# Patient Record
Sex: FEMALE | Race: BLACK OR AFRICAN AMERICAN | NOT HISPANIC OR LATINO | Employment: OTHER | URBAN - METROPOLITAN AREA
[De-identification: names, ages, dates, MRNs, and addresses within clinical notes are randomized per-mention and may not be internally consistent; named-entity substitution may affect disease eponyms.]

---

## 2017-02-01 ENCOUNTER — OFFICE VISIT (OUTPATIENT)
Dept: PAIN MEDICINE | Facility: CLINIC | Age: 62
End: 2017-02-01
Attending: ANESTHESIOLOGY
Payer: MEDICARE

## 2017-02-01 VITALS
DIASTOLIC BLOOD PRESSURE: 73 MMHG | SYSTOLIC BLOOD PRESSURE: 118 MMHG | TEMPERATURE: 99 F | HEART RATE: 70 BPM | BODY MASS INDEX: 39 KG/M2 | WEIGHT: 234.38 LBS

## 2017-02-01 DIAGNOSIS — M54.12 BRACHIAL NEURITIS OR RADICULITIS NOS: ICD-10-CM

## 2017-02-01 DIAGNOSIS — M53.3 SACROILIAC JOINT PAIN: ICD-10-CM

## 2017-02-01 DIAGNOSIS — R53.81 PHYSICAL DECONDITIONING: ICD-10-CM

## 2017-02-01 DIAGNOSIS — M25.512 CHRONIC PAIN OF BOTH SHOULDERS: ICD-10-CM

## 2017-02-01 DIAGNOSIS — M79.641 BILATERAL HAND PAIN: ICD-10-CM

## 2017-02-01 DIAGNOSIS — M79.642 BILATERAL HAND PAIN: ICD-10-CM

## 2017-02-01 DIAGNOSIS — M54.16 BILATERAL LUMBAR RADICULOPATHY: ICD-10-CM

## 2017-02-01 DIAGNOSIS — M54.17 LUMBOSACRAL RADICULOPATHY: ICD-10-CM

## 2017-02-01 DIAGNOSIS — M79.10 MYALGIA: ICD-10-CM

## 2017-02-01 DIAGNOSIS — G56.03 BILATERAL CARPAL TUNNEL SYNDROME: ICD-10-CM

## 2017-02-01 DIAGNOSIS — M54.12 CERVICAL RADICULOPATHY: Primary | ICD-10-CM

## 2017-02-01 DIAGNOSIS — M47.816 LUMBAR FACET ARTHROPATHY: ICD-10-CM

## 2017-02-01 DIAGNOSIS — M47.816 SPONDYLOSIS OF LUMBAR REGION WITHOUT MYELOPATHY OR RADICULOPATHY: ICD-10-CM

## 2017-02-01 DIAGNOSIS — G89.29 CHRONIC PAIN OF BOTH SHOULDERS: ICD-10-CM

## 2017-02-01 DIAGNOSIS — M25.511 CHRONIC PAIN OF BOTH SHOULDERS: ICD-10-CM

## 2017-02-01 DIAGNOSIS — M47.816 FACET ARTHRITIS, DEGENERATIVE, LUMBAR SPINE: ICD-10-CM

## 2017-02-01 PROCEDURE — 99214 OFFICE O/P EST MOD 30 MIN: CPT | Mod: S$GLB,,, | Performed by: NURSE PRACTITIONER

## 2017-02-01 PROCEDURE — 99999 PR PBB SHADOW E&M-EST. PATIENT-LVL III: CPT | Mod: PBBFAC,,, | Performed by: NURSE PRACTITIONER

## 2017-02-01 PROCEDURE — 3074F SYST BP LT 130 MM HG: CPT | Mod: S$GLB,,, | Performed by: NURSE PRACTITIONER

## 2017-02-01 PROCEDURE — 3078F DIAST BP <80 MM HG: CPT | Mod: S$GLB,,, | Performed by: NURSE PRACTITIONER

## 2017-02-01 RX ORDER — ONDANSETRON 4 MG/1
TABLET, FILM COATED ORAL
Refills: 0 | COMMUNITY
Start: 2016-11-06 | End: 2018-06-27

## 2017-02-01 RX ORDER — TRAMADOL HYDROCHLORIDE 50 MG/1
50 TABLET ORAL EVERY 12 HOURS PRN
Qty: 60 TABLET | Refills: 1 | Status: SHIPPED | OUTPATIENT
Start: 2017-02-01 | End: 2017-04-02

## 2017-02-01 RX ORDER — PANTOPRAZOLE SODIUM 40 MG/1
TABLET, DELAYED RELEASE ORAL
Refills: 1 | COMMUNITY
Start: 2016-11-16 | End: 2017-12-27 | Stop reason: CLARIF

## 2017-02-01 RX ORDER — GABAPENTIN 300 MG/1
600 CAPSULE ORAL 3 TIMES DAILY
Qty: 540 CAPSULE | Refills: 0 | Status: SHIPPED | OUTPATIENT
Start: 2017-02-01 | End: 2017-08-11 | Stop reason: SDUPTHER

## 2017-02-01 RX ORDER — LINACLOTIDE 145 UG/1
CAPSULE, GELATIN COATED ORAL
Refills: 2 | Status: ON HOLD | COMMUNITY
Start: 2016-11-10 | End: 2022-03-23 | Stop reason: HOSPADM

## 2017-02-01 NOTE — PROGRESS NOTES
Subjective:       Patient ID: Jessie Gallardo is a 61 y.o. female.    Chief Complaint: Hand Pain and Shoulder Pain    Interval History 2/1/2017:  The patient returns today for follow up of neck and back pain.  Her back pain is tolerable today.  Her biggest complaint today is neck pain with radiation into her arms.  She has numbness to her fingers also.  She previously had significant benefit from cervical MIKE in the past.  She had an A1C completed at her PCP last week and reports that it was 8.0.  She has been trying to control her sugars through diet and exercise.  She continues to take Gabapentin 1800 mg daily and Tramadol PRN.  She reports significant benefit of the medication without side effects.  Her pain today is 5/10.  The patient denies any bowel or bladder incontinence or signs of saddle paresthesia.      Interval History 12/2/2016:  The patient returns today for follow up of lower back and neck pain.  She is s/p L5-S1 IL MIKE on 11/16/16 with 70% pain relief of back and leg pain.  She reports that her pain is mild today.  She has increased her activity lately and has been cleaning her house.  She does have some increased pain after cleaning.  She is very happy with her results though.  Her neck pain has been tolerable.  She has not started PT because she recently moved and would like another order.  Her pain today is 5/10.  The patient denies any bowel or bladder incontinence or signs of saddle paresthesia.  The patient denies any major medical changes since last office visit.    Interval History 10/5/2016:  The patient returns today for follow up of lower back and leg pain.  She is s/p bilateral SI joint injections on 8/10/16 with 50% benefit.  She is still having radiating pain down the back and sides of both legs to the top and bottom of her feet.  She is also having numbness and tingling throughout her feet.  She was previously having swelling to her legs.  Dr. Ley instructed her to titrate down  Gabapentin to see if this was causing this.  She states that this did not improve her swelling.  She then stopped vitamin supplements (Vit E and C).  Her leg swelling then resolved.  She then restarted Gabapentin and titrated up to 600 mg TID.  Her pain today is 8/10.  The patient denies any bowel or bladder incontinence or signs of saddle paresthesia.  The patient denies any major medical changes since last office visit.    Interval History 6/27/2016:  The patient returns today for f/u of neck, bilateral shoulder and lower back pain.  She is s/p cervical MIKE on 6/8/16 with 80% improvement of her neck and left arm pain.  She is no longer having the shooting electric pain.  Her numbness has also improved since the procedure.  Her biggest complaint today is lower back pain which radiates down the back and sides of both legs to the bottom of her feet with numbness.  She also has a history of diabetes and diabetic neuropathy.  Her last A1C was 8.7, increased from 7.8.  She has had lumbar RFAs in the past with significant relief.  She has not had lumbar ESIs.  Her previous lumbar MRI from 2014 shows disc bulges at L3-4, L4-5, and L5-S1 without NF narrowing.  She is taking Tramadol PRN with relief.  I increased her Gabapentin last OV but she reports that she is taking 300 mg BID.  Her pain today is 6/10.  The patient denies any bowel/bladder incontinence or symptoms of saddle paresthesia.     Interval History 05/20/2016:  Patient is present today for a f/u for lower back, bilateral shoulder and neck pain.  Her back pain has been mild since her lumbar RFAs.  She is mostly complaining of neck pain which radiates down both arms with associated tingling.  She has had cervical ESIs in the past which she now states may have offered her more benefit than she previously believed.  She would like to try this treatment options again.  She did have a recent cervical MRI ordered by Dr. Herrera which does show muliple osteophytes with  spinal narrowing.  She also had bilateral shoulder XRAYs which show DJD.   She continues to use Tramadol which provided relief.  She has a history of C4-5 fusion in 1999 with relief of her pain.  She is also taking Gabapentin 300 mg TID with limited relief.  Her pain today is a 6/10.  The patient denies any bowel/bladder incontinence.    Interval History 04/26/2016:  Patient is present today for a f/u for Low Back Pain. The pt recently had a Right Lumbar RFA @ L3,L4,L5 on 4/12/2016 where she reports a significant amount of relief. She reports her pain to be 4/10 today. The pt would like to address neck and shoulder pain that is radiating down through to both arms.  Patient has had a history of a C4-5 fusion, she had previous cervical intralaminar epidural steroidal injections in January 2015 which did not offer significant relief and the patient did have a cervical EMG/NCV which did not show evidence of radiculopathy at that time but some carpal tunnel syndrome.  Currently she states that the pain in her shoulders and in her arms is most significant and she describes her arm pain as feelings of swelling bilaterally.    Interval History 11/19/2015:  Patient here for f/u of chronic low back pain. Patient was last here in 7/15 and had B L3,4,5 RFA done. Patient states that she continues to have about 50% pain reduction of low back pain and some leg pain. She able to walk and stand more and feels that she is functioning much better over all. Patient continues to have some low back pain right> left with intermittent BLE pains right >left and bilateral foot numbess and tingling. Patient admits to not taking gabapentin as directed and had been taking it prn.     Interval history 07/7/2015:  Ms. Gallardo is a 61 y.o. female with neck and low back pain presents today for f/u s/p b/l MBB at L4, 5 and sacral Ala and right sided MBB at same level. Reports significantly more relief with b/l block. She was in physical therapy but  has not gone for 2 months. She has a lot of anxiety and frustration associated with her chronic pain. She saw Dr. Rosenbaum today and it was recommended she continue f/u with Dr. Jean. Pt has been seen in the clinic before by Dr. Ley, however pt is new to me.   History below per Dr. Ley    Interval history 05/21/2015:  Since previous encounter patient reports neck pain and lower back pain, although the pain in her lower back is her worst pain. . Patient reports pain as a 8/10 today. Patient does not take Soma and Hydrocodone anymore. She has a lot of anxiety and frustration associated with her chronic pain.    Interval history 04/20/2015:  Since previous encounter patient reports neck pain radiating into her upper extremities. Patient stated that she completed her EMG with  which did not show any evidence of radiculopathy and neurosurgery was evaluating the patient did not determine any need for further surgical intervention. Patient stated that she discontinued the Lyrica due to it makes her swell and have dry mouth. Patient stated that she still takes the Gabapentin. Patient reports no other health changes since her last visit. Patient reports her pain 8/10 today.     Interval history 03/16/2015:  Since previous encounter patient reports neck pain radiating into her upper extremities and sometimes causes her to have headaches. Patient reports low back also. Patient stated that she did receive relief from her last injection but the pain has returned. Patient stated that she still takes Norco but it only puts her to sleep and when she awakens the pain is back. Patient reports that she missed her EMG appt due to her pain, however she has been rescheduled to the end of the month Additionally the patient has not started Lyrica as previously prescribed. Patient reports memory loss. Patient reports no other health changes since her last visit. Patient reports her pain 8/10 today.   Initial  "encounter:    Jessie Gallardo presents to the clinic for the evaluation of neck and low back pain. The pain started 3 months ago following traumatic fall and symptoms have been worsening. Reports that her neck pain is the most concerning today.     Brief history:    She has h/o cervical fusion in 1999 in [place] and she did well after surgery. She fell on 9/2/14 when she was getting her oil changed. She was unable to stand right after the incident and she was taken by ambulance to the ED.     She complains of diffuse body pain. She feels like she is getting worse from the accident. She has pain from her "head to her toes."      Pain Description:    The pain is located in the neck and low back area and radiates to the shoulders and posterior thighs, respectively..     At BEST 6/10     At WORST  9/10 on the WORST day.     On average pain is rated as 7/10.     Today the pain is rated as 8/10    The pain is described as aching, burning, numbing, sharp, shooting, stabbing, tingling and weakness      Symptoms interfere with daily activity and sleeping.     Exacerbating factors: Sitting, Laying, Bending, Morning, Extension, Flexing, Lifting and Getting out of bed/chair.     Mitigating factors laying down and medications.     Patient denies night fever/night sweats, urinary incontinence, bowel incontinence, significant weight loss, significant motor weakness and loss of sensations.  Patient denies any suicidal or homicidal ideations    Pain Medications:    Current:  Neurontin 600 mg TID (1800 mg daily)  Tramadol 50 mg BID PRN  Compounding cream    Tried in Past:  NSAIDs -Aleve  TCA -Never  SNRI -Never  Muscle relaxer: Klonopin, Zanaflex, Zoloft  Opioid: Tramadol     Physical Therapy/Home Exercise: not currently     report: Reviewed and consistent with medication use as prescribed.    Pain Procedures:  1/7/2015-cervical epidural steroidal injection  1/21/2015-cervical epidural steroidal injection  6/10/2015 bilateral " medial branch nerve block at the level of L3, L4, L5  6/17/2015-right medial branch nerve block at the level of L3, L4, L5  7/15/2015-left radiofrequency ablation at the level of L3, L4, L5  7/29/2015-right radiofrequency ablation at the level of L3, L4, L5  3/29/2016-left radio frequency ablation at the level of L3, L4, L5  4/12/2016-right radio frequency ablation at the level of L3, L4, L5  6/8/16 C7-T1 IL MIKE- 80% relief  8/10/16 Bilateral SI joint injections- 50% relief  11/16/16 L5-S1 IL MIKE- 70% relief    Chiropractor -never  Acupuncture - never  TENS unit -Yes, with PT  Spinal decompression -never  Joint replacement -never    Imaging:    MRI Cervical 05/17/2016:  Comparison is November 2014    Routine imaging of the cervical spine was obtained.    There is an osseous fusion at C4-5.  There is straightening and slight reversal of the normal cervical lordosis again seen.  Alignment of vertebral bodies is satisfactory.  Discs are desiccated throughout with disc space narrowing most pronounced at the C5-6 level, similar to prior study.  The spinal cord is normal in signal and paravertebral structures are unremarkable.    C2-3 demonstrates no significant abnormality.    C3-4 demonstrates a mild diffuse disc bulge asymmetric toward the right.  There is bilateral foraminal narrowing.  This disc bulge thins the posterior cerebrospinal fluid sleeve and abuts the anterior aspect of the spinal cord with resultant mild spinal canal narrowing.    C4-5 demonstrates osseous fusion with some posterior osteophytic spurring.  There is complete loss of the anterior cerebrospinal fluid sleeve,and the posterior cerebrospinal fluid sleeve and some flattening of the spinal cord but no abnormal spinal cord signal.  Findings appear fairly similar to prior study.  There is mild to moderate narrowing of the spinal canal.    C5-6 demonstrates posterior disc osteophyte complex asymmetric to the left more pronounced as compared to prior  study.  There is thinning of the cerebrospinal fluid sleeve around the spinal cord and mild spinal canal narrowing.    C6-7 demonstrates a posterior disc osteophyte complex asymmetric toward the left.  There is thinning of the cerebrospinal fluid sleeve around the spinal cord and mild spinal canal narrowing.   Impression    In this patient with osseous fusion at C4-5, there is multilevel spinal canal narrowing most significant at C4-5, as further detailed above.  ______________________________________     Electronically signed by: Sherie Rodriguez MD  Date: 05/18/16       Lumbar MRI 10/31/16  Narrative   MRI OF THE LUMBAR SPINE WITHOUT CONTRAST.     Technique:  Sagittal T1, sagittal T2, sagittal STIR, axial T1 and axial T2 weighted images of the lumbar spine obtained without contrast.     Comparison: MRI 11/20/2014.     Findings:  Minimal grade 1 anterolisthesis is seen at L3-4 and L4-L5.  Otherwise, sagittal vertebral body alignment is appropriate.  Vertebral body heights well-maintained.  No fracture is identified.  Mild multilevel disc degeneration is seen, most pronounced at L5-S1. No marrow signal abnormality suspicious for an infiltrative process.      The conus is normal in appearance, and terminates at the L1 level.  Multiple fibroids are seen in the uterus.      T12-L1: No focal disc herniation.  No significant spinal canal stenosis or neuroforaminal narrowing.  L1-L2: Bilateral facet arthropathy.  No focal disc herniation.  No significant spinal canal stenosis or neuroforaminal narrowing.  L2-L3: No focal disc herniation.  No significant spinal canal stenosis.  Bilateral facet arthropathy is noted, which contributes to mild bilateral neuroforaminal narrowing.  L3-L4: Uncovering of the disc with mild bulge, bilateral facet arthropathy and ligament of flavum thickening resulting in moderate right-sided and mild left-sided neuroforaminal narrowing.  L4-L5: Uncovering of the disc with mild bulge, bilateral facet  arthropathy, and ligamentum flavum thickening resulting in moderate right-sided and mild left-sided neuroforaminal narrowing.  L5-S1: Broad-based disc bulge and bilateral facet arthropathy without significant spinal canal stenosis.  Moderate bilateral neuroforaminal narrowing is seen.   Impression      Multilevel lumbar spondylosis, as detailed above.         Lab Results   Component Value Date    HGBA1C 8.7 (H) 04/21/2016       REVIEW OF SYSTEMS:    GENERAL:  No weight loss, malaise or fevers.  HEENT:   No recent changes in vision or hearing  NECK:  Negative for lumps, no difficulty with swallowing.  RESPIRATORY:  Negative for cough, wheezing or shortness of breath, patient denies any recent URI.  CARDIOVASCULAR:  Negative for chest pain, leg swelling or palpitations. H/o htn.  GI:  Negative for abdominal discomfort, blood in stools or black stools or change in bowel habits.  MUSCULOSKELETAL:  See HPI.  SKIN:  Negative for lesions, rash, and itching.  PSYCH:  No mood disorder or recent psychosocial stressors.  Patients sleep is not disturbed secondary to pain.  HEMATOLOGY/LYMPHOLOGY:  Negative for prolonged bleeding, bruising easily or swollen nodes.  Patient is not currently taking any anti-coagulants  NEURO:   No history of syncope, paralysis, seizures or tremors. H/O headaches- followed by Dr. Herrera.  ENDO: H/O diabetes and peripheral neuropathy.  All other reviewed and negative other than HPI.      OBJECTIVE:    Visit Vitals    /73    Pulse 70    Temp 99.2 °F (37.3 °C)    Wt 106.3 kg (234 lb 5.6 oz)    LMP 10/11/2010    BMI 39 kg/m2       PHYSICAL EXAMINATION:    GENERAL: Well appearing, in no acute distress, alert and oriented x3.  PSYCH:  Mood and affect appropriate.  SKIN: Skin color, texture, turgor normal, no rashes or lesions.  HEAD/FACE:  Normocephalic, atraumatic. Cranial nerves grossly intact.  NECK: Mild pain to palpation over the cervical paraspinal muscles on the right. Spurling Negative.   Painful cervical flexion.  Mild cervical facet loading bilaterally.  Negative Spurling.  CV: RRR with palpation of the radial artery.  PULM: No evidence of respiratory difficulty, symmetric chest rise.  BACK:  There is no pain with palpation over the facet joints of the lumbar spine bilaterally. Full ROM to lumbar spine with pain on flexion.  Mild facet loading bilaterally.  There is pain to palpation over the sacroiliac joints bilaterally.  FABERs is negative bilaterally.  EXTREMITIES: Peripheral joint ROM is full and pain free without obvious instability or laxity in all four extremities. No deformities, edema, or skin discoloration. Good capillary refill.  MUSCULOSKELETAL:  Pain to palpation of the subacromial bursa on the right.  Full ROM to bilateral shoulder with pain on internal rotation of right shoulder.  Bilateral upper  extremity strength is normal and symmetric.  No atrophy or tone abnormalities are noted.  NEURO: Bilateral upper extremity coordination and muscle stretch reflexes are physiologic and symmetric.  Brenda's negative. No clonus.  Decreased sensation to BLE.  GAIT: Antalgic- ambulates without assistance.    Assessment:     Ms. Gallardo is a 61 y.o. female with neck and lower back pain consistent with the following diagnoses:    1. Cervical radiculopathy    2. Facet arthritis, degenerative, lumbar spine    3. Lumbosacral radiculopathy    4. Myalgia    5. Chronic pain of both shoulders    6. Bilateral lumbar radiculopathy    7. Sacroiliac joint pain    8. Lumbar facet arthropathy    9. Physical deconditioning    10. Spondylosis of lumbar region without myelopathy or radiculopathy    11. Bilateral hand pain    12. Brachial neuritis or radiculitis NOS    13. Bilateral carpal tunnel syndrome        Plan:     - Previous imaging was reviewed and discussed with the patient today.    - Schedule C7-T1 IL MIKE as previous provided excellent benefit.  The procedure, risks, benefits and options were  discussed with patient. There are no contraindications to the procedure. The patient expressed understanding and agreed to proceed.  Consent obtained today.    - If back pain worsens, can repeat L5-S1 IL MIKE as this provided more relief that other past procedures.    - Continue Gabapentin 600 mg TID (1800 mg daily).    - Continue Tramadol 50 mg BID PRN pain, #60, 1 refill.  I called this in today.    - The [place] Board of Pharmacy website for prescription monitoring was consulted today, and it does not suggest any deviations in conflict with the patient's controlled substance contract with our clinic. Will continue current therapy with frequent monitoring of the controlled substance database, and urine drug screens on followup. Refill approved. Medication management provided by Dr. Ley.    - UDS from 12/2/16 reviewed and is consistent.  This does not test for Tramadol, however.    - The patient will continue a home exercise routine to help with pain and strengthening.      - We also discussed the importance of maintaining sugars WNL and compliance with medication regimen.  Last A1C was 8.7.  She may have a component of peripheral neuropathy as well.    - RTC 2 weeks after above procedure.    - Dr. Ley was consulted on the patient and agrees with this plan.      The above plan and management options were discussed at length with patient. Patient is in agreement with the above and verbalized understanding.     Lisa Angel  02/01/2017

## 2017-02-01 NOTE — MR AVS SNAPSHOT
[facility]  [address]  [facility] [address]  Phone: [phone]  Fax: [phone]                  Jessie Gallardo   2017 8:20 AM   Office Visit    Description:  Female : 1955   Provider:  ALEX Cat   Department:  [facility]           Reason for Visit     Hand Pain     Shoulder Pain           Diagnoses this Visit        Comments    Cervical radiculopathy    -  Primary     Facet arthritis, degenerative, lumbar spine         Lumbosacral radiculopathy         Myalgia         Chronic pain of both shoulders         Bilateral lumbar radiculopathy         Sacroiliac joint pain         Lumbar facet arthropathy         Physical deconditioning         Spondylosis of lumbar region without myelopathy or radiculopathy         Bilateral hand pain         Brachial neuritis or radiculitis NOS         Bilateral carpal tunnel syndrome                To Do List           Future Appointments        Provider Department Dept Phone    3/27/2017 1:00 PM Mateo Herrera III, MD [facility] [phone]      Goals (5 Years of Data)     None       These Medications        Disp Refills Start End    gabapentin (NEURONTIN) 300 MG capsule 540 capsule 0 2017    Take 2 capsules (600 mg total) by mouth 3 (three) times daily. - Oral    [facility] [address] [place], [place] [address] [address] AT [facility] Ph #: [phone]       Notes to Pharmacy: **Patient requests 90 days supply**    tramadol (ULTRAM) 50 mg tablet 60 tablet 1 2017    Take 1 tablet (50 mg total) by mouth every 12 (twelve) hours as needed for Pain. - Oral    [facility] [address] [facility] [address] AT [facility] Ph #: [phone]         [facility]     [facility] Nurse Care Line -  Assistance  Registered nurses in the [facility] provide clinical advisement,  health education, appointment booking, and other advisory services.  Call for this free service at [phone].             Medications           Message regarding Medications     Verify the changes and/or additions to your medication regime listed below are the same as discussed with your clinician today.  If any of these changes or additions are incorrect, please notify your healthcare provider.        CHANGE how you are taking these medications     Start Taking Instead of    gabapentin (NEURONTIN) 300 MG capsule gabapentin (NEURONTIN) 300 MG capsule    Dosage:  Take 2 capsules (600 mg total) by mouth 3 (three) times daily. Dosage:  TAKE 2 CAPSULES BY MOUTH THREE TIMES DAILY    Reason for Change:  Reorder            Verify that the below list of medications is an accurate representation of the medications you are currently taking.  If none reported, the list may be blank. If incorrect, please contact your healthcare provider. Carry this list with you in case of emergency.           Current Medications     albuterol 90 mcg/actuation inhaler Inhale 2 puffs into the lungs every 6 (six) hours as needed for Wheezing or Shortness of Breath.    amlodipine (NORVASC) 10 MG tablet Take 1 tablet (10 mg total) by mouth once daily.    aspirin (ECOTRIN) 81 MG EC tablet Take 1 tablet (81 mg total) by mouth once daily.    blood sugar diagnostic (FREESTYLE LITE STRIPS) Strp TEST BLOOD SUGAR 3 TIMES A DAY    divalproex (DEPAKOTE) 500 MG Tb24 Take 2 tablets (1,000 mg total) by mouth every evening.    enalapril (VASOTEC) 20 MG tablet Take 1 tablet (20 mg total) by mouth 2 (two) times daily.    fluticasone (FLONASE) 50 mcg/actuation nasal spray USE 2 SPRAYS IN EACH NOSTRIL ONCE DAILY    gabapentin (NEURONTIN) 300 MG capsule Take 2 capsules (600 mg total) by mouth 3 (three) times daily.    hydrochlorothiazide (HYDRODIURIL) 25 MG tablet TAKE 1 TABLET BY MOUTH ONCE DAILY    insulin aspart (NOVOLOG FLEXPEN) 100 unit/mL InPn pen INJECT  "20 UNITS UNDER THE SKIN THREE TIMES DAILY WITH MEALS    insulin detemir (LEVEMIR FLEXTOUCH) 100 unit/mL (3 mL) SubQ InPn pen Inject 32 units bid    insulin needles, disposable, (NOVOFINE 30) 30 x 1/3 " Ndle USE AS DIRECTED THREE TIMES DAILY    lancets (FREESTYLE LANCETS) 28 gauge Misc Inject 1 lancet into the skin 3 (three) times daily.    lidocaine-prilocaine (EMLA) cream     LINZESS 145 mcg Cap capsule TK 1 C PO ONCE A DAY ON AN EMPTY STOMACH    lovastatin (MEVACOR) 20 MG tablet TAKE 1 TABLET BY MOUTH EVERY EVENING.    omeprazole (PRILOSEC) 20 MG capsule TAKE ONE CAPSULE BY MOUTH TWICE DAILY    ondansetron (ZOFRAN) 4 MG tablet TK 1 T PO Q 8 H PRF NAUSEA    pantoprazole (PROTONIX) 40 MG tablet TK 1 T PO ONCE A DAY    pen needle, diabetic (BD INSULIN PEN NEEDLE UF MINI) 31 gauge x 3/16" Ndle USE AS DIRECTED TWICE DAILY    lorazepam (ATIVAN) 1 MG tablet Take 1 tablet (1 mg total) by mouth On call Procedure (for MRI).    tramadol (ULTRAM) 50 mg tablet Take 1 tablet (50 mg total) by mouth every 12 (twelve) hours as needed for Pain.           Clinical Reference Information           Vital Signs - Last Recorded  Most recent update: 2/1/2017  8:38 AM by Humza Nunn MA    BP Pulse Temp Wt LMP BMI    118/73 70 99.2 °F (37.3 °C) 106.3 kg (234 lb 5.6 oz) 10/11/2010 39 kg/m2      Blood Pressure          Most Recent Value    BP  118/73      Allergies as of 2/1/2017     No Known Allergies      Immunizations Administered on Date of Encounter - 2/1/2017     None      "

## 2017-02-14 ENCOUNTER — TELEPHONE (OUTPATIENT)
Dept: PAIN MEDICINE | Facility: CLINIC | Age: 62
End: 2017-02-14

## 2017-02-14 NOTE — TELEPHONE ENCOUNTER
Spoke with patient regarding tramadol prescription.  She was unaware that there was one at [facility] for her.  She also mentioned LE swelling.  She has a history of slight CALABRESE and Htn well controlled.  She is aware to contact her PCP with persistent symptoms.  She denies chest pain at this time.

## 2017-02-14 NOTE — TELEPHONE ENCOUNTER
----- Message from Sirena Warren sent at 2/14/2017  1:54 PM CST -----  _x  1st Request  _  2nd Request  _  3rd Request        Who: gisel    Why: pt. Would like to speak with zion in regards to pain.please call to discuss    What Number to Call Back:[phone]    When to Expect a call back: (Before the end of the day)   -- if the call is after 12:00, the call back will be tomorrow.

## 2017-03-07 RX ORDER — LIDOCAINE HYDROCHLORIDE 10 MG/ML
10 INJECTION INFILTRATION; PERINEURAL
Status: CANCELLED | OUTPATIENT
Start: 2017-03-07 | End: 2017-03-07

## 2017-03-07 RX ORDER — BUPIVACAINE HYDROCHLORIDE 2.5 MG/ML
10 INJECTION, SOLUTION EPIDURAL; INFILTRATION; INTRACAUDAL ONCE
Status: CANCELLED | OUTPATIENT
Start: 2017-03-07 | End: 2017-03-07

## 2017-03-07 RX ORDER — SODIUM CHLORIDE 9 MG/ML
3 INJECTION, SOLUTION INTRAMUSCULAR; INTRAVENOUS; SUBCUTANEOUS ONCE
Status: CANCELLED | OUTPATIENT
Start: 2017-03-07 | End: 2017-03-07

## 2017-03-07 RX ORDER — MIDAZOLAM HYDROCHLORIDE 1 MG/ML
2 INJECTION INTRAMUSCULAR; INTRAVENOUS
Status: CANCELLED | OUTPATIENT
Start: 2017-03-07

## 2017-03-15 ENCOUNTER — HOSPITAL ENCOUNTER (OUTPATIENT)
Facility: OTHER | Age: 62
Discharge: HOME | End: 2017-03-15
Attending: ANESTHESIOLOGY | Admitting: ANESTHESIOLOGY
Payer: MEDICARE

## 2017-03-15 ENCOUNTER — SURGERY (OUTPATIENT)
Age: 62
End: 2017-03-15

## 2017-03-15 VITALS
BODY MASS INDEX: 35.68 KG/M2 | TEMPERATURE: 98 F | OXYGEN SATURATION: 97 % | WEIGHT: 222 LBS | RESPIRATION RATE: 18 BRPM | HEIGHT: 66 IN | DIASTOLIC BLOOD PRESSURE: 93 MMHG | HEART RATE: 57 BPM | SYSTOLIC BLOOD PRESSURE: 137 MMHG

## 2017-03-15 DIAGNOSIS — M54.16 LUMBAR RADICULOPATHY: Primary | ICD-10-CM

## 2017-03-15 DIAGNOSIS — M47.812 CERVICAL SPONDYLOSIS WITHOUT MYELOPATHY: ICD-10-CM

## 2017-03-15 LAB — POCT GLUCOSE: 134 MG/DL (ref 70–110)

## 2017-03-15 PROCEDURE — 82947 ASSAY GLUCOSE BLOOD QUANT: CPT | Performed by: ANESTHESIOLOGY

## 2017-03-15 PROCEDURE — 25000003 PHARM REV CODE 250: Performed by: PAIN MEDICINE

## 2017-03-15 PROCEDURE — 77003 FLUOROGUIDE FOR SPINE INJECT: CPT | Performed by: ANESTHESIOLOGY

## 2017-03-15 PROCEDURE — 62320 NJX INTERLAMINAR CRV/THRC: CPT | Performed by: ANESTHESIOLOGY

## 2017-03-15 PROCEDURE — 62321 NJX INTERLAMINAR CRV/THRC: CPT | Performed by: ANESTHESIOLOGY

## 2017-03-15 PROCEDURE — 62321 NJX INTERLAMINAR CRV/THRC: CPT | Mod: ,,, | Performed by: ANESTHESIOLOGY

## 2017-03-15 PROCEDURE — 99152 MOD SED SAME PHYS/QHP 5/>YRS: CPT | Mod: ,,, | Performed by: ANESTHESIOLOGY

## 2017-03-15 PROCEDURE — 63600175 PHARM REV CODE 636 W HCPCS: Performed by: ANESTHESIOLOGY

## 2017-03-15 PROCEDURE — 25500020 PHARM REV CODE 255: Performed by: ANESTHESIOLOGY

## 2017-03-15 PROCEDURE — 25000003 PHARM REV CODE 250: Performed by: ANESTHESIOLOGY

## 2017-03-15 RX ORDER — SODIUM CHLORIDE 9 MG/ML
INJECTION, SOLUTION INTRAVENOUS CONTINUOUS
Status: DISCONTINUED | OUTPATIENT
Start: 2017-03-15 | End: 2017-03-15 | Stop reason: HOSPADM

## 2017-03-15 RX ORDER — MIDAZOLAM HYDROCHLORIDE 1 MG/ML
2 INJECTION INTRAMUSCULAR; INTRAVENOUS
Status: DISCONTINUED | OUTPATIENT
Start: 2017-03-15 | End: 2017-03-15 | Stop reason: HOSPADM

## 2017-03-15 RX ORDER — MIDAZOLAM HYDROCHLORIDE 1 MG/ML
INJECTION INTRAMUSCULAR; INTRAVENOUS
Status: DISCONTINUED | OUTPATIENT
Start: 2017-03-15 | End: 2017-03-15 | Stop reason: HOSPADM

## 2017-03-15 RX ORDER — METHYLPREDNISOLONE ACETATE 40 MG/ML
40 INJECTION, SUSPENSION INTRA-ARTICULAR; INTRALESIONAL; INTRAMUSCULAR; SOFT TISSUE ONCE
Status: COMPLETED | OUTPATIENT
Start: 2017-03-15 | End: 2017-03-15

## 2017-03-15 RX ORDER — SODIUM CHLORIDE 9 MG/ML
3 INJECTION, SOLUTION INTRAMUSCULAR; INTRAVENOUS; SUBCUTANEOUS ONCE
Status: CANCELLED | OUTPATIENT
Start: 2017-03-15 | End: 2017-03-15

## 2017-03-15 RX ORDER — LIDOCAINE HYDROCHLORIDE 10 MG/ML
10 INJECTION INFILTRATION; PERINEURAL
Status: DISCONTINUED | OUTPATIENT
Start: 2017-03-15 | End: 2017-03-15 | Stop reason: HOSPADM

## 2017-03-15 RX ORDER — BUPIVACAINE HYDROCHLORIDE 2.5 MG/ML
INJECTION, SOLUTION EPIDURAL; INFILTRATION; INTRACAUDAL
Status: DISCONTINUED | OUTPATIENT
Start: 2017-03-15 | End: 2017-03-15 | Stop reason: HOSPADM

## 2017-03-15 RX ORDER — SODIUM CHLORIDE 9 MG/ML
3 INJECTION, SOLUTION INTRAMUSCULAR; INTRAVENOUS; SUBCUTANEOUS ONCE
Status: DISCONTINUED | OUTPATIENT
Start: 2017-03-15 | End: 2017-03-15 | Stop reason: HOSPADM

## 2017-03-15 RX ORDER — BUPIVACAINE HYDROCHLORIDE 2.5 MG/ML
10 INJECTION, SOLUTION EPIDURAL; INFILTRATION; INTRACAUDAL ONCE
Status: DISCONTINUED | OUTPATIENT
Start: 2017-03-15 | End: 2017-03-15 | Stop reason: HOSPADM

## 2017-03-15 RX ORDER — FAMOTIDINE 10 MG/ML
20 INJECTION INTRAVENOUS ONCE
Status: COMPLETED | OUTPATIENT
Start: 2017-03-15 | End: 2017-03-15

## 2017-03-15 RX ORDER — BUPIVACAINE HYDROCHLORIDE 2.5 MG/ML
10 INJECTION, SOLUTION EPIDURAL; INFILTRATION; INTRACAUDAL ONCE
Status: CANCELLED | OUTPATIENT
Start: 2017-03-15 | End: 2017-03-15

## 2017-03-15 RX ORDER — LIDOCAINE HYDROCHLORIDE 10 MG/ML
10 INJECTION INFILTRATION; PERINEURAL
Status: CANCELLED | OUTPATIENT
Start: 2017-03-15 | End: 2017-03-15

## 2017-03-15 RX ORDER — LIDOCAINE HYDROCHLORIDE 10 MG/ML
INJECTION INFILTRATION; PERINEURAL
Status: DISCONTINUED | OUTPATIENT
Start: 2017-03-15 | End: 2017-03-15 | Stop reason: HOSPADM

## 2017-03-15 RX ADMIN — IOHEXOL 5 ML: 300 INJECTION, SOLUTION INTRAVENOUS at 04:03

## 2017-03-15 RX ADMIN — MIDAZOLAM HYDROCHLORIDE 2 MG: 1 INJECTION, SOLUTION INTRAMUSCULAR; INTRAVENOUS at 04:03

## 2017-03-15 RX ADMIN — LIDOCAINE HYDROCHLORIDE 10 ML: 10 INJECTION, SOLUTION INFILTRATION; PERINEURAL at 04:03

## 2017-03-15 RX ADMIN — METHYLPREDNISOLONE ACETATE 40 MG: 40 INJECTION, SUSPENSION INTRA-ARTICULAR; INTRALESIONAL; INTRAMUSCULAR; SOFT TISSUE at 04:03

## 2017-03-15 RX ADMIN — MIDAZOLAM HYDROCHLORIDE 1 MG: 1 INJECTION, SOLUTION INTRAMUSCULAR; INTRAVENOUS at 04:03

## 2017-03-15 RX ADMIN — BUPIVACAINE HYDROCHLORIDE 10 ML: 2.5 INJECTION, SOLUTION EPIDURAL; INFILTRATION; INTRACAUDAL; PERINEURAL at 04:03

## 2017-03-15 RX ADMIN — SODIUM CHLORIDE: 0.9 INJECTION, SOLUTION INTRAVENOUS at 04:03

## 2017-03-15 RX ADMIN — FAMOTIDINE 20 MG: 10 INJECTION INTRAVENOUS at 04:03

## 2017-03-15 NOTE — OP NOTE
Cervical Interlaminar Epidural Steroid Injection under Fluoroscopic Guidance.   Time-out taken to identify patient and procedure prior to starting the procedure.     Date of Procedure: 03/15/2017    PROCEDURE: Cervical Interlaminar epidural steroid injection C7/T1 under fluoroscopic guidance.     Pre-Op diagnosis: Cervical spinal stenosis    Post-Op diagnosis: Cervical spinal stenosis    PHYSICIAN: IVIS VERDE     ASSISTANTS: None     MEDICATIONS INJECTED: Preservative-free Decadron 10 mg with 1mL of sterile 0.25%Bupivicaine and 3ml of preservative free normal saline.     LOCAL ANESTHETIC INJECTED: Xylocaine 1% 3mL    ESTIMATED BLOOD LOSS: none.     COMPLICATIONS: none.     TECHNIQUE: With the patient laying in a prone position, the area was prepped and draped in the usual sterile fashion using ChloraPrep and a fenestrated drape. Local anesthetic was given using a 27-gauge needle by raising a wheal and going down to the hub of the needle over the C7/T1 interlaminar space.  The interlaminar space was then approached with a 3.5 inch 18-gauge Touhy needle was introduced under fluoroscopic guidance in the AP and Lateral view. Once the Ligamentum flavum was encountered loss of resistance to saline was used to enter the epidural space. With positive loss of resistance and negative CSF or Blood, 2mL contrast dye Omnipaque (300mg/ml) was injected to confirm placement and there was no vascular runoff. The medication was then injected slowly. The patient tolerated the procedure well.     Conscious sedation provided by M.D    The patient was monitored with continuous pulse oximetry, EKG, and intermittent blood pressure monitors.  The patient was hemodynamically stable throughout the entire process was responsive to voice, and breathing spontaneously.  Supplemental O2 was provided at 2L/min via nasal cannula.  Patient was comfortable for the duration of the procedure.    There was a total of 3 mg IV Midazolam was titrated  for the procedure        The patient was monitored after the procedure.   They were given post-procedure and discharge instructions to follow at home.  The patient was discharged in a stable condition.

## 2017-03-15 NOTE — OP NOTE
Discharge Note  Short Stay      SUMMARY     Admit Date: 3/15/2017    Attending Physician: Graciela Ley      Discharge Physician: Graciela Ley      Discharge Date: 3/15/2017 4:23 PM     PROCEDURE: Cervical Interlaminar epidural steroid injection C7/T1 under fluoroscopic guidance.     Pre-Op diagnosis: Cervical spinal stenosis    Post-Op diagnosis: Cervical spinal stenosis    Disposition: Home or self care    Patient Instructions:   Current Discharge Medication List      CONTINUE these medications which have NOT CHANGED    Details   amlodipine (NORVASC) 10 MG tablet Take 1 tablet (10 mg total) by mouth once daily.  Qty: 90 tablet, Refills: 3      aspirin (ECOTRIN) 81 MG EC tablet Take 1 tablet (81 mg total) by mouth once daily.  Qty: 90 tablet, Refills: 2      blood sugar diagnostic (FREESTYLE LITE STRIPS) Strp TEST BLOOD SUGAR 3 TIMES A DAY  Qty: 100 strip, Refills: 5    Comments: DX Code Needed .250.00      divalproex (DEPAKOTE) 500 MG Tb24 Take 2 tablets (1,000 mg total) by mouth every evening.  Qty: 180 tablet, Refills: 3    Comments: **Patient requests 90 days supply**  Associated Diagnoses: Chronic post-traumatic headache, not intractable      fluticasone (FLONASE) 50 mcg/actuation nasal spray USE 2 SPRAYS IN EACH NOSTRIL ONCE DAILY  Qty: 16 g, Refills: 3      gabapentin (NEURONTIN) 300 MG capsule Take 2 capsules (600 mg total) by mouth 3 (three) times daily.  Qty: 540 capsule, Refills: 0    Comments: **Patient requests 90 days supply**  Associated Diagnoses: Bilateral lumbar radiculopathy; Sacroiliac joint pain; Lumbar facet arthropathy; Physical deconditioning; Spondylosis of lumbar region without myelopathy or radiculopathy      hydrochlorothiazide (HYDRODIURIL) 25 MG tablet TAKE 1 TABLET BY MOUTH ONCE DAILY  Qty: 90 tablet, Refills: 3      insulin aspart (NOVOLOG FLEXPEN) 100 unit/mL InPn pen INJECT 20 UNITS UNDER THE SKIN THREE TIMES DAILY WITH MEALS  Qty: 45 mL, Refills: 3      insulin detemir  "(LEVEMIR FLEXTOUCH) 100 unit/mL (3 mL) SubQ InPn pen Inject 32 units bid  Qty: 45 mL, Refills: 0      insulin needles, disposable, (NOVOFINE 30) 30 x 1/3 " Ndle USE AS DIRECTED THREE TIMES DAILY  Qty: 100 each, Refills: 5      lancets (FREESTYLE LANCETS) 28 gauge Misc Inject 1 lancet into the skin 3 (three) times daily.  Qty: 100 each, Refills: 5    Comments: DX Code Needed . 250.00      lidocaine-prilocaine (EMLA) cream       LINZESS 145 mcg Cap capsule TK 1 C PO ONCE A DAY ON AN EMPTY STOMACH  Refills: 2      lovastatin (MEVACOR) 20 MG tablet TAKE 1 TABLET BY MOUTH EVERY EVENING.  Qty: 90 tablet, Refills: 3      omeprazole (PRILOSEC) 20 MG capsule TAKE ONE CAPSULE BY MOUTH TWICE DAILY  Qty: 180 capsule, Refills: 0      ondansetron (ZOFRAN) 4 MG tablet TK 1 T PO Q 8 H PRF NAUSEA  Refills: 0      pen needle, diabetic (BD INSULIN PEN NEEDLE UF MINI) 31 gauge x 3/16" Ndle USE AS DIRECTED TWICE DAILY  Qty: 100 each, Refills: 3      tramadol (ULTRAM) 50 mg tablet Take 1 tablet (50 mg total) by mouth every 12 (twelve) hours as needed for Pain.  Qty: 60 tablet, Refills: 1    Associated Diagnoses: Lumbosacral radiculopathy; Facet arthritis, degenerative, lumbar spine; Myalgia; Bilateral hand pain; Brachial neuritis or radiculitis NOS; Bilateral carpal tunnel syndrome      albuterol 90 mcg/actuation inhaler Inhale 2 puffs into the lungs every 6 (six) hours as needed for Wheezing or Shortness of Breath.  Qty: 18 g, Refills: 0      enalapril (VASOTEC) 20 MG tablet Take 1 tablet (20 mg total) by mouth 2 (two) times daily.  Qty: 180 tablet, Refills: 1      lorazepam (ATIVAN) 1 MG tablet Take 1 tablet (1 mg total) by mouth On call Procedure (for MRI).  Qty: 1 tablet, Refills: 0    Associated Diagnoses: Cervical radiculopathy; Cervical spondylosis without myelopathy      pantoprazole (PROTONIX) 40 MG tablet TK 1 T PO ONCE A DAY  Refills: 1             Resume home diet and activity    "

## 2017-03-15 NOTE — IP AVS SNAPSHOT
[facility] Location ([facility])  [address] [place], [place] [address]  Phone: [phone]           Patient Discharge Instructions     Our goal is to set you up for success. This packet includes information on your condition, medications, and your home care. It will help you to care for yourself so you don't get sicker and need to go back to the hospital.     Please ask your nurse if you have any questions.        There are many details to remember when preparing to leave the hospital. Here is what you will need to do:    1. Take your medicine. If you are prescribed medications, review your Medication List in the following pages. You may have new medications to  at the pharmacy and others that you'll need to stop taking. Review the instructions for how and when to take your medications. Talk with your doctor or nurses if you are unsure of what to do.     2. Go to your follow-up appointments. Specific follow-up information is listed in the following pages. Your may be contacted by a transition nurse or clinical provider about future appointments. Be sure we have all of the phone numbers to reach you, if needed. Please contact your provider's office if you are unable to make an appointment.     3. Watch for warning signs. Your doctor or nurse will give you detailed warning signs to watch for and when to call for assistance. These instructions may also include educational information about your condition. If you experience any of warning signs to your health, call your doctor.               [facility]  Unless otherwise directed by your provider, please contact [facility], our nurse care line that is available for 24/7 assistance.     [phone] (toll-free)    Registered nurses in the [facility] provide clinical advisement, health education, appointment booking, and other advisory services.                    ** Verify the list of medication(s) below is accurate and up to  date. Carry this with you in case of emergency. If your medications have changed, please notify your healthcare provider.             Medication List      CONTINUE taking these medications        Additional Info                      albuterol 90 mcg/actuation inhaler   Quantity:  18 g   Refills:  0   Dose:  2 puff    Instructions:  Inhale 2 puffs into the lungs every 6 (six) hours as needed for Wheezing or Shortness of Breath.     Begin Date    AM    Noon    PM    Bedtime       amlodipine 10 MG tablet   Commonly known as:  NORVASC   Quantity:  90 tablet   Refills:  3   Dose:  10 mg    Instructions:  Take 1 tablet (10 mg total) by mouth once daily.     Begin Date    AM    Noon    PM    Bedtime       aspirin 81 MG EC tablet   Commonly known as:  ECOTRIN   Quantity:  90 tablet   Refills:  2   Dose:  81 mg    Instructions:  Take 1 tablet (81 mg total) by mouth once daily.     Begin Date    AM    Noon    PM    Bedtime       blood sugar diagnostic Strp   Commonly known as:  FREESTYLE LITE STRIPS   Quantity:  100 strip   Refills:  5   Comments:  DX Code Needed .250.00    Instructions:  TEST BLOOD SUGAR 3 TIMES A DAY     Begin Date    AM    Noon    PM    Bedtime       divalproex 500 MG Tb24   Commonly known as:  DEPAKOTE   Quantity:  180 tablet   Refills:  3   Dose:  1000 mg   Comments:  **Patient requests 90 days supply**    Instructions:  Take 2 tablets (1,000 mg total) by mouth every evening.     Begin Date    AM    Noon    PM    Bedtime       enalapril 20 MG tablet   Commonly known as:  VASOTEC   Quantity:  180 tablet   Refills:  1   Dose:  20 mg    Instructions:  Take 1 tablet (20 mg total) by mouth 2 (two) times daily.     Begin Date    AM    Noon    PM    Bedtime       fluticasone 50 mcg/actuation nasal spray   Commonly known as:  FLONASE   Quantity:  16 g   Refills:  3    Instructions:  USE 2 SPRAYS IN EACH NOSTRIL ONCE DAILY     Begin Date    AM    Noon    PM    Bedtime       gabapentin 300 MG capsule   Commonly  known as:  NEURONTIN   Quantity:  540 capsule   Refills:  0   Dose:  600 mg   Comments:  **Patient requests 90 days supply**    Instructions:  Take 2 capsules (600 mg total) by mouth 3 (three) times daily.     Begin Date    AM    Noon    PM    Bedtime       hydrochlorothiazide 25 MG tablet   Commonly known as:  HYDRODIURIL   Quantity:  90 tablet   Refills:  3    Instructions:  TAKE 1 TABLET BY MOUTH ONCE DAILY     Begin Date    AM    Noon    PM    Bedtime       insulin aspart 100 unit/mL Inpn pen   Commonly known as:  NOVOLOG FLEXPEN   Quantity:  45 mL   Refills:  3    Instructions:  INJECT 20 UNITS UNDER THE SKIN THREE TIMES DAILY WITH MEALS     Begin Date    AM    Noon    PM    Bedtime       insulin detemir 100 unit/mL (3 mL) Inpn pen   Commonly known as:  LEVEMIR FLEXTOUCH   Quantity:  45 mL   Refills:  0    Instructions:  Inject 32 units bid     Begin Date    AM    Noon    PM    Bedtime       lancets 28 gauge Misc   Commonly known as:  FREESTYLE LANCETS   Quantity:  100 each   Refills:  5   Dose:  1 lancet   Comments:  DX Code Needed . 250.00    Instructions:  Inject 1 lancet into the skin 3 (three) times daily.     Begin Date    AM    Noon    PM    Bedtime       lidocaine-prilocaine cream   Commonly known as:  EMLA   Refills:  0      Begin Date    AM    Noon    PM    Bedtime       LINZESS 145 mcg Cap capsule   Refills:  2   Generic drug:  linaclotide    Instructions:  TK 1 C PO ONCE A DAY ON AN EMPTY STOMACH     Begin Date    AM    Noon    PM    Bedtime       lorazepam 1 MG tablet   Commonly known as:  ATIVAN   Quantity:  1 tablet   Refills:  0   Dose:  1 mg    Instructions:  Take 1 tablet (1 mg total) by mouth On call Procedure (for MRI).     Begin Date    AM    Noon    PM    Bedtime       lovastatin 20 MG tablet   Commonly known as:  MEVACOR   Quantity:  90 tablet   Refills:  3    Instructions:  TAKE 1 TABLET BY MOUTH EVERY EVENING.     Begin Date    AM    Noon    PM    Bedtime       omeprazole 20 MG capsule  "  Commonly known as:  PRILOSEC   Quantity:  180 capsule   Refills:  0    Instructions:  TAKE ONE CAPSULE BY MOUTH TWICE DAILY     Begin Date    AM    Noon    PM    Bedtime       ondansetron 4 MG tablet   Commonly known as:  ZOFRAN   Refills:  0    Instructions:  TK 1 T PO Q 8 H PRF NAUSEA     Begin Date    AM    Noon    PM    Bedtime       pantoprazole 40 MG tablet   Commonly known as:  PROTONIX   Refills:  1    Instructions:  TK 1 T PO ONCE A DAY     Begin Date    AM    Noon    PM    Bedtime       pen needle, diabetic 30 gauge x 1/3" Ndle   Commonly known as:  NOVOFINE 30   Quantity:  100 each   Refills:  5    Instructions:  USE AS DIRECTED THREE TIMES DAILY     Begin Date    AM    Noon    PM    Bedtime       pen needle, diabetic 31 gauge x 3/16" Ndle   Commonly known as:  BD INSULIN PEN NEEDLE UF MINI   Quantity:  100 each   Refills:  3    Instructions:  USE AS DIRECTED TWICE DAILY     Begin Date    AM    Noon    PM    Bedtime       tramadol 50 mg tablet   Commonly known as:  ULTRAM   Quantity:  60 tablet   Refills:  1   Dose:  50 mg    Instructions:  Take 1 tablet (50 mg total) by mouth every 12 (twelve) hours as needed for Pain.     Begin Date    AM    Noon    PM    Bedtime                  Please bring to all follow up appointments:    1. A copy of your discharge instructions.  2. All medicines you are currently taking in their original bottles.  3. Identification and insurance card.    Please arrive 15 minutes ahead of scheduled appointment time.    Please call 24 hours in advance if you must reschedule your appointment and/or time.        Your Scheduled Appointments     Mar 27, 2017  1:00 PM CDT   Neurology - Established Patient with Mateo Herrera III, MD   [facility] ([facility]    [address]  [facility] [address]   [phone]            Mar 31, 2017  9:00 AM CDT   Established Patient Visit with ALEX Cat   [facility] ([facility])    [address] " "[facility] [id number] [phone]                  Discharge Instructions       Home Care Instructions Pain Management:    1. DIET:   You may resume your normal diet today.   2. BATHING:   You may shower with luke warm water. No soaking in tub.  3. DRESSING:   You may remove your bandage today.   4. ACTIVITY LEVEL:   You may resume your normal activities 24 hrs after your procedure.  5. MEDICATIONS:   You may resume your normal medications today.   6. SPECIAL INSTRUCTIONS:   No heat to the injection site for 24 hrs including, bath or shower, heating pad, moist heat, or hot tubs.    Use ice pack to injection site for any pain or discomfort.  Apply ice packs for 20 minute intervals as needed.   If you have received any sedatives by mouth today you may not drive for 12 hours.    If you have received any sedation through your IV, you may not drive for 24 hrs.     PLEASE CALL YOUR DOCTOR IF:  1. Redness or swelling around the injection site.  2. Fever of 101 degrees  3. Drainage (pus) from the injection site.  4. For any continuous bleeding (some dried blood over the incision is normal.)  5. For severe headache that is relieved when lying flat.    FOR EMERGENCIES:   If any unusual problems or difficulties occur during clinic hours, call [phone] or 428.         Admission Information     Date & Time Provider Department CSN    3/15/2017  2:25 PM Graciela Ley MD [facility] [id number]      Care Providers     Provider Role Specialty Primary office phone    Graciela Ley MD Attending Provider Pain Medicine [phone]    Graciela Ley MD Surgeon  Pain Medicine [phone]      Your Vitals Were     BP Pulse Temp Resp Height Weight    137/93 57 98.4 °F (36.9 °C) (Oral) 18 5' 6" (1.676 m) 100.7 kg (222 lb)    Last Period SpO2 BMI          10/11/2010 97% 35.83 kg/m2        Recent Lab Values        12/27/2013 3/25/2014 7/9/2014 12/5/2014 3/13/2015 7/13/2015 10/16/2015 4/21/2016      8:44 " AM  8:20 AM  2:15 PM 12:44 PM 11:18 AM  2:10 PM 10:35 AM 12:28 PM    A1C 11.5 (H) 9.0 (H) 7.8 (H) 9.7 (H) 9.2 (H) 8.0 (H) 7.8 (H) 8.7 (H)      Allergies as of 3/15/2017     No Known Allergies      Advance Directives     An advance directive is a document which, in the event you are no longer able to make decisions for yourself, tells your healthcare team what kind of treatment you do or do not want to receive, or who you would like to make those decisions for you.  If you do not currently have an advance directive, [facility] encourages you to create one.  For more information call:  [phone]), [phone] ([phone]),  or log on to [email or web address].        Language Assistance Services     ATTENTION: Language assistance services are available, free of charge. Please call [phone].      ATENCIÓN: Si habla español, tiene a rodas disposición servicios gratuitos de asistencia lingüística. Llame al [phone].     CHÚ Ý: N?u b?n nói Ti?ng Vi?t, có các d?ch v? h? tr? ngôn ng? mi?n phí dành cho b?n. G?i s? [phone].        Diabetes Discharge Instructions                                    [facility] complies with applicable Federal civil rights laws and does not discriminate on the basis of race, color, national origin, age, disability, or sex.

## 2017-03-15 NOTE — DISCHARGE INSTRUCTIONS

## 2017-03-15 NOTE — H&P
[facility]  History & Physical    SUBJECTIVE:     Chief Complaint: Hand Pain and Shoulder Pain     Interval History 2/1/2017:  The patient returns today for follow up of neck and back pain.  Her back pain is tolerable today.  Her biggest complaint today is neck pain with radiation into her arms.  She has numbness to her fingers also.  She previously had significant benefit from cervical MIKE in the past.  She had an A1C completed at her PCP last week and reports that it was 8.0.  She has been trying to control her sugars through diet and exercise.  She continues to take Gabapentin 1800 mg daily and Tramadol PRN.  She reports significant benefit of the medication without side effects.  Her pain today is 5/10.  The patient denies any bowel or bladder incontinence or signs of saddle paresthesia.       Interval History 12/2/2016:  The patient returns today for follow up of lower back and neck pain.  She is s/p L5-S1 IL MIKE on 11/16/16 with 70% pain relief of back and leg pain.  She reports that her pain is mild today.  She has increased her activity lately and has been cleaning her house.  She does have some increased pain after cleaning.  She is very happy with her results though.  Her neck pain has been tolerable.  She has not started PT because she recently moved and would like another order.  Her pain today is 5/10.  The patient denies any bowel or bladder incontinence or signs of saddle paresthesia.  The patient denies any major medical changes since last office visit.     Interval History 10/5/2016:  The patient returns today for follow up of lower back and leg pain.  She is s/p bilateral SI joint injections on 8/10/16 with 50% benefit.  She is still having radiating pain down the back and sides of both legs to the top and bottom of her feet.  She is also having numbness and tingling throughout her feet.  She was previously having swelling to her legs.  Dr. Ley instructed her to titrate down  Gabapentin to see if this was causing this.  She states that this did not improve her swelling.  She then stopped vitamin supplements (Vit E and C).  Her leg swelling then resolved.  She then restarted Gabapentin and titrated up to 600 mg TID.  Her pain today is 8/10.  The patient denies any bowel or bladder incontinence or signs of saddle paresthesia.  The patient denies any major medical changes since last office visit.     Interval History 6/27/2016:  The patient returns today for f/u of neck, bilateral shoulder and lower back pain.  She is s/p cervical MIKE on 6/8/16 with 80% improvement of her neck and left arm pain.  She is no longer having the shooting electric pain.  Her numbness has also improved since the procedure.  Her biggest complaint today is lower back pain which radiates down the back and sides of both legs to the bottom of her feet with numbness.  She also has a history of diabetes and diabetic neuropathy.  Her last A1C was 8.7, increased from 7.8.  She has had lumbar RFAs in the past with significant relief.  She has not had lumbar ESIs.  Her previous lumbar MRI from 2014 shows disc bulges at L3-4, L4-5, and L5-S1 without NF narrowing.  She is taking Tramadol PRN with relief.  I increased her Gabapentin last OV but she reports that she is taking 300 mg BID.  Her pain today is 6/10.  The patient denies any bowel/bladder incontinence or symptoms of saddle paresthesia.      Interval History 05/20/2016:  Patient is present today for a f/u for lower back, bilateral shoulder and neck pain.  Her back pain has been mild since her lumbar RFAs.  She is mostly complaining of neck pain which radiates down both arms with associated tingling.  She has had cervical ESIs in the past which she now states may have offered her more benefit than she previously believed.  She would like to try this treatment options again.  She did have a recent cervical MRI ordered by Dr. Herrera which does show muliple osteophytes with  spinal narrowing.  She also had bilateral shoulder XRAYs which show DJD.   She continues to use Tramadol which provided relief.  She has a history of C4-5 fusion in 1999 with relief of her pain.  She is also taking Gabapentin 300 mg TID with limited relief.  Her pain today is a 6/10.  The patient denies any bowel/bladder incontinence.     Interval History 04/26/2016:  Patient is present today for a f/u for Low Back Pain. The pt recently had a Right Lumbar RFA @ L3,L4,L5 on 4/12/2016 where she reports a significant amount of relief. She reports her pain to be 4/10 today. The pt would like to address neck and shoulder pain that is radiating down through to both arms.  Patient has had a history of a C4-5 fusion, she had previous cervical intralaminar epidural steroidal injections in January 2015 which did not offer significant relief and the patient did have a cervical EMG/NCV which did not show evidence of radiculopathy at that time but some carpal tunnel syndrome.  Currently she states that the pain in her shoulders and in her arms is most significant and she describes her arm pain as feelings of swelling bilaterally.     Interval History 11/19/2015:  Patient here for f/u of chronic low back pain. Patient was last here in 7/15 and had B L3,4,5 RFA done. Patient states that she continues to have about 50% pain reduction of low back pain and some leg pain. She able to walk and stand more and feels that she is functioning much better over all. Patient continues to have some low back pain right> left with intermittent BLE pains right >left and bilateral foot numbess and tingling. Patient admits to not taking gabapentin as directed and had been taking it prn.      Interval history 07/7/2015:  Ms. Gallardo is a 61 y.o. female with neck and low back pain presents today for f/u s/p b/l MBB at L4, 5 and sacral Ala and right sided MBB at same level. Reports significantly more relief with b/l block. She was in physical therapy  but has not gone for 2 months. She has a lot of anxiety and frustration associated with her chronic pain. She saw Dr. Rosenbaum today and it was recommended she continue f/u with Dr. Jean. Pt has been seen in the clinic before by Dr. Ley, however pt is new to me.   History below per Dr. Ley     Interval history 05/21/2015:  Since previous encounter patient reports neck pain and lower back pain, although the pain in her lower back is her worst pain. . Patient reports pain as a 8/10 today. Patient does not take Soma and Hydrocodone anymore. She has a lot of anxiety and frustration associated with her chronic pain.     Interval history 04/20/2015:  Since previous encounter patient reports neck pain radiating into her upper extremities. Patient stated that she completed her EMG with  which did not show any evidence of radiculopathy and neurosurgery was evaluating the patient did not determine any need for further surgical intervention. Patient stated that she discontinued the Lyrica due to it makes her swell and have dry mouth. Patient stated that she still takes the Gabapentin. Patient reports no other health changes since her last visit. Patient reports her pain 8/10 today.      Interval history 03/16/2015:  Since previous encounter patient reports neck pain radiating into her upper extremities and sometimes causes her to have headaches. Patient reports low back also. Patient stated that she did receive relief from her last injection but the pain has returned. Patient stated that she still takes Norco but it only puts her to sleep and when she awakens the pain is back. Patient reports that she missed her EMG appt due to her pain, however she has been rescheduled to the end of the month Additionally the patient has not started Lyrica as previously prescribed. Patient reports memory loss. Patient reports no other health changes since her last visit. Patient reports her pain 8/10 today.   Initial  "encounter:     Jessie Gallardo presents to the clinic for the evaluation of neck and low back pain. The pain started 3 months ago following traumatic fall and symptoms have been worsening. Reports that her neck pain is the most concerning today.      Brief history:     She has h/o cervical fusion in 1999 in [place] and she did well after surgery. She fell on 9/2/14 when she was getting her oil changed. She was unable to stand right after the incident and she was taken by ambulance to the ED.      She complains of diffuse body pain. She feels like she is getting worse from the accident. She has pain from her "head to her toes."        Pain Description:     The pain is located in the neck and low back area and radiates to the shoulders and posterior thighs, respectively..      At BEST 6/10      At WORST  9/10 on the WORST day.      On average pain is rated as 7/10.      Today the pain is rated as 8/10     The pain is described as aching, burning, numbing, sharp, shooting, stabbing, tingling and weakness       Symptoms interfere with daily activity and sleeping.      Exacerbating factors: Sitting, Laying, Bending, Morning, Extension, Flexing, Lifting and Getting out of bed/chair.      Mitigating factors laying down and medications.      Patient denies night fever/night sweats, urinary incontinence, bowel incontinence, significant weight loss, significant motor weakness and loss of sensations.  Patient denies any suicidal or homicidal ideations     Pain Medications:     Current:  Neurontin 600 mg TID (1800 mg daily)  Tramadol 50 mg BID PRN  Compounding cream     Tried in Past:  NSAIDs -Aleve  TCA -Never  SNRI -Never  Muscle relaxer: Klonopin, Zanaflex, Zoloft  Opioid: Tramadol      Physical Therapy/Home Exercise: not currently      report: Reviewed and consistent with medication use as prescribed.     Pain Procedures:  1/7/2015-cervical epidural steroidal injection  1/21/2015-cervical epidural steroidal " injection  6/10/2015 bilateral medial branch nerve block at the level of L3, L4, L5  6/17/2015-right medial branch nerve block at the level of L3, L4, L5  7/15/2015-left radiofrequency ablation at the level of L3, L4, L5  7/29/2015-right radiofrequency ablation at the level of L3, L4, L5  3/29/2016-left radio frequency ablation at the level of L3, L4, L5  4/12/2016-right radio frequency ablation at the level of L3, L4, L5  6/8/16 C7-T1 IL MIKE- 80% relief  8/10/16 Bilateral SI joint injections- 50% relief  11/16/16 L5-S1 IL MIKE- 70% relief     Chiropractor -never  Acupuncture - never  TENS unit -Yes, with PT  Spinal decompression -never  Joint replacement -never     Imaging:     MRI Cervical 05/17/2016:  Comparison is November 2014    Routine imaging of the cervical spine was obtained.    There is an osseous fusion at C4-5.  There is straightening and slight reversal of the normal cervical lordosis again seen.  Alignment of vertebral bodies is satisfactory.  Discs are desiccated throughout with disc space narrowing most pronounced at the C5-6 level, similar to prior study.  The spinal cord is normal in signal and paravertebral structures are unremarkable.    C2-3 demonstrates no significant abnormality.    C3-4 demonstrates a mild diffuse disc bulge asymmetric toward the right.  There is bilateral foraminal narrowing.  This disc bulge thins the posterior cerebrospinal fluid sleeve and abuts the anterior aspect of the spinal cord with resultant mild spinal canal narrowing.    C4-5 demonstrates osseous fusion with some posterior osteophytic spurring.  There is complete loss of the anterior cerebrospinal fluid sleeve,and the posterior cerebrospinal fluid sleeve and some flattening of the spinal cord but no abnormal spinal cord signal.  Findings appear fairly similar to prior study.  There is mild to moderate narrowing of the spinal canal.    C5-6 demonstrates posterior disc osteophyte complex asymmetric to the left more  pronounced as compared to prior study.  There is thinning of the cerebrospinal fluid sleeve around the spinal cord and mild spinal canal narrowing.    C6-7 demonstrates a posterior disc osteophyte complex asymmetric toward the left.  There is thinning of the cerebrospinal fluid sleeve around the spinal cord and mild spinal canal narrowing.   Impression    In this patient with osseous fusion at C4-5, there is multilevel spinal canal narrowing most significant at C4-5, as further detailed above.  ______________________________________     Electronically signed by: Sherie Rodriguez MD  Date: 05/18/16       Lumbar MRI 10/31/16  Narrative   MRI OF THE LUMBAR SPINE WITHOUT CONTRAST.     Technique:  Sagittal T1, sagittal T2, sagittal STIR, axial T1 and axial T2 weighted images of the lumbar spine obtained without contrast.     Comparison: MRI 11/20/2014.     Findings:  Minimal grade 1 anterolisthesis is seen at L3-4 and L4-L5.  Otherwise, sagittal vertebral body alignment is appropriate.  Vertebral body heights well-maintained.  No fracture is identified.  Mild multilevel disc degeneration is seen, most pronounced at L5-S1. No marrow signal abnormality suspicious for an infiltrative process.      The conus is normal in appearance, and terminates at the L1 level.  Multiple fibroids are seen in the uterus.      T12-L1: No focal disc herniation.  No significant spinal canal stenosis or neuroforaminal narrowing.  L1-L2: Bilateral facet arthropathy.  No focal disc herniation.  No significant spinal canal stenosis or neuroforaminal narrowing.  L2-L3: No focal disc herniation.  No significant spinal canal stenosis.  Bilateral facet arthropathy is noted, which contributes to mild bilateral neuroforaminal narrowing.  L3-L4: Uncovering of the disc with mild bulge, bilateral facet arthropathy and ligament of flavum thickening resulting in moderate right-sided and mild left-sided neuroforaminal narrowing.  L4-L5: Uncovering of the disc  "with mild bulge, bilateral facet arthropathy, and ligamentum flavum thickening resulting in moderate right-sided and mild left-sided neuroforaminal narrowing.  L5-S1: Broad-based disc bulge and bilateral facet arthropathy without significant spinal canal stenosis.  Moderate bilateral neuroforaminal narrowing is seen.   Impression      Multilevel lumbar spondylosis, as detailed above.         PTA Medications   Medication Sig    amlodipine (NORVASC) 10 MG tablet Take 1 tablet (10 mg total) by mouth once daily.    aspirin (ECOTRIN) 81 MG EC tablet Take 1 tablet (81 mg total) by mouth once daily.    blood sugar diagnostic (FREESTYLE LITE STRIPS) Strp TEST BLOOD SUGAR 3 TIMES A DAY    divalproex (DEPAKOTE) 500 MG Tb24 Take 2 tablets (1,000 mg total) by mouth every evening.    fluticasone (FLONASE) 50 mcg/actuation nasal spray USE 2 SPRAYS IN EACH NOSTRIL ONCE DAILY    gabapentin (NEURONTIN) 300 MG capsule Take 2 capsules (600 mg total) by mouth 3 (three) times daily.    hydrochlorothiazide (HYDRODIURIL) 25 MG tablet TAKE 1 TABLET BY MOUTH ONCE DAILY    insulin aspart (NOVOLOG FLEXPEN) 100 unit/mL InPn pen INJECT 20 UNITS UNDER THE SKIN THREE TIMES DAILY WITH MEALS    insulin detemir (LEVEMIR FLEXTOUCH) 100 unit/mL (3 mL) SubQ InPn pen Inject 32 units bid    insulin needles, disposable, (NOVOFINE 30) 30 x 1/3 " Ndle USE AS DIRECTED THREE TIMES DAILY    lancets (FREESTYLE LANCETS) 28 gauge Misc Inject 1 lancet into the skin 3 (three) times daily.    lidocaine-prilocaine (EMLA) cream     LINZESS 145 mcg Cap capsule TK 1 C PO ONCE A DAY ON AN EMPTY STOMACH    lovastatin (MEVACOR) 20 MG tablet TAKE 1 TABLET BY MOUTH EVERY EVENING.    omeprazole (PRILOSEC) 20 MG capsule TAKE ONE CAPSULE BY MOUTH TWICE DAILY    ondansetron (ZOFRAN) 4 MG tablet TK 1 T PO Q 8 H PRF NAUSEA    pen needle, diabetic (BD INSULIN PEN NEEDLE UF MINI) 31 gauge x 3/16" Ndle USE AS DIRECTED TWICE DAILY    tramadol (ULTRAM) 50 mg tablet " Take 1 tablet (50 mg total) by mouth every 12 (twelve) hours as needed for Pain.    albuterol 90 mcg/actuation inhaler Inhale 2 puffs into the lungs every 6 (six) hours as needed for Wheezing or Shortness of Breath.    enalapril (VASOTEC) 20 MG tablet Take 1 tablet (20 mg total) by mouth 2 (two) times daily.    lorazepam (ATIVAN) 1 MG tablet Take 1 tablet (1 mg total) by mouth On call Procedure (for MRI).    pantoprazole (PROTONIX) 40 MG tablet TK 1 T PO ONCE A DAY       Review of patient's allergies indicates:  No Known Allergies    Past Medical History:   Diagnosis Date    Anxiety     Chronic back pain     See neuro     Chronic low back pain     Depression     Diabetes mellitus type II     Diabetic neuropathy     Diverticulosis     last colonoscopy was in 2000    Encounter for blood transfusion     Fibroids     Fibromyalgia     Hyperlipidemia     Hypertension     Obesity     Obstructive sleep apnea     wear CPAP machine nightly    Pancreatitis     Post menopausal syndrome      Past Surgical History:   Procedure Laterality Date    ABDOMINAL SURGERY      CHOLECYSTECTOMY      fibroid      OOPHORECTOMY      xlap, right ovary removed due to infection?    SPINE SURGERY      cervical spine    UTERINE ARTERY EMBOLIZATION  3/14     Family History   Problem Relation Age of Onset    Alcohol abuse Father     Cancer Sister      throat ca    Prostate cancer Brother     Heart attack Paternal Uncle     Cirrhosis Sister     Stroke Mother     Diabetes Mother     Hypertension Mother     No Known Problems Daughter     No Known Problems Son     No Known Problems Maternal Grandmother     No Known Problems Daughter     No Known Problems Son     No Known Problems Sister     No Known Problems Sister     No Known Problems Sister     No Known Problems Brother     No Known Problems Brother     No Known Problems Brother     No Known Problems Brother     Cancer Paternal Aunt     Lupus Grandchild      No Known Problems Maternal Aunt     No Known Problems Maternal Uncle     No Known Problems Maternal Grandfather     No Known Problems Paternal Grandfather     No Known Problems Paternal Grandmother     No Known Problems Cousin     Breast cancer Neg Hx     Colon cancer Neg Hx     Ovarian cancer Neg Hx     Heart disease Neg Hx     Heart failure Neg Hx      Social History   Substance Use Topics    Smoking status: Former Smoker     Quit date: 12/5/1980    Smokeless tobacco: Never Used      Comment: disabled due to fibromyalgia; 4 healthy     Alcohol use No        Review of Systems:  REVIEW OF SYSTEMS:     GENERAL:  No weight loss, malaise or fevers.  HEENT:   No recent changes in vision or hearing  NECK:  Negative for lumps, no difficulty with swallowing.  RESPIRATORY:  Negative for cough, wheezing or shortness of breath, patient denies any recent URI.  CARDIOVASCULAR:  Negative for chest pain, leg swelling or palpitations. H/o htn.  GI:  Negative for abdominal discomfort, blood in stools or black stools or change in bowel habits.  MUSCULOSKELETAL:  See HPI.  SKIN:  Negative for lesions, rash, and itching.  PSYCH:  No mood disorder or recent psychosocial stressors.  Patients sleep is not disturbed secondary to pain.  HEMATOLOGY/LYMPHOLOGY:  Negative for prolonged bleeding, bruising easily or swollen nodes.  Patient is not currently taking any anti-coagulants  NEURO:   No history of syncope, paralysis, seizures or tremors. H/O headaches- followed by Dr. Herrera.  ENDO: H/O diabetes and peripheral neuropathy.  All other reviewed and negative other than HPI.         OBJECTIVE:     Vital Signs (Most Recent):  Temp: 98.4 °F (36.9 °C) (03/15/17 1529)  Pulse: (!) 59 (03/15/17 1529)  Resp: 18 (03/15/17 1529)  BP: (!) 159/74 (03/15/17 1529)  SpO2: 98 % (03/15/17 1529)    Physical Exam:  PHYSICAL EXAMINATION:     GENERAL: Well appearing, in no acute distress, alert and oriented x3.  PSYCH:  Mood and affect  appropriate.  SKIN: Skin color, texture, turgor normal, no rashes or lesions.  HEAD/FACE:  Normocephalic, atraumatic. Cranial nerves grossly intact.  NECK: Mild pain to palpation over the cervical paraspinal muscles on the right. Spurling Negative.  Painful cervical flexion.  Mild cervical facet loading bilaterally.  Negative Spurling.  CV: RRR with palpation of the radial artery.  PULM: No evidence of respiratory difficulty, symmetric chest rise.  BACK:  There is no pain with palpation over the facet joints of the lumbar spine bilaterally. Full ROM to lumbar spine with pain on flexion.  Mild facet loading bilaterally.  There is pain to palpation over the sacroiliac joints bilaterally.  FABERs is negative bilaterally.  EXTREMITIES: Peripheral joint ROM is full and pain free without obvious instability or laxity in all four extremities. No deformities, edema, or skin discoloration. Good capillary refill.  MUSCULOSKELETAL:  Pain to palpation of the subacromial bursa on the right.  Full ROM to bilateral shoulder with pain on internal rotation of right shoulder.  Bilateral upper  extremity strength is normal and symmetric.  No atrophy or tone abnormalities are noted.  NEURO: Bilateral upper extremity coordination and muscle stretch reflexes are physiologic and symmetric.  Brenda's negative. No clonus.  Decreased sensation to BLE.  GAIT: Antalgic- ambulates without assistance.      ASSESSMENT/PLAN:     proceed with injection    I have seen the pt and reviewed the H&P and there are no changes  Maxime Fiore MD   Anesthesiology PGY IV  [phone]

## 2017-03-16 ENCOUNTER — TELEPHONE (OUTPATIENT)
Dept: PAIN MEDICINE | Facility: CLINIC | Age: 62
End: 2017-03-16

## 2017-03-16 NOTE — TELEPHONE ENCOUNTER
----- Message from Sirena Warren sent at 3/16/2017  8:04 AM CDT -----  x 1st Request  _  2nd Request  _  3rd Request        Who: gisel    Why: pt. Stating she has a severe headache since she had procedure on yesterday. Please call to discuss    What Number to Call Back:[phone]    When to Expect a call back: (Before the end of the day)   -- if the call is after 12:00, the call back will be tomorrow.      Patient was contacted as per patient she stated that she has been having a headache since her procedure. Patient was informed to lie down flat without a pillow and drink caffeine for her headache and if the headache doesn't subsides she is to report to the ED. Patient verbalized understanding

## 2017-04-07 ENCOUNTER — OFFICE VISIT (OUTPATIENT)
Dept: PAIN MEDICINE | Facility: CLINIC | Age: 62
End: 2017-04-07
Payer: MEDICARE

## 2017-04-07 VITALS
SYSTOLIC BLOOD PRESSURE: 160 MMHG | TEMPERATURE: 99 F | HEIGHT: 66 IN | HEART RATE: 68 BPM | BODY MASS INDEX: 36.84 KG/M2 | DIASTOLIC BLOOD PRESSURE: 80 MMHG | RESPIRATION RATE: 18 BRPM | WEIGHT: 229.25 LBS

## 2017-04-07 DIAGNOSIS — G89.29 CHRONIC PAIN OF BOTH SHOULDERS: ICD-10-CM

## 2017-04-07 DIAGNOSIS — M75.42 SHOULDER IMPINGEMENT SYNDROME, LEFT: ICD-10-CM

## 2017-04-07 DIAGNOSIS — M54.12 CERVICAL RADICULOPATHY: Primary | ICD-10-CM

## 2017-04-07 DIAGNOSIS — M25.512 CHRONIC PAIN OF BOTH SHOULDERS: ICD-10-CM

## 2017-04-07 DIAGNOSIS — M48.02 CERVICAL STENOSIS OF SPINAL CANAL: ICD-10-CM

## 2017-04-07 DIAGNOSIS — M54.16 LUMBAR RADICULOPATHY: ICD-10-CM

## 2017-04-07 DIAGNOSIS — M54.17 LUMBOSACRAL RADICULOPATHY: ICD-10-CM

## 2017-04-07 DIAGNOSIS — M25.511 CHRONIC PAIN OF BOTH SHOULDERS: ICD-10-CM

## 2017-04-07 PROCEDURE — 3079F DIAST BP 80-89 MM HG: CPT | Mod: S$GLB,,, | Performed by: NURSE PRACTITIONER

## 2017-04-07 PROCEDURE — 99214 OFFICE O/P EST MOD 30 MIN: CPT | Mod: S$GLB,,, | Performed by: NURSE PRACTITIONER

## 2017-04-07 PROCEDURE — 99999 PR PBB SHADOW E&M-EST. PATIENT-LVL III: CPT | Mod: PBBFAC,,, | Performed by: NURSE PRACTITIONER

## 2017-04-07 PROCEDURE — 1160F RVW MEDS BY RX/DR IN RCRD: CPT | Mod: S$GLB,,, | Performed by: NURSE PRACTITIONER

## 2017-04-07 PROCEDURE — 3077F SYST BP >= 140 MM HG: CPT | Mod: S$GLB,,, | Performed by: NURSE PRACTITIONER

## 2017-04-07 RX ORDER — TRAMADOL HYDROCHLORIDE 50 MG/1
50 TABLET ORAL EVERY 12 HOURS PRN
Qty: 60 TABLET | Refills: 1 | Status: SHIPPED | OUTPATIENT
Start: 2017-04-07 | End: 2017-06-02 | Stop reason: SDUPTHER

## 2017-04-07 NOTE — MR AVS SNAPSHOT
[facility]  [address]  [place] [address]  Phone: [phone]  Fax: [phone]                  Jessie Gallardo   2017 7:40 AM   Office Visit    Description:  Female : 1955   Provider:  ALEX Cat   Department:  [facility]           Reason for Visit     Back Pain     Neck Pain     Shoulder Pain           Diagnoses this Visit        Comments    Cervical radiculopathy    -  Primary     Chronic pain of both shoulders         Cervical stenosis of spinal canal         Shoulder impingement syndrome, left         Lumbar radiculopathy         Lumbosacral radiculopathy                To Do List           Future Appointments        Provider Department Dept Phone    2017 11:00 AM [facility] MRI1 350 LB LIMIT [facility] [phone]      Goals (5 Years of Data)     None       These Medications        Disp Refills Start End    tramadol (ULTRAM) 50 mg tablet 60 tablet 1 2017    Take 1 tablet (50 mg total) by mouth every 12 (twelve) hours as needed for Pain. - Oral    Pharmacy: [facility] [id number] - [place] [id number] AT [facility]  #: [phone]         [facility]     [facility] -  Assistance  Unless otherwise directed by your provider, please contact [facility], our nurse care line that is available for  assistance.     Registered nurses in the [facility] provide: appointment scheduling, clinical advisement, health education, and other advisory services.  Call: [phone] (toll free)               Medications           Message regarding Medications     Verify the changes and/or additions to your medication regime listed below are the same as discussed with your clinician today.  If any of these changes or additions are incorrect, please notify your healthcare provider.        START taking these NEW medications      "   Refills    tramadol (ULTRAM) 50 mg tablet 1    Sig: Take 1 tablet (50 mg total) by mouth every 12 (twelve) hours as needed for Pain.    Class: Phone In    Route: Oral           Verify that the below list of medications is an accurate representation of the medications you are currently taking.  If none reported, the list may be blank. If incorrect, please contact your healthcare provider. Carry this list with you in case of emergency.           Current Medications     albuterol 90 mcg/actuation inhaler Inhale 2 puffs into the lungs every 6 (six) hours as needed for Wheezing or Shortness of Breath.    amlodipine (NORVASC) 10 MG tablet Take 1 tablet (10 mg total) by mouth once daily.    aspirin (ECOTRIN) 81 MG EC tablet Take 1 tablet (81 mg total) by mouth once daily.    blood sugar diagnostic (FREESTYLE LITE STRIPS) Strp TEST BLOOD SUGAR 3 TIMES A DAY    divalproex (DEPAKOTE) 500 MG Tb24 Take 2 tablets (1,000 mg total) by mouth every evening.    enalapril (VASOTEC) 20 MG tablet Take 1 tablet (20 mg total) by mouth 2 (two) times daily.    fluticasone (FLONASE) 50 mcg/actuation nasal spray USE 2 SPRAYS IN EACH NOSTRIL ONCE DAILY    gabapentin (NEURONTIN) 300 MG capsule Take 2 capsules (600 mg total) by mouth 3 (three) times daily.    hydrochlorothiazide (HYDRODIURIL) 25 MG tablet TAKE 1 TABLET BY MOUTH ONCE DAILY    insulin aspart (NOVOLOG FLEXPEN) 100 unit/mL InPn pen INJECT 20 UNITS UNDER THE SKIN THREE TIMES DAILY WITH MEALS    insulin detemir (LEVEMIR FLEXTOUCH) 100 unit/mL (3 mL) SubQ InPn pen Inject 32 units bid    insulin needles, disposable, (NOVOFINE 30) 30 x 1/3 " Ndle USE AS DIRECTED THREE TIMES DAILY    lancets (FREESTYLE LANCETS) 28 gauge Misc Inject 1 lancet into the skin 3 (three) times daily.    lidocaine-prilocaine (EMLA) cream     LINZESS 145 mcg Cap capsule TK 1 C PO ONCE A DAY ON AN EMPTY STOMACH    lorazepam (ATIVAN) 1 MG tablet Take 1 tablet (1 mg total) by mouth On call Procedure (for MRI).    " "lovastatin (MEVACOR) 20 MG tablet TAKE 1 TABLET BY MOUTH EVERY EVENING.    omeprazole (PRILOSEC) 20 MG capsule TAKE ONE CAPSULE BY MOUTH TWICE DAILY    ondansetron (ZOFRAN) 4 MG tablet TK 1 T PO Q 8 H PRF NAUSEA    pantoprazole (PROTONIX) 40 MG tablet TK 1 T PO ONCE A DAY    pen needle, diabetic (BD INSULIN PEN NEEDLE UF MINI) 31 gauge x 3/16" Ndle USE AS DIRECTED TWICE DAILY    tramadol (ULTRAM) 50 mg tablet Take 1 tablet (50 mg total) by mouth every 12 (twelve) hours as needed for Pain.           Clinical Reference Information           Your Vitals Were     BP Pulse Temp Resp Height Weight    160/80 (BP Location: Right arm) 68 99 °F (37.2 °C) (Oral) 18 5' 6" (1.676 m) 104 kg (229 lb 4.5 oz)    Last Period BMI             10/11/2010 37.01 kg/m2         Blood Pressure          Most Recent Value    BP  (!)  160/80      Allergies as of 4/7/2017     No Known Allergies      Immunizations Administered on Date of Encounter - 4/7/2017     None      Orders Placed During Today's Visit     Future Labs/Procedures Expected by Expires    MRI Upper Extremity Joint WO Cont Left  4/7/2017 4/7/2018      Language Assistance Services     ATTENTION: Language assistance services are available, free of charge. Please call [phone].      ATENCIÓN: Si habla carinañol, tiene a rodas disposición servicios gratuitos de asistencia lingüística. Llame al [phone].     GERBER Ý: N?u b?n nói Ti?ng Vi?t, có các d?ch v? h? tr? ngôn ng? mi?n phí dành cho b?n. G?i s? [phone].         [facility] complies with applicable Federal civil rights laws and does not discriminate on the basis of race, color, national origin, age, disability, or sex.        "

## 2017-04-07 NOTE — PROGRESS NOTES
Subjective:       Patient ID: Jessie Gallardo is a 61 y.o. female.    Chief Complaint: Back Pain; Neck Pain; and Shoulder Pain    Interval History 4/7/2017:  The patient returns today for follow up of neck and back pain.  She is s/p cervical MIKE on 3/15/17 with limited benefit.  She reports significant relief with this procedure in the past.  She does report having a headaches after the procedure.  She called the office and was told to lay down and drink caffeine.  She reports that this resolved her headache.  She also has left shoulder pain with radiation down her left arm which is worsening.  She reports an injury about 6 years ago during which she fell into a hole and was removed by her left arm.  She has had shoulder pain since this time, although it has severely worsened over the past month.  She has had XRAYs which show DJD.  She has not had an MRI.  She is also reporting worsening lower back pain and would like a repeat MIKE as previous provided significant benefit.  She continues to take Tramadol and Gabapentin with benefit.  She continues to try to control her diabetes through diet and reports that her sugars have been stable.  Her pain today is 8/10.  The patient denies any bowel or bladder incontinence or signs of saddle paresthesia.  The patient denies any major medical changes since last office visit.    Interval History 2/1/2017:  The patient returns today for follow up of neck and back pain.  Her back pain is tolerable today.  Her biggest complaint today is neck pain with radiation into her arms.  She has numbness to her fingers also.  She previously had significant benefit from cervical MIKE in the past.  She had an A1C completed at her PCP last week and reports that it was 8.0.  She has been trying to control her sugars through diet and exercise.  She continues to take Gabapentin 1800 mg daily and Tramadol PRN.  She reports significant benefit of the medication without side effects.  Her pain today is  5/10.  The patient denies any bowel or bladder incontinence or signs of saddle paresthesia.      Interval History 12/2/2016:  The patient returns today for follow up of lower back and neck pain.  She is s/p L5-S1 IL MIKE on 11/16/16 with 70% pain relief of back and leg pain.  She reports that her pain is mild today.  She has increased her activity lately and has been cleaning her house.  She does have some increased pain after cleaning.  She is very happy with her results though.  Her neck pain has been tolerable.  She has not started PT because she recently moved and would like another order.  Her pain today is 5/10.  The patient denies any bowel or bladder incontinence or signs of saddle paresthesia.  The patient denies any major medical changes since last office visit.    Interval History 10/5/2016:  The patient returns today for follow up of lower back and leg pain.  She is s/p bilateral SI joint injections on 8/10/16 with 50% benefit.  She is still having radiating pain down the back and sides of both legs to the top and bottom of her feet.  She is also having numbness and tingling throughout her feet.  She was previously having swelling to her legs.  Dr. Ley instructed her to titrate down Gabapentin to see if this was causing this.  She states that this did not improve her swelling.  She then stopped vitamin supplements (Vit E and C).  Her leg swelling then resolved.  She then restarted Gabapentin and titrated up to 600 mg TID.  Her pain today is 8/10.  The patient denies any bowel or bladder incontinence or signs of saddle paresthesia.  The patient denies any major medical changes since last office visit.    Interval History 6/27/2016:  The patient returns today for f/u of neck, bilateral shoulder and lower back pain.  She is s/p cervical MIKE on 6/8/16 with 80% improvement of her neck and left arm pain.  She is no longer having the shooting electric pain.  Her numbness has also improved since the  procedure.  Her biggest complaint today is lower back pain which radiates down the back and sides of both legs to the bottom of her feet with numbness.  She also has a history of diabetes and diabetic neuropathy.  Her last A1C was 8.7, increased from 7.8.  She has had lumbar RFAs in the past with significant relief.  She has not had lumbar ESIs.  Her previous lumbar MRI from 2014 shows disc bulges at L3-4, L4-5, and L5-S1 without NF narrowing.  She is taking Tramadol PRN with relief.  I increased her Gabapentin last OV but she reports that she is taking 300 mg BID.  Her pain today is 6/10.  The patient denies any bowel/bladder incontinence or symptoms of saddle paresthesia.     Interval History 05/20/2016:  Patient is present today for a f/u for lower back, bilateral shoulder and neck pain.  Her back pain has been mild since her lumbar RFAs.  She is mostly complaining of neck pain which radiates down both arms with associated tingling.  She has had cervical ESIs in the past which she now states may have offered her more benefit than she previously believed.  She would like to try this treatment options again.  She did have a recent cervical MRI ordered by Dr. Herrera which does show muliple osteophytes with spinal narrowing.  She also had bilateral shoulder XRAYs which show DJD.   She continues to use Tramadol which provided relief.  She has a history of C4-5 fusion in 1999 with relief of her pain.  She is also taking Gabapentin 300 mg TID with limited relief.  Her pain today is a 6/10.  The patient denies any bowel/bladder incontinence.    Interval History 04/26/2016:  Patient is present today for a f/u for Low Back Pain. The pt recently had a Right Lumbar RFA @ L3,L4,L5 on 4/12/2016 where she reports a significant amount of relief. She reports her pain to be 4/10 today. The pt would like to address neck and shoulder pain that is radiating down through to both arms.  Patient has had a history of a C4-5 fusion, she  had previous cervical intralaminar epidural steroidal injections in January 2015 which did not offer significant relief and the patient did have a cervical EMG/NCV which did not show evidence of radiculopathy at that time but some carpal tunnel syndrome.  Currently she states that the pain in her shoulders and in her arms is most significant and she describes her arm pain as feelings of swelling bilaterally.    Interval History 11/19/2015:  Patient here for f/u of chronic low back pain. Patient was last here in 7/15 and had B L3,4,5 RFA done. Patient states that she continues to have about 50% pain reduction of low back pain and some leg pain. She able to walk and stand more and feels that she is functioning much better over all. Patient continues to have some low back pain right> left with intermittent BLE pains right >left and bilateral foot numbess and tingling. Patient admits to not taking gabapentin as directed and had been taking it prn.     Interval history 07/7/2015:  Ms. Gallardo is a 61 y.o. female with neck and low back pain presents today for f/u s/p b/l MBB at L4, 5 and sacral Ala and right sided MBB at same level. Reports significantly more relief with b/l block. She was in physical therapy but has not gone for 2 months. She has a lot of anxiety and frustration associated with her chronic pain. She saw Dr. Rosenbaum today and it was recommended she continue f/u with Dr. Jean. Pt has been seen in the clinic before by Dr. Ley, however pt is new to me.   History below per Dr. Ley    Interval history 05/21/2015:  Since previous encounter patient reports neck pain and lower back pain, although the pain in her lower back is her worst pain. . Patient reports pain as a 8/10 today. Patient does not take Soma and Hydrocodone anymore. She has a lot of anxiety and frustration associated with her chronic pain.    Interval history 04/20/2015:  Since previous encounter patient reports neck pain radiating  "into her upper extremities. Patient stated that she completed her EMG with  which did not show any evidence of radiculopathy and neurosurgery was evaluating the patient did not determine any need for further surgical intervention. Patient stated that she discontinued the Lyrica due to it makes her swell and have dry mouth. Patient stated that she still takes the Gabapentin. Patient reports no other health changes since her last visit. Patient reports her pain 8/10 today.     Interval history 03/16/2015:  Since previous encounter patient reports neck pain radiating into her upper extremities and sometimes causes her to have headaches. Patient reports low back also. Patient stated that she did receive relief from her last injection but the pain has returned. Patient stated that she still takes Norco but it only puts her to sleep and when she awakens the pain is back. Patient reports that she missed her EMG appt due to her pain, however she has been rescheduled to the end of the month Additionally the patient has not started Lyrica as previously prescribed. Patient reports memory loss. Patient reports no other health changes since her last visit. Patient reports her pain 8/10 today.   Initial encounter:    Jessie Gallardo presents to the clinic for the evaluation of neck and low back pain. The pain started 3 months ago following traumatic fall and symptoms have been worsening. Reports that her neck pain is the most concerning today.     Brief history:    She has h/o cervical fusion in 1999 in [place] and she did well after surgery. She fell on 9/2/14 when she was getting her oil changed. She was unable to stand right after the incident and she was taken by ambulance to the ED.     She complains of diffuse body pain. She feels like she is getting worse from the accident. She has pain from her "head to her toes."      Pain Description:    The pain is located in the neck and low back area and radiates to the " shoulders and posterior thighs, respectively..     At BEST 6/10     At WORST  9/10 on the WORST day.     On average pain is rated as 7/10.     Today the pain is rated as 8/10    The pain is described as aching, burning, numbing, sharp, shooting, stabbing, tingling and weakness      Symptoms interfere with daily activity and sleeping.     Exacerbating factors: Sitting, Laying, Bending, Morning, Extension, Flexing, Lifting and Getting out of bed/chair.     Mitigating factors laying down and medications.     Patient denies night fever/night sweats, urinary incontinence, bowel incontinence, significant weight loss, significant motor weakness and loss of sensations.  Patient denies any suicidal or homicidal ideations    Pain Medications:    Current:  Neurontin 600 mg TID (1800 mg daily)  Tramadol 50 mg BID PRN  Compounding cream    Tried in Past:  NSAIDs -Aleve  TCA -Never  SNRI -Never  Muscle relaxer: Klonopin, Zanaflex, Zoloft  Opioid: Tramadol     Physical Therapy/Home Exercise: not currently     report: Reviewed and consistent with medication use as prescribed.    Pain Procedures:  1/7/2015-cervical epidural steroidal injection  1/21/2015-cervical epidural steroidal injection  6/10/2015 bilateral medial branch nerve block at the level of L3, L4, L5  6/17/2015-right medial branch nerve block at the level of L3, L4, L5  7/15/2015-left radiofrequency ablation at the level of L3, L4, L5  7/29/2015-right radiofrequency ablation at the level of L3, L4, L5  3/29/2016-left radio frequency ablation at the level of L3, L4, L5  4/12/2016-right radio frequency ablation at the level of L3, L4, L5  6/8/16 C7-T1 IL MIKE- 80% relief  8/10/16 Bilateral SI joint injections- 50% relief  11/16/16 L5-S1 IL MIKE- 70% relief  3/15/17 C7-T1 IL MIKE- no relief    Chiropractor -never  Acupuncture - never  TENS unit -Yes, with PT  Spinal decompression -never  Joint replacement -never    Imaging:    MRI Cervical 05/17/2016:  Comparison is  November 2014    Routine imaging of the cervical spine was obtained.    There is an osseous fusion at C4-5.  There is straightening and slight reversal of the normal cervical lordosis again seen.  Alignment of vertebral bodies is satisfactory.  Discs are desiccated throughout with disc space narrowing most pronounced at the C5-6 level, similar to prior study.  The spinal cord is normal in signal and paravertebral structures are unremarkable.    C2-3 demonstrates no significant abnormality.    C3-4 demonstrates a mild diffuse disc bulge asymmetric toward the right.  There is bilateral foraminal narrowing.  This disc bulge thins the posterior cerebrospinal fluid sleeve and abuts the anterior aspect of the spinal cord with resultant mild spinal canal narrowing.    C4-5 demonstrates osseous fusion with some posterior osteophytic spurring.  There is complete loss of the anterior cerebrospinal fluid sleeve,and the posterior cerebrospinal fluid sleeve and some flattening of the spinal cord but no abnormal spinal cord signal.  Findings appear fairly similar to prior study.  There is mild to moderate narrowing of the spinal canal.    C5-6 demonstrates posterior disc osteophyte complex asymmetric to the left more pronounced as compared to prior study.  There is thinning of the cerebrospinal fluid sleeve around the spinal cord and mild spinal canal narrowing.    C6-7 demonstrates a posterior disc osteophyte complex asymmetric toward the left.  There is thinning of the cerebrospinal fluid sleeve around the spinal cord and mild spinal canal narrowing.   Impression    In this patient with osseous fusion at C4-5, there is multilevel spinal canal narrowing most significant at C4-5, as further detailed above.  ______________________________________     Electronically signed by: Sherie Rodriguez MD  Date: 05/18/16       Lumbar MRI 10/31/16  Narrative   MRI OF THE LUMBAR SPINE WITHOUT CONTRAST.     Technique:  Sagittal T1, sagittal T2,  sagittal STIR, axial T1 and axial T2 weighted images of the lumbar spine obtained without contrast.     Comparison: MRI 11/20/2014.     Findings:  Minimal grade 1 anterolisthesis is seen at L3-4 and L4-L5.  Otherwise, sagittal vertebral body alignment is appropriate.  Vertebral body heights well-maintained.  No fracture is identified.  Mild multilevel disc degeneration is seen, most pronounced at L5-S1. No marrow signal abnormality suspicious for an infiltrative process.      The conus is normal in appearance, and terminates at the L1 level.  Multiple fibroids are seen in the uterus.      T12-L1: No focal disc herniation.  No significant spinal canal stenosis or neuroforaminal narrowing.  L1-L2: Bilateral facet arthropathy.  No focal disc herniation.  No significant spinal canal stenosis or neuroforaminal narrowing.  L2-L3: No focal disc herniation.  No significant spinal canal stenosis.  Bilateral facet arthropathy is noted, which contributes to mild bilateral neuroforaminal narrowing.  L3-L4: Uncovering of the disc with mild bulge, bilateral facet arthropathy and ligament of flavum thickening resulting in moderate right-sided and mild left-sided neuroforaminal narrowing.  L4-L5: Uncovering of the disc with mild bulge, bilateral facet arthropathy, and ligamentum flavum thickening resulting in moderate right-sided and mild left-sided neuroforaminal narrowing.  L5-S1: Broad-based disc bulge and bilateral facet arthropathy without significant spinal canal stenosis.  Moderate bilateral neuroforaminal narrowing is seen.   Impression      Multilevel lumbar spondylosis, as detailed above.         Lab Results   Component Value Date    HGBA1C 8.7 (H) 04/21/2016       REVIEW OF SYSTEMS:    GENERAL:  No weight loss, malaise or fevers.  HEENT:   No recent changes in vision or hearing  NECK:  Negative for lumps, no difficulty with swallowing.  RESPIRATORY:  Negative for cough, wheezing or shortness of breath, patient denies any  "recent URI.  CARDIOVASCULAR:  Negative for chest pain, leg swelling or palpitations. H/o htn.  GI:  Negative for abdominal discomfort, blood in stools or black stools or change in bowel habits.  MUSCULOSKELETAL:  See HPI.  SKIN:  Negative for lesions, rash, and itching.  PSYCH:  No mood disorder or recent psychosocial stressors.  Patients sleep is not disturbed secondary to pain.  HEMATOLOGY/LYMPHOLOGY:  Negative for prolonged bleeding, bruising easily or swollen nodes.  Patient is not currently taking any anti-coagulants  NEURO:   No history of syncope, paralysis, seizures or tremors. H/O headaches- followed by Dr. Herrera.  ENDO: H/O diabetes and peripheral neuropathy.  All other reviewed and negative other than HPI.      OBJECTIVE:    BP (!) 160/80 (BP Location: Right arm)  Pulse 68  Temp 99 °F (37.2 °C) (Oral)   Resp 18  Ht 5' 6" (1.676 m)  Wt 104 kg (229 lb 4.5 oz)  LMP 10/11/2010  BMI 37.01 kg/m2    PHYSICAL EXAMINATION:    GENERAL: Well appearing, in no acute distress, alert and oriented x3.  PSYCH:  Mood and affect appropriate.  SKIN: Skin color, texture, turgor normal, no rashes or lesions.  HEAD/FACE:  Normocephalic, atraumatic. Cranial nerves grossly intact.  NECK: There is pain to palpation over the cervical paraspinal muscles bilaterally.  Spurling Negative.  Painful cervical flexion and extension.  Mild cervical facet loading bilaterally.  Negative Spurling.  CV: RRR with palpation of the radial artery.  PULM: No evidence of respiratory difficulty, symmetric chest rise.  BACK:  There is no pain with palpation over the facet joints of the lumbar spine bilaterally. Full ROM to lumbar spine with pain on flexion greater than extension.  Mild facet loading bilaterally.  There is pain to palpation over the sacroiliac joints bilaterally.  FABERs is negative bilaterally.  EXTREMITIES: Peripheral joint ROM is full and pain free without obvious instability or laxity in all four extremities. No deformities, " edema, or skin discoloration. Good capillary refill.  MUSCULOSKELETAL:  Pain to palpation of the subacromial bursa bilaterally.  Full ROM to bilateral shoulder with pain on internal and external rotation of left shoulder.  There is positive Neer to left side.  Bilateral upper  extremity strength is normal and symmetric.  No atrophy or tone abnormalities are noted.  NEURO: Bilateral upper extremity coordination and muscle stretch reflexes are physiologic and symmetric.  Brenda's negative. No clonus.  Decreased sensation to BLE.  GAIT: Antalgic- ambulates without assistance.    Assessment:     Ms. Gallardo is a 61 y.o. female with neck, left shoulder and lower back pain consistent with the following diagnoses:    1. Cervical radiculopathy    2. Chronic pain of both shoulders    3. Cervical stenosis of spinal canal    4. Shoulder impingement syndrome, left    5. Lumbar radiculopathy    6. Lumbosacral radiculopathy        Plan:     - Previous imaging was reviewed and discussed with the patient today.    - She is C7-T1 IL MIKE with limited benefit.  Today, she complains mostly of left shoulder pain.  She had an injury in the past.  I will order an MRI to further evaluate pain source.    - Will schedule repeat L5-S1 IL MIKE.  The procedure, risks, benefits and options were discussed with patient. There are no contraindications to the procedure. The patient expressed understanding and agreed to proceed.  Consent obtained today.    - Continue Gabapentin 600 mg TID (1800 mg daily).    - Continue Tramadol 50 mg BID PRN pain, #60, 1 refill.  I called this in today.    - The [place] Board of Pharmacy website for prescription monitoring was consulted today, and it does not suggest any deviations in conflict with the patient's controlled substance contract with our clinic. Will continue current therapy with frequent monitoring of the controlled substance database, and urine drug screens on followup. Refill approved.     - S  from 12/2/16 reviewed and is consistent.  This does not test for Tramadol, however.    - The patient will continue a home exercise routine to help with pain and strengthening.      - We also discussed the importance of maintaining sugars WNL and compliance with medication regimen.  She may have a component of peripheral neuropathy as well.    - RTC 2 weeks after above procedure.        The above plan and management options were discussed at length with patient. Patient is in agreement with the above and verbalized understanding.     Lisa Angel  04/07/2017

## 2017-04-19 ENCOUNTER — HOSPITAL ENCOUNTER (OUTPATIENT)
Facility: OTHER | Age: 62
Discharge: HOME | End: 2017-04-19
Attending: ANESTHESIOLOGY | Admitting: ANESTHESIOLOGY
Payer: MEDICARE

## 2017-04-19 ENCOUNTER — SURGERY (OUTPATIENT)
Age: 62
End: 2017-04-19

## 2017-04-19 VITALS
DIASTOLIC BLOOD PRESSURE: 73 MMHG | TEMPERATURE: 98 F | HEIGHT: 66 IN | BODY MASS INDEX: 36.96 KG/M2 | SYSTOLIC BLOOD PRESSURE: 176 MMHG | HEART RATE: 57 BPM | RESPIRATION RATE: 18 BRPM | WEIGHT: 230 LBS | OXYGEN SATURATION: 97 %

## 2017-04-19 DIAGNOSIS — M54.16 LUMBAR RADICULOPATHY: Primary | ICD-10-CM

## 2017-04-19 LAB
GLUCOSE SERPL-MCNC: 165 MG/DL (ref 70–110)
GLUCOSE SERPL-MCNC: 233 MG/DL (ref 70–110)
POCT GLUCOSE: 165 MG/DL (ref 70–110)
POCT GLUCOSE: 233 MG/DL (ref 70–110)

## 2017-04-19 PROCEDURE — 82947 ASSAY GLUCOSE BLOOD QUANT: CPT | Performed by: ANESTHESIOLOGY

## 2017-04-19 PROCEDURE — 62323 NJX INTERLAMINAR LMBR/SAC: CPT | Mod: ,,, | Performed by: ANESTHESIOLOGY

## 2017-04-19 PROCEDURE — 63600175 PHARM REV CODE 636 W HCPCS: Performed by: ANESTHESIOLOGY

## 2017-04-19 PROCEDURE — 62322 NJX INTERLAMINAR LMBR/SAC: CPT | Performed by: ANESTHESIOLOGY

## 2017-04-19 PROCEDURE — 25500020 PHARM REV CODE 255: Performed by: ANESTHESIOLOGY

## 2017-04-19 PROCEDURE — 62323 NJX INTERLAMINAR LMBR/SAC: CPT | Performed by: ANESTHESIOLOGY

## 2017-04-19 PROCEDURE — 25000003 PHARM REV CODE 250: Performed by: ANESTHESIOLOGY

## 2017-04-19 PROCEDURE — 77003 FLUOROGUIDE FOR SPINE INJECT: CPT | Performed by: ANESTHESIOLOGY

## 2017-04-19 PROCEDURE — 99152 MOD SED SAME PHYS/QHP 5/>YRS: CPT | Mod: ,,, | Performed by: ANESTHESIOLOGY

## 2017-04-19 RX ORDER — METHYLPREDNISOLONE ACETATE 40 MG/ML
40 INJECTION, SUSPENSION INTRA-ARTICULAR; INTRALESIONAL; INTRAMUSCULAR; SOFT TISSUE ONCE
Status: DISCONTINUED | OUTPATIENT
Start: 2017-04-19 | End: 2017-04-19 | Stop reason: HOSPADM

## 2017-04-19 RX ORDER — BUPIVACAINE HYDROCHLORIDE 2.5 MG/ML
10 INJECTION, SOLUTION EPIDURAL; INFILTRATION; INTRACAUDAL ONCE
Status: DISCONTINUED | OUTPATIENT
Start: 2017-04-19 | End: 2017-04-19 | Stop reason: HOSPADM

## 2017-04-19 RX ORDER — SODIUM CHLORIDE 9 MG/ML
INJECTION, SOLUTION INTRAVENOUS CONTINUOUS
Status: DISCONTINUED | OUTPATIENT
Start: 2017-04-19 | End: 2017-04-19 | Stop reason: HOSPADM

## 2017-04-19 RX ORDER — MIDAZOLAM HYDROCHLORIDE 1 MG/ML
2 INJECTION INTRAMUSCULAR; INTRAVENOUS
Status: DISCONTINUED | OUTPATIENT
Start: 2017-04-19 | End: 2017-04-19 | Stop reason: HOSPADM

## 2017-04-19 RX ORDER — METHYLPREDNISOLONE ACETATE 40 MG/ML
INJECTION, SUSPENSION INTRA-ARTICULAR; INTRALESIONAL; INTRAMUSCULAR; SOFT TISSUE
Status: DISCONTINUED | OUTPATIENT
Start: 2017-04-19 | End: 2017-04-19 | Stop reason: HOSPADM

## 2017-04-19 RX ORDER — BUPIVACAINE HYDROCHLORIDE 2.5 MG/ML
10 INJECTION, SOLUTION EPIDURAL; INFILTRATION; INTRACAUDAL ONCE
Status: COMPLETED | OUTPATIENT
Start: 2017-04-19 | End: 2017-04-19

## 2017-04-19 RX ORDER — LIDOCAINE HYDROCHLORIDE 10 MG/ML
INJECTION INFILTRATION; PERINEURAL
Status: DISCONTINUED | OUTPATIENT
Start: 2017-04-19 | End: 2017-04-19 | Stop reason: HOSPADM

## 2017-04-19 RX ORDER — LIDOCAINE HYDROCHLORIDE 10 MG/ML
10 INJECTION INFILTRATION; PERINEURAL
Status: DISCONTINUED | OUTPATIENT
Start: 2017-04-19 | End: 2017-04-19 | Stop reason: HOSPADM

## 2017-04-19 RX ORDER — SODIUM CHLORIDE 9 MG/ML
3 INJECTION, SOLUTION INTRAMUSCULAR; INTRAVENOUS; SUBCUTANEOUS ONCE
Status: DISCONTINUED | OUTPATIENT
Start: 2017-04-19 | End: 2017-04-19 | Stop reason: HOSPADM

## 2017-04-19 RX ORDER — MIDAZOLAM HYDROCHLORIDE 1 MG/ML
INJECTION INTRAMUSCULAR; INTRAVENOUS
Status: DISCONTINUED | OUTPATIENT
Start: 2017-04-19 | End: 2017-04-19 | Stop reason: HOSPADM

## 2017-04-19 RX ADMIN — MIDAZOLAM HYDROCHLORIDE 2 MG: 1 INJECTION, SOLUTION INTRAMUSCULAR; INTRAVENOUS at 11:04

## 2017-04-19 RX ADMIN — SODIUM CHLORIDE: 0.9 INJECTION, SOLUTION INTRAVENOUS at 11:04

## 2017-04-19 RX ADMIN — BUPIVACAINE HYDROCHLORIDE 10 ML: 2.5 INJECTION, SOLUTION EPIDURAL; INFILTRATION; INTRACAUDAL; PERINEURAL at 11:04

## 2017-04-19 RX ADMIN — METHYLPREDNISOLONE ACETATE 40 MG: 40 INJECTION, SUSPENSION INTRA-ARTICULAR; INTRALESIONAL; INTRAMUSCULAR; SOFT TISSUE at 11:04

## 2017-04-19 RX ADMIN — LIDOCAINE HYDROCHLORIDE 20 ML: 10 INJECTION, SOLUTION INFILTRATION; PERINEURAL at 11:04

## 2017-04-19 RX ADMIN — IOHEXOL 50 ML: 300 INJECTION, SOLUTION INTRAVENOUS at 11:04

## 2017-04-19 NOTE — PROGRESS NOTES
Blood glucose at 1120 =233. Dr Ley ordered pt to give self dose of insulin. Repeat blood glucose at 6863=424

## 2017-04-19 NOTE — DISCHARGE SUMMARY
Discharge Note  Short Stay      SUMMARY     Admit Date: 4/19/2017    Attending Physician: Graciela Ley      Discharge Physician: Graciela Ley      Discharge Date: 4/19/2017 12:00 PM     PROCEDURE: Interlaminar epidural steroid injection under fluoroscopic guidance.     Pre-Op diagnosis: lumbar stenosis with radiculopathy    Disposition: Home or self care    Patient Instructions:   Current Discharge Medication List      CONTINUE these medications which have NOT CHANGED    Details   amlodipine (NORVASC) 10 MG tablet Take 1 tablet (10 mg total) by mouth once daily.  Qty: 90 tablet, Refills: 3      aspirin (ECOTRIN) 81 MG EC tablet Take 1 tablet (81 mg total) by mouth once daily.  Qty: 90 tablet, Refills: 2      blood sugar diagnostic (FREESTYLE LITE STRIPS) Strp TEST BLOOD SUGAR 3 TIMES A DAY  Qty: 100 strip, Refills: 5    Comments: DX Code Needed .250.00      divalproex (DEPAKOTE) 500 MG Tb24 Take 2 tablets (1,000 mg total) by mouth every evening.  Qty: 180 tablet, Refills: 3    Comments: **Patient requests 90 days supply**  Associated Diagnoses: Chronic post-traumatic headache, not intractable      enalapril (VASOTEC) 20 MG tablet Take 1 tablet (20 mg total) by mouth 2 (two) times daily.  Qty: 180 tablet, Refills: 1      fluticasone (FLONASE) 50 mcg/actuation nasal spray USE 2 SPRAYS IN EACH NOSTRIL ONCE DAILY  Qty: 16 g, Refills: 3      gabapentin (NEURONTIN) 300 MG capsule Take 2 capsules (600 mg total) by mouth 3 (three) times daily.  Qty: 540 capsule, Refills: 0    Comments: **Patient requests 90 days supply**  Associated Diagnoses: Bilateral lumbar radiculopathy; Sacroiliac joint pain; Lumbar facet arthropathy; Physical deconditioning; Spondylosis of lumbar region without myelopathy or radiculopathy      hydrochlorothiazide (HYDRODIURIL) 25 MG tablet TAKE 1 TABLET BY MOUTH ONCE DAILY  Qty: 90 tablet, Refills: 3      insulin aspart (NOVOLOG FLEXPEN) 100 unit/mL InPn pen INJECT 20 UNITS UNDER THE SKIN  "THREE TIMES DAILY WITH MEALS  Qty: 45 mL, Refills: 3      insulin detemir (LEVEMIR FLEXTOUCH) 100 unit/mL (3 mL) SubQ InPn pen Inject 32 units bid  Qty: 45 mL, Refills: 0      insulin needles, disposable, (NOVOFINE 30) 30 x 1/3 " Ndle USE AS DIRECTED THREE TIMES DAILY  Qty: 100 each, Refills: 5      lancets (FREESTYLE LANCETS) 28 gauge Misc Inject 1 lancet into the skin 3 (three) times daily.  Qty: 100 each, Refills: 5    Comments: DX Code Needed . 250.00      lovastatin (MEVACOR) 20 MG tablet TAKE 1 TABLET BY MOUTH EVERY EVENING.  Qty: 90 tablet, Refills: 3      omeprazole (PRILOSEC) 20 MG capsule TAKE ONE CAPSULE BY MOUTH TWICE DAILY  Qty: 180 capsule, Refills: 0      pen needle, diabetic (BD INSULIN PEN NEEDLE UF MINI) 31 gauge x 3/16" Ndle USE AS DIRECTED TWICE DAILY  Qty: 100 each, Refills: 3      albuterol 90 mcg/actuation inhaler Inhale 2 puffs into the lungs every 6 (six) hours as needed for Wheezing or Shortness of Breath.  Qty: 18 g, Refills: 0      lidocaine-prilocaine (EMLA) cream       LINZESS 145 mcg Cap capsule TK 1 C PO ONCE A DAY ON AN EMPTY STOMACH  Refills: 2      lorazepam (ATIVAN) 1 MG tablet Take 1 tablet (1 mg total) by mouth On call Procedure (for MRI).  Qty: 1 tablet, Refills: 0    Associated Diagnoses: Cervical radiculopathy; Cervical spondylosis without myelopathy      ondansetron (ZOFRAN) 4 MG tablet TK 1 T PO Q 8 H PRF NAUSEA  Refills: 0      pantoprazole (PROTONIX) 40 MG tablet TK 1 T PO ONCE A DAY  Refills: 1      tramadol (ULTRAM) 50 mg tablet Take 1 tablet (50 mg total) by mouth every 12 (twelve) hours as needed for Pain.  Qty: 60 tablet, Refills: 1             Resume home diet and activity    "

## 2017-04-19 NOTE — H&P (VIEW-ONLY)
Subjective:       Patient ID: Jessie Gallardo is a 61 y.o. female.    Chief Complaint: Back Pain; Neck Pain; and Shoulder Pain    Interval History 4/7/2017:  The patient returns today for follow up of neck and back pain.  She is s/p cervical MIKE on 3/15/17 with limited benefit.  She reports significant relief with this procedure in the past.  She does report having a headaches after the procedure.  She called the office and was told to lay down and drink caffeine.  She reports that this resolved her headache.  She also has left shoulder pain with radiation down her left arm which is worsening.  She reports an injury about 6 years ago during which she fell into a hole and was removed by her left arm.  She has had shoulder pain since this time, although it has severely worsened over the past month.  She has had XRAYs which show DJD.  She has not had an MRI.  She is also reporting worsening lower back pain and would like a repeat MIKE as previous provided significant benefit.  She continues to take Tramadol and Gabapentin with benefit.  She continues to try to control her diabetes through diet and reports that her sugars have been stable.  Her pain today is 8/10.  The patient denies any bowel or bladder incontinence or signs of saddle paresthesia.  The patient denies any major medical changes since last office visit.    Interval History 2/1/2017:  The patient returns today for follow up of neck and back pain.  Her back pain is tolerable today.  Her biggest complaint today is neck pain with radiation into her arms.  She has numbness to her fingers also.  She previously had significant benefit from cervical MIKE in the past.  She had an A1C completed at her PCP last week and reports that it was 8.0.  She has been trying to control her sugars through diet and exercise.  She continues to take Gabapentin 1800 mg daily and Tramadol PRN.  She reports significant benefit of the medication without side effects.  Her pain today is  5/10.  The patient denies any bowel or bladder incontinence or signs of saddle paresthesia.      Interval History 12/2/2016:  The patient returns today for follow up of lower back and neck pain.  She is s/p L5-S1 IL MIKE on 11/16/16 with 70% pain relief of back and leg pain.  She reports that her pain is mild today.  She has increased her activity lately and has been cleaning her house.  She does have some increased pain after cleaning.  She is very happy with her results though.  Her neck pain has been tolerable.  She has not started PT because she recently moved and would like another order.  Her pain today is 5/10.  The patient denies any bowel or bladder incontinence or signs of saddle paresthesia.  The patient denies any major medical changes since last office visit.    Interval History 10/5/2016:  The patient returns today for follow up of lower back and leg pain.  She is s/p bilateral SI joint injections on 8/10/16 with 50% benefit.  She is still having radiating pain down the back and sides of both legs to the top and bottom of her feet.  She is also having numbness and tingling throughout her feet.  She was previously having swelling to her legs.  Dr. Ley instructed her to titrate down Gabapentin to see if this was causing this.  She states that this did not improve her swelling.  She then stopped vitamin supplements (Vit E and C).  Her leg swelling then resolved.  She then restarted Gabapentin and titrated up to 600 mg TID.  Her pain today is 8/10.  The patient denies any bowel or bladder incontinence or signs of saddle paresthesia.  The patient denies any major medical changes since last office visit.    Interval History 6/27/2016:  The patient returns today for f/u of neck, bilateral shoulder and lower back pain.  She is s/p cervical MIKE on 6/8/16 with 80% improvement of her neck and left arm pain.  She is no longer having the shooting electric pain.  Her numbness has also improved since the  procedure.  Her biggest complaint today is lower back pain which radiates down the back and sides of both legs to the bottom of her feet with numbness.  She also has a history of diabetes and diabetic neuropathy.  Her last A1C was 8.7, increased from 7.8.  She has had lumbar RFAs in the past with significant relief.  She has not had lumbar ESIs.  Her previous lumbar MRI from 2014 shows disc bulges at L3-4, L4-5, and L5-S1 without NF narrowing.  She is taking Tramadol PRN with relief.  I increased her Gabapentin last OV but she reports that she is taking 300 mg BID.  Her pain today is 6/10.  The patient denies any bowel/bladder incontinence or symptoms of saddle paresthesia.     Interval History 05/20/2016:  Patient is present today for a f/u for lower back, bilateral shoulder and neck pain.  Her back pain has been mild since her lumbar RFAs.  She is mostly complaining of neck pain which radiates down both arms with associated tingling.  She has had cervical ESIs in the past which she now states may have offered her more benefit than she previously believed.  She would like to try this treatment options again.  She did have a recent cervical MRI ordered by Dr. Herrera which does show muliple osteophytes with spinal narrowing.  She also had bilateral shoulder XRAYs which show DJD.   She continues to use Tramadol which provided relief.  She has a history of C4-5 fusion in 1999 with relief of her pain.  She is also taking Gabapentin 300 mg TID with limited relief.  Her pain today is a 6/10.  The patient denies any bowel/bladder incontinence.    Interval History 04/26/2016:  Patient is present today for a f/u for Low Back Pain. The pt recently had a Right Lumbar RFA @ L3,L4,L5 on 4/12/2016 where she reports a significant amount of relief. She reports her pain to be 4/10 today. The pt would like to address neck and shoulder pain that is radiating down through to both arms.  Patient has had a history of a C4-5 fusion, she  had previous cervical intralaminar epidural steroidal injections in January 2015 which did not offer significant relief and the patient did have a cervical EMG/NCV which did not show evidence of radiculopathy at that time but some carpal tunnel syndrome.  Currently she states that the pain in her shoulders and in her arms is most significant and she describes her arm pain as feelings of swelling bilaterally.    Interval History 11/19/2015:  Patient here for f/u of chronic low back pain. Patient was last here in 7/15 and had B L3,4,5 RFA done. Patient states that she continues to have about 50% pain reduction of low back pain and some leg pain. She able to walk and stand more and feels that she is functioning much better over all. Patient continues to have some low back pain right> left with intermittent BLE pains right >left and bilateral foot numbess and tingling. Patient admits to not taking gabapentin as directed and had been taking it prn.     Interval history 07/7/2015:  Ms. Gallardo is a 61 y.o. female with neck and low back pain presents today for f/u s/p b/l MBB at L4, 5 and sacral Ala and right sided MBB at same level. Reports significantly more relief with b/l block. She was in physical therapy but has not gone for 2 months. She has a lot of anxiety and frustration associated with her chronic pain. She saw Dr. Rosenbaum today and it was recommended she continue f/u with Dr. Jean. Pt has been seen in the clinic before by Dr. Ley, however pt is new to me.   History below per Dr. Ley    Interval history 05/21/2015:  Since previous encounter patient reports neck pain and lower back pain, although the pain in her lower back is her worst pain. . Patient reports pain as a 8/10 today. Patient does not take Soma and Hydrocodone anymore. She has a lot of anxiety and frustration associated with her chronic pain.    Interval history 04/20/2015:  Since previous encounter patient reports neck pain radiating  "into her upper extremities. Patient stated that she completed her EMG with  which did not show any evidence of radiculopathy and neurosurgery was evaluating the patient did not determine any need for further surgical intervention. Patient stated that she discontinued the Lyrica due to it makes her swell and have dry mouth. Patient stated that she still takes the Gabapentin. Patient reports no other health changes since her last visit. Patient reports her pain 8/10 today.     Interval history 03/16/2015:  Since previous encounter patient reports neck pain radiating into her upper extremities and sometimes causes her to have headaches. Patient reports low back also. Patient stated that she did receive relief from her last injection but the pain has returned. Patient stated that she still takes Norco but it only puts her to sleep and when she awakens the pain is back. Patient reports that she missed her EMG appt due to her pain, however she has been rescheduled to the end of the month Additionally the patient has not started Lyrica as previously prescribed. Patient reports memory loss. Patient reports no other health changes since her last visit. Patient reports her pain 8/10 today.   Initial encounter:    Jessie Gallardo presents to the clinic for the evaluation of neck and low back pain. The pain started 3 months ago following traumatic fall and symptoms have been worsening. Reports that her neck pain is the most concerning today.     Brief history:    She has h/o cervical fusion in 1999 in [place] and she did well after surgery. She fell on 9/2/14 when she was getting her oil changed. She was unable to stand right after the incident and she was taken by ambulance to the ED.     She complains of diffuse body pain. She feels like she is getting worse from the accident. She has pain from her "head to her toes."      Pain Description:    The pain is located in the neck and low back area and radiates to the " shoulders and posterior thighs, respectively..     At BEST 6/10     At WORST  9/10 on the WORST day.     On average pain is rated as 7/10.     Today the pain is rated as 8/10    The pain is described as aching, burning, numbing, sharp, shooting, stabbing, tingling and weakness      Symptoms interfere with daily activity and sleeping.     Exacerbating factors: Sitting, Laying, Bending, Morning, Extension, Flexing, Lifting and Getting out of bed/chair.     Mitigating factors laying down and medications.     Patient denies night fever/night sweats, urinary incontinence, bowel incontinence, significant weight loss, significant motor weakness and loss of sensations.  Patient denies any suicidal or homicidal ideations    Pain Medications:    Current:  Neurontin 600 mg TID (1800 mg daily)  Tramadol 50 mg BID PRN  Compounding cream    Tried in Past:  NSAIDs -Aleve  TCA -Never  SNRI -Never  Muscle relaxer: Klonopin, Zanaflex, Zoloft  Opioid: Tramadol     Physical Therapy/Home Exercise: not currently     report: Reviewed and consistent with medication use as prescribed.    Pain Procedures:  1/7/2015-cervical epidural steroidal injection  1/21/2015-cervical epidural steroidal injection  6/10/2015 bilateral medial branch nerve block at the level of L3, L4, L5  6/17/2015-right medial branch nerve block at the level of L3, L4, L5  7/15/2015-left radiofrequency ablation at the level of L3, L4, L5  7/29/2015-right radiofrequency ablation at the level of L3, L4, L5  3/29/2016-left radio frequency ablation at the level of L3, L4, L5  4/12/2016-right radio frequency ablation at the level of L3, L4, L5  6/8/16 C7-T1 IL MIKE- 80% relief  8/10/16 Bilateral SI joint injections- 50% relief  11/16/16 L5-S1 IL MIKE- 70% relief  3/15/17 C7-T1 IL MIKE- no relief    Chiropractor -never  Acupuncture - never  TENS unit -Yes, with PT  Spinal decompression -never  Joint replacement -never    Imaging:    MRI Cervical 05/17/2016:  Comparison is  November 2014    Routine imaging of the cervical spine was obtained.    There is an osseous fusion at C4-5.  There is straightening and slight reversal of the normal cervical lordosis again seen.  Alignment of vertebral bodies is satisfactory.  Discs are desiccated throughout with disc space narrowing most pronounced at the C5-6 level, similar to prior study.  The spinal cord is normal in signal and paravertebral structures are unremarkable.    C2-3 demonstrates no significant abnormality.    C3-4 demonstrates a mild diffuse disc bulge asymmetric toward the right.  There is bilateral foraminal narrowing.  This disc bulge thins the posterior cerebrospinal fluid sleeve and abuts the anterior aspect of the spinal cord with resultant mild spinal canal narrowing.    C4-5 demonstrates osseous fusion with some posterior osteophytic spurring.  There is complete loss of the anterior cerebrospinal fluid sleeve,and the posterior cerebrospinal fluid sleeve and some flattening of the spinal cord but no abnormal spinal cord signal.  Findings appear fairly similar to prior study.  There is mild to moderate narrowing of the spinal canal.    C5-6 demonstrates posterior disc osteophyte complex asymmetric to the left more pronounced as compared to prior study.  There is thinning of the cerebrospinal fluid sleeve around the spinal cord and mild spinal canal narrowing.    C6-7 demonstrates a posterior disc osteophyte complex asymmetric toward the left.  There is thinning of the cerebrospinal fluid sleeve around the spinal cord and mild spinal canal narrowing.   Impression    In this patient with osseous fusion at C4-5, there is multilevel spinal canal narrowing most significant at C4-5, as further detailed above.  ______________________________________     Electronically signed by: Sherie Rodriguez MD  Date: 05/18/16       Lumbar MRI 10/31/16  Narrative   MRI OF THE LUMBAR SPINE WITHOUT CONTRAST.     Technique:  Sagittal T1, sagittal T2,  sagittal STIR, axial T1 and axial T2 weighted images of the lumbar spine obtained without contrast.     Comparison: MRI 11/20/2014.     Findings:  Minimal grade 1 anterolisthesis is seen at L3-4 and L4-L5.  Otherwise, sagittal vertebral body alignment is appropriate.  Vertebral body heights well-maintained.  No fracture is identified.  Mild multilevel disc degeneration is seen, most pronounced at L5-S1. No marrow signal abnormality suspicious for an infiltrative process.      The conus is normal in appearance, and terminates at the L1 level.  Multiple fibroids are seen in the uterus.      T12-L1: No focal disc herniation.  No significant spinal canal stenosis or neuroforaminal narrowing.  L1-L2: Bilateral facet arthropathy.  No focal disc herniation.  No significant spinal canal stenosis or neuroforaminal narrowing.  L2-L3: No focal disc herniation.  No significant spinal canal stenosis.  Bilateral facet arthropathy is noted, which contributes to mild bilateral neuroforaminal narrowing.  L3-L4: Uncovering of the disc with mild bulge, bilateral facet arthropathy and ligament of flavum thickening resulting in moderate right-sided and mild left-sided neuroforaminal narrowing.  L4-L5: Uncovering of the disc with mild bulge, bilateral facet arthropathy, and ligamentum flavum thickening resulting in moderate right-sided and mild left-sided neuroforaminal narrowing.  L5-S1: Broad-based disc bulge and bilateral facet arthropathy without significant spinal canal stenosis.  Moderate bilateral neuroforaminal narrowing is seen.   Impression      Multilevel lumbar spondylosis, as detailed above.         Lab Results   Component Value Date    HGBA1C 8.7 (H) 04/21/2016       REVIEW OF SYSTEMS:    GENERAL:  No weight loss, malaise or fevers.  HEENT:   No recent changes in vision or hearing  NECK:  Negative for lumps, no difficulty with swallowing.  RESPIRATORY:  Negative for cough, wheezing or shortness of breath, patient denies any  "recent URI.  CARDIOVASCULAR:  Negative for chest pain, leg swelling or palpitations. H/o htn.  GI:  Negative for abdominal discomfort, blood in stools or black stools or change in bowel habits.  MUSCULOSKELETAL:  See HPI.  SKIN:  Negative for lesions, rash, and itching.  PSYCH:  No mood disorder or recent psychosocial stressors.  Patients sleep is not disturbed secondary to pain.  HEMATOLOGY/LYMPHOLOGY:  Negative for prolonged bleeding, bruising easily or swollen nodes.  Patient is not currently taking any anti-coagulants  NEURO:   No history of syncope, paralysis, seizures or tremors. H/O headaches- followed by Dr. Herrera.  ENDO: H/O diabetes and peripheral neuropathy.  All other reviewed and negative other than HPI.      OBJECTIVE:    BP (!) 160/80 (BP Location: Right arm)  Pulse 68  Temp 99 °F (37.2 °C) (Oral)   Resp 18  Ht 5' 6" (1.676 m)  Wt 104 kg (229 lb 4.5 oz)  LMP 10/11/2010  BMI 37.01 kg/m2    PHYSICAL EXAMINATION:    GENERAL: Well appearing, in no acute distress, alert and oriented x3.  PSYCH:  Mood and affect appropriate.  SKIN: Skin color, texture, turgor normal, no rashes or lesions.  HEAD/FACE:  Normocephalic, atraumatic. Cranial nerves grossly intact.  NECK: There is pain to palpation over the cervical paraspinal muscles bilaterally.  Spurling Negative.  Painful cervical flexion and extension.  Mild cervical facet loading bilaterally.  Negative Spurling.  CV: RRR with palpation of the radial artery.  PULM: No evidence of respiratory difficulty, symmetric chest rise.  BACK:  There is no pain with palpation over the facet joints of the lumbar spine bilaterally. Full ROM to lumbar spine with pain on flexion greater than extension.  Mild facet loading bilaterally.  There is pain to palpation over the sacroiliac joints bilaterally.  FABERs is negative bilaterally.  EXTREMITIES: Peripheral joint ROM is full and pain free without obvious instability or laxity in all four extremities. No deformities, " edema, or skin discoloration. Good capillary refill.  MUSCULOSKELETAL:  Pain to palpation of the subacromial bursa bilaterally.  Full ROM to bilateral shoulder with pain on internal and external rotation of left shoulder.  There is positive Neer to left side.  Bilateral upper  extremity strength is normal and symmetric.  No atrophy or tone abnormalities are noted.  NEURO: Bilateral upper extremity coordination and muscle stretch reflexes are physiologic and symmetric.  Brenda's negative. No clonus.  Decreased sensation to BLE.  GAIT: Antalgic- ambulates without assistance.    Assessment:     Ms. Gallardo is a 61 y.o. female with neck, left shoulder and lower back pain consistent with the following diagnoses:    1. Cervical radiculopathy    2. Chronic pain of both shoulders    3. Cervical stenosis of spinal canal    4. Shoulder impingement syndrome, left    5. Lumbar radiculopathy    6. Lumbosacral radiculopathy        Plan:     - Previous imaging was reviewed and discussed with the patient today.    - She is C7-T1 IL MIKE with limited benefit.  Today, she complains mostly of left shoulder pain.  She had an injury in the past.  I will order an MRI to further evaluate pain source.    - Will schedule repeat L5-S1 IL MIKE.  The procedure, risks, benefits and options were discussed with patient. There are no contraindications to the procedure. The patient expressed understanding and agreed to proceed.  Consent obtained today.    - Continue Gabapentin 600 mg TID (1800 mg daily).    - Continue Tramadol 50 mg BID PRN pain, #60, 1 refill.  I called this in today.    - The [place] Board of Pharmacy website for prescription monitoring was consulted today, and it does not suggest any deviations in conflict with the patient's controlled substance contract with our clinic. Will continue current therapy with frequent monitoring of the controlled substance database, and urine drug screens on followup. Refill approved.     - S  from 12/2/16 reviewed and is consistent.  This does not test for Tramadol, however.    - The patient will continue a home exercise routine to help with pain and strengthening.      - We also discussed the importance of maintaining sugars WNL and compliance with medication regimen.  She may have a component of peripheral neuropathy as well.    - RTC 2 weeks after above procedure.        The above plan and management options were discussed at length with patient. Patient is in agreement with the above and verbalized understanding.     Lisa Angel  04/07/2017

## 2017-04-19 NOTE — PLAN OF CARE
Problem: Patient Care Overview  Goal: Plan of Care Review  Pt tolerated procedure well. Pt reports 3/10 pain after procedure. Assisted pt up for first time. Steady on feet. All discharge instructions given.

## 2017-04-19 NOTE — DISCHARGE INSTRUCTIONS
Home Care Instructions Pain Management:    1. DIET:   You may resume your normal diet today.   2. BATHING:   You may shower with luke warm water. No tub baths or anything that will soak injection sites under water for 24 hours.  3. DRESSING:   You may remove your bandage today.   4. ACTIVITY LEVEL:   You may resume your normal activities 24 hrs after your procedure. Nothing strenuous today.  5. MEDICATIONS:   You may resume your normal medications today. To restart blood thinners, ask your doctor.  6. SPECIAL INSTRUCTIONS:   No heat to the injection site for 24 hrs including, bath or shower, heating pad, moist heat, or hot tubs.    Use ice pack to injection site for any pain or discomfort.  Apply ice packs for 20 minute intervals as needed.     If you have received any sedatives by mouth today you may not drive for 12 hours.    If you have received any sedation through your IV, you may not drive for 24 hrs.     PLEASE CALL YOUR DOCTOR IF:  1. Redness or swelling around the injection site.  2. Fever of 101 degrees or more  3. Drainage (pus) from the injection site.  4. For any continuous bleeding (some dried blood over the incision is normal.)    FOR EMERGENCIES:   If any unusual problems or difficulties occur during clinic hours, call [phone] or [phone].

## 2017-04-19 NOTE — IP AVS SNAPSHOT
[facility] ([facility]  [address] [place], [place] [id number]  Phone: [phone]           Patient Discharge Instructions   Our goal is to set you up for success. This packet includes information on your condition, medications, and your home care.  It will help you care for yourself to prevent having to return to the hospital.     Please ask your nurse if you have any questions.      There are many details to remember when preparing to leave the hospital. Here is what you will need to do:    1. Take your medicine. If you are prescribed medications, review your Medication List on the following pages. You may have new medications to  at the pharmacy and others that you'll need to stop taking. Review the instructions for how and when to take your medications. Talk with your doctor or nurses if you are unsure of what to do.     2. Go to your follow-up appointments. Specific follow-up information is listed in the following pages. Your may be contacted by a nurse or clinical provider about future appointments. Be sure we have all of the phone numbers to reach you. Please contact your provider's office if you are unable to make an appointment.     3. Watch for warning signs. Your doctor or nurse will give you detailed warning signs to watch for and when to call for assistance. These instructions may also include educational information about your condition. If you experience any of warning signs to your health, call your doctor.           [facility]  Unless otherwise directed by your provider, please   contact [facility], our nurse care line   that is available for 24/7 assistance.     [phone] (toll-free)     Registered nurses in the [facility]   provide: appointment scheduling, clinical advisement, health education, and other advisory services.                  ** Verify the list of medication(s) below is accurate and up to date. Carry this with you in case of  emergency. If your medications have changed, please notify your healthcare provider.             Medication List      ASK your doctor about these medications        Additional Info                      albuterol 90 mcg/actuation inhaler   Quantity:  18 g   Refills:  0   Dose:  2 puff    Instructions:  Inhale 2 puffs into the lungs every 6 (six) hours as needed for Wheezing or Shortness of Breath.     Begin Date    AM    Noon    PM    Bedtime       amlodipine 10 MG tablet   Commonly known as:  NORVASC   Quantity:  90 tablet   Refills:  3   Dose:  10 mg    Instructions:  Take 1 tablet (10 mg total) by mouth once daily.     Begin Date    AM    Noon    PM    Bedtime       aspirin 81 MG EC tablet   Commonly known as:  ECOTRIN   Quantity:  90 tablet   Refills:  2   Dose:  81 mg    Instructions:  Take 1 tablet (81 mg total) by mouth once daily.     Begin Date    AM    Noon    PM    Bedtime       blood sugar diagnostic Strp   Commonly known as:  FREESTYLE LITE STRIPS   Quantity:  100 strip   Refills:  5   Comments:  DX Code Needed .250.00    Instructions:  TEST BLOOD SUGAR 3 TIMES A DAY     Begin Date    AM    Noon    PM    Bedtime       divalproex  MG Tb24   Commonly known as:  DEPAKOTE   Quantity:  180 tablet   Refills:  3   Dose:  1000 mg   Comments:  **Patient requests 90 days supply**    Instructions:  Take 2 tablets (1,000 mg total) by mouth every evening.     Begin Date    AM    Noon    PM    Bedtime       enalapril 20 MG tablet   Commonly known as:  VASOTEC   Quantity:  180 tablet   Refills:  1   Dose:  20 mg    Instructions:  Take 1 tablet (20 mg total) by mouth 2 (two) times daily.     Begin Date    AM    Noon    PM    Bedtime       fluticasone 50 mcg/actuation nasal spray   Commonly known as:  FLONASE   Quantity:  16 g   Refills:  3    Instructions:  USE 2 SPRAYS IN EACH NOSTRIL ONCE DAILY     Begin Date    AM    Noon    PM    Bedtime       gabapentin 300 MG capsule   Commonly known as:  NEURONTIN    Quantity:  540 capsule   Refills:  0   Dose:  600 mg   Comments:  **Patient requests 90 days supply**    Instructions:  Take 2 capsules (600 mg total) by mouth 3 (three) times daily.     Begin Date    AM    Noon    PM    Bedtime       hydrochlorothiazide 25 MG tablet   Commonly known as:  HYDRODIURIL   Quantity:  90 tablet   Refills:  3    Instructions:  TAKE 1 TABLET BY MOUTH ONCE DAILY     Begin Date    AM    Noon    PM    Bedtime       insulin aspart 100 unit/mL Inpn pen   Commonly known as:  NOVOLOG FLEXPEN   Quantity:  45 mL   Refills:  3    Instructions:  INJECT 20 UNITS UNDER THE SKIN THREE TIMES DAILY WITH MEALS     Begin Date    AM    Noon    PM    Bedtime       insulin detemir 100 unit/mL (3 mL) Inpn pen   Commonly known as:  LEVEMIR FLEXTOUCH   Quantity:  45 mL   Refills:  0    Instructions:  Inject 32 units bid     Begin Date    AM    Noon    PM    Bedtime       lancets 28 gauge Misc   Commonly known as:  FREESTYLE LANCETS   Quantity:  100 each   Refills:  5   Dose:  1 lancet   Comments:  DX Code Needed . 250.00    Instructions:  Inject 1 lancet into the skin 3 (three) times daily.     Begin Date    AM    Noon    PM    Bedtime       lidocaine-prilocaine cream   Commonly known as:  EMLA   Refills:  0      Begin Date    AM    Noon    PM    Bedtime       LINZESS 145 mcg Cap capsule   Refills:  2   Generic drug:  linaclotide    Instructions:  TK 1 C PO ONCE A DAY ON AN EMPTY STOMACH     Begin Date    AM    Noon    PM    Bedtime       lorazepam 1 MG tablet   Commonly known as:  ATIVAN   Quantity:  1 tablet   Refills:  0   Dose:  1 mg    Instructions:  Take 1 tablet (1 mg total) by mouth On call Procedure (for MRI).     Begin Date    AM    Noon    PM    Bedtime       lovastatin 20 MG tablet   Commonly known as:  MEVACOR   Quantity:  90 tablet   Refills:  3    Instructions:  TAKE 1 TABLET BY MOUTH EVERY EVENING.     Begin Date    AM    Noon    PM    Bedtime       omeprazole 20 MG capsule   Commonly known as:   "PRILOSEC   Quantity:  180 capsule   Refills:  0    Instructions:  TAKE ONE CAPSULE BY MOUTH TWICE DAILY     Begin Date    AM    Noon    PM    Bedtime       ondansetron 4 MG tablet   Commonly known as:  ZOFRAN   Refills:  0    Instructions:  TK 1 T PO Q 8 H PRF NAUSEA     Begin Date    AM    Noon    PM    Bedtime       pantoprazole 40 MG tablet   Commonly known as:  PROTONIX   Refills:  1    Instructions:  TK 1 T PO ONCE A DAY     Begin Date    AM    Noon    PM    Bedtime       pen needle, diabetic 30 gauge x 1/3" Ndle   Commonly known as:  NOVOFINE 30   Quantity:  100 each   Refills:  5    Instructions:  USE AS DIRECTED THREE TIMES DAILY     Begin Date    AM    Noon    PM    Bedtime       pen needle, diabetic 31 gauge x 3/16" Ndle   Commonly known as:  BD INSULIN PEN NEEDLE UF MINI   Quantity:  100 each   Refills:  3    Instructions:  USE AS DIRECTED TWICE DAILY     Begin Date    AM    Noon    PM    Bedtime       tramadol 50 mg tablet   Commonly known as:  ULTRAM   Quantity:  60 tablet   Refills:  1   Dose:  50 mg    Instructions:  Take 1 tablet (50 mg total) by mouth every 12 (twelve) hours as needed for Pain.     Begin Date    AM    Noon    PM    Bedtime                  Please bring to all follow up appointments:    1. A copy of your discharge instructions.  2. All medicines you are currently taking in their original bottles.  3. Identification and insurance card.    Please arrive 15 minutes ahead of scheduled appointment time.    Please call 24 hours in advance if you must reschedule your appointment and/or time.        Your Scheduled Appointments     Apr 25, 2017 11:00 AM CDT   Mri Non Contrast with [facility] MRI1 350 LB LIMIT   [facility]    [address] [address]   [phone]            May 03, 2017  8:40 AM CDT   Established Patient Visit with ALEX Cat   [facility] ([facility]    [address] " "[id number] [phone]                  Discharge Instructions       Home Care Instructions Pain Management:    1. DIET:   You may resume your normal diet today.   2. BATHING:   You may shower with luke warm water. No tub baths or anything that will soak injection sites under water for 24 hours.  3. DRESSING:   You may remove your bandage today.   4. ACTIVITY LEVEL:   You may resume your normal activities 24 hrs after your procedure. Nothing strenuous today.  5. MEDICATIONS:   You may resume your normal medications today. To restart blood thinners, ask your doctor.  6. SPECIAL INSTRUCTIONS:   No heat to the injection site for 24 hrs including, bath or shower, heating pad, moist heat, or hot tubs.    Use ice pack to injection site for any pain or discomfort.  Apply ice packs for 20 minute intervals as needed.     If you have received any sedatives by mouth today you may not drive for 12 hours.    If you have received any sedation through your IV, you may not drive for 24 hrs.     PLEASE CALL YOUR DOCTOR IF:  1. Redness or swelling around the injection site.  2. Fever of 101 degrees or more  3. Drainage (pus) from the injection site.  4. For any continuous bleeding (some dried blood over the incision is normal.)    FOR EMERGENCIES:   If any unusual problems or difficulties occur during clinic hours, call [phone] or [phone].       Admission Information     Date & Time Provider Department CSN    4/19/2017 10:39 AM Graciela Ley MD [facility] [id number]      Care Providers     Provider Role Specialty Primary office phone    Graciela Ley MD Attending Provider Pain Medicine [phone]    Graciela Ley MD Surgeon  Pain Medicine [phone]      Your Vitals Were     BP Pulse Temp Resp Height Weight    194/84 56 98.4 °F (36.9 °C) (Oral) 18 5' 5.5" (1.664 m) 104.3 kg (230 lb)    Last Period SpO2 BMI          10/11/2010 100% 37.69 kg/m2        Recent Lab Values        12/27/2013 " 3/25/2014 7/9/2014 12/5/2014 3/13/2015 7/13/2015 10/16/2015 4/21/2016      8:44 AM  8:20 AM  2:15 PM 12:44 PM 11:18 AM  2:10 PM 10:35 AM 12:28 PM    A1C 11.5 (H) 9.0 (H) 7.8 (H) 9.7 (H) 9.2 (H) 8.0 (H) 7.8 (H) 8.7 (H)      Allergies as of 4/19/2017     No Known Allergies      Advance Directives     An advance directive is a document which, in the event you are no longer able to make decisions for yourself, tells your healthcare team what kind of treatment you do or do not want to receive, or who you would like to make those decisions for you.  If you do not currently have an advance directive, [facility] encourages you to create one.  For more information call:  [phone]), [phone] ([phone]),  or log on to [email or web address].        Smoking Cessation     If you would like to quit smoking:   You may be eligible for free services if you are a [place] resident and started smoking cigarettes before September 1, 1988.  Call the Smoking Cessation [employer] (S[employer]) toll free at (180) 609-8434 or (555) 967-9090.   Call 1-390-QUIT-NOW if you do not meet the above criteria.   Contact us via email: [email or web address]   View our website for more information: [email or web address]        Language Assistance Services     ATTENTION: Language assistance services are available, free of charge. Please call [phone].      ATENCIÓN: Si habla español, tiene a rodas disposición servicios gratuitos de asistencia lingüística. Llame al [phone].     CHÚ Ý: N?u b?n nói Ti?ng Vi?t, có các d?ch v? h? tr? ngôn ng? mi?n phí dành cho b?n. G?i s? [phone].        Diabetes Discharge Instructions                                    [facility] complies with applicable Federal civil rights laws and does not discriminate on the basis of race, color, national origin, age, disability, or sex.

## 2017-04-19 NOTE — OP NOTE
Lumbar Interlaminar Epidural Steroid Injection under Fluoroscopic Guidance.   Time-out taken to identify patient and procedure prior to starting the procedure.     04/19/2017    PROCEDURE: Interlaminar epidural steroid injection under fluoroscopic guidance.     Pre-Op diagnosis: lumbar stenosis with radiculopathy    Post-Op diagnosis: same    PHYSICIAN: IVIS VERDE     ASSISTANTS: None     ESTIMATED BLOOD LOSS: none.     COMPLICATIONS: none.     SPECIMENS: none    TECHNIQUE: With the patient laying in a prone position, the area was prepped and draped in the usual sterile fashion using ChloraPrep and a fenestrated drape. 1% lidocaine was given using a 27-gauge needle by raising a wheal and going down to the hub of the needle over the L5/S1 interlaminar space.  The interlaminar space was then approached with a 3.5 inch 18-gauge Touhy needle was introduced under fluoroscopic guidance in the AP and Lateral view. Once the Ligamentum flavum was encountered loss of resistance to saline was used to enter the epidural space. With positive loss of resistance and negative CSF or Blood, 3mL contrast dye Omnipaque (300mg/ml) was injected to confirm placement and there was no vascular runoff. Then 1ml 40mg/ml Depomedrol + 1mL 0.25% Bupivicaine + 8mL preservative free normal saline was injected slowly. Displacement of the radio opaque contrast after injection of the medication confirmed that the medication went into the area of the epidural space.  The patient tolerated the procedure well.     Conscious sedation provided by M.D    The patient was monitored with continuous pulse oximetry, EKG, and intermittent blood pressure monitors.  The patient was hemodynamically stable throughout the entire process was responsive to voice, and breathing spontaneously.  Supplemental O2 was provided at 2L/min via nasal cannula.  Patient was comfortable for the duration of the procedure.    There was a total of 2mg IV Midazolam was titrated  for the procedure      The patient was monitored after the procedure.   They were given post-procedure and discharge instructions to follow at home.  The patient was discharged in a stable condition.

## 2017-05-02 ENCOUNTER — TELEPHONE (OUTPATIENT)
Dept: PAIN MEDICINE | Facility: CLINIC | Age: 62
End: 2017-05-02

## 2017-05-02 NOTE — TELEPHONE ENCOUNTER
----- Message from Mateo Baldwin sent at 5/2/2017  4:31 PM CDT -----  _X  1st Request  _  2nd Request  _  3rd Request        Who: FATIMAH BURR [[id number]]    Why: Pt would like to speak with Lias Angel(Phone hung up before she could tell me why). Please call back.    What Number to Call Back:[phone]    When to Expect a call back: (Before the end of the day)   -- if the call is after 12:00, the call back will be tomorrow.

## 2017-05-12 RX ORDER — ENALAPRIL MALEATE 20 MG/1
TABLET ORAL
Qty: 180 TABLET | Refills: 0 | Status: SHIPPED | OUTPATIENT
Start: 2017-05-12 | End: 2017-08-11 | Stop reason: SDUPTHER

## 2017-05-12 RX ORDER — LOVASTATIN 20 MG/1
TABLET ORAL
Qty: 90 TABLET | Refills: 0 | Status: SHIPPED | OUTPATIENT
Start: 2017-05-12 | End: 2017-08-11 | Stop reason: SDUPTHER

## 2017-05-18 ENCOUNTER — HOSPITAL ENCOUNTER (OUTPATIENT)
Dept: RADIOLOGY | Facility: OTHER | Age: 62
Discharge: HOME | End: 2017-05-18
Attending: INTERNAL MEDICINE
Payer: MEDICARE

## 2017-05-18 DIAGNOSIS — K31.7 POLYP OF STOMACH: ICD-10-CM

## 2017-05-18 DIAGNOSIS — R10.84 ABDOMINAL PAIN, GENERALIZED: ICD-10-CM

## 2017-05-18 DIAGNOSIS — K92.2 GASTROINTESTINAL HEMORRHAGE, UNSPECIFIED: ICD-10-CM

## 2017-05-18 DIAGNOSIS — R13.10 DYSPHAGIA: ICD-10-CM

## 2017-05-18 DIAGNOSIS — K92.2 GASTROINTESTINAL HEMORRHAGE, UNSPECIFIED: Primary | ICD-10-CM

## 2017-05-18 DIAGNOSIS — M79.642 LEFT HAND PAIN: Primary | ICD-10-CM

## 2017-05-18 PROCEDURE — 71020 XR CHEST PA AND LATERAL: CPT | Mod: TC

## 2017-05-18 PROCEDURE — 71020 XR CHEST PA AND LATERAL: CPT | Mod: 26,,, | Performed by: RADIOLOGY

## 2017-05-24 ENCOUNTER — TELEPHONE (OUTPATIENT)
Dept: INTERNAL MEDICINE | Facility: CLINIC | Age: 62
End: 2017-05-24

## 2017-05-24 NOTE — TELEPHONE ENCOUNTER
----- Message from Idalia Kevin sent at 5/24/2017  2:47 PM CDT -----  Contact: pt  _  1st Request  _  2nd Request  _  3rd Request        Who: pt    Why: pt returned phone call to socrates molina regarding a wellness visit. Please call the pt    What Number to Call Back:[phone]    When to Expect a call back: (Before the end of the day)   -- if the call is after 12:00, the call back will be tomorrow.

## 2017-05-24 NOTE — LETTER
May 24, 2017    Jessie Gallardo  [address] [address]             [facility]  [address]  [facility] [address]  Phone: [phone]  Fax: [phone] Dear Ms. Gallardo:    We were unable to contact you in regards to scheduling an appointment with  Dr. Marie.  Please contact us at [phone] so we can schedule you to see your primary care provider at your earliest convenience.        If you have any questions or concerns, please don't hesitate to call.    Sincerely,        Irene More

## 2017-05-29 DIAGNOSIS — M79.642 LEFT HAND PAIN: Primary | ICD-10-CM

## 2017-05-31 ENCOUNTER — TELEPHONE (OUTPATIENT)
Dept: ORTHOPEDICS | Facility: CLINIC | Age: 62
End: 2017-05-31

## 2017-05-31 DIAGNOSIS — M79.642 BILATERAL HAND PAIN: Primary | ICD-10-CM

## 2017-05-31 DIAGNOSIS — M79.641 BILATERAL HAND PAIN: Primary | ICD-10-CM

## 2017-06-02 ENCOUNTER — HOSPITAL ENCOUNTER (OUTPATIENT)
Dept: RADIOLOGY | Facility: OTHER | Age: 62
Discharge: HOME | End: 2017-06-02
Attending: PLASTIC SURGERY
Payer: MEDICARE

## 2017-06-02 ENCOUNTER — TELEPHONE (OUTPATIENT)
Dept: NEUROLOGY | Facility: CLINIC | Age: 62
End: 2017-06-02

## 2017-06-02 ENCOUNTER — OFFICE VISIT (OUTPATIENT)
Dept: PAIN MEDICINE | Facility: CLINIC | Age: 62
End: 2017-06-02
Payer: MEDICARE

## 2017-06-02 ENCOUNTER — OFFICE VISIT (OUTPATIENT)
Dept: ORTHOPEDICS | Facility: CLINIC | Age: 62
End: 2017-06-02
Payer: MEDICARE

## 2017-06-02 VITALS
HEIGHT: 65 IN | RESPIRATION RATE: 18 BRPM | HEART RATE: 71 BPM | WEIGHT: 230 LBS | DIASTOLIC BLOOD PRESSURE: 56 MMHG | BODY MASS INDEX: 38.32 KG/M2 | SYSTOLIC BLOOD PRESSURE: 96 MMHG

## 2017-06-02 VITALS
SYSTOLIC BLOOD PRESSURE: 138 MMHG | DIASTOLIC BLOOD PRESSURE: 72 MMHG | HEIGHT: 65 IN | HEART RATE: 82 BPM | TEMPERATURE: 99 F | BODY MASS INDEX: 38.32 KG/M2 | WEIGHT: 230 LBS

## 2017-06-02 DIAGNOSIS — M79.642 BILATERAL HAND PAIN: ICD-10-CM

## 2017-06-02 DIAGNOSIS — M79.7 FIBROMYALGIA: ICD-10-CM

## 2017-06-02 DIAGNOSIS — M54.17 LUMBOSACRAL RADICULOPATHY: ICD-10-CM

## 2017-06-02 DIAGNOSIS — G56.03 BILATERAL CARPAL TUNNEL SYNDROME: Primary | ICD-10-CM

## 2017-06-02 DIAGNOSIS — M48.02 CERVICAL STENOSIS OF SPINAL CANAL: ICD-10-CM

## 2017-06-02 DIAGNOSIS — M53.3 COCCYDYNIA: ICD-10-CM

## 2017-06-02 DIAGNOSIS — M25.511 CHRONIC PAIN OF BOTH SHOULDERS: ICD-10-CM

## 2017-06-02 DIAGNOSIS — M79.10 MYALGIA: ICD-10-CM

## 2017-06-02 DIAGNOSIS — M54.16 BILATERAL LUMBAR RADICULOPATHY: ICD-10-CM

## 2017-06-02 DIAGNOSIS — M53.3 SACROILIAC JOINT PAIN: ICD-10-CM

## 2017-06-02 DIAGNOSIS — M47.816 LUMBAR FACET ARTHROPATHY: ICD-10-CM

## 2017-06-02 DIAGNOSIS — M25.512 CHRONIC PAIN OF BOTH SHOULDERS: ICD-10-CM

## 2017-06-02 DIAGNOSIS — Z98.890 HISTORY OF BILATERAL CARPAL TUNNEL RELEASE: ICD-10-CM

## 2017-06-02 DIAGNOSIS — G89.29 CHRONIC PAIN OF BOTH SHOULDERS: ICD-10-CM

## 2017-06-02 DIAGNOSIS — M54.16 LUMBAR RADICULOPATHY: Primary | ICD-10-CM

## 2017-06-02 DIAGNOSIS — M79.641 BILATERAL HAND PAIN: ICD-10-CM

## 2017-06-02 PROCEDURE — 99999 PR PBB SHADOW E&M-EST. PATIENT-LVL IV: CPT | Mod: PBBFAC,,, | Performed by: NURSE PRACTITIONER

## 2017-06-02 PROCEDURE — 99214 OFFICE O/P EST MOD 30 MIN: CPT | Mod: S$GLB,,, | Performed by: NURSE PRACTITIONER

## 2017-06-02 PROCEDURE — 99203 OFFICE O/P NEW LOW 30 MIN: CPT | Mod: 25,S$GLB,, | Performed by: PLASTIC SURGERY

## 2017-06-02 PROCEDURE — 99999 PR PBB SHADOW E&M-EST. PATIENT-LVL IV: CPT | Mod: PBBFAC,,, | Performed by: PLASTIC SURGERY

## 2017-06-02 PROCEDURE — 20526 THER INJECTION CARP TUNNEL: CPT | Mod: 50,S$GLB,, | Performed by: PLASTIC SURGERY

## 2017-06-02 PROCEDURE — 73130 X-RAY EXAM OF HAND: CPT | Mod: 26,LT,, | Performed by: RADIOLOGY

## 2017-06-02 PROCEDURE — 73130 X-RAY EXAM OF HAND: CPT | Mod: 26,RT,, | Performed by: RADIOLOGY

## 2017-06-02 RX ORDER — TRAMADOL HYDROCHLORIDE 50 MG/1
50 TABLET ORAL EVERY 12 HOURS PRN
Qty: 60 TABLET | Refills: 1 | Status: SHIPPED | OUTPATIENT
Start: 2017-06-02 | End: 2017-08-01

## 2017-06-02 RX ORDER — TRIAMCINOLONE ACETONIDE 40 MG/ML
80 INJECTION, SUSPENSION INTRA-ARTICULAR; INTRAMUSCULAR
Status: COMPLETED | OUTPATIENT
Start: 2017-06-02 | End: 2017-06-02

## 2017-06-02 RX ORDER — TRAMADOL HYDROCHLORIDE 50 MG/1
50 TABLET ORAL EVERY 12 HOURS PRN
Qty: 60 TABLET | Refills: 1 | Status: SHIPPED | OUTPATIENT
Start: 2017-06-02 | End: 2017-06-02 | Stop reason: SDUPTHER

## 2017-06-02 RX ORDER — LIDOCAINE HYDROCHLORIDE 10 MG/ML
2 INJECTION INFILTRATION; PERINEURAL
Status: COMPLETED | OUTPATIENT
Start: 2017-06-02 | End: 2017-06-02

## 2017-06-02 RX ADMIN — TRIAMCINOLONE ACETONIDE 80 MG: 40 INJECTION, SUSPENSION INTRA-ARTICULAR; INTRAMUSCULAR at 12:06

## 2017-06-02 RX ADMIN — LIDOCAINE HYDROCHLORIDE 2 ML: 10 INJECTION INFILTRATION; PERINEURAL at 12:06

## 2017-06-02 NOTE — PROGRESS NOTES
Subjective:       Patient ID: Jessie Gallardo is a 61 y.o. female.    Chief Complaint: Low-back Pain    Interval History 6/2/2017:  The patient returns today for follow up of neck and lower back pain.  She is s/p L5-S1 IL MIKE with significant benefit of leg pain.  She reports no change in her neck pain since her previous encounter.  She continues to take Tramadol and Gabapentin with significant benefit.  She has increased her activity level which she thinks is helping.  She has an appointment today with Dr. Ortiz for evaluation of bilateral hand pain.  Her pain today is 6/10.      Interval History 4/7/2017:  The patient returns today for follow up of neck and back pain.  She is s/p cervical MIKE on 3/15/17 with limited benefit.  She reports significant relief with this procedure in the past.  She does report having a headaches after the procedure.  She called the office and was told to lay down and drink caffeine.  She reports that this resolved her headache.  She also has left shoulder pain with radiation down her left arm which is worsening.  She reports an injury about 6 years ago during which she fell into a hole and was removed by her left arm.  She has had shoulder pain since this time, although it has severely worsened over the past month.  She has had XRAYs which show DJD.  She has not had an MRI.  She is also reporting worsening lower back pain and would like a repeat MIKE as previous provided significant benefit.  She continues to take Tramadol and Gabapentin with benefit.  She continues to try to control her diabetes through diet and reports that her sugars have been stable.  Her pain today is 8/10.  The patient denies any bowel or bladder incontinence or signs of saddle paresthesia.  The patient denies any major medical changes since last office visit.    Interval History 2/1/2017:  The patient returns today for follow up of neck and back pain.  Her back pain is tolerable today.  Her biggest complaint  today is neck pain with radiation into her arms.  She has numbness to her fingers also.  She previously had significant benefit from cervical MIKE in the past.  She had an A1C completed at her PCP last week and reports that it was 8.0.  She has been trying to control her sugars through diet and exercise.  She continues to take Gabapentin 1800 mg daily and Tramadol PRN.  She reports significant benefit of the medication without side effects.  Her pain today is 5/10.  The patient denies any bowel or bladder incontinence or signs of saddle paresthesia.      Interval History 12/2/2016:  The patient returns today for follow up of lower back and neck pain.  She is s/p L5-S1 IL MIKE on 11/16/16 with 70% pain relief of back and leg pain.  She reports that her pain is mild today.  She has increased her activity lately and has been cleaning her house.  She does have some increased pain after cleaning.  She is very happy with her results though.  Her neck pain has been tolerable.  She has not started PT because she recently moved and would like another order.  Her pain today is 5/10.  The patient denies any bowel or bladder incontinence or signs of saddle paresthesia.  The patient denies any major medical changes since last office visit.    Interval History 10/5/2016:  The patient returns today for follow up of lower back and leg pain.  She is s/p bilateral SI joint injections on 8/10/16 with 50% benefit.  She is still having radiating pain down the back and sides of both legs to the top and bottom of her feet.  She is also having numbness and tingling throughout her feet.  She was previously having swelling to her legs.  Dr. Ley instructed her to titrate down Gabapentin to see if this was causing this.  She states that this did not improve her swelling.  She then stopped vitamin supplements (Vit E and C).  Her leg swelling then resolved.  She then restarted Gabapentin and titrated up to 600 mg TID.  Her pain today is 8/10.   The patient denies any bowel or bladder incontinence or signs of saddle paresthesia.  The patient denies any major medical changes since last office visit.    Interval History 6/27/2016:  The patient returns today for f/u of neck, bilateral shoulder and lower back pain.  She is s/p cervical MIKE on 6/8/16 with 80% improvement of her neck and left arm pain.  She is no longer having the shooting electric pain.  Her numbness has also improved since the procedure.  Her biggest complaint today is lower back pain which radiates down the back and sides of both legs to the bottom of her feet with numbness.  She also has a history of diabetes and diabetic neuropathy.  Her last A1C was 8.7, increased from 7.8.  She has had lumbar RFAs in the past with significant relief.  She has not had lumbar ESIs.  Her previous lumbar MRI from 2014 shows disc bulges at L3-4, L4-5, and L5-S1 without NF narrowing.  She is taking Tramadol PRN with relief.  I increased her Gabapentin last OV but she reports that she is taking 300 mg BID.  Her pain today is 6/10.  The patient denies any bowel/bladder incontinence or symptoms of saddle paresthesia.     Interval History 05/20/2016:  Patient is present today for a f/u for lower back, bilateral shoulder and neck pain.  Her back pain has been mild since her lumbar RFAs.  She is mostly complaining of neck pain which radiates down both arms with associated tingling.  She has had cervical ESIs in the past which she now states may have offered her more benefit than she previously believed.  She would like to try this treatment options again.  She did have a recent cervical MRI ordered by Dr. Herrera which does show muliple osteophytes with spinal narrowing.  She also had bilateral shoulder XRAYs which show DJD.   She continues to use Tramadol which provided relief.  She has a history of C4-5 fusion in 1999 with relief of her pain.  She is also taking Gabapentin 300 mg TID with limited relief.  Her pain  today is a 6/10.  The patient denies any bowel/bladder incontinence.    Interval History 04/26/2016:  Patient is present today for a f/u for Low Back Pain. The pt recently had a Right Lumbar RFA @ L3,L4,L5 on 4/12/2016 where she reports a significant amount of relief. She reports her pain to be 4/10 today. The pt would like to address neck and shoulder pain that is radiating down through to both arms.  Patient has had a history of a C4-5 fusion, she had previous cervical intralaminar epidural steroidal injections in January 2015 which did not offer significant relief and the patient did have a cervical EMG/NCV which did not show evidence of radiculopathy at that time but some carpal tunnel syndrome.  Currently she states that the pain in her shoulders and in her arms is most significant and she describes her arm pain as feelings of swelling bilaterally.    Interval History 11/19/2015:  Patient here for f/u of chronic low back pain. Patient was last here in 7/15 and had B L3,4,5 RFA done. Patient states that she continues to have about 50% pain reduction of low back pain and some leg pain. She able to walk and stand more and feels that she is functioning much better over all. Patient continues to have some low back pain right> left with intermittent BLE pains right >left and bilateral foot numbess and tingling. Patient admits to not taking gabapentin as directed and had been taking it prn.     Interval history 07/7/2015:  Ms. Gallardo is a 61 y.o. female with neck and low back pain presents today for f/u s/p b/l MBB at L4, 5 and sacral Ala and right sided MBB at same level. Reports significantly more relief with b/l block. She was in physical therapy but has not gone for 2 months. She has a lot of anxiety and frustration associated with her chronic pain. She saw Dr. Rosenbaum today and it was recommended she continue f/u with Dr. Jean. Pt has been seen in the clinic before by Dr. Ley, however pt is new to me.    History below per Dr. Ley    Interval history 05/21/2015:  Since previous encounter patient reports neck pain and lower back pain, although the pain in her lower back is her worst pain. . Patient reports pain as a 8/10 today. Patient does not take Soma and Hydrocodone anymore. She has a lot of anxiety and frustration associated with her chronic pain.    Interval history 04/20/2015:  Since previous encounter patient reports neck pain radiating into her upper extremities. Patient stated that she completed her EMG with  which did not show any evidence of radiculopathy and neurosurgery was evaluating the patient did not determine any need for further surgical intervention. Patient stated that she discontinued the Lyrica due to it makes her swell and have dry mouth. Patient stated that she still takes the Gabapentin. Patient reports no other health changes since her last visit. Patient reports her pain 8/10 today.     Interval history 03/16/2015:  Since previous encounter patient reports neck pain radiating into her upper extremities and sometimes causes her to have headaches. Patient reports low back also. Patient stated that she did receive relief from her last injection but the pain has returned. Patient stated that she still takes Norco but it only puts her to sleep and when she awakens the pain is back. Patient reports that she missed her EMG appt due to her pain, however she has been rescheduled to the end of the month Additionally the patient has not started Lyrica as previously prescribed. Patient reports memory loss. Patient reports no other health changes since her last visit. Patient reports her pain 8/10 today.   Initial encounter:    Jessie Gallardo presents to the clinic for the evaluation of neck and low back pain. The pain started 3 months ago following traumatic fall and symptoms have been worsening. Reports that her neck pain is the most concerning today.     Brief history:    She has h/o  "cervical fusion in 1999 in [place] and she did well after surgery. She fell on 9/2/14 when she was getting her oil changed. She was unable to stand right after the incident and she was taken by ambulance to the ED.     She complains of diffuse body pain. She feels like she is getting worse from the accident. She has pain from her "head to her toes."      Pain Description:    The pain is located in the neck and low back area and radiates to the shoulders and posterior thighs, respectively..     At BEST 6/10     At WORST  9/10 on the WORST day.     On average pain is rated as 7/10.     Today the pain is rated as 8/10    The pain is described as aching, burning, numbing, sharp, shooting, stabbing, tingling and weakness      Symptoms interfere with daily activity and sleeping.     Exacerbating factors: Sitting, Laying, Bending, Morning, Extension, Flexing, Lifting and Getting out of bed/chair.     Mitigating factors laying down and medications.     Patient denies night fever/night sweats, urinary incontinence, bowel incontinence, significant weight loss, significant motor weakness and loss of sensations.  Patient denies any suicidal or homicidal ideations    Pain Medications:    Current:  Neurontin 600 mg TID (1800 mg daily)  Tramadol 50 mg BID PRN  Compounding cream    Tried in Past:  NSAIDs -Aleve  TCA -Never  SNRI -Never  Muscle relaxer: Klonopin, Zanaflex, Zoloft  Opioid: Tramadol     Physical Therapy/Home Exercise: not currently     report: Reviewed and consistent with medication use as prescribed.    Pain Procedures:  1/7/2015-cervical epidural steroidal injection  1/21/2015-cervical epidural steroidal injection  6/10/2015 bilateral medial branch nerve block at the level of L3, L4, L5  6/17/2015-right medial branch nerve block at the level of L3, L4, L5  7/15/2015-left radiofrequency ablation at the level of L3, L4, L5  7/29/2015-right radiofrequency ablation at the level of L3, L4, L5  3/29/2016-left radio " frequency ablation at the level of L3, L4, L5  4/12/2016-right radio frequency ablation at the level of L3, L4, L5  6/8/16 C7-T1 IL MIKE- 80% relief  8/10/16 Bilateral SI joint injections- 50% relief  11/16/16 L5-S1 IL MIKE- 70% relief  3/15/17 C7-T1 IL MIKE- no relief  4/19/17 L5-S1 IL MIKE- 70% relief    Chiropractor -never  Acupuncture - never  TENS unit -Yes, with PT  Spinal decompression -never  Joint replacement -never    Imaging:    MRI Cervical 05/17/2016:  Comparison is November 2014    Routine imaging of the cervical spine was obtained.    There is an osseous fusion at C4-5.  There is straightening and slight reversal of the normal cervical lordosis again seen.  Alignment of vertebral bodies is satisfactory.  Discs are desiccated throughout with disc space narrowing most pronounced at the C5-6 level, similar to prior study.  The spinal cord is normal in signal and paravertebral structures are unremarkable.    C2-3 demonstrates no significant abnormality.    C3-4 demonstrates a mild diffuse disc bulge asymmetric toward the right.  There is bilateral foraminal narrowing.  This disc bulge thins the posterior cerebrospinal fluid sleeve and abuts the anterior aspect of the spinal cord with resultant mild spinal canal narrowing.    C4-5 demonstrates osseous fusion with some posterior osteophytic spurring.  There is complete loss of the anterior cerebrospinal fluid sleeve,and the posterior cerebrospinal fluid sleeve and some flattening of the spinal cord but no abnormal spinal cord signal.  Findings appear fairly similar to prior study.  There is mild to moderate narrowing of the spinal canal.    C5-6 demonstrates posterior disc osteophyte complex asymmetric to the left more pronounced as compared to prior study.  There is thinning of the cerebrospinal fluid sleeve around the spinal cord and mild spinal canal narrowing.    C6-7 demonstrates a posterior disc osteophyte complex asymmetric toward the left.  There is  thinning of the cerebrospinal fluid sleeve around the spinal cord and mild spinal canal narrowing.   Impression    In this patient with osseous fusion at C4-5, there is multilevel spinal canal narrowing most significant at C4-5, as further detailed above.  ______________________________________     Electronically signed by: Sherie Rodriguez MD  Date: 05/18/16       Lumbar MRI 10/31/16  Narrative   MRI OF THE LUMBAR SPINE WITHOUT CONTRAST.     Technique:  Sagittal T1, sagittal T2, sagittal STIR, axial T1 and axial T2 weighted images of the lumbar spine obtained without contrast.     Comparison: MRI 11/20/2014.     Findings:  Minimal grade 1 anterolisthesis is seen at L3-4 and L4-L5.  Otherwise, sagittal vertebral body alignment is appropriate.  Vertebral body heights well-maintained.  No fracture is identified.  Mild multilevel disc degeneration is seen, most pronounced at L5-S1. No marrow signal abnormality suspicious for an infiltrative process.      The conus is normal in appearance, and terminates at the L1 level.  Multiple fibroids are seen in the uterus.      T12-L1: No focal disc herniation.  No significant spinal canal stenosis or neuroforaminal narrowing.  L1-L2: Bilateral facet arthropathy.  No focal disc herniation.  No significant spinal canal stenosis or neuroforaminal narrowing.  L2-L3: No focal disc herniation.  No significant spinal canal stenosis.  Bilateral facet arthropathy is noted, which contributes to mild bilateral neuroforaminal narrowing.  L3-L4: Uncovering of the disc with mild bulge, bilateral facet arthropathy and ligament of flavum thickening resulting in moderate right-sided and mild left-sided neuroforaminal narrowing.  L4-L5: Uncovering of the disc with mild bulge, bilateral facet arthropathy, and ligamentum flavum thickening resulting in moderate right-sided and mild left-sided neuroforaminal narrowing.  L5-S1: Broad-based disc bulge and bilateral facet arthropathy without significant  "spinal canal stenosis.  Moderate bilateral neuroforaminal narrowing is seen.   Impression      Multilevel lumbar spondylosis, as detailed above.         Lab Results   Component Value Date    HGBA1C 8.7 (H) 04/21/2016       REVIEW OF SYSTEMS:    GENERAL:  No weight loss, malaise or fevers.  HEENT:   No recent changes in vision or hearing  NECK:  Negative for lumps, no difficulty with swallowing.  RESPIRATORY:  Negative for cough, wheezing or shortness of breath, patient denies any recent URI.  CARDIOVASCULAR:  Negative for chest pain, leg swelling or palpitations. H/o htn.  GI:  Negative for abdominal discomfort, blood in stools or black stools or change in bowel habits.  MUSCULOSKELETAL:  See HPI.  SKIN:  Negative for lesions, rash, and itching.  PSYCH:  No mood disorder or recent psychosocial stressors.  Patients sleep is not disturbed secondary to pain.  HEMATOLOGY/LYMPHOLOGY:  Negative for prolonged bleeding, bruising easily or swollen nodes.  Patient is not currently taking any anti-coagulants  NEURO:   No history of syncope, paralysis, seizures or tremors. H/O headaches- followed by Dr. Herrera.  ENDO: H/O diabetes and peripheral neuropathy.  All other reviewed and negative other than HPI.      OBJECTIVE:    /72   Pulse 82   Temp 98.9 °F (37.2 °C)   Ht 5' 5" (1.651 m)   Wt 104.3 kg (230 lb)   LMP 10/11/2010   BMI 38.27 kg/m²     PHYSICAL EXAMINATION:    GENERAL: Well appearing, in no acute distress, alert and oriented x3.  PSYCH:  Mood and affect appropriate.  SKIN: Skin color, texture, turgor normal, no rashes or lesions.  HEAD/FACE:  Normocephalic, atraumatic. Cranial nerves grossly intact.  NECK: There is pain to palpation over the cervical paraspinal and trapezius muscles bilaterally.  Spurling Negative.  Painful cervical flexion and extension.  Mild cervical facet loading bilaterally.  Negative Spurling.  CV: RRR with palpation of the radial artery.  PULM: No evidence of respiratory difficulty, " symmetric chest rise.  BACK:  There is no pain with palpation over the facet joints of the lumbar spine bilaterally.  Full ROM to lumbar spine with pain on extension.  Mild facet loading bilaterally.  There is pain to palpation over the sacroiliac joints bilaterally.  FABERs is negative bilaterally.  EXTREMITIES: Peripheral joint ROM is full and pain free without obvious instability or laxity in all four extremities. No deformities, edema, or skin discoloration. Good capillary refill.  MUSCULOSKELETAL:  Pain to palpation of the subacromial bursa bilaterally.  Full ROM to bilateral shoulder with pain on internal and external rotation of left shoulder.  There is positive Neer to left side.  Bilateral upper  extremity strength is normal and symmetric.  No atrophy or tone abnormalities are noted.  NEURO: Bilateral upper extremity coordination and muscle stretch reflexes are physiologic and symmetric.  Brenda's negative. No clonus.  Decreased sensation to BLE.  GAIT: Antalgic- ambulates without assistance.    Assessment:     Ms. Gallardo is a 61 y.o. female with neck, left shoulder and lower back pain consistent with the following diagnoses:    1. Lumbar radiculopathy    2. Chronic pain of both shoulders    3. Lumbosacral radiculopathy    4. Cervical stenosis of spinal canal    5. Myalgia    6. Lumbar facet arthropathy    7. Sacroiliac joint pain    8. Bilateral lumbar radiculopathy    9. Coccydynia    10. Fibromyalgia        Plan:     - Previous imaging was reviewed and discussed with the patient today.    - Can consider repeat cervical MIKE in the future if pain persists.    - She is seeing Dr. Ortiz today for bilateral hand pain, possibly due to carpal tunnel syndrome.    - She is s/p L5-S1 IL MIKE with significant benefit.    - Continue Gabapentin 600 mg TID (1800 mg daily).    - Continue Tramadol 50 mg BID PRN pain, #60, 1 refill.      - The [place] Board [employer] website for prescription monitoring was  consulted today, and it does not suggest any deviations in conflict with the patient's controlled substance contract with our clinic. Will continue current therapy with frequent monitoring of the controlled substance database, and urine drug screens on followup. Refill approved.     - UDS from 12/2/16 reviewed and is consistent.  This does not test for Tramadol, however.    - The patient will continue a home exercise routine to help with pain and strengthening.      - RTC 2 months or sooner if needed.    - Dr. Ley was consulted on the patient and agrees with this plan.      The above plan and management options were discussed at length with patient. Patient is in agreement with the above and verbalized understanding.     Lisa Angel  06/02/2017

## 2017-06-02 NOTE — TELEPHONE ENCOUNTER
----- Message from Alicia Coe LPN sent at 6/2/2017 11:54 AM CDT -----  Please call pt and schedule EMG. Then let us know and we can schedule the results appt here.

## 2017-06-05 NOTE — PROGRESS NOTES
HISTORY OF PRESENT ILLNESS:  Mrs. Gallardo is a 61-year-old right-hand dominant   female who complains today of a several month history of bilateral ulnar-sided   hand pain.  The patient denies any recent history of trauma.  She states that   she has no numbness or tingling.  She complains of a sharp pain on the ulnar   volar aspect of her hand.  She states that she seeks relief with applying   pressure with the thumb and index finger on the ulnar aspects of her hands.  She   denies any events that elicit the pain and she is unable to express anything   that makes the pain better.  She states that she had a previous right carpal   tunnel release and a previous right cubital tunnel release approximately 20   years ago.  She denies any other interventions.    Past Medical History:   Diagnosis Date    Anxiety     Chronic back pain     See neuro     Chronic low back pain     Depression     Diabetes mellitus type II     Diabetic neuropathy     Diverticulosis     last colonoscopy was in 2000    Encounter for blood transfusion     Fibroids     Fibromyalgia     Hyperlipidemia     Hypertension     Obesity     Obstructive sleep apnea     wear CPAP machine nightly    Pancreatitis     Post menopausal syndrome        Past Surgical History:   Procedure Laterality Date    ABDOMINAL SURGERY      CHOLECYSTECTOMY      fibroid      OOPHORECTOMY      xlap, right ovary removed due to infection?    SPINE SURGERY      cervical spine    UTERINE ARTERY EMBOLIZATION  3/14       Social History     Social History    Marital status: Single     Spouse name: N/A    Number of children: 4    Years of education: N/A     Occupational History     Disabled     Social History Main Topics    Smoking status: Former Smoker     Quit date: 12/5/1980    Smokeless tobacco: Never Used      Comment: disabled due to fibromyalgia; 4 healthy     Alcohol use No    Drug use: No    Sexual activity: Not Currently     Partners: Male      "Birth control/ protection: Post-menopausal      Comment: ,  4 grown kids      Other Topics Concern    Not on file     Social History Narrative    No narrative on file       Current Outpatient Prescriptions on File Prior to Visit   Medication Sig Dispense Refill    amlodipine (NORVASC) 10 MG tablet Take 1 tablet (10 mg total) by mouth once daily. 90 tablet 3    aspirin (ECOTRIN) 81 MG EC tablet Take 1 tablet (81 mg total) by mouth once daily. 90 tablet 2    blood sugar diagnostic (FREESTYLE LITE STRIPS) Strp TEST BLOOD SUGAR 3 TIMES A  strip 5    divalproex (DEPAKOTE) 500 MG Tb24 Take 2 tablets (1,000 mg total) by mouth every evening. 180 tablet 3    enalapril (VASOTEC) 20 MG tablet TAKE 1 TABLET(20 MG) BY MOUTH TWICE DAILY 180 tablet 0    fluticasone (FLONASE) 50 mcg/actuation nasal spray USE 2 SPRAYS IN EACH NOSTRIL ONCE DAILY 16 g 3    hydrochlorothiazide (HYDRODIURIL) 25 MG tablet TAKE 1 TABLET BY MOUTH ONCE DAILY 90 tablet 3    insulin aspart (NOVOLOG FLEXPEN) 100 unit/mL InPn pen INJECT 20 UNITS UNDER THE SKIN THREE TIMES DAILY WITH MEALS 45 mL 3    insulin detemir (LEVEMIR FLEXTOUCH) 100 unit/mL (3 mL) SubQ InPn pen Inject 32 units bid 45 mL 0    insulin needles, disposable, (NOVOFINE 30) 30 x 1/3 " Ndle USE AS DIRECTED THREE TIMES DAILY 100 each 5    lancets (FREESTYLE LANCETS) 28 gauge Misc Inject 1 lancet into the skin 3 (three) times daily. 100 each 5    lidocaine-prilocaine (EMLA) cream       LINZESS 145 mcg Cap capsule TK 1 C PO ONCE A DAY ON AN EMPTY STOMACH  2    lovastatin (MEVACOR) 20 MG tablet TAKE 1 TABLET BY MOUTH EVERY EVENING 90 tablet 0    omeprazole (PRILOSEC) 20 MG capsule TAKE ONE CAPSULE BY MOUTH TWICE DAILY 180 capsule 0    ondansetron (ZOFRAN) 4 MG tablet TK 1 T PO Q 8 H PRF NAUSEA  0    pantoprazole (PROTONIX) 40 MG tablet TK 1 T PO ONCE A DAY  1    pen needle, diabetic (BD INSULIN PEN NEEDLE UF MINI) 31 gauge x 3/16" Ndle USE AS DIRECTED TWICE DAILY 100 " "each 3    albuterol 90 mcg/actuation inhaler Inhale 2 puffs into the lungs every 6 (six) hours as needed for Wheezing or Shortness of Breath. 18 g 0    gabapentin (NEURONTIN) 300 MG capsule Take 2 capsules (600 mg total) by mouth 3 (three) times daily. 540 capsule 0    lorazepam (ATIVAN) 1 MG tablet Take 1 tablet (1 mg total) by mouth On call Procedure (for MRI). 1 tablet 0     No current facility-administered medications on file prior to visit.        Review of patient's allergies indicates:  No Known Allergies    Review of Systems:  Constitutional: no fever or chills  ENT: no nasal congestion or sore throat  Respiratory: no cough or shortness of breath  Cardiovascular: no chest pain or palpitations  Gastrointestinal: no nausea or vomiting  Genitourinary: no hematuria or dysuria  Integument/Breast: no rash or pruritis  Hematologic/Lymphatic: no easy bruising or lymphadenopathy  Musculoskeletal: see HPI  Neurological: no seizures or tremors  Behavioral/Psych: no auditory or visual hallucinations      PHYSICAL EXAM    Vitals:    06/02/17 1125   BP: (!) 96/56   Pulse: 71   Resp: 18   Weight: 104.3 kg (230 lb)   Height: 5' 5" (1.651 m)   PainSc:   7   PainLoc: Hand         PHYSICAL EXAMINATION:  GENERAL:  No acute distress, alert and oriented x3, cooperative, well-nourished.  LUNGS:  Nonlabored on room air.  CARDIOVASCULAR:  Distal pulses are intact.  Good capillary refill.  No clubbing,   cyanosis or edema.  EXTREMITIES:  Right Upper Extremity:  There is a well-healed incision on the medial elbow,   posterior to the medial epicondyle.  She has a positive Tinel's over the ulnar   nerve.  She has full flexion, extension, supination and pronation at the wrist   and elbow.  There is a positive Tinel's and Durkan's sign over the carpal   tunnel.  She has full active range of motion of all digits at all joints.  She   is neurovascularly intact in the median, radial and ulnar distributions.  There   is no thenar or " hypothenar atrophy and intrinsics are strong.  Left Upper Extremity:  Positive Tinel's over the cubital tunnel.  Positive   Tinel's and Durkan's sign over the carpal tunnel.  Full flexion, extension,   supination and pronation at the wrist and elbow.  Full active range of motion of   all digits at all joints.  Neurovascularly intact in the median, radial and   ulnar distributions.  No thenar or hypothenar atrophy.  There is tenderness to   deep palpation over the A1 pulley of the small finger with no active triggering.    Intrinsics are strong.    RADIOLOGY IMPRESSION:    Right and left hand AP, lateral, and oblique views. Comparison 11/24/14.    The skeletal structures are intact with satisfactory alignment. No fracture or dislocation is identified. Mild degenerative spurring is seen at the distal right ulna. Minimal  osteoarthritis is seen at the articulations of the trapezium at both wrists and at several of the IP joints of both hands. MCP joints are better preserved. No erosive change or focal soft tissue swelling is detected.   Impression      No fracture or acute abnormality    Minimal osteoarthritis.         PROCEDURE:  I have explained the risks, benefits, and alternatives of the procedure in detail.  The patient voices understanding and all questions have been answered.  The patient agrees to proceed as planned. After a sterile prep of the skin the bilateral carpal tunnel is injected from the volar approach using a 25 gauge needle with a combination of 1cc 1% plain xylocaine and 40 mg of Kenalog.  The patient is cautioned and immediate relief of pain is secondary to the local anesthetic and will be temporary.  After the anesthetic wears off there may be a increase in pain that may last for a few hours or a few days and they should use ice to help alleviate this flair up of pain.   ASSESSMENT:  1.  Bilateral carpal tunnel syndrome.  2.  Possible left small finger trigger digit.    PLAN:  Based on the  symptoms that Mrs. Gallardo is presenting today, it appears   that she is affected by bilateral carpal tunnel syndrome.  In addition, she has   a positive Tinel's over both ulnar nerves.  Due to this, I would like to obtain   an EMG to assess the nerve function in both arms.  She was offered a   corticosteroid injection into the carpal tunnel to help alleviate her symptoms.    I would like to see if this would make her symptoms subside in order to unmask   other issues.  It also appears that she may be developing an A1 trigger digit of   the small finger in the left hand.  I will continue to monitor this after the   injections to see if it improves.  The patient will follow up with me in 8 weeks   or sooner if she received an EMG.  All questions and concerns were addressed   prior to discharge.      PHIL  dd: 06/02/2017 13:02:51 (CDT)  td: 06/03/2017 11:03:06 (CDT)  Doc ID   #[id number]  Job ID #[id number]    CC:

## 2017-06-15 ENCOUNTER — CLINICAL SUPPORT (OUTPATIENT)
Dept: REHABILITATION | Facility: HOSPITAL | Age: 62
End: 2017-06-15
Attending: NURSE PRACTITIONER
Payer: MEDICARE

## 2017-06-15 DIAGNOSIS — M54.16 LUMBAR RADICULOPATHY: Primary | ICD-10-CM

## 2017-06-15 PROCEDURE — 97162 PT EVAL MOD COMPLEX 30 MIN: CPT

## 2017-06-15 PROCEDURE — 97110 THERAPEUTIC EXERCISES: CPT

## 2017-06-15 PROCEDURE — G8978 MOBILITY CURRENT STATUS: HCPCS | Mod: CL

## 2017-06-15 PROCEDURE — G8979 MOBILITY GOAL STATUS: HCPCS | Mod: CK

## 2017-06-21 NOTE — PROGRESS NOTES
"Physical Therapy Evaluation    Name: Jessie Gallardo  [facility] Number: [id number]    Diagnosis:   Encounter Diagnosis   Name Primary?    Lumbar radiculopathy Yes     Physician: Lisa Angel,*  Treatment Orders: PT Eval and Treat/ Aquatic therapy    History     Past Medical History:   Diagnosis Date    Anxiety     Chronic back pain     See neuro     Chronic low back pain     Depression     Diabetes mellitus type II     Diabetic neuropathy     Diverticulosis     last colonoscopy was in 2000    Encounter for blood transfusion     Fibroids     Fibromyalgia     Hyperlipidemia     Hypertension     Obesity     Obstructive sleep apnea     wear CPAP machine nightly    Pancreatitis     Post menopausal syndrome      Current Outpatient Prescriptions   Medication Sig    albuterol 90 mcg/actuation inhaler Inhale 2 puffs into the lungs every 6 (six) hours as needed for Wheezing or Shortness of Breath.    amlodipine (NORVASC) 10 MG tablet Take 1 tablet (10 mg total) by mouth once daily.    aspirin (ECOTRIN) 81 MG EC tablet Take 1 tablet (81 mg total) by mouth once daily.    blood sugar diagnostic (FREESTYLE LITE STRIPS) Strp TEST BLOOD SUGAR 3 TIMES A DAY    divalproex (DEPAKOTE) 500 MG Tb24 Take 2 tablets (1,000 mg total) by mouth every evening.    enalapril (VASOTEC) 20 MG tablet TAKE 1 TABLET(20 MG) BY MOUTH TWICE DAILY    fluticasone (FLONASE) 50 mcg/actuation nasal spray USE 2 SPRAYS IN EACH NOSTRIL ONCE DAILY    gabapentin (NEURONTIN) 300 MG capsule Take 2 capsules (600 mg total) by mouth 3 (three) times daily.    hydrochlorothiazide (HYDRODIURIL) 25 MG tablet TAKE 1 TABLET BY MOUTH ONCE DAILY    insulin aspart (NOVOLOG FLEXPEN) 100 unit/mL InPn pen INJECT 20 UNITS UNDER THE SKIN THREE TIMES DAILY WITH MEALS    insulin detemir (LEVEMIR FLEXTOUCH) 100 unit/mL (3 mL) SubQ InPn pen Inject 32 units bid    insulin needles, disposable, (NOVOFINE 30) 30 x 1/3 " Ndle USE AS DIRECTED THREE TIMES DAILY " "   lancets (FREESTYLE LANCETS) 28 gauge Misc Inject 1 lancet into the skin 3 (three) times daily.    lidocaine-prilocaine (EMLA) cream     LINZESS 145 mcg Cap capsule TK 1 C PO ONCE A DAY ON AN EMPTY STOMACH    lorazepam (ATIVAN) 1 MG tablet Take 1 tablet (1 mg total) by mouth On call Procedure (for MRI).    lovastatin (MEVACOR) 20 MG tablet TAKE 1 TABLET BY MOUTH EVERY EVENING    omeprazole (PRILOSEC) 20 MG capsule TAKE ONE CAPSULE BY MOUTH TWICE DAILY    ondansetron (ZOFRAN) 4 MG tablet TK 1 T PO Q 8 H PRF NAUSEA    pantoprazole (PROTONIX) 40 MG tablet TK 1 T PO ONCE A DAY    pen needle, diabetic (BD INSULIN PEN NEEDLE UF MINI) 31 gauge x 3/16" Ndle USE AS DIRECTED TWICE DAILY    tramadol (ULTRAM) 50 mg tablet Take 1 tablet (50 mg total) by mouth every 12 (twelve) hours as needed for Pain.     No current facility-administered medications for this visit.      Review of patient's allergies indicates:  No Known Allergies    Precautions: none    Evaluation Date: 6/15/17  Visit # authorized: 1/12  Authorization period: 6/15/17-8/15/17  Plan of care expiration: 7/15/17    Subjective     PLOF: Pt has an extensive PMH of pain from head to toe. Pt is being followed by pain mangement. Pt had a series of pain injections and  Medication for for chroinc pain sustained from a fall on concrete on September 2, 2013 where pt was unconscious. Pt is unable to work or due much at home due to pain. Pt does drive and tries to perform basic adls.       Primary concern/ Chief complaints:  Jessie is a 61 y.o. female that presents to [facility] secondary to lumbar radiculopathy and generalized muscle weakness throughout the body. Pt. presents with the following co-morbidities and personal factors that directly impact POC. Pt was seen in the ER  For increased pain Last week at [facility] where they took updated xray and MRI. Information is not on file at [facility]. Physician from ER recommending possible surgery. " Educated patient on following up with PCP and discussing options. Pt has numbness and tingling in B LE and B wrist and hands. Pt saw hand physician for B CTS.     Red flags:  Pt. denies bowel/bladder symptoms (urinary retention/fecal incontinence). Recent weight loss? none Constant/Night pain that is unchanging with change of position? YES- intermittent PMH of CA? denies    Onset/MARLENI: Chronic    Previous treatment: pt had PT prior at [facility] which was unsuccessful due to constant pain.     Pain Scale: Jessie rates pain on a scale of 0-10 to be 7 currently; 7 at best; 9 at worst .    Aggravating factors: all movement  Relieving factors: injection and pain medications.   Pt stated she is out of her pain medications at this time and is in more pain than usual.     ADLs: Pt has a decreased ability to perform ADLs such as house cleaning.    Patient Goals: Pt would like to decrease pain and increase function to return to some normal daily activities.    Objective     Observation/Posture: Pt had mild forward head posture and mild forward trunk lean in standing and sitting.  Increased R side trunk lean in standing. Pt was observed to perform sit to stand, supine to sit, sit to supine.    Lumbar ROM: (measured in degrees) unable to perform due to increased pain with all movements. Muscle guarding through any attempts to move.    Active/Passive Hip/Knee/ankle ROM/ MMT: (measured in degrees) B LE ROM WNL and MMT 4-/5 in sitting    Special Tests: ((+): pos.; (-): neg.)      · SLR Test: +  · Bridge Test: -  · Pirformis Test: -    Palpation for condition: + Tenderness along lumbar paraspinals      Functional Status Measures:    Intake Score     Pts Physical FS Primary Measure      33                          Risk Adjustment Statistical FOTO     42        PT reviewed FOTO scores for Jessie Gallardo on 06/20/2017.   FOTO scores were entered into [employer] - see media section.    History  Co-morbidities and personal factors that may  impact the plan of care Examination  Body Structures and Functions, activity limitations and participation restrictions that may impact the plan of care Clinical Presentation   Decision Making/ Complexity Score   Co-morbidities:   Anxiety, Chronic low back pain, depression, fibromyalgia        Personal Factors:   Coping style , attitude Body Regions: head, back, trunk, LE    Body Systems: musculoskeletal: strength      Activity limitations: mobility, self care, domestic lifestyle,      Participation Restrictions: mobilty, interpersonal interactionsand relationship, community social life style   Evolving clinical presentation with changing clinical characteristics   moderate       PT Evaluation Completed? Yes  Discussed Plan of Care with patient: Yes    TREATMENT:  Therapeutic exercise: Jessie received therapeutic exercises to develop strength and endurance, flexibility for 15 minutes including: Bridge, SKTC, Trunk rolls supine, seated trunk flexion    Modalities: Pt. received MH x 10 min. to low back. Following treatment.    Pt. Education: Instructed pt. regarding:body mechanics, posture, activity modification/avoidance, and proper technique with all exercises. Pt. to demonstrate good understanding of the education provided. Jessie demonstrated good return demonstration of activities. No cultural, environmental, or spiritual barriers identified to treatment or learning.    Medical necessity is demonstrated by the following IMPAIRMENTS/PROBLEM LIST:   1) Pain limiting function   2) Posture dysfunction   3) Core/Lumbar/LE weakness   5) Decreased Lumbar ROM   6) Decreased soft tissue extensibility/fascia restriction   7) Decreased LE flexibility:    8) Lack of HEP   9) LE paresthesia    GOALS:   Short Term Goals:  1-2 weeks  1. Report decreased low back pain </= 5/10 at worst to increase tolerance for sitting and standing  2. Increase LS mobiltiy with decrease pain 25% to promote greater ease with self care skills  3.  Pt. to demonstrate increased MMT for hip flexor to 4-/5 to increase ability to tolerated adls.   4. Pt to tolerate HEP to improve ROM and independence with ADL's    Long Term Goals: 2-4 weeks  1. Report decreased low back pain </=  3/10 at worst to increase tolerance for all activity and adls  2. Increase lumbar mobility to 50% to promote greater ease with prolonged sitting/standing  3. Pt. to demonstrate increased MMT for hip flexor to 4/5/5 to increase tolerance for ADL and work activities.   4. Pt to be independent with HEP to improve ROM and independence with ADL's    Assessment   This is a 61 y.o. female referred to outpatient physical therapy who presents with a medical/ PT diagnosis of lumbar radiculopathy  demonstrating mobility and functional limitation as described above. Level of complexity is moderate;  based on patient's past medical history including the above co-morbidities and personal factors; functional limitations, and clinical presentation directly impacting his/her plan of care. Pt was unable to tolerate much movement during the  IE due to not having pain medication. Pt was unsure if therapy will help. Explained what therapy will help her with as well as the benefits of the aquatic therapy. Pt will wait for pain medication refill to try therapy 1 more time before deciding to try aquatic therapy.    Patient was in agreement with set goals and plan of care. Pt was given a HEP consisting of posture reeducation, diaphragmatic breathing, instruction on body mechanics, activity modification/avoidance, and core/lumbar/LE strengthening regimen. Pt. verbally understood instructions and demonstrated proper form/technique. Pt was advised to perform these exercises free of pain, and discontinue use if symptoms persist/worsen. Pt will benefit from physical therapy services in order to maximize pain free functional independence.     Plan     Pt will be treated by physical therapy 1-3 times a week for 2-4  weeks for pt. education, HEP, aquatic therapy ,therapeutic exercises, neuromuscular re-education, soft tissue and joint mobilizations; and modalities prn to achieve established goals. Jessie may at times be seen by a PTA as part of the Rehab Team.     I certify the need for these services furnished under this plan of treatment and while under my care.______________________________ Physician/Referring Practitioner  Date of Signature

## 2017-06-29 NOTE — PLAN OF CARE
"Physical Therapy Evaluation    Name: Jessie Gallardo  [facility] Number: [id number]    Diagnosis:   Encounter Diagnosis   Name Primary?    Lumbar radiculopathy Yes     Physician: Lisa Angel,*  Treatment Orders: PT Eval and Treat/ Aquatic therapy    History     Past Medical History:   Diagnosis Date    Anxiety     Chronic back pain     See neuro     Chronic low back pain     Depression     Diabetes mellitus type II     Diabetic neuropathy     Diverticulosis     last colonoscopy was in 2000    Encounter for blood transfusion     Fibroids     Fibromyalgia     Hyperlipidemia     Hypertension     Obesity     Obstructive sleep apnea     wear CPAP machine nightly    Pancreatitis     Post menopausal syndrome      Current Outpatient Prescriptions   Medication Sig    albuterol 90 mcg/actuation inhaler Inhale 2 puffs into the lungs every 6 (six) hours as needed for Wheezing or Shortness of Breath.    amlodipine (NORVASC) 10 MG tablet Take 1 tablet (10 mg total) by mouth once daily.    aspirin (ECOTRIN) 81 MG EC tablet Take 1 tablet (81 mg total) by mouth once daily.    blood sugar diagnostic (FREESTYLE LITE STRIPS) Strp TEST BLOOD SUGAR 3 TIMES A DAY    divalproex (DEPAKOTE) 500 MG Tb24 Take 2 tablets (1,000 mg total) by mouth every evening.    enalapril (VASOTEC) 20 MG tablet TAKE 1 TABLET(20 MG) BY MOUTH TWICE DAILY    fluticasone (FLONASE) 50 mcg/actuation nasal spray USE 2 SPRAYS IN EACH NOSTRIL ONCE DAILY    gabapentin (NEURONTIN) 300 MG capsule Take 2 capsules (600 mg total) by mouth 3 (three) times daily.    hydrochlorothiazide (HYDRODIURIL) 25 MG tablet TAKE 1 TABLET BY MOUTH ONCE DAILY    insulin aspart (NOVOLOG FLEXPEN) 100 unit/mL InPn pen INJECT 20 UNITS UNDER THE SKIN THREE TIMES DAILY WITH MEALS    insulin detemir (LEVEMIR FLEXTOUCH) 100 unit/mL (3 mL) SubQ InPn pen Inject 32 units bid    insulin needles, disposable, (NOVOFINE 30) 30 x 1/3 " Ndle USE AS DIRECTED THREE TIMES DAILY " "   lancets (FREESTYLE LANCETS) 28 gauge Misc Inject 1 lancet into the skin 3 (three) times daily.    lidocaine-prilocaine (EMLA) cream     LINZESS 145 mcg Cap capsule TK 1 C PO ONCE A DAY ON AN EMPTY STOMACH    lorazepam (ATIVAN) 1 MG tablet Take 1 tablet (1 mg total) by mouth On call Procedure (for MRI).    lovastatin (MEVACOR) 20 MG tablet TAKE 1 TABLET BY MOUTH EVERY EVENING    omeprazole (PRILOSEC) 20 MG capsule TAKE ONE CAPSULE BY MOUTH TWICE DAILY    ondansetron (ZOFRAN) 4 MG tablet TK 1 T PO Q 8 H PRF NAUSEA    pantoprazole (PROTONIX) 40 MG tablet TK 1 T PO ONCE A DAY    pen needle, diabetic (BD INSULIN PEN NEEDLE UF MINI) 31 gauge x 3/16" Ndle USE AS DIRECTED TWICE DAILY    tramadol (ULTRAM) 50 mg tablet Take 1 tablet (50 mg total) by mouth every 12 (twelve) hours as needed for Pain.     No current facility-administered medications for this visit.      Review of patient's allergies indicates:  No Known Allergies    Precautions: none    Evaluation Date: 6/15/17  Visit # authorized: 1/12  Authorization period: 6/15/17-8/15/17  Plan of care expiration: 7/15/17    Subjective     PLOF: Pt has an extensive PMH of pain from head to toe. Pt is being followed by pain mangement. Pt had a series of pain injections and  Medication for for chroinc pain sustained from a fall on concrete on September 2, 2013 where pt was unconscious. Pt is unable to work or due much at home due to pain. Pt does drive and tries to perform basic adls.       Primary concern/ Chief complaints:  Jessie is a 61 y.o. female that presents to [facility] secondary to lumbar radiculopathy and generalized muscle weakness throughout the body. Pt. presents with the following co-morbidities and personal factors that directly impact POC. Pt was seen in the ER  For increased pain Last week at [facility] where they took updated xray and MRI. Information is not on file at [facility]. Physician from ER recommending possible surgery. " Educated patient on following up with PCP and discussing options. Pt has numbness and tingling in B LE and B wrist and hands. Pt saw hand physician for B CTS.     Red flags:  Pt. denies bowel/bladder symptoms (urinary retention/fecal incontinence). Recent weight loss? none Constant/Night pain that is unchanging with change of position? YES- intermittent PMH of CA? denies    Onset/MARLENI: Chronic    Previous treatment: pt had PT prior at [facility] which was unsuccessful due to constant pain.     Pain Scale: Jessie rates pain on a scale of 0-10 to be 7 currently; 7 at best; 9 at worst .    Aggravating factors: all movement  Relieving factors: injection and pain medications.   Pt stated she is out of her pain medications at this time and is in more pain than usual.     ADLs: Pt has a decreased ability to perform ADLs such as house cleaning.    Patient Goals: Pt would like to decrease pain and increase function to return to some normal daily activities.    Objective     Observation/Posture: Pt had mild forward head posture and mild forward trunk lean in standing and sitting.  Increased R side trunk lean in standing. Pt was observed to perform sit to stand, supine to sit, sit to supine.    Lumbar ROM: (measured in degrees) unable to perform due to increased pain with all movements. Muscle guarding through any attempts to move.    Active/Passive Hip/Knee/ankle ROM/ MMT: (measured in degrees) B LE ROM WNL and MMT 4-/5 in sitting    Special Tests: ((+): pos.; (-): neg.)      · SLR Test: +  · Bridge Test: -  · Pirformis Test: -    Palpation for condition: + Tenderness along lumbar paraspinals      Functional Status Measures:    Intake Score     Pts Physical FS Primary Measure      33                          Risk Adjustment Statistical FOTO     42        PT reviewed FOTO scores for Jessie Gallardo on 06/20/2017.   FOTO scores were entered into [employer]: [employer] - see media section.    History  Co-morbidities and personal factors that may  impact the plan of care Examination  Body Structures and Functions, activity limitations and participation restrictions that may impact the plan of care Clinical Presentation   Decision Making/ Complexity Score   Co-morbidities:   Anxiety, Chronic low back pain, depression, fibromyalgia        Personal Factors:   Coping style , attitude Body Regions: head, back, trunk, LE    Body Systems: musculoskeletal: strength      Activity limitations: mobility, self care, domestic lifestyle,      Participation Restrictions: mobilty, interpersonal interactionsand relationship, community social life style   Evolving clinical presentation with changing clinical characteristics   moderate       PT Evaluation Completed? Yes  Discussed Plan of Care with patient: Yes    TREATMENT:  Therapeutic exercise: Jessie received therapeutic exercises to develop strength and endurance, flexibility for 15 minutes including: Bridge, SKTC, Trunk rolls supine, seated trunk flexion    Modalities: Pt. received MH x 10 min. to low back. Following treatment.    Pt. Education: Instructed pt. regarding:body mechanics, posture, activity modification/avoidance, and proper technique with all exercises. Pt. to demonstrate good understanding of the education provided. Jessie demonstrated good return demonstration of activities. No cultural, environmental, or spiritual barriers identified to treatment or learning.    Medical necessity is demonstrated by the following IMPAIRMENTS/PROBLEM LIST:   1) Pain limiting function   2) Posture dysfunction   3) Core/Lumbar/LE weakness   5) Decreased Lumbar ROM   6) Decreased soft tissue extensibility/fascia restriction   7) Decreased LE flexibility:    8) Lack of HEP   9) LE paresthesia    GOALS:   Short Term Goals:  1-2 weeks  1. Report decreased low back pain </= 5/10 at worst to increase tolerance for sitting and standing  2. Increase LS mobiltiy with decrease pain 25% to promote greater ease with self care skills  3.  Pt. to demonstrate increased MMT for hip flexor to 4-/5 to increase ability to tolerated adls.   4. Pt to tolerate HEP to improve ROM and independence with ADL's    Long Term Goals: 2-4 weeks  1. Report decreased low back pain </=  3/10 at worst to increase tolerance for all activity and adls  2. Increase lumbar mobility to 50% to promote greater ease with prolonged sitting/standing  3. Pt. to demonstrate increased MMT for hip flexor to 4/5/5 to increase tolerance for ADL and work activities.   4. Pt to be independent with HEP to improve ROM and independence with ADL's    Assessment   This is a 61 y.o. female referred to outpatient physical therapy who presents with a medical/ PT diagnosis of lumbar radiculopathy  demonstrating mobility and functional limitation as described above. Level of complexity is moderate;  based on patient's past medical history including the above co-morbidities and personal factors; functional limitations, and clinical presentation directly impacting his/her plan of care. Pt was unable to tolerate much movement during the  IE due to not having pain medication. Pt was unsure if therapy will help. Explained what therapy will help her with as well as the benefits of the aquatic therapy. Pt will wait for pain medication refill to try therapy 1 more time before deciding to try aquatic therapy.    Patient was in agreement with set goals and plan of care. Pt was given a HEP consisting of posture reeducation, diaphragmatic breathing, instruction on body mechanics, activity modification/avoidance, and core/lumbar/LE strengthening regimen. Pt. verbally understood instructions and demonstrated proper form/technique. Pt was advised to perform these exercises free of pain, and discontinue use if symptoms persist/worsen. Pt will benefit from physical therapy services in order to maximize pain free functional independence.     Plan     Pt will be treated by physical therapy 1-3 times a week for 2-4  weeks for pt. education, HEP, aquatic therapy ,therapeutic exercises, neuromuscular re-education, soft tissue and joint mobilizations; and modalities prn to achieve established goals. Jessie may at times be seen by a PTA as part of the Rehab Team.     I certify the need for these services furnished under this plan of treatment and while under my care.______________________________ Physician/Referring Practitioner  Date of Signature

## 2017-07-27 DIAGNOSIS — G44.329 CHRONIC POST-TRAUMATIC HEADACHE, NOT INTRACTABLE: ICD-10-CM

## 2017-08-03 DIAGNOSIS — R52 PAIN: Primary | ICD-10-CM

## 2017-08-07 RX ORDER — DIVALPROEX SODIUM 500 MG/1
TABLET, FILM COATED, EXTENDED RELEASE ORAL
Qty: 180 TABLET | Refills: 0 | Status: SHIPPED | OUTPATIENT
Start: 2017-08-07 | End: 2017-12-27 | Stop reason: CLARIF

## 2017-08-07 RX ORDER — TRAMADOL HYDROCHLORIDE 50 MG/1
50 TABLET ORAL 2 TIMES DAILY PRN
Qty: 60 TABLET | Refills: 0 | Status: SHIPPED | OUTPATIENT
Start: 2017-08-07 | End: 2017-08-11 | Stop reason: SDUPTHER

## 2017-08-11 ENCOUNTER — OFFICE VISIT (OUTPATIENT)
Dept: PAIN MEDICINE | Facility: CLINIC | Age: 62
End: 2017-08-11
Payer: MEDICARE

## 2017-08-11 VITALS
RESPIRATION RATE: 16 BRPM | WEIGHT: 224.88 LBS | HEART RATE: 70 BPM | BODY MASS INDEX: 37.47 KG/M2 | DIASTOLIC BLOOD PRESSURE: 90 MMHG | TEMPERATURE: 98 F | SYSTOLIC BLOOD PRESSURE: 142 MMHG | HEIGHT: 65 IN

## 2017-08-11 DIAGNOSIS — M54.16 LUMBAR RADICULOPATHY: ICD-10-CM

## 2017-08-11 DIAGNOSIS — M25.511 CHRONIC PAIN OF BOTH SHOULDERS: ICD-10-CM

## 2017-08-11 DIAGNOSIS — M47.816 LUMBAR FACET ARTHROPATHY: ICD-10-CM

## 2017-08-11 DIAGNOSIS — R53.81 PHYSICAL DECONDITIONING: ICD-10-CM

## 2017-08-11 DIAGNOSIS — M25.512 CHRONIC PAIN OF BOTH SHOULDERS: ICD-10-CM

## 2017-08-11 DIAGNOSIS — M53.3 SACROILIAC JOINT PAIN: ICD-10-CM

## 2017-08-11 DIAGNOSIS — R52 PAIN: ICD-10-CM

## 2017-08-11 DIAGNOSIS — M53.3 COCCYDYNIA: ICD-10-CM

## 2017-08-11 DIAGNOSIS — M54.17 LUMBOSACRAL RADICULOPATHY: Primary | ICD-10-CM

## 2017-08-11 DIAGNOSIS — M47.816 SPONDYLOSIS OF LUMBAR REGION WITHOUT MYELOPATHY OR RADICULOPATHY: ICD-10-CM

## 2017-08-11 DIAGNOSIS — Z79.891 ENCOUNTER FOR LONG-TERM OPIATE ANALGESIC USE: ICD-10-CM

## 2017-08-11 DIAGNOSIS — M79.10 MYALGIA: ICD-10-CM

## 2017-08-11 DIAGNOSIS — G89.29 CHRONIC PAIN OF BOTH SHOULDERS: ICD-10-CM

## 2017-08-11 DIAGNOSIS — M54.16 BILATERAL LUMBAR RADICULOPATHY: ICD-10-CM

## 2017-08-11 DIAGNOSIS — M79.7 FIBROMYALGIA: ICD-10-CM

## 2017-08-11 LAB
AMP D-AMPHETAMINE 1000 NG/ML: NEGATIVE
BAR SECOBARBITAL 300 NG/ML: POSITIVE
BUP BUPRENORPHINE 10 NG/ML: NEGATIVE
BZO OXAZEPAM 300 NG/ML: NEGATIVE
COC BENZOYLECGONINE 300 NG/ML: NEGATIVE
MAMP D-METHAMPHETAMINE 1000 NG/ML: NEGATIVE
MOP MORPHINE 300 NG/ML: NEGATIVE
MTD METHADONE 300 NG/ML: NEGATIVE
QXY OXYCODONE 100 NG/ML: NEGATIVE
THC 11-NOR-9-TETRAHYDROCANNABINOL-9-CARBOXYLIC ACID: NEGATIVE

## 2017-08-11 PROCEDURE — 99214 OFFICE O/P EST MOD 30 MIN: CPT | Mod: S$GLB,,, | Performed by: NURSE PRACTITIONER

## 2017-08-11 PROCEDURE — 3077F SYST BP >= 140 MM HG: CPT | Mod: S$GLB,,, | Performed by: NURSE PRACTITIONER

## 2017-08-11 PROCEDURE — 3080F DIAST BP >= 90 MM HG: CPT | Mod: S$GLB,,, | Performed by: NURSE PRACTITIONER

## 2017-08-11 PROCEDURE — 80307 DRUG TEST PRSMV CHEM ANLYZR: CPT

## 2017-08-11 PROCEDURE — 99999 PR PBB SHADOW E&M-EST. PATIENT-LVL III: CPT | Mod: PBBFAC,,, | Performed by: NURSE PRACTITIONER

## 2017-08-11 RX ORDER — ENALAPRIL MALEATE 20 MG/1
TABLET ORAL
Qty: 180 TABLET | Refills: 0 | Status: SHIPPED | OUTPATIENT
Start: 2017-08-11 | End: 2021-10-14

## 2017-08-11 RX ORDER — LOVASTATIN 20 MG/1
TABLET ORAL
Qty: 90 TABLET | Refills: 0 | Status: SHIPPED | OUTPATIENT
Start: 2017-08-11 | End: 2022-03-01

## 2017-08-11 RX ORDER — HYDROCHLOROTHIAZIDE 25 MG/1
TABLET ORAL
Qty: 90 TABLET | Refills: 0 | Status: SHIPPED | OUTPATIENT
Start: 2017-08-11 | End: 2021-10-14

## 2017-08-11 RX ORDER — AMOXICILLIN AND CLAVULANATE POTASSIUM 875; 125 MG/1; MG/1
TABLET, FILM COATED ORAL
COMMUNITY
Start: 2017-06-20 | End: 2017-12-27 | Stop reason: CLARIF

## 2017-08-11 RX ORDER — LEVOFLOXACIN 500 MG/1
TABLET, FILM COATED ORAL
COMMUNITY
Start: 2017-06-12 | End: 2017-12-27 | Stop reason: CLARIF

## 2017-08-11 RX ORDER — GABAPENTIN 300 MG/1
600 CAPSULE ORAL 3 TIMES DAILY
Qty: 540 CAPSULE | Refills: 0 | Status: SHIPPED | OUTPATIENT
Start: 2017-08-11 | End: 2017-10-24 | Stop reason: SDUPTHER

## 2017-08-11 RX ORDER — NALOXEGOL OXALATE 25 MG/1
TABLET, FILM COATED ORAL
Status: ON HOLD | COMMUNITY
Start: 2017-07-27 | End: 2022-03-08

## 2017-08-11 RX ORDER — DULOXETIN HYDROCHLORIDE 30 MG/1
CAPSULE, DELAYED RELEASE ORAL
COMMUNITY
Start: 2017-07-29 | End: 2018-01-22 | Stop reason: CLARIF

## 2017-08-11 RX ORDER — TRAMADOL HYDROCHLORIDE 50 MG/1
50 TABLET ORAL 2 TIMES DAILY PRN
Qty: 60 TABLET | Refills: 1 | Status: SHIPPED | OUTPATIENT
Start: 2017-08-25 | End: 2017-09-24

## 2017-08-11 RX ORDER — BUTALBITAL, ACETAMINOPHEN AND CAFFEINE 50; 325; 40 MG/1; MG/1; MG/1
TABLET ORAL
Refills: 3 | COMMUNITY
Start: 2017-05-31 | End: 2018-01-04 | Stop reason: SDUPTHER

## 2017-08-11 RX ORDER — BLOOD-GLUCOSE METER
EACH MISCELLANEOUS
Refills: 0 | Status: ON HOLD | COMMUNITY
Start: 2017-06-12 | End: 2022-03-23 | Stop reason: HOSPADM

## 2017-08-11 NOTE — PROGRESS NOTES
Subjective:       Patient ID: Jessie Gallardo is a 61 y.o. female.    Chief Complaint: No chief complaint on file.    Interval History 8/11/2017:  The patient returns today for follow up of lower back and neck pain.  She reports increased pain since her last visit.  She is having neck pain with radiation into her left arm.  She is also having lower back pain with radiation down the back and side of her right leg to her calf.  She does have mild left leg pain also.  She did go to the ED at [facility] in June and reports that she was told to see a surgeon.  Her PCP referred her for an appointment.  She reports that she is seeing a neurosurgeon on [facility] later this month.  She states that they are waiting to receive her records to decide on a plan of care.  Her pain today is 7/10.  She continues to take Tramadol as needed with some benefit.      Interval History 6/2/2017:  The patient returns today for follow up of neck and lower back pain.  She is s/p L5-S1 IL MIKE with significant benefit of leg pain.  She reports no change in her neck pain since her previous encounter.  She continues to take Tramadol and Gabapentin with significant benefit.  She has increased her activity level which she thinks is helping.  She has an appointment today with Dr. Ortiz for evaluation of bilateral hand pain.  Her pain today is 6/10.      Interval History 4/7/2017:  The patient returns today for follow up of neck and back pain.  She is s/p cervical MIKE on 3/15/17 with limited benefit.  She reports significant relief with this procedure in the past.  She does report having a headaches after the procedure.  She called the office and was told to lay down and drink caffeine.  She reports that this resolved her headache.  She also has left shoulder pain with radiation down her left arm which is worsening.  She reports an injury about 6 years ago during which she fell into a hole and was removed by her left arm.  She has had shoulder pain  since this time, although it has severely worsened over the past month.  She has had XRAYs which show DJD.  She has not had an MRI.  She is also reporting worsening lower back pain and would like a repeat MIKE as previous provided significant benefit.  She continues to take Tramadol and Gabapentin with benefit.  She continues to try to control her diabetes through diet and reports that her sugars have been stable.  Her pain today is 8/10.  The patient denies any bowel or bladder incontinence or signs of saddle paresthesia.  The patient denies any major medical changes since last office visit.    Interval History 2/1/2017:  The patient returns today for follow up of neck and back pain.  Her back pain is tolerable today.  Her biggest complaint today is neck pain with radiation into her arms.  She has numbness to her fingers also.  She previously had significant benefit from cervical MIKE in the past.  She had an A1C completed at her PCP last week and reports that it was 8.0.  She has been trying to control her sugars through diet and exercise.  She continues to take Gabapentin 1800 mg daily and Tramadol PRN.  She reports significant benefit of the medication without side effects.  Her pain today is 5/10.  The patient denies any bowel or bladder incontinence or signs of saddle paresthesia.      Interval History 12/2/2016:  The patient returns today for follow up of lower back and neck pain.  She is s/p L5-S1 IL MIKE on 11/16/16 with 70% pain relief of back and leg pain.  She reports that her pain is mild today.  She has increased her activity lately and has been cleaning her house.  She does have some increased pain after cleaning.  She is very happy with her results though.  Her neck pain has been tolerable.  She has not started PT because she recently moved and would like another order.  Her pain today is 5/10.  The patient denies any bowel or bladder incontinence or signs of saddle paresthesia.  The patient denies any  major medical changes since last office visit.    Interval History 10/5/2016:  The patient returns today for follow up of lower back and leg pain.  She is s/p bilateral SI joint injections on 8/10/16 with 50% benefit.  She is still having radiating pain down the back and sides of both legs to the top and bottom of her feet.  She is also having numbness and tingling throughout her feet.  She was previously having swelling to her legs.  Dr. Ley instructed her to titrate down Gabapentin to see if this was causing this.  She states that this did not improve her swelling.  She then stopped vitamin supplements (Vit E and C).  Her leg swelling then resolved.  She then restarted Gabapentin and titrated up to 600 mg TID.  Her pain today is 8/10.  The patient denies any bowel or bladder incontinence or signs of saddle paresthesia.  The patient denies any major medical changes since last office visit.    Interval History 6/27/2016:  The patient returns today for f/u of neck, bilateral shoulder and lower back pain.  She is s/p cervical MIKE on 6/8/16 with 80% improvement of her neck and left arm pain.  She is no longer having the shooting electric pain.  Her numbness has also improved since the procedure.  Her biggest complaint today is lower back pain which radiates down the back and sides of both legs to the bottom of her feet with numbness.  She also has a history of diabetes and diabetic neuropathy.  Her last A1C was 8.7, increased from 7.8.  She has had lumbar RFAs in the past with significant relief.  She has not had lumbar ESIs.  Her previous lumbar MRI from 2014 shows disc bulges at L3-4, L4-5, and L5-S1 without NF narrowing.  She is taking Tramadol PRN with relief.  I increased her Gabapentin last OV but she reports that she is taking 300 mg BID.  Her pain today is 6/10.  The patient denies any bowel/bladder incontinence or symptoms of saddle paresthesia.     Interval History 05/20/2016:  Patient is present today  for a f/u for lower back, bilateral shoulder and neck pain.  Her back pain has been mild since her lumbar RFAs.  She is mostly complaining of neck pain which radiates down both arms with associated tingling.  She has had cervical ESIs in the past which she now states may have offered her more benefit than she previously believed.  She would like to try this treatment options again.  She did have a recent cervical MRI ordered by Dr. Herrera which does show muliple osteophytes with spinal narrowing.  She also had bilateral shoulder XRAYs which show DJD.   She continues to use Tramadol which provided relief.  She has a history of C4-5 fusion in 1999 with relief of her pain.  She is also taking Gabapentin 300 mg TID with limited relief.  Her pain today is a 6/10.  The patient denies any bowel/bladder incontinence.    Interval History 04/26/2016:  Patient is present today for a f/u for Low Back Pain. The pt recently had a Right Lumbar RFA @ L3,L4,L5 on 4/12/2016 where she reports a significant amount of relief. She reports her pain to be 4/10 today. The pt would like to address neck and shoulder pain that is radiating down through to both arms.  Patient has had a history of a C4-5 fusion, she had previous cervical intralaminar epidural steroidal injections in January 2015 which did not offer significant relief and the patient did have a cervical EMG/NCV which did not show evidence of radiculopathy at that time but some carpal tunnel syndrome.  Currently she states that the pain in her shoulders and in her arms is most significant and she describes her arm pain as feelings of swelling bilaterally.    Interval History 11/19/2015:  Patient here for f/u of chronic low back pain. Patient was last here in 7/15 and had B L3,4,5 RFA done. Patient states that she continues to have about 50% pain reduction of low back pain and some leg pain. She able to walk and stand more and feels that she is functioning much better over all.  Patient continues to have some low back pain right> left with intermittent BLE pains right >left and bilateral foot numbess and tingling. Patient admits to not taking gabapentin as directed and had been taking it prn.     Interval history 07/7/2015:  Ms. Gallardo is a 61 y.o. female with neck and low back pain presents today for f/u s/p b/l MBB at L4, 5 and sacral Ala and right sided MBB at same level. Reports significantly more relief with b/l block. She was in physical therapy but has not gone for 2 months. She has a lot of anxiety and frustration associated with her chronic pain. She saw Dr. Rosenbaum today and it was recommended she continue f/u with Dr. Jean. Pt has been seen in the clinic before by Dr. Ley, however pt is new to me.   History below per Dr. Ley    Interval history 05/21/2015:  Since previous encounter patient reports neck pain and lower back pain, although the pain in her lower back is her worst pain. . Patient reports pain as a 8/10 today. Patient does not take Soma and Hydrocodone anymore. She has a lot of anxiety and frustration associated with her chronic pain.    Interval history 04/20/2015:  Since previous encounter patient reports neck pain radiating into her upper extremities. Patient stated that she completed her EMG with  which did not show any evidence of radiculopathy and neurosurgery was evaluating the patient did not determine any need for further surgical intervention. Patient stated that she discontinued the Lyrica due to it makes her swell and have dry mouth. Patient stated that she still takes the Gabapentin. Patient reports no other health changes since her last visit. Patient reports her pain 8/10 today.     Interval history 03/16/2015:  Since previous encounter patient reports neck pain radiating into her upper extremities and sometimes causes her to have headaches. Patient reports low back also. Patient stated that she did receive relief from her last  "injection but the pain has returned. Patient stated that she still takes Norco but it only puts her to sleep and when she awakens the pain is back. Patient reports that she missed her EMG appt due to her pain, however she has been rescheduled to the end of the month Additionally the patient has not started Lyrica as previously prescribed. Patient reports memory loss. Patient reports no other health changes since her last visit. Patient reports her pain 8/10 today.   Initial encounter:    Jessie Gallardo presents to the clinic for the evaluation of neck and low back pain. The pain started 3 months ago following traumatic fall and symptoms have been worsening. Reports that her neck pain is the most concerning today.     Brief history:    She has h/o cervical fusion in 1999 in [place] and she did well after surgery. She fell on 9/2/14 when she was getting her oil changed. She was unable to stand right after the incident and she was taken by ambulance to the ED.     She complains of diffuse body pain. She feels like she is getting worse from the accident. She has pain from her "head to her toes."      Pain Description:    The pain is located in the neck and low back area and radiates to the shoulders and posterior thighs, respectively..     At BEST 6/10     At WORST  9/10 on the WORST day.     On average pain is rated as 7/10.     Today the pain is rated as 8/10    The pain is described as aching, burning, numbing, sharp, shooting, stabbing, tingling and weakness      Symptoms interfere with daily activity and sleeping.     Exacerbating factors: Sitting, Laying, Bending, Morning, Extension, Flexing, Lifting and Getting out of bed/chair.     Mitigating factors laying down and medications.     Patient denies night fever/night sweats, urinary incontinence, bowel incontinence, significant weight loss, significant motor weakness and loss of sensations.  Patient denies any suicidal or homicidal ideations    Pain " Medications:    Current:  Neurontin 600 mg TID (1800 mg daily)  Tramadol 50 mg BID PRN  Compounding cream    Tried in Past:  NSAIDs -Aleve  TCA -Never  SNRI -Never  Muscle relaxer: Klonopin, Zanaflex, Zoloft  Opioid: Tramadol     Physical Therapy/Home Exercise: not currently     report: Reviewed and consistent with medication use as prescribed.    Pain Procedures:  1/7/2015-cervical epidural steroidal injection  1/21/2015-cervical epidural steroidal injection  6/10/2015 bilateral medial branch nerve block at the level of L3, L4, L5  6/17/2015-right medial branch nerve block at the level of L3, L4, L5  7/15/2015-left radiofrequency ablation at the level of L3, L4, L5  7/29/2015-right radiofrequency ablation at the level of L3, L4, L5  3/29/2016-left radio frequency ablation at the level of L3, L4, L5  4/12/2016-right radio frequency ablation at the level of L3, L4, L5  6/8/16 C7-T1 IL MIKE- 80% relief  8/10/16 Bilateral SI joint injections- 50% relief  11/16/16 L5-S1 IL MIKE- 70% relief  3/15/17 C7-T1 IL MIKE- no relief  4/19/17 L5-S1 IL MIKE- 70% relief    Chiropractor -never  Acupuncture - never  TENS unit -Yes, with PT  Spinal decompression -never  Joint replacement -never    Imaging:    MRI Cervical 05/17/2016:  Comparison is November 2014    Routine imaging of the cervical spine was obtained.    There is an osseous fusion at C4-5.  There is straightening and slight reversal of the normal cervical lordosis again seen.  Alignment of vertebral bodies is satisfactory.  Discs are desiccated throughout with disc space narrowing most pronounced at the C5-6 level, similar to prior study.  The spinal cord is normal in signal and paravertebral structures are unremarkable.    C2-3 demonstrates no significant abnormality.    C3-4 demonstrates a mild diffuse disc bulge asymmetric toward the right.  There is bilateral foraminal narrowing.  This disc bulge thins the posterior cerebrospinal fluid sleeve and abuts the anterior  aspect of the spinal cord with resultant mild spinal canal narrowing.    C4-5 demonstrates osseous fusion with some posterior osteophytic spurring.  There is complete loss of the anterior cerebrospinal fluid sleeve,and the posterior cerebrospinal fluid sleeve and some flattening of the spinal cord but no abnormal spinal cord signal.  Findings appear fairly similar to prior study.  There is mild to moderate narrowing of the spinal canal.    C5-6 demonstrates posterior disc osteophyte complex asymmetric to the left more pronounced as compared to prior study.  There is thinning of the cerebrospinal fluid sleeve around the spinal cord and mild spinal canal narrowing.    C6-7 demonstrates a posterior disc osteophyte complex asymmetric toward the left.  There is thinning of the cerebrospinal fluid sleeve around the spinal cord and mild spinal canal narrowing.   Impression    In this patient with osseous fusion at C4-5, there is multilevel spinal canal narrowing most significant at C4-5, as further detailed above.  ______________________________________     Electronically signed by: Sherie Rodriguez MD  Date: 05/18/16       Lumbar MRI 10/31/16  Narrative   MRI OF THE LUMBAR SPINE WITHOUT CONTRAST.     Technique:  Sagittal T1, sagittal T2, sagittal STIR, axial T1 and axial T2 weighted images of the lumbar spine obtained without contrast.     Comparison: MRI 11/20/2014.     Findings:  Minimal grade 1 anterolisthesis is seen at L3-4 and L4-L5.  Otherwise, sagittal vertebral body alignment is appropriate.  Vertebral body heights well-maintained.  No fracture is identified.  Mild multilevel disc degeneration is seen, most pronounced at L5-S1. No marrow signal abnormality suspicious for an infiltrative process.      The conus is normal in appearance, and terminates at the L1 level.  Multiple fibroids are seen in the uterus.      T12-L1: No focal disc herniation.  No significant spinal canal stenosis or neuroforaminal  narrowing.  L1-L2: Bilateral facet arthropathy.  No focal disc herniation.  No significant spinal canal stenosis or neuroforaminal narrowing.  L2-L3: No focal disc herniation.  No significant spinal canal stenosis.  Bilateral facet arthropathy is noted, which contributes to mild bilateral neuroforaminal narrowing.  L3-L4: Uncovering of the disc with mild bulge, bilateral facet arthropathy and ligament of flavum thickening resulting in moderate right-sided and mild left-sided neuroforaminal narrowing.  L4-L5: Uncovering of the disc with mild bulge, bilateral facet arthropathy, and ligamentum flavum thickening resulting in moderate right-sided and mild left-sided neuroforaminal narrowing.  L5-S1: Broad-based disc bulge and bilateral facet arthropathy without significant spinal canal stenosis.  Moderate bilateral neuroforaminal narrowing is seen.   Impression      Multilevel lumbar spondylosis, as detailed above.         Lab Results   Component Value Date    HGBA1C 8.7 (H) 04/21/2016       REVIEW OF SYSTEMS:    GENERAL:  No weight loss, malaise or fevers.  HEENT:   No recent changes in vision or hearing  NECK:  Negative for lumps, no difficulty with swallowing.  RESPIRATORY:  Negative for cough, wheezing or shortness of breath, patient denies any recent URI.  CARDIOVASCULAR:  Negative for chest pain, leg swelling or palpitations. H/o htn.  GI:  Negative for abdominal discomfort, blood in stools or black stools or change in bowel habits.  MUSCULOSKELETAL:  See HPI.  SKIN:  Negative for lesions, rash, and itching.  PSYCH:  No mood disorder or recent psychosocial stressors.  Patients sleep is not disturbed secondary to pain.  HEMATOLOGY/LYMPHOLOGY:  Negative for prolonged bleeding, bruising easily or swollen nodes.  Patient is not currently taking any anti-coagulants  NEURO:   No history of syncope, paralysis, seizures or tremors. H/O headaches- followed by Dr. Herrera.  ENDO: H/O diabetes and peripheral neuropathy.  All  "other reviewed and negative other than HPI.      OBJECTIVE:    BP (!) 142/90   Pulse 70   Temp 98.3 °F (36.8 °C) (Oral)   Resp 16   Ht 5' 5" (1.651 m)   Wt 102 kg (224 lb 13.9 oz)   LMP 10/11/2010   BMI 37.42 kg/m²     PHYSICAL EXAMINATION:    GENERAL: Well appearing, in no acute distress, alert and oriented x3.  PSYCH:  Mood and affect appropriate.  SKIN: Skin color, texture, turgor normal, no rashes or lesions.  HEAD/FACE:  Normocephalic, atraumatic. Cranial nerves grossly intact.  NECK: There is pain to palpation over the cervical paraspinal and trapezius muscles bilaterally.  Spurling Negative.  Painful cervical flexion and extension.  Mild cervical facet loading bilaterally.  Negative Spurling.  CV: RRR with palpation of the radial artery.  PULM: No evidence of respiratory difficulty, symmetric chest rise.  BACK:  There is no pain with palpation over the facet joints of the lumbar spine bilaterally.  Full ROM to lumbar spine with pain on flexion and extension.  Mild facet loading bilaterally.  There is pain to palpation over the sacroiliac joints bilaterally.  FABERs is negative bilaterally.  EXTREMITIES: Peripheral joint ROM is full and pain free without obvious instability or laxity in all four extremities. No deformities, edema, or skin discoloration. Good capillary refill.  MUSCULOSKELETAL:  Pain to palpation of the subacromial bursa bilaterally.  Full ROM to bilateral shoulder with pain on internal and external rotation of left shoulder.  There is positive Neer to left side.  Bilateral upper  extremity strength is normal and symmetric.  No atrophy or tone abnormalities are noted.  NEURO: Bilateral upper extremity coordination and muscle stretch reflexes are physiologic and symmetric.  Brenda's negative. No clonus.  Decreased sensation to BLE.  GAIT: Antalgic- ambulates without assistance.    Assessment:     Ms. Gallardo is a 61 y.o. female with neck, left shoulder and lower back pain consistent with " the following diagnoses:    1. Lumbosacral radiculopathy    2. Lumbar radiculopathy    3. Chronic pain of both shoulders    4. Lumbar facet arthropathy    5. Myalgia    6. Sacroiliac joint pain    7. Coccydynia    8. Fibromyalgia    9. Pain    10. Encounter for long-term opiate analgesic use    11. Bilateral lumbar radiculopathy    12. Physical deconditioning    13. Spondylosis of lumbar region without myelopathy or radiculopathy        Plan:     - Previous imaging was reviewed and discussed with the patient today.    - Will schedule for repeat L5-S1 IL MIKE.  The procedure, risks, benefits and options were discussed with patient. There are no contraindications to the procedure. The patient expressed understanding and agreed to proceed.  Consent obtained today.    - She states that she is having an evaluation with a neurosurgeon later this month.  She will let me know what they discuss at her next visit.  If surgery is not an option, we discussed SCS trial.  I provided her with a Bluetector DVD.    - She will continue to f/u with Dr. Ortiz for bilateral carpal tunnel syndrome.    - Continue Gabapentin 600 mg TID (1800 mg daily).    - Continue Tramadol 50 mg BID PRN pain, #60, 1 refill.      - The [place] Board of Pharmacy website for prescription monitoring was consulted today, and it does not suggest any deviations in conflict with the patient's controlled substance contract with our clinic. Will continue current therapy with frequent monitoring of the controlled substance database, and urine drug screens on followup. Refill approved.     - UDS from 12/2/16 reviewed and is consistent.  UDS was performed today.  Preliminary results are consistent with medications as prescribed and negative for illicit substances.    - The patient will continue a home exercise routine to help with pain and strengthening.      - RTC 2 weeks after procedure.    - Dr. Ley was consulted on the patient and agrees with this  plan.      The above plan and management options were discussed at length with patient. Patient is in agreement with the above and verbalized understanding.     Lisa Angel  08/11/2017

## 2017-08-17 LAB
6MAM UR QL: NOT DETECTED
7AMINOCLONAZEPAM UR QL: NOT DETECTED
A-OH ALPRAZ UR QL: NOT DETECTED
ALPRAZ UR QL: NOT DETECTED
AMPHET UR QL SCN: NOT DETECTED
ANNOTATION COMMENT IMP: ABNORMAL
ANNOTATION COMMENT IMP: ABNORMAL
BARBITURATES UR QL: PRESENT
BUPRENORPHINE UR QL: NOT DETECTED
BZE UR QL: NOT DETECTED
CARBOXYTHC UR QL: NOT DETECTED
CARISOPRODOL UR QL: NOT DETECTED
CLONAZEPAM UR QL: NOT DETECTED
CODEINE UR QL: NOT DETECTED
CREAT UR-MCNC: >400 MG/DL (ref 20–400)
DIAZEPAM UR QL: NOT DETECTED
ETHYL GLUCURONIDE UR QL: NOT DETECTED
FENTANYL UR QL: NOT DETECTED
HYDROCODONE UR QL: NOT DETECTED
HYDROMORPHONE UR QL: NOT DETECTED
LORAZEPAM UR QL: NOT DETECTED
MDA UR QL: NOT DETECTED
MDEA UR QL: NOT DETECTED
MDMA UR QL: NOT DETECTED
ME-PHENIDATE UR QL: NOT DETECTED
MEPERIDINE UR QL: NOT DETECTED
METHADONE UR QL: NOT DETECTED
METHAMPHET UR QL: NOT DETECTED
MIDAZOLAM UR QL SCN: NOT DETECTED
MORPHINE UR QL: NOT DETECTED
NORBUPRENORPHINE UR QL CFM: NOT DETECTED
NORDIAZEPAM UR QL: NOT DETECTED
NORFENTANYL UR QL: NOT DETECTED
NORHYDROCODONE UR QL CFM: NOT DETECTED
NOROXYCODONE UR QL CFM: NOT DETECTED
OXAZEPAM UR QL: NOT DETECTED
OXYCODONE UR QL: NOT DETECTED
OXYMORPHONE UR QL: NOT DETECTED
OXYMORPHONE UR QL: NOT DETECTED
PATHOLOGY STUDY: ABNORMAL
PCP UR QL: NOT DETECTED
PHENTERMINE UR QL: NOT DETECTED
PROPOXYPH UR QL: NOT DETECTED
SERVICE CMNT-IMP: ABNORMAL
TAPENTADOL UR QL SCN: NOT DETECTED
TAPENTADOL UR QL SCN: NOT DETECTED
TEMAZEPAM UR QL: NOT DETECTED
TRAMADOL UR QL: PRESENT
ZOLPIDEM UR QL: NOT DETECTED

## 2017-08-22 ENCOUNTER — TELEPHONE (OUTPATIENT)
Dept: PAIN MEDICINE | Facility: OTHER | Age: 62
End: 2017-08-22

## 2017-08-23 NOTE — TELEPHONE ENCOUNTER
----- Message from Anna Allen sent at 8/23/2017  8:42 AM CDT -----  Pt. Will call to reschedule procedure once UTI has cleared up.      Lexie Velasco LPN routed conversation to [facility] Management Schedulers 23 hours ago (8:44 AM)    Lexie Velasco LPN 23 hours ago (8:43 AM)     Pt called stating she has a UTI, She was asked to call the clinic schedulers to reschedule.    Documentation

## 2017-09-18 ENCOUNTER — TELEPHONE (OUTPATIENT)
Dept: PAIN MEDICINE | Facility: CLINIC | Age: 62
End: 2017-09-18

## 2017-09-18 NOTE — TELEPHONE ENCOUNTER
----- Message from Shaq Whelan sent at 9/18/2017  8:03 AM CDT -----  Contact: Jessie Gallardo  X_  1st Request  _  2nd Request  _  3rd Request        Who: Jessie Gallardo    Why: Patient is having a lot of low back pain, shoulder, neck pain, going down to the legs. Please call back to follow up.    What Number to Call Back: [phone]    When to Expect a call back: (With in 24 hours)

## 2017-09-18 NOTE — TELEPHONE ENCOUNTER
Patient states she is in severe pain.      Patient has Bacteria vaginosis and was given one pill that she took on  9/15/17 Friday.    Staff let the patient know that her concerns would be sent to Lisa for review.      Staff called patient back and left a voice message asking patient for the last date she took the antibiotic that was given to her for the UTI.

## 2017-09-19 ENCOUNTER — TELEPHONE (OUTPATIENT)
Dept: PAIN MEDICINE | Facility: CLINIC | Age: 62
End: 2017-09-19

## 2017-09-20 ENCOUNTER — TELEPHONE (OUTPATIENT)
Dept: PAIN MEDICINE | Facility: CLINIC | Age: 62
End: 2017-09-20

## 2017-09-20 NOTE — TELEPHONE ENCOUNTER
Spoke with [facility] pharmacy regarding Ultram refill request, pharmacy to refill medication, refill due 09/22/2017

## 2017-10-10 ENCOUNTER — SURGERY (OUTPATIENT)
Age: 62
End: 2017-10-10

## 2017-10-10 ENCOUNTER — HOSPITAL ENCOUNTER (OUTPATIENT)
Facility: OTHER | Age: 62
Discharge: HOME | End: 2017-10-10
Attending: ANESTHESIOLOGY | Admitting: ANESTHESIOLOGY
Payer: MEDICARE

## 2017-10-10 VITALS
OXYGEN SATURATION: 94 % | TEMPERATURE: 98 F | RESPIRATION RATE: 18 BRPM | BODY MASS INDEX: 36.96 KG/M2 | SYSTOLIC BLOOD PRESSURE: 156 MMHG | HEIGHT: 66 IN | WEIGHT: 230 LBS | DIASTOLIC BLOOD PRESSURE: 71 MMHG | HEART RATE: 67 BPM

## 2017-10-10 DIAGNOSIS — M51.36 ANNULAR TEAR OF LUMBAR DISC: Primary | ICD-10-CM

## 2017-10-10 DIAGNOSIS — M54.16 LUMBAR RADICULOPATHY: ICD-10-CM

## 2017-10-10 LAB — POCT GLUCOSE: 141 MG/DL (ref 70–110)

## 2017-10-10 PROCEDURE — 82947 ASSAY GLUCOSE BLOOD QUANT: CPT | Performed by: ANESTHESIOLOGY

## 2017-10-10 PROCEDURE — 63600175 PHARM REV CODE 636 W HCPCS: Performed by: ANESTHESIOLOGY

## 2017-10-10 PROCEDURE — 62322 NJX INTERLAMINAR LMBR/SAC: CPT | Performed by: ANESTHESIOLOGY

## 2017-10-10 PROCEDURE — 99152 MOD SED SAME PHYS/QHP 5/>YRS: CPT | Mod: ,,, | Performed by: ANESTHESIOLOGY

## 2017-10-10 PROCEDURE — 25500020 PHARM REV CODE 255: Performed by: ANESTHESIOLOGY

## 2017-10-10 PROCEDURE — 62323 NJX INTERLAMINAR LMBR/SAC: CPT | Performed by: ANESTHESIOLOGY

## 2017-10-10 PROCEDURE — 25000003 PHARM REV CODE 250: Performed by: ANESTHESIOLOGY

## 2017-10-10 PROCEDURE — 62323 NJX INTERLAMINAR LMBR/SAC: CPT | Mod: ,,, | Performed by: ANESTHESIOLOGY

## 2017-10-10 RX ORDER — MIDAZOLAM HYDROCHLORIDE 1 MG/ML
INJECTION INTRAMUSCULAR; INTRAVENOUS
Status: DISCONTINUED | OUTPATIENT
Start: 2017-10-10 | End: 2017-10-10 | Stop reason: HOSPADM

## 2017-10-10 RX ORDER — BUPIVACAINE HYDROCHLORIDE 2.5 MG/ML
INJECTION, SOLUTION EPIDURAL; INFILTRATION; INTRACAUDAL
Status: DISCONTINUED | OUTPATIENT
Start: 2017-10-10 | End: 2017-10-10 | Stop reason: HOSPADM

## 2017-10-10 RX ORDER — METHYLPREDNISOLONE ACETATE 40 MG/ML
INJECTION, SUSPENSION INTRA-ARTICULAR; INTRALESIONAL; INTRAMUSCULAR; SOFT TISSUE
Status: DISCONTINUED | OUTPATIENT
Start: 2017-10-10 | End: 2017-10-10 | Stop reason: HOSPADM

## 2017-10-10 RX ORDER — SODIUM CHLORIDE 9 MG/ML
INJECTION, SOLUTION INTRAVENOUS CONTINUOUS
Status: DISCONTINUED | OUTPATIENT
Start: 2017-10-10 | End: 2017-10-10 | Stop reason: HOSPADM

## 2017-10-10 RX ORDER — LIDOCAINE HYDROCHLORIDE 10 MG/ML
INJECTION INFILTRATION; PERINEURAL
Status: DISCONTINUED | OUTPATIENT
Start: 2017-10-10 | End: 2017-10-10 | Stop reason: HOSPADM

## 2017-10-10 RX ADMIN — IOHEXOL 10 ML: 300 INJECTION, SOLUTION INTRAVENOUS at 10:10

## 2017-10-10 RX ADMIN — SODIUM CHLORIDE: 900 INJECTION, SOLUTION INTRAVENOUS at 10:10

## 2017-10-10 RX ADMIN — METHYLPREDNISOLONE ACETATE 40 MG: 40 INJECTION, SUSPENSION INTRA-ARTICULAR; INTRALESIONAL; INTRAMUSCULAR; SOFT TISSUE at 10:10

## 2017-10-10 RX ADMIN — MIDAZOLAM HYDROCHLORIDE 2 MG: 1 INJECTION, SOLUTION INTRAMUSCULAR; INTRAVENOUS at 10:10

## 2017-10-10 RX ADMIN — LIDOCAINE HYDROCHLORIDE 10 ML: 10 INJECTION, SOLUTION INFILTRATION; PERINEURAL at 10:10

## 2017-10-10 RX ADMIN — BUPIVACAINE HYDROCHLORIDE 10 ML: 2.5 INJECTION, SOLUTION EPIDURAL; INFILTRATION; INTRACAUDAL; PERINEURAL at 10:10

## 2017-10-10 RX ADMIN — MIDAZOLAM HYDROCHLORIDE 1 MG: 1 INJECTION, SOLUTION INTRAMUSCULAR; INTRAVENOUS at 10:10

## 2017-10-10 NOTE — DISCHARGE INSTRUCTIONS

## 2017-10-10 NOTE — PLAN OF CARE
PATIENT TOLERATED PROCEDURE WELL. PT COMPLAINS OF  2/10 PAIN. ASSISTED PATIENT UP FOR FIRST TIME. STEADY ON FEET AND DISCHARGE INSTRUCTIONS GIVEN.

## 2017-10-10 NOTE — H&P
HPI  Jessie Gallardo is a 61 y.o. female with a pmhx of chronic low back pain, DM Type2, HTN, and fibromyalgia who is presenting today for repeat L5-S1 IL MIKE.    Past Medical History:   Diagnosis Date    Anxiety     Chronic back pain     See neuro     Chronic low back pain     Depression     Diabetes mellitus type II     Diabetic neuropathy     Diverticulosis     last colonoscopy was in 2000    Encounter for blood transfusion     Fibroids     Fibromyalgia     Hyperlipidemia     Hypertension     Obesity     Obstructive sleep apnea     wear CPAP machine nightly    Pancreatitis     Post menopausal syndrome          PMHx, PSHx, Allergies, Medications reviewed in epic    ROS negative except pain complaints in HPI    OBJECTIVE:    BP (!) 133/95   LMP 10/11/2010     PHYSICAL EXAMINATION:    GENERAL: Well appearing, in no acute distress, alert and oriented x3.  PSYCH:  Mood and affect appropriate.  SKIN: Skin color, texture, turgor normal, no rashes or lesions.  CV: RRR with palpation of the radial artery.  PULM: No evidence of respiratory difficulty, symmetric chest rise. Clear to auscultation.  NEURO: Cranial nerves grossly intact.    Plan:    Proceed with procedure as planned    Nguyễn Cheng  10/10/2017

## 2017-10-10 NOTE — OP NOTE
Lumbar Interlaminar Epidural Steroid Injection under Fluoroscopic Guidance.   Time-out taken to identify patient and procedure prior to starting the procedure.     10/10/2017    PROCEDURE: Interlaminar epidural steroid injection under fluoroscopic guidance.     Pre-Op diagnosis: Lumbar radiculopathy [M54.16]    Post-Op diagnosis: Lumbar radiculopathy [M54.16]    PHYSICIAN: IVIS VERDE     Assistants:  Nguyễn Cheng MD PGY-3  I was present and supervising all critical portions of the procedure      ESTIMATED BLOOD LOSS: none.     COMPLICATIONS: none.     SPECIMENS: none    TECHNIQUE: With the patient laying in a prone position, the area was prepped and draped in the usual sterile fashion using ChloraPrep and a fenestrated drape. 1% lidocaine was given using a 27-gauge needle by raising a wheal and going down to the hub of the needle over the L5/S1 interlaminar space.  The interlaminar space was then approached with a 3.5 inch 18-gauge Touhy needle was introduced under fluoroscopic guidance in the AP and Lateral view. Once the Ligamentum flavum was encountered loss of resistance to saline was used to enter the epidural space. With positive loss of resistance and negative CSF or Blood, 3mL contrast dye Omnipaque (300mg/ml) was injected to confirm placement and there was no vascular runoff. Then 1ml 40mg/ml Depomedrol + 1mL 0.25% Bupivicaine + 8mL preservative free normal saline was injected slowly. Displacement of the radio opaque contrast after injection of the medication confirmed that the medication went into the area of the epidural space.  The patient tolerated the procedure well.     Conscious sedation provided by M.D    The patient was monitored with continuous pulse oximetry, EKG, and intermittent blood pressure monitors.  The patient was hemodynamically stable throughout the entire process was responsive to voice, and breathing spontaneously.  Supplemental O2 was provided at 2L/min via nasal  cannula.  Patient was comfortable for the duration of the procedure. (See nurse documentation and case log for sedation time)    There was a total of 3 mg IV Midazolam was titrated for the procedure      The patient was monitored after the procedure.   They were given post-procedure and discharge instructions to follow at home.  The patient was discharged in a stable condition.

## 2017-10-10 NOTE — DISCHARGE SUMMARY
Discharge Note  Short Stay      SUMMARY     Admit Date: 10/10/2017    Attending Physician: Graciela Ley    Discharge Diagnosis: Lumbar radiculopathy [M54.16]    Discharge Physician: Graciela Ley      Discharge Date: 10/10/2017 11:04 AM       PROCEDURE: Interlaminar epidural steroid injection under fluoroscopic guidance.     Pre-Op diagnosis: Lumbar radiculopathy [M54.16]    Post-Op diagnosis: Lumbar radiculopathy [M54.16]    Disposition: Home or self care    Patient Instructions:   Current Discharge Medication List      CONTINUE these medications which have NOT CHANGED    Details   amlodipine (NORVASC) 10 MG tablet Take 1 tablet (10 mg total) by mouth once daily.  Qty: 90 tablet, Refills: 3      enalapril (VASOTEC) 20 MG tablet TAKE 1 TABLET(20 MG) BY MOUTH TWICE DAILY  Qty: 180 tablet, Refills: 0      albuterol 90 mcg/actuation inhaler Inhale 2 puffs into the lungs every 6 (six) hours as needed for Wheezing or Shortness of Breath.  Qty: 18 g, Refills: 0      amoxicillin-clavulanate 875-125mg (AUGMENTIN) 875-125 mg per tablet       aspirin (ECOTRIN) 81 MG EC tablet Take 1 tablet (81 mg total) by mouth once daily.  Qty: 90 tablet, Refills: 2      blood sugar diagnostic (FREESTYLE LITE STRIPS) Strp TEST BLOOD SUGAR 3 TIMES A DAY  Qty: 100 strip, Refills: 5    Comments: DX Code Needed .250.00      butalbital-acetaminophen-caffeine -40 mg (FIORICET, ESGIC) -40 mg per tablet TK 1 T PO  Q 6-8 HOURS PRN  Refills: 3      !! divalproex (DEPAKOTE) 500 MG Tb24 Take 2 tablets (1,000 mg total) by mouth every evening.  Qty: 180 tablet, Refills: 3    Comments: **Patient requests 90 days supply**  Associated Diagnoses: Chronic post-traumatic headache, not intractable      !! divalproex ER (DEPAKOTE) 500 MG Tb24 TAKE 2 TABLETS BY MOUTH EVERY EVENING  Qty: 180 tablet, Refills: 0    Associated Diagnoses: Chronic post-traumatic headache, not intractable      duloxetine (CYMBALTA) 30 MG capsule       fluticasone  "(FLONASE) 50 mcg/actuation nasal spray USE 2 SPRAYS IN EACH NOSTRIL ONCE DAILY  Qty: 16 g, Refills: 3      gabapentin (NEURONTIN) 300 MG capsule Take 2 capsules (600 mg total) by mouth 3 (three) times daily.  Qty: 540 capsule, Refills: 0    Comments: **Patient requests 90 days supply**  Associated Diagnoses: Bilateral lumbar radiculopathy; Sacroiliac joint pain; Lumbar facet arthropathy; Physical deconditioning; Spondylosis of lumbar region without myelopathy or radiculopathy      hydrochlorothiazide (HYDRODIURIL) 25 MG tablet TAKE 1 TABLET BY MOUTH ONCE DAILY  Qty: 90 tablet, Refills: 0      insulin aspart (NOVOLOG FLEXPEN) 100 unit/mL InPn pen INJECT 20 UNITS UNDER THE SKIN THREE TIMES DAILY WITH MEALS  Qty: 45 mL, Refills: 3      insulin detemir (LEVEMIR FLEXTOUCH) 100 unit/mL (3 mL) SubQ InPn pen Inject 32 units bid  Qty: 45 mL, Refills: 0      insulin needles, disposable, (NOVOFINE 30) 30 x 1/3 " Ndle USE AS DIRECTED THREE TIMES DAILY  Qty: 100 each, Refills: 5      lancets (FREESTYLE LANCETS) 28 gauge Misc Inject 1 lancet into the skin 3 (three) times daily.  Qty: 100 each, Refills: 5    Comments: DX Code Needed . 250.00      levoFLOXacin (LEVAQUIN) 500 MG tablet       lidocaine-prilocaine (EMLA) cream       LINZESS 145 mcg Cap capsule TK 1 C PO ONCE A DAY ON AN EMPTY STOMACH  Refills: 2      lorazepam (ATIVAN) 1 MG tablet Take 1 tablet (1 mg total) by mouth On call Procedure (for MRI).  Qty: 1 tablet, Refills: 0    Associated Diagnoses: Cervical radiculopathy; Cervical spondylosis without myelopathy      lovastatin (MEVACOR) 20 MG tablet TAKE 1 TABLET BY MOUTH EVERY EVENING  Qty: 90 tablet, Refills: 0      MOVANTIK tablet       omeprazole (PRILOSEC) 20 MG capsule TAKE ONE CAPSULE BY MOUTH TWICE DAILY  Qty: 180 capsule, Refills: 0      ondansetron (ZOFRAN) 4 MG tablet TK 1 T PO Q 8 H PRF NAUSEA  Refills: 0      pantoprazole (PROTONIX) 40 MG tablet TK 1 T PO ONCE A DAY  Refills: 1      pen needle, diabetic (BD " "INSULIN PEN NEEDLE UF MINI) 31 gauge x 3/16" Ndle USE AS DIRECTED TWICE DAILY  Qty: 100 each, Refills: 3      TRUE METRIX GLUCOSE METER Misc UTD  Refills: 0       !! - Potential duplicate medications found. Please discuss with provider.          Resume home diet and activity    "

## 2017-10-13 ENCOUNTER — TELEPHONE (OUTPATIENT)
Dept: OBSTETRICS AND GYNECOLOGY | Facility: CLINIC | Age: 62
End: 2017-10-13

## 2017-10-13 DIAGNOSIS — R52 PAIN: ICD-10-CM

## 2017-10-13 NOTE — TELEPHONE ENCOUNTER
----- Message from Jacki Davis sent at 10/12/2017  6:34 PM CDT -----  Contact: Self   Pt is requesting a call back in regards scheduling an appt for pelvic pain and her annual visit       Pt can be contacted at [phone]

## 2017-10-13 NOTE — TELEPHONE ENCOUNTER
Returned the pt's call to the clinic regarding her pelvic pain. No answer, left VM message for the pt to call the clinic back.

## 2017-10-16 RX ORDER — TRAMADOL HYDROCHLORIDE 50 MG/1
TABLET ORAL
Qty: 60 TABLET | Refills: 0 | Status: SHIPPED | OUTPATIENT
Start: 2017-10-16 | End: 2017-10-24 | Stop reason: SDUPTHER

## 2017-10-24 ENCOUNTER — OFFICE VISIT (OUTPATIENT)
Dept: PAIN MEDICINE | Facility: CLINIC | Age: 62
End: 2017-10-24
Payer: MEDICARE

## 2017-10-24 VITALS
HEIGHT: 66 IN | BODY MASS INDEX: 35.44 KG/M2 | SYSTOLIC BLOOD PRESSURE: 124 MMHG | HEART RATE: 71 BPM | TEMPERATURE: 100 F | RESPIRATION RATE: 20 BRPM | WEIGHT: 220.5 LBS | DIASTOLIC BLOOD PRESSURE: 78 MMHG

## 2017-10-24 DIAGNOSIS — M54.16 BILATERAL LUMBAR RADICULOPATHY: ICD-10-CM

## 2017-10-24 DIAGNOSIS — M25.512 CHRONIC PAIN OF BOTH SHOULDERS: ICD-10-CM

## 2017-10-24 DIAGNOSIS — R53.81 PHYSICAL DECONDITIONING: ICD-10-CM

## 2017-10-24 DIAGNOSIS — M54.17 LUMBOSACRAL RADICULOPATHY: ICD-10-CM

## 2017-10-24 DIAGNOSIS — R52 PAIN: ICD-10-CM

## 2017-10-24 DIAGNOSIS — M47.816 LUMBAR FACET ARTHROPATHY: ICD-10-CM

## 2017-10-24 DIAGNOSIS — M25.511 CHRONIC PAIN OF BOTH SHOULDERS: ICD-10-CM

## 2017-10-24 DIAGNOSIS — M47.816 SPONDYLOSIS OF LUMBAR REGION WITHOUT MYELOPATHY OR RADICULOPATHY: ICD-10-CM

## 2017-10-24 DIAGNOSIS — M54.16 LUMBAR RADICULOPATHY: Primary | ICD-10-CM

## 2017-10-24 DIAGNOSIS — M51.36 ANNULAR TEAR OF LUMBAR DISC: ICD-10-CM

## 2017-10-24 DIAGNOSIS — G89.29 CHRONIC PAIN OF BOTH SHOULDERS: ICD-10-CM

## 2017-10-24 DIAGNOSIS — M53.3 SACROILIAC JOINT PAIN: ICD-10-CM

## 2017-10-24 DIAGNOSIS — M51.36 DDD (DEGENERATIVE DISC DISEASE), LUMBAR: ICD-10-CM

## 2017-10-24 PROCEDURE — 99214 OFFICE O/P EST MOD 30 MIN: CPT | Mod: S$GLB,,, | Performed by: NURSE PRACTITIONER

## 2017-10-24 PROCEDURE — 99999 PR PBB SHADOW E&M-EST. PATIENT-LVL IV: CPT | Mod: PBBFAC,,, | Performed by: NURSE PRACTITIONER

## 2017-10-24 RX ORDER — TRAMADOL HYDROCHLORIDE 50 MG/1
TABLET ORAL
Qty: 60 TABLET | Refills: 1 | Status: SHIPPED | OUTPATIENT
Start: 2017-10-24 | End: 2017-12-26 | Stop reason: SDUPTHER

## 2017-10-24 RX ORDER — TRAMADOL HYDROCHLORIDE 50 MG/1
TABLET ORAL
Qty: 60 TABLET | Refills: 1 | Status: SHIPPED | OUTPATIENT
Start: 2017-10-24 | End: 2017-10-24 | Stop reason: SDUPTHER

## 2017-10-24 RX ORDER — GABAPENTIN 300 MG/1
600 CAPSULE ORAL 3 TIMES DAILY
Qty: 540 CAPSULE | Refills: 0 | Status: SHIPPED | OUTPATIENT
Start: 2017-10-24 | End: 2018-01-25

## 2017-10-24 NOTE — PROGRESS NOTES
Subjective:       Patient ID: Jessie Gallardo is a 61 y.o. female.    Chief Complaint: Low-back Pain (radiate to bilateral leg pain ) and Neck Pain (radiate to bilateral arm pain )    Interval History 10/24/2017:  The patient returns today for follow up.  She is s/p L5-S1 IL MIKE on 10/10/17 with about 40% pain relief.  Previous procedures provided significant benefit.  She did not previously see outside neurosurgeon as we discussed.  She would like for me to refer her to someone at [facility].  She continues to report back pain with radiation down her legs.  Her neck pain has somewhat improved since last OV.  She continues to take Tramadol which does help as needed.  Her pain today is 7/10.    Interval History 8/11/2017:  The patient returns today for follow up of lower back and neck pain.  She reports increased pain since her last visit.  She is having neck pain with radiation into her left arm.  She is also having lower back pain with radiation down the back and side of her right leg to her calf.  She does have mild left leg pain also.  She did go to the ED at [place] in June and reports that she was told to see a surgeon.  Her PCP referred her for an appointment.  She reports that she is seeing a neurosurgeon on [facility] later this month.  She states that they are waiting to receive her records to decide on a plan of care.  Her pain today is 7/10.  She continues to take Tramadol as needed with some benefit.      Interval History 6/2/2017:  The patient returns today for follow up of neck and lower back pain.  She is s/p L5-S1 IL MIKE with significant benefit of leg pain.  She reports no change in her neck pain since her previous encounter.  She continues to take Tramadol and Gabapentin with significant benefit.  She has increased her activity level which she thinks is helping.  She has an appointment today with Dr. Ortiz for evaluation of bilateral hand pain.  Her pain today is 6/10.      Interval History  4/7/2017:  The patient returns today for follow up of neck and back pain.  She is s/p cervical MIKE on 3/15/17 with limited benefit.  She reports significant relief with this procedure in the past.  She does report having a headaches after the procedure.  She called the office and was told to lay down and drink caffeine.  She reports that this resolved her headache.  She also has left shoulder pain with radiation down her left arm which is worsening.  She reports an injury about 6 years ago during which she fell into a hole and was removed by her left arm.  She has had shoulder pain since this time, although it has severely worsened over the past month.  She has had XRAYs which show DJD.  She has not had an MRI.  She is also reporting worsening lower back pain and would like a repeat MIKE as previous provided significant benefit.  She continues to take Tramadol and Gabapentin with benefit.  She continues to try to control her diabetes through diet and reports that her sugars have been stable.  Her pain today is 8/10.  The patient denies any bowel or bladder incontinence or signs of saddle paresthesia.  The patient denies any major medical changes since last office visit.    Interval History 2/1/2017:  The patient returns today for follow up of neck and back pain.  Her back pain is tolerable today.  Her biggest complaint today is neck pain with radiation into her arms.  She has numbness to her fingers also.  She previously had significant benefit from cervical MIKE in the past.  She had an A1C completed at her PCP last week and reports that it was 8.0.  She has been trying to control her sugars through diet and exercise.  She continues to take Gabapentin 1800 mg daily and Tramadol PRN.  She reports significant benefit of the medication without side effects.  Her pain today is 5/10.  The patient denies any bowel or bladder incontinence or signs of saddle paresthesia.      Interval History 12/2/2016:  The patient returns  today for follow up of lower back and neck pain.  She is s/p L5-S1 IL MIKE on 11/16/16 with 70% pain relief of back and leg pain.  She reports that her pain is mild today.  She has increased her activity lately and has been cleaning her house.  She does have some increased pain after cleaning.  She is very happy with her results though.  Her neck pain has been tolerable.  She has not started PT because she recently moved and would like another order.  Her pain today is 5/10.  The patient denies any bowel or bladder incontinence or signs of saddle paresthesia.  The patient denies any major medical changes since last office visit.    Interval History 10/5/2016:  The patient returns today for follow up of lower back and leg pain.  She is s/p bilateral SI joint injections on 8/10/16 with 50% benefit.  She is still having radiating pain down the back and sides of both legs to the top and bottom of her feet.  She is also having numbness and tingling throughout her feet.  She was previously having swelling to her legs.  Dr. Ley instructed her to titrate down Gabapentin to see if this was causing this.  She states that this did not improve her swelling.  She then stopped vitamin supplements (Vit E and C).  Her leg swelling then resolved.  She then restarted Gabapentin and titrated up to 600 mg TID.  Her pain today is 8/10.  The patient denies any bowel or bladder incontinence or signs of saddle paresthesia.  The patient denies any major medical changes since last office visit.    Interval History 6/27/2016:  The patient returns today for f/u of neck, bilateral shoulder and lower back pain.  She is s/p cervical MIKE on 6/8/16 with 80% improvement of her neck and left arm pain.  She is no longer having the shooting electric pain.  Her numbness has also improved since the procedure.  Her biggest complaint today is lower back pain which radiates down the back and sides of both legs to the bottom of her feet with numbness.   She also has a history of diabetes and diabetic neuropathy.  Her last A1C was 8.7, increased from 7.8.  She has had lumbar RFAs in the past with significant relief.  She has not had lumbar ESIs.  Her previous lumbar MRI from 2014 shows disc bulges at L3-4, L4-5, and L5-S1 without NF narrowing.  She is taking Tramadol PRN with relief.  I increased her Gabapentin last OV but she reports that she is taking 300 mg BID.  Her pain today is 6/10.  The patient denies any bowel/bladder incontinence or symptoms of saddle paresthesia.     Interval History 05/20/2016:  Patient is present today for a f/u for lower back, bilateral shoulder and neck pain.  Her back pain has been mild since her lumbar RFAs.  She is mostly complaining of neck pain which radiates down both arms with associated tingling.  She has had cervical ESIs in the past which she now states may have offered her more benefit than she previously believed.  She would like to try this treatment options again.  She did have a recent cervical MRI ordered by Dr. Herrera which does show muliple osteophytes with spinal narrowing.  She also had bilateral shoulder XRAYs which show DJD.   She continues to use Tramadol which provided relief.  She has a history of C4-5 fusion in 1999 with relief of her pain.  She is also taking Gabapentin 300 mg TID with limited relief.  Her pain today is a 6/10.  The patient denies any bowel/bladder incontinence.    Interval History 04/26/2016:  Patient is present today for a f/u for Low Back Pain. The pt recently had a Right Lumbar RFA @ L3,L4,L5 on 4/12/2016 where she reports a significant amount of relief. She reports her pain to be 4/10 today. The pt would like to address neck and shoulder pain that is radiating down through to both arms.  Patient has had a history of a C4-5 fusion, she had previous cervical intralaminar epidural steroidal injections in January 2015 which did not offer significant relief and the patient did have a cervical  EMG/NCV which did not show evidence of radiculopathy at that time but some carpal tunnel syndrome.  Currently she states that the pain in her shoulders and in her arms is most significant and she describes her arm pain as feelings of swelling bilaterally.    Interval History 11/19/2015:  Patient here for f/u of chronic low back pain. Patient was last here in 7/15 and had B L3,4,5 RFA done. Patient states that she continues to have about 50% pain reduction of low back pain and some leg pain. She able to walk and stand more and feels that she is functioning much better over all. Patient continues to have some low back pain right> left with intermittent BLE pains right >left and bilateral foot numbess and tingling. Patient admits to not taking gabapentin as directed and had been taking it prn.     Interval history 07/7/2015:  Ms. Gallardo is a 61 y.o. female with neck and low back pain presents today for f/u s/p b/l MBB at L4, 5 and sacral Ala and right sided MBB at same level. Reports significantly more relief with b/l block. She was in physical therapy but has not gone for 2 months. She has a lot of anxiety and frustration associated with her chronic pain. She saw Dr. Rosenbaum today and it was recommended she continue f/u with Dr. Jean. Pt has been seen in the clinic before by Dr. Ley, however pt is new to me.   History below per Dr. Ley    Interval history 05/21/2015:  Since previous encounter patient reports neck pain and lower back pain, although the pain in her lower back is her worst pain. . Patient reports pain as a 8/10 today. Patient does not take Soma and Hydrocodone anymore. She has a lot of anxiety and frustration associated with her chronic pain.    Interval history 04/20/2015:  Since previous encounter patient reports neck pain radiating into her upper extremities. Patient stated that she completed her EMG with  which did not show any evidence of radiculopathy and neurosurgery was  "evaluating the patient did not determine any need for further surgical intervention. Patient stated that she discontinued the Lyrica due to it makes her swell and have dry mouth. Patient stated that she still takes the Gabapentin. Patient reports no other health changes since her last visit. Patient reports her pain 8/10 today.     Interval history 03/16/2015:  Since previous encounter patient reports neck pain radiating into her upper extremities and sometimes causes her to have headaches. Patient reports low back also. Patient stated that she did receive relief from her last injection but the pain has returned. Patient stated that she still takes Norco but it only puts her to sleep and when she awakens the pain is back. Patient reports that she missed her EMG appt due to her pain, however she has been rescheduled to the end of the month Additionally the patient has not started Lyrica as previously prescribed. Patient reports memory loss. Patient reports no other health changes since her last visit. Patient reports her pain 8/10 today.   Initial encounter:    Jessie Gallardo presents to the clinic for the evaluation of neck and low back pain. The pain started 3 months ago following traumatic fall and symptoms have been worsening. Reports that her neck pain is the most concerning today.     Brief history:    She has h/o cervical fusion in 1999 in [place] and she did well after surgery. She fell on 9/2/14 when she was getting her oil changed. She was unable to stand right after the incident and she was taken by ambulance to the ED.     She complains of diffuse body pain. She feels like she is getting worse from the accident. She has pain from her "head to her toes."      Pain Description:    The pain is located in the neck and low back area and radiates to the shoulders and posterior thighs, respectively..     At BEST 6/10     At WORST  9/10 on the WORST day.     On average pain is rated as 7/10.     Today the pain " is rated as 8/10    The pain is described as aching, burning, numbing, sharp, shooting, stabbing, tingling and weakness      Symptoms interfere with daily activity and sleeping.     Exacerbating factors: Sitting, Laying, Bending, Morning, Extension, Flexing, Lifting and Getting out of bed/chair.     Mitigating factors laying down and medications.     Patient denies night fever/night sweats, urinary incontinence, bowel incontinence, significant weight loss, significant motor weakness and loss of sensations.  Patient denies any suicidal or homicidal ideations    Pain Medications:    Current:  Neurontin 600 mg TID (1800 mg daily)  Tramadol 50 mg BID PRN  Compounding cream    Tried in Past:  NSAIDs -Aleve  TCA -Never  SNRI -Never  Muscle relaxer: Klonopin, Zanaflex, Zoloft  Opioid: Tramadol     Physical Therapy/Home Exercise: not currently     report: Reviewed and consistent with medication use as prescribed.    Pain Procedures:  1/7/2015-cervical epidural steroidal injection  1/21/2015-cervical epidural steroidal injection  6/10/2015 bilateral medial branch nerve block at the level of L3, L4, L5  6/17/2015-right medial branch nerve block at the level of L3, L4, L5  7/15/2015-left radiofrequency ablation at the level of L3, L4, L5  7/29/2015-right radiofrequency ablation at the level of L3, L4, L5  3/29/2016-left radio frequency ablation at the level of L3, L4, L5  4/12/2016-right radio frequency ablation at the level of L3, L4, L5  6/8/16 C7-T1 IL MIKE- 80% relief  8/10/16 Bilateral SI joint injections- 50% relief  11/16/16 L5-S1 IL MIKE- 70% relief  3/15/17 C7-T1 IL MIKE- no relief  4/19/17 L5-S1 IL MIKE- 70% relief  10/10/17 L5-S1 IL MIKE- 40% relief    Chiropractor -never  Acupuncture - never  TENS unit -Yes, with PT  Spinal decompression -never  Joint replacement -never    Imaging:    MRI Cervical 05/17/2016:  Comparison is November 2014    Routine imaging of the cervical spine was obtained.    There is an osseous  fusion at C4-5.  There is straightening and slight reversal of the normal cervical lordosis again seen.  Alignment of vertebral bodies is satisfactory.  Discs are desiccated throughout with disc space narrowing most pronounced at the C5-6 level, similar to prior study.  The spinal cord is normal in signal and paravertebral structures are unremarkable.    C2-3 demonstrates no significant abnormality.    C3-4 demonstrates a mild diffuse disc bulge asymmetric toward the right.  There is bilateral foraminal narrowing.  This disc bulge thins the posterior cerebrospinal fluid sleeve and abuts the anterior aspect of the spinal cord with resultant mild spinal canal narrowing.    C4-5 demonstrates osseous fusion with some posterior osteophytic spurring.  There is complete loss of the anterior cerebrospinal fluid sleeve,and the posterior cerebrospinal fluid sleeve and some flattening of the spinal cord but no abnormal spinal cord signal.  Findings appear fairly similar to prior study.  There is mild to moderate narrowing of the spinal canal.    C5-6 demonstrates posterior disc osteophyte complex asymmetric to the left more pronounced as compared to prior study.  There is thinning of the cerebrospinal fluid sleeve around the spinal cord and mild spinal canal narrowing.    C6-7 demonstrates a posterior disc osteophyte complex asymmetric toward the left.  There is thinning of the cerebrospinal fluid sleeve around the spinal cord and mild spinal canal narrowing.   Impression    In this patient with osseous fusion at C4-5, there is multilevel spinal canal narrowing most significant at C4-5, as further detailed above.  ______________________________________     Electronically signed by: Sherie Rodriguez MD  Date: 05/18/16       Lumbar MRI 10/31/16  Narrative   MRI OF THE LUMBAR SPINE WITHOUT CONTRAST.     Technique:  Sagittal T1, sagittal T2, sagittal STIR, axial T1 and axial T2 weighted images of the lumbar spine obtained without  contrast.     Comparison: MRI 11/20/2014.     Findings:  Minimal grade 1 anterolisthesis is seen at L3-4 and L4-L5.  Otherwise, sagittal vertebral body alignment is appropriate.  Vertebral body heights well-maintained.  No fracture is identified.  Mild multilevel disc degeneration is seen, most pronounced at L5-S1. No marrow signal abnormality suspicious for an infiltrative process.      The conus is normal in appearance, and terminates at the L1 level.  Multiple fibroids are seen in the uterus.      T12-L1: No focal disc herniation.  No significant spinal canal stenosis or neuroforaminal narrowing.  L1-L2: Bilateral facet arthropathy.  No focal disc herniation.  No significant spinal canal stenosis or neuroforaminal narrowing.  L2-L3: No focal disc herniation.  No significant spinal canal stenosis.  Bilateral facet arthropathy is noted, which contributes to mild bilateral neuroforaminal narrowing.  L3-L4: Uncovering of the disc with mild bulge, bilateral facet arthropathy and ligament of flavum thickening resulting in moderate right-sided and mild left-sided neuroforaminal narrowing.  L4-L5: Uncovering of the disc with mild bulge, bilateral facet arthropathy, and ligamentum flavum thickening resulting in moderate right-sided and mild left-sided neuroforaminal narrowing.  L5-S1: Broad-based disc bulge and bilateral facet arthropathy without significant spinal canal stenosis.  Moderate bilateral neuroforaminal narrowing is seen.   Impression      Multilevel lumbar spondylosis, as detailed above.         Lab Results   Component Value Date    HGBA1C 8.7 (H) 04/21/2016       REVIEW OF SYSTEMS:    GENERAL:  No weight loss, malaise or fevers.  HEENT:   No recent changes in vision or hearing  NECK:  Negative for lumps, no difficulty with swallowing.  RESPIRATORY:  Negative for cough, wheezing or shortness of breath, patient denies any recent URI.  CARDIOVASCULAR:  Negative for chest pain, leg swelling or palpitations. H/o  "htn.  GI:  Negative for abdominal discomfort, blood in stools or black stools or change in bowel habits.  MUSCULOSKELETAL:  See HPI.  SKIN:  Negative for lesions, rash, and itching.  PSYCH:  No mood disorder or recent psychosocial stressors.  Patients sleep is not disturbed secondary to pain.  HEMATOLOGY/LYMPHOLOGY:  Negative for prolonged bleeding, bruising easily or swollen nodes.  Patient is not currently taking any anti-coagulants  NEURO:   No history of syncope, paralysis, seizures or tremors. H/O headaches- followed by Dr. Herrera.  ENDO: H/O diabetes and peripheral neuropathy.  All other reviewed and negative other than HPI.      OBJECTIVE:    /78   Pulse 71   Temp 99.9 °F (37.7 °C) (Oral)   Resp 20   Ht 5' 6" (1.676 m)   Wt 100 kg (220 lb 8 oz)   LMP 10/11/2010   BMI 35.59 kg/m²     PHYSICAL EXAMINATION:    GENERAL: Well appearing, in no acute distress, alert and oriented x3.  PSYCH:  Mood and affect appropriate.  SKIN: Skin color, texture, turgor normal, no rashes or lesions.  HEAD/FACE:  Normocephalic, atraumatic. Cranial nerves grossly intact.  NECK: There is pain to palpation over the cervical paraspinal and trapezius muscles bilaterally.  Spurling Negative.  Painful cervical extension.  Mild cervical facet loading bilaterally.  Negative Spurling.  CV: RRR with palpation of the radial artery.  PULM: No evidence of respiratory difficulty, symmetric chest rise.  BACK:  There is no pain with palpation over the facet joints of the lumbar spine bilaterally.  Full ROM to lumbar spine with pain on flexion and extension.  Positive facet loading bilaterally.  There is pain to palpation over the sacroiliac joints bilaterally.  FABERs is negative bilaterally.  EXTREMITIES: Peripheral joint ROM is full and pain free without obvious instability or laxity in all four extremities. No deformities, edema, or skin discoloration. Good capillary refill.  MUSCULOSKELETAL:  Pain to palpation of the subacromial " bursa bilaterally.  Full ROM to bilateral shoulder with pain on internal and external rotation of left shoulder.  Bilateral upper  extremity strength is normal and symmetric.  No atrophy or tone abnormalities are noted.  NEURO: Bilateral upper extremity coordination and muscle stretch reflexes are physiologic and symmetric.  Brenda's negative. No clonus.  Decreased sensation to BLE.  GAIT: Antalgic- ambulates without assistance.    Assessment:     Ms. Gallardo is a 61 y.o. female with neck, left shoulder and lower back pain consistent with the following diagnoses:    1. Lumbar radiculopathy    2. Annular tear of lumbar disc    3. DDD (degenerative disc disease), lumbar    4. Lumbar facet arthropathy    5. Lumbosacral radiculopathy    6. Sacroiliac joint pain    7. Chronic pain of both shoulders    8. Pain    9. Bilateral lumbar radiculopathy    10. Physical deconditioning    11. Spondylosis of lumbar region without myelopathy or radiculopathy        Plan:     - Previous imaging was reviewed and discussed with the patient today.    - She is s/p L5-S1 IL MIKE with limited benefit.  At this point, she would like a referral to neurosurgery to discuss her surgical options.  I will place this for her.    - If surgery is not an option, we discussed SCS trial with [employer].    - Continue Gabapentin 600 mg TID (1800 mg daily).    - Continue Tramadol 50 mg BID PRN pain, #60, 1 refill.  Phoned in today.    - The [place] Board of Pharmacy website for prescription monitoring was consulted today, and it does not suggest any deviations in conflict with the patient's controlled substance contract with our clinic. Will continue current therapy with frequent monitoring of the controlled substance database, and urine drug screens on followup. Refill approved.     - UDS from 8/11/17 reviewed and is consistent.      - The patient will continue a home exercise routine to help with pain and strengthening.      - RTC in 2 months  or sooner if needed.  She will let me know if she would like me to send for psych referral for SCS trial before this time.    - Dr. Ley was consulted on the patient and agrees with this plan.      The above plan and management options were discussed at length with patient. Patient is in agreement with the above and verbalized understanding.     Lisa Angel  10/24/2017

## 2017-10-28 ENCOUNTER — HOSPITAL ENCOUNTER (EMERGENCY)
Facility: HOSPITAL | Age: 62
Discharge: HOME | End: 2017-10-28
Attending: EMERGENCY MEDICINE
Payer: MEDICARE

## 2017-10-28 VITALS
HEART RATE: 66 BPM | WEIGHT: 220 LBS | OXYGEN SATURATION: 96 % | DIASTOLIC BLOOD PRESSURE: 86 MMHG | SYSTOLIC BLOOD PRESSURE: 182 MMHG | TEMPERATURE: 98 F | BODY MASS INDEX: 35.36 KG/M2 | RESPIRATION RATE: 18 BRPM | HEIGHT: 66 IN

## 2017-10-28 DIAGNOSIS — M54.50 ACUTE BILATERAL LOW BACK PAIN WITHOUT SCIATICA: Primary | ICD-10-CM

## 2017-10-28 LAB
ALBUMIN SERPL BCP-MCNC: 3.4 G/DL
ALP SERPL-CCNC: 105 U/L
ALT SERPL W/O P-5'-P-CCNC: 13 U/L
ANION GAP SERPL CALC-SCNC: 11 MMOL/L
AST SERPL-CCNC: 18 U/L
BASOPHILS # BLD AUTO: 0.01 K/UL
BASOPHILS NFR BLD: 0.2 %
BILIRUB SERPL-MCNC: 0.3 MG/DL
BILIRUB UR QL STRIP: NEGATIVE
BUN SERPL-MCNC: 36 MG/DL
CALCIUM SERPL-MCNC: 10.6 MG/DL
CHLORIDE SERPL-SCNC: 104 MMOL/L
CLARITY UR REFRACT.AUTO: CLEAR
CO2 SERPL-SCNC: 25 MMOL/L
COLOR UR AUTO: NORMAL
CREAT SERPL-MCNC: 1.3 MG/DL
CRP SERPL-MCNC: 1.5 MG/L
DIFFERENTIAL METHOD: ABNORMAL
EOSINOPHIL # BLD AUTO: 0.1 K/UL
EOSINOPHIL NFR BLD: 0.9 %
ERYTHROCYTE [DISTWIDTH] IN BLOOD BY AUTOMATED COUNT: 13.4 %
ERYTHROCYTE [SEDIMENTATION RATE] IN BLOOD BY WESTERGREN METHOD: 31 MM/HR
EST. GFR  (AFRICAN AMERICAN): 50.5 ML/MIN/1.73 M^2
EST. GFR  (NON AFRICAN AMERICAN): 43.8 ML/MIN/1.73 M^2
GLUCOSE SERPL-MCNC: 152 MG/DL
GLUCOSE UR QL STRIP: NEGATIVE
HCT VFR BLD AUTO: 36.1 %
HGB BLD-MCNC: 11.6 G/DL
HGB UR QL STRIP: NEGATIVE
IMM GRANULOCYTES # BLD AUTO: 0.01 K/UL
IMM GRANULOCYTES NFR BLD AUTO: 0.2 %
KETONES UR QL STRIP: NEGATIVE
LEUKOCYTE ESTERASE UR QL STRIP: NEGATIVE
LYMPHOCYTES # BLD AUTO: 3 K/UL
LYMPHOCYTES NFR BLD: 44.8 %
MCH RBC QN AUTO: 26.4 PG
MCHC RBC AUTO-ENTMCNC: 32.1 G/DL
MCV RBC AUTO: 82 FL
MONOCYTES # BLD AUTO: 0.4 K/UL
MONOCYTES NFR BLD: 6.1 %
NEUTROPHILS # BLD AUTO: 3.2 K/UL
NEUTROPHILS NFR BLD: 47.8 %
NITRITE UR QL STRIP: NEGATIVE
NRBC BLD-RTO: 0 /100 WBC
PH UR STRIP: 5 [PH] (ref 5–8)
PLATELET # BLD AUTO: 189 K/UL
PMV BLD AUTO: 10.4 FL
POTASSIUM SERPL-SCNC: 4.1 MMOL/L
PROT SERPL-MCNC: 7.7 G/DL
PROT UR QL STRIP: NEGATIVE
RBC # BLD AUTO: 4.39 M/UL
SODIUM SERPL-SCNC: 140 MMOL/L
SP GR UR STRIP: 1 (ref 1–1.03)
URN SPEC COLLECT METH UR: NORMAL
UROBILINOGEN UR STRIP-ACNC: NEGATIVE EU/DL
WBC # BLD AUTO: 6.61 K/UL

## 2017-10-28 PROCEDURE — 99283 EMERGENCY DEPT VISIT LOW MDM: CPT | Mod: 25

## 2017-10-28 PROCEDURE — 80053 COMPREHEN METABOLIC PANEL: CPT

## 2017-10-28 PROCEDURE — 99284 EMERGENCY DEPT VISIT MOD MDM: CPT | Mod: ,,, | Performed by: EMERGENCY MEDICINE

## 2017-10-28 PROCEDURE — 85651 RBC SED RATE NONAUTOMATED: CPT

## 2017-10-28 PROCEDURE — 85025 COMPLETE CBC W/AUTO DIFF WBC: CPT

## 2017-10-28 PROCEDURE — 81003 URINALYSIS AUTO W/O SCOPE: CPT

## 2017-10-28 PROCEDURE — 96374 THER/PROPH/DIAG INJ IV PUSH: CPT

## 2017-10-28 PROCEDURE — 86140 C-REACTIVE PROTEIN: CPT

## 2017-10-28 PROCEDURE — 63600175 PHARM REV CODE 636 W HCPCS: Performed by: EMERGENCY MEDICINE

## 2017-10-28 RX ORDER — ORPHENADRINE CITRATE 30 MG/ML
30 INJECTION INTRAMUSCULAR; INTRAVENOUS
Status: COMPLETED | OUTPATIENT
Start: 2017-10-28 | End: 2017-10-28

## 2017-10-28 RX ORDER — METHOCARBAMOL 500 MG/1
1000 TABLET, FILM COATED ORAL 3 TIMES DAILY
Qty: 19 TABLET | Refills: 0 | Status: SHIPPED | OUTPATIENT
Start: 2017-10-28 | End: 2017-11-02

## 2017-10-28 RX ADMIN — ORPHENADRINE CITRATE 30 MG: 30 INJECTION INTRAMUSCULAR; INTRAVENOUS at 11:10

## 2017-10-28 NOTE — ED TRIAGE NOTES
Bilateral flank pain that is throbbing and constant. Having urinary frequency.  Having trouble holding her urine.  Denies dysuria. Having low grade fevers and chills.

## 2017-10-28 NOTE — ED PROVIDER NOTES
"Encounter Date: 10/28/2017    SCRIBE #1 NOTE: I, Didi Mejia, am scribing for, and in the presence of, Dr. Silva. the Resident attestation.       History     Chief Complaint   Patient presents with    Flank Pain     Bilat flank pain and "low grade fever".  Pt states that she took tramadol and gabapentin     61 year old woman with history of chronic back pain, diabetic neuropathy, and fibromyalgia presents with one week history of bilateral lumbar back pain.  She has a history of chronic back pain managed by pain management but says this pain is different from her typical back pain.  She says her pain is off midline, nonradiating, moderate to severe in intensity, and worse with movement.  She endorses Tmax of 99.9 and chills.  No nausea, vomiting, hematuria, dysuria, bowel/bladder incontinence, or weakness; she does have some increased urinary frequency.  She takes tramadol and gabapentin which is her typical pain regimen with no relief.  She has not tried muscle relaxers.  Recent lumbar IMKE on 10/10. No known recent injury or increased activity          Review of patient's allergies indicates:  No Known Allergies  Past Medical History:   Diagnosis Date    Anxiety     Chronic back pain     See neuro     Chronic low back pain     Depression     Diabetes mellitus type II     Diabetic neuropathy     Diverticulosis     last colonoscopy was in 2000    Encounter for blood transfusion     Fibroids     Fibromyalgia     Hyperlipidemia     Hypertension     Obesity     Obstructive sleep apnea     wear CPAP machine nightly    Pancreatitis     Post menopausal syndrome      Past Surgical History:   Procedure Laterality Date    ABDOMINAL SURGERY      CHOLECYSTECTOMY      fibroid      OOPHORECTOMY      xlap, right ovary removed due to infection?    SPINE SURGERY      cervical spine    UTERINE ARTERY EMBOLIZATION  3/14     Family History   Problem Relation Age of Onset    Alcohol abuse Father     " Cancer Sister      throat ca    Prostate cancer Brother     Heart attack Paternal Uncle     Cirrhosis Sister     Stroke Mother     Diabetes Mother     Hypertension Mother     No Known Problems Daughter     No Known Problems Son     No Known Problems Maternal Grandmother     No Known Problems Daughter     No Known Problems Son     No Known Problems Sister     No Known Problems Sister     No Known Problems Sister     No Known Problems Brother     No Known Problems Brother     No Known Problems Brother     No Known Problems Brother     Cancer Paternal Aunt     Lupus Grandchild     No Known Problems Maternal Aunt     No Known Problems Maternal Uncle     No Known Problems Maternal Grandfather     No Known Problems Paternal Grandfather     No Known Problems Paternal Grandmother     No Known Problems Cousin     Breast cancer Neg Hx     Colon cancer Neg Hx     Ovarian cancer Neg Hx     Heart disease Neg Hx     Heart failure Neg Hx      Social History   Substance Use Topics    Smoking status: Former Smoker     Quit date: 12/5/1980    Smokeless tobacco: Never Used      Comment: disabled due to fibromyalgia; 4 healthy     Alcohol use No     Review of Systems   Constitutional: Positive for chills. Negative for fever.   HENT: Negative for sinus pressure, tinnitus and voice change.    Eyes: Negative for visual disturbance.   Respiratory: Negative for cough, choking and shortness of breath.    Cardiovascular: Negative for chest pain and palpitations.   Gastrointestinal: Negative for abdominal pain, constipation, diarrhea, nausea and vomiting.   Genitourinary: Positive for frequency. Negative for difficulty urinating, dysuria, hematuria and urgency.   Musculoskeletal: Positive for arthralgias, back pain, myalgias and neck pain.   Skin: Negative for rash and wound.   Neurological: Negative for dizziness, weakness and headaches.   Hematological: Does not bruise/bleed easily.   Psychiatric/Behavioral:  Negative for confusion.       Physical Exam     Initial Vitals [10/28/17 0815]   BP Pulse Resp Temp SpO2   (!) 188/81 72 (!) 26 98.6 °F (37 °C) 100 %      MAP       116.67         Physical Exam    Constitutional: She appears well-developed and well-nourished. She is not diaphoretic. No distress.   HENT:   Head: Normocephalic and atraumatic.   Eyes: EOM are normal. Pupils are equal, round, and reactive to light.   Neck: Normal range of motion. Neck supple.   Cardiovascular: Normal rate, regular rhythm and normal heart sounds.   Pulmonary/Chest: No respiratory distress. She has no wheezes. She has no rales.   Abdominal: Soft. Bowel sounds are normal. She exhibits no distension. There is no tenderness. There is no rebound and no CVA tenderness.   Musculoskeletal:        Cervical back: She exhibits no tenderness.        Thoracic back: She exhibits no tenderness.   +mild bilateral tenderness within the paraspinous musculature; no midline tenderness    +bilateral straight leg raise    Neurological: She is alert and oriented to person, place, and time. GCS eye subscore is 4. GCS verbal subscore is 5. GCS motor subscore is 6.   Skin: Skin is warm and dry.   Psychiatric: She has a normal mood and affect. Thought content normal.         ED Course   Procedures  Labs Reviewed   CBC W/ AUTO DIFFERENTIAL - Abnormal; Notable for the following:        Result Value    Hemoglobin 11.6 (*)     Hematocrit 36.1 (*)     MCH 26.4 (*)     All other components within normal limits   COMPREHENSIVE METABOLIC PANEL - Abnormal; Notable for the following:     Glucose 152 (*)     BUN, Bld 36 (*)     Calcium 10.6 (*)     Albumin 3.4 (*)     eGFR if  50.5 (*)     eGFR if non  43.8 (*)     All other components within normal limits   SEDIMENTATION RATE, MANUAL - Abnormal; Notable for the following:     Sed Rate 31 (*)     All other components within normal limits   C-REACTIVE PROTEIN   URINALYSIS, REFLEX TO URINE  CULTURE    Narrative:     Preferred Collection Type->Urine, Clean Catch                   APC / Resident Notes:   61 year old woman with chronic back pain presenting  with acute bilateral lumbar back pain, no known injury or precipitant. She is neurologically intact, so no sign spinal cord injury or spine fracture. She does c/o +urinary frequency and chills associated.   DDx musculoskeletal (discogenic vs radiculopathy), renal colic, UTI/pyelonepritis; will check basic labs including ESR, CRP and UA to assess for UTI/ pyelonephritis. No sign epidural abscess.    Labs unremarkable; ESR and CRP normal; UA negative for UTI, no elevated WBC. Most likely exacerbation of chronic LBP, as she states pain is somewhat similar to baseline but extends further up paraspinal muscles than usual. Will d/c home with PO robaxin for acute lumbar back strain/spasm and continue other meds, return for any worsening urinary sx or other new complaints       Scribe Attestation:   Scribe #1: I performed the above scribed service and the documentation accurately describes the services I performed. I attest to the accuracy of the note.    Attending Attestation:   Physician Attestation Statement for Resident:  As the supervising MD   Physician Attestation Statement: I have personally seen and examined this patient.   I agree with the above history. -:   As the supervising MD I agree with the above PE.    As the supervising MD I agree with the above treatment, course, plan, and disposition.   -:     61 y.o. female with hx of chronic lower back pain seen by pain management presents to the ED with bilateral paraspinal lower back pain. She has similar chronic pain with sciatic component. She is concerned for her kidneys since pain is radiating upwards. Work up with normal urine and labs at baseline with mild dehydration. Urinary symptoms are likely due to poorly controlled DM, not UTI. No fever or elevated white count in ED. Will add robaxin to  current medications for chronic back pain and advise to follow up with pain management.       I have reviewed and agree with the residents interpretation of the following: lab data.                    ED Course      Clinical Impression:   The encounter diagnosis was Acute bilateral low back pain without sciatica.    Disposition:   Disposition: Discharged  Condition: Stable                        Danny Franks MD  Resident  10/28/17 1141       Reji Silva MD  10/31/17 0697

## 2017-10-28 NOTE — ED NOTES
LOC: The patient is awake and alert; oriented x 3 and speaking appropriately.  APPEARANCE: Patient resting comfortably, patient is clean and well groomed  SKIN: warm and dry, normal skin turgor & moist mucus membranes, skin intact, no breakdown noted.  MUSCULOSKELETAL: Patient moving all extremities well, no obvious swelling or deformities noted, bilatyeral flank pain , constant  With urinary frequency.  RESPIRATORY: Airway is open and patent, breath sounds clear throughout all lung fields; respirations are spontaneous, normal effort and rate  CARDIAC: Patient has a normal rate, no peripheral edema noted, capillary refill < 3 seconds; No complaints of chest pain   ABDOMEN: Soft and non tender to palpation, no distention noted. Bowel sounds present x 4

## 2017-10-30 ENCOUNTER — OFFICE VISIT (OUTPATIENT)
Dept: OBSTETRICS AND GYNECOLOGY | Facility: CLINIC | Age: 62
End: 2017-10-30
Payer: MEDICARE

## 2017-10-30 ENCOUNTER — HOSPITAL ENCOUNTER (OUTPATIENT)
Dept: RADIOLOGY | Facility: OTHER | Age: 62
Discharge: HOME | End: 2017-10-30
Attending: NURSE PRACTITIONER
Payer: MEDICARE

## 2017-10-30 VITALS
BODY MASS INDEX: 35.79 KG/M2 | WEIGHT: 222.69 LBS | DIASTOLIC BLOOD PRESSURE: 60 MMHG | HEIGHT: 66 IN | SYSTOLIC BLOOD PRESSURE: 140 MMHG

## 2017-10-30 DIAGNOSIS — R10.2 PELVIC PAIN: ICD-10-CM

## 2017-10-30 DIAGNOSIS — N76.0 ACUTE VAGINITIS: Primary | ICD-10-CM

## 2017-10-30 LAB
BILIRUB SERPL-MCNC: NEGATIVE MG/DL
BLOOD URINE, POC: NEGATIVE
CANDIDA RRNA VAG QL PROBE: NEGATIVE
COLOR, POC UA: NORMAL
G VAGINALIS RRNA GENITAL QL PROBE: NEGATIVE
GLUCOSE UR QL STRIP: NORMAL
KETONES UR QL STRIP: NEGATIVE
LEUKOCYTE ESTERASE URINE, POC: NEGATIVE
NITRITE, POC UA: NEGATIVE
PH, POC UA: 6
PROTEIN, POC: NEGATIVE
SPECIFIC GRAVITY, POC UA: 1.01
T VAGINALIS RRNA GENITAL QL PROBE: NEGATIVE
UROBILINOGEN, POC UA: NORMAL

## 2017-10-30 PROCEDURE — 76830 TRANSVAGINAL US NON-OB: CPT | Mod: 26,,, | Performed by: RADIOLOGY

## 2017-10-30 PROCEDURE — 76856 US EXAM PELVIC COMPLETE: CPT | Mod: 26,,, | Performed by: RADIOLOGY

## 2017-10-30 PROCEDURE — 87660 TRICHOMONAS VAGIN DIR PROBE: CPT

## 2017-10-30 PROCEDURE — 99999 PR PBB SHADOW E&M-NEW PATIENT-LVL III: CPT | Mod: PBBFAC,,, | Performed by: NURSE PRACTITIONER

## 2017-10-30 PROCEDURE — 76856 US EXAM PELVIC COMPLETE: CPT | Mod: TC

## 2017-10-30 PROCEDURE — 99213 OFFICE O/P EST LOW 20 MIN: CPT | Mod: 25,S$GLB,, | Performed by: NURSE PRACTITIONER

## 2017-10-30 PROCEDURE — 87591 N.GONORRHOEAE DNA AMP PROB: CPT

## 2017-10-30 PROCEDURE — 87480 CANDIDA DNA DIR PROBE: CPT

## 2017-10-30 PROCEDURE — 81002 URINALYSIS NONAUTO W/O SCOPE: CPT | Mod: S$GLB,,, | Performed by: NURSE PRACTITIONER

## 2017-10-30 RX ORDER — NALOXEGOL OXALATE 25 MG/1
TABLET, FILM COATED ORAL
COMMUNITY
Start: 2017-10-13 | End: 2017-12-27 | Stop reason: SDUPTHER

## 2017-10-30 RX ORDER — LIRAGLUTIDE 6 MG/ML
INJECTION SUBCUTANEOUS
COMMUNITY
Start: 2017-10-20 | End: 2018-01-22 | Stop reason: CLARIF

## 2017-10-30 RX ORDER — PANTOPRAZOLE SODIUM 40 MG/1
TABLET, DELAYED RELEASE ORAL
COMMUNITY
Start: 2017-10-19 | End: 2017-12-27 | Stop reason: CLARIF

## 2017-10-30 RX ORDER — GABAPENTIN 300 MG/1
CAPSULE ORAL
COMMUNITY
Start: 2017-10-24 | End: 2018-01-04 | Stop reason: SDUPTHER

## 2017-10-30 RX ORDER — METHOCARBAMOL 500 MG/1
TABLET, FILM COATED ORAL
Refills: 0 | COMMUNITY
Start: 2017-10-28 | End: 2018-01-04

## 2017-10-30 RX ORDER — LINACLOTIDE 145 UG/1
CAPSULE, GELATIN COATED ORAL
COMMUNITY
Start: 2017-10-23 | End: 2018-01-04 | Stop reason: SDUPTHER

## 2017-10-30 RX ORDER — BUTALBITAL, ACETAMINOPHEN AND CAFFEINE 50; 325; 40 MG/1; MG/1; MG/1
TABLET ORAL
Refills: 3 | COMMUNITY
Start: 2017-10-19 | End: 2018-06-27

## 2017-10-30 RX ORDER — TRAMADOL HYDROCHLORIDE 50 MG/1
TABLET ORAL
COMMUNITY
Start: 2017-10-24 | End: 2017-12-26 | Stop reason: SDUPTHER

## 2017-10-30 RX ORDER — LOVASTATIN 20 MG/1
TABLET ORAL
COMMUNITY
Start: 2017-10-19 | End: 2018-01-04 | Stop reason: SDUPTHER

## 2017-10-30 RX ORDER — FLUCONAZOLE 150 MG/1
TABLET ORAL
COMMUNITY
Start: 2017-09-15 | End: 2018-01-04

## 2017-10-30 RX ORDER — PEN NEEDLE, DIABETIC 31 GX5/16"
NEEDLE, DISPOSABLE MISCELLANEOUS
Status: ON HOLD | COMMUNITY
Start: 2017-10-20 | End: 2022-03-08

## 2017-10-30 RX ORDER — INSULIN ASPART 100 [IU]/ML
INJECTION, SOLUTION INTRAVENOUS; SUBCUTANEOUS
COMMUNITY
Start: 2017-10-23 | End: 2017-12-27 | Stop reason: SDUPTHER

## 2017-10-30 RX ORDER — AMLODIPINE BESYLATE 10 MG/1
TABLET ORAL
COMMUNITY
Start: 2017-10-20 | End: 2018-01-04 | Stop reason: SDUPTHER

## 2017-10-30 RX ORDER — ENALAPRIL MALEATE 20 MG/1
TABLET ORAL
COMMUNITY
Start: 2017-10-19 | End: 2018-01-04 | Stop reason: SDUPTHER

## 2017-10-30 RX ORDER — HYDROCHLOROTHIAZIDE 25 MG/1
TABLET ORAL
COMMUNITY
Start: 2017-10-19 | End: 2018-01-04 | Stop reason: SDUPTHER

## 2017-10-30 RX ORDER — ONDANSETRON 4 MG/1
TABLET, ORALLY DISINTEGRATING ORAL
COMMUNITY
Start: 2017-09-19 | End: 2018-01-04 | Stop reason: SDUPTHER

## 2017-10-30 NOTE — PROGRESS NOTES
CC: Pelvic pain    Pt is 31 y.o. Presents to urgent care for evaluation of pelvic pain and vaginal discharge. The pain is described as aching, and is 7/10 in intensity, and constant. Pain is located in the suprapubic area with radiation to the her back (L>R). Onset was gradual occurring 2 weeks ago. Symptoms have been gradually worsening since. Aggravating factors: none. Alleviating factors: acetaminophen. Associated symptoms: vaginal discharge, weight loss, and fatigue. The patient denies chills, diarrhea, fever, headache, nausea and vomiting. Risk factors for pelvic/abdominal pain include prior pelvic surgery (endometrial ablation and a right oophorectomy at age 17). Reports vaginal discharge is white with a mild odor. Denies itching. Reports general fatigue/weakness and a 10 # weight loss in 10 days.       ROS:  GENERAL: Feeling well overall. Denies fever or chills.   SKIN: Denies rash or lesions.   HEAD: Denies head injury or headache.   NODES: Denies enlarged lymph nodes.   CHEST: Denies chest pain or shortness of breath.   CARDIOVASCULAR: Denies palpitations or left sided chest pain.   ABDOMEN: No abdominal pain, constipation, diarrhea, nausea, vomiting or rectal bleeding.   URINARY: No dysuria, hematuria, or burning on urination.  REPRODUCTIVE: See HPI.   BREASTS: Denies pain, lumps, or nipple discharge.   HEMATOLOGIC: No easy bruisability or excessive bleeding.   MUSCULOSKELETAL: Denies joint pain or swelling.   NEUROLOGIC: Denies syncope or  + weakness.   PSYCHIATRIC: Denies depression, anxiety or mood swings.    PE:   APPEARANCE: Well nourished, well developed, White female in no acute distress.  VULVA: No lesions. Normal external female genitalia.  URETHRAL MEATUS: Normal size and location, no lesions, no prolapse.  URETHRA: No masses, tenderness, or prolapse.  VAGINA: Atrophic No lesions or lacerations noted. No abnormal discharge present. No odor present.   CERVIX: No lesions or discharge. No cervical  motion tenderness.   UTERUS: Normal size, regular shape, mobile, non-tender.  ADNEXA: No tenderness. No fullness or masses palpated in the adnexal regions.   ANUS PERINEUM: Normal.      Diagnosis:  1. Pelvic pain in female    2.      Acute vaginitis    Plan      -gcct and affirm pending  -urine dip negative  -pelvic u/s  -f/u with PCP   -continue taking tylenol prn     Follow-up pending results

## 2017-10-31 LAB
C TRACH DNA SPEC QL NAA+PROBE: NOT DETECTED
N GONORRHOEA DNA SPEC QL NAA+PROBE: NOT DETECTED

## 2017-11-09 DIAGNOSIS — Z12.31 VISIT FOR SCREENING MAMMOGRAM: Primary | ICD-10-CM

## 2017-11-22 ENCOUNTER — HOSPITAL ENCOUNTER (OUTPATIENT)
Dept: RADIOLOGY | Facility: OTHER | Age: 62
Discharge: HOME | End: 2017-11-22
Attending: OBSTETRICS & GYNECOLOGY
Payer: MEDICARE

## 2017-11-22 DIAGNOSIS — Z12.31 VISIT FOR SCREENING MAMMOGRAM: ICD-10-CM

## 2017-11-22 DIAGNOSIS — R10.2 PELVIC PAIN IN FEMALE: ICD-10-CM

## 2017-11-22 PROCEDURE — 76856 US EXAM PELVIC COMPLETE: CPT | Mod: 26,,, | Performed by: RADIOLOGY

## 2017-11-22 PROCEDURE — 77080 DXA BONE DENSITY AXIAL: CPT | Mod: TC

## 2017-11-22 PROCEDURE — 77080 DXA BONE DENSITY AXIAL: CPT | Mod: 26,,, | Performed by: RADIOLOGY

## 2017-11-22 PROCEDURE — 77063 BREAST TOMOSYNTHESIS BI: CPT | Mod: 26,,, | Performed by: RADIOLOGY

## 2017-11-22 PROCEDURE — 77067 SCR MAMMO BI INCL CAD: CPT | Mod: TC

## 2017-11-22 PROCEDURE — 76830 TRANSVAGINAL US NON-OB: CPT | Mod: 26,,, | Performed by: RADIOLOGY

## 2017-11-22 PROCEDURE — 76856 US EXAM PELVIC COMPLETE: CPT | Mod: TC

## 2017-11-22 PROCEDURE — 77067 SCR MAMMO BI INCL CAD: CPT | Mod: 26,,, | Performed by: RADIOLOGY

## 2017-11-28 ENCOUNTER — OFFICE VISIT (OUTPATIENT)
Dept: OBSTETRICS AND GYNECOLOGY | Facility: CLINIC | Age: 62
End: 2017-11-28
Payer: MEDICARE

## 2017-11-28 VITALS — WEIGHT: 220.44 LBS | BODY MASS INDEX: 35.58 KG/M2

## 2017-11-28 DIAGNOSIS — R93.89 THICKENED ENDOMETRIUM: ICD-10-CM

## 2017-11-28 DIAGNOSIS — N95.0 PMB (POSTMENOPAUSAL BLEEDING): Primary | ICD-10-CM

## 2017-11-28 PROCEDURE — 99999 PR PBB SHADOW E&M-EST. PATIENT-LVL III: CPT | Mod: PBBFAC,,, | Performed by: OBSTETRICS & GYNECOLOGY

## 2017-11-28 PROCEDURE — 88304 TISSUE EXAM BY PATHOLOGIST: CPT | Performed by: PATHOLOGY

## 2017-11-28 PROCEDURE — 88304 TISSUE EXAM BY PATHOLOGIST: CPT | Mod: 26,,, | Performed by: PATHOLOGY

## 2017-11-28 PROCEDURE — 58100 BIOPSY OF UTERUS LINING: CPT | Mod: S$GLB,,, | Performed by: OBSTETRICS & GYNECOLOGY

## 2017-11-28 PROCEDURE — 99214 OFFICE O/P EST MOD 30 MIN: CPT | Mod: 25,S$GLB,, | Performed by: OBSTETRICS & GYNECOLOGY

## 2017-11-28 NOTE — PROGRESS NOTES
HISTORY OF PRESENT ILLNESS:    Jessie Gallardo is a 61 y.o. female, , No LMP recorded (lmp unknown). Patient is postmenopausal.,  presents for a problem visit, complaining of ??ENDOMETRIOSIS.  WAS SEEN 3 WEEKS AGO BY NP WITH VAGINAL DISCHARGE, ATROPHIC VAGINITIS AND 2 WEEKS AGO FOR ROUTINE VISIT WITH DR ALVAREZ, C/O SEVERAL MONTHS LLQ PAIN.  HAS HX FIBROIDS, CONCERNED THAT ACUTE PAIN MIGHT BE RELATED BUT DISCUSSED CAUSES OF PELVIC PAIN - LLQ PAIN MORE LIKELY DUE TO CHRONIC GI ISSUES AND CONSTIPATION.  AS A SEPARATE ISSUE, RECENT PELVIC USG WITH THICKENED EM AND FLUID - NEEDS ENDOMETRIAL BIOPSY, DONE TODAY.  FURTHER QUESTIONING HAS HAD OVER THE PAST 2 MONTHS SEVERAL EPISODES OF MILD BLEEDING/ SPOTTING.  2017:  Findings: Transabdominal and transvaginal imaging performed.  Uterus measures 7.2 x 5.1 x 5.3 cm. Endometrium measures 7.3 mm with fluid noted.  Multiple partially calcified fibroids noted, the largest 2.7 cm and may have a submucosal component at the anterior fundus  Ovaries not visualized. No free fluid.   Impression    Thickened endometrium with endometrial fluid. Correlate clinically and with endometrial biopsy as needed. Multiple fibroids which appear at least partially calcified with one fibroid possibly partially submucosal.       ROUTINE VISIT 2017 DR ALVAREZ:  Jessie Gallardo is a 61 y.o. female who presents for an annual exam.   She reports her periods are: none   Patient reports left sided pelvic pain.  Had UAE a few years ago due to mass effect of fibroids.    She states the case was a success and she feels much better.   Complains of vaginitis No    Past Medical History:   Diagnosis Date    Anxiety     Chronic back pain     See neuro     Chronic low back pain     Depression     Diabetes mellitus     type 2    Diabetes mellitus type II     Diabetic neuropathy     Diverticulosis     last colonoscopy was in     Encounter for blood transfusion     Fibroids     Fibromyalgia   "   GERD (gastroesophageal reflux disease)     Hyperlipidemia     Hypertension     Obesity     Obstructive sleep apnea     wear CPAP machine nightly    Pancreatitis     Post menopausal syndrome        Past Surgical History:   Procedure Laterality Date    ABDOMINAL SURGERY      CHOLECYSTECTOMY      fibroid      OOPHORECTOMY      xlap, right ovary removed due to infection?    right ovary removal Right     SPINE SURGERY      cervical spine    UTERINE ARTERY EMBOLIZATION  3/14       MEDICATIONS AND ALLERGIES:      Current Outpatient Prescriptions:     albuterol 90 mcg/actuation inhaler, Inhale 2 puffs into the lungs every 6 (six) hours as needed for Wheezing or Shortness of Breath., Disp: 18 g, Rfl: 0    amlodipine (NORVASC) 10 MG tablet, Take 1 tablet (10 mg total) by mouth once daily., Disp: 90 tablet, Rfl: 3    amLODIPine (NORVASC) 10 MG tablet, , Disp: , Rfl:     amoxicillin-clavulanate 875-125mg (AUGMENTIN) 875-125 mg per tablet, , Disp: , Rfl:     aspirin (ECOTRIN) 81 MG EC tablet, Take 1 tablet (81 mg total) by mouth once daily., Disp: 90 tablet, Rfl: 2    BD INSULIN PEN NEEDLE UF SHORT 31 gauge x 5/16" Ndle, , Disp: , Rfl:     blood sugar diagnostic (FREESTYLE LITE STRIPS) Strp, TEST BLOOD SUGAR 3 TIMES A DAY, Disp: 100 strip, Rfl: 5    butalbital-acetaminophen-caffeine -40 mg (FIORICET, ESGIC) -40 mg per tablet, TK 1 T PO  Q 6-8 HOURS PRN, Disp: , Rfl: 3    butalbital-acetaminophen-caffeine -40 mg (FIORICET, ESGIC) -40 mg per tablet, TK 1 T PO  Q 6-8 HOURS PRN, Disp: , Rfl: 3    divalproex (DEPAKOTE) 500 MG Tb24, Take 2 tablets (1,000 mg total) by mouth every evening., Disp: 180 tablet, Rfl: 3    divalproex ER (DEPAKOTE) 500 MG Tb24, TAKE 2 TABLETS BY MOUTH EVERY EVENING, Disp: 180 tablet, Rfl: 0    duloxetine (CYMBALTA) 30 MG capsule, , Disp: , Rfl:     enalapril (VASOTEC) 20 MG tablet, TAKE 1 TABLET(20 MG) BY MOUTH TWICE DAILY, Disp: 180 tablet, Rfl: 0    " "enalapril (VASOTEC) 20 MG tablet, , Disp: , Rfl:     fluconazole (DIFLUCAN) 150 MG Tab, , Disp: , Rfl:     fluticasone (FLONASE) 50 mcg/actuation nasal spray, USE 2 SPRAYS IN EACH NOSTRIL ONCE DAILY, Disp: 16 g, Rfl: 3    gabapentin (NEURONTIN) 300 MG capsule, Take 2 capsules (600 mg total) by mouth 3 (three) times daily., Disp: 540 capsule, Rfl: 0    gabapentin (NEURONTIN) 300 MG capsule, , Disp: , Rfl:     hydrochlorothiazide (HYDRODIURIL) 25 MG tablet, TAKE 1 TABLET BY MOUTH ONCE DAILY, Disp: 90 tablet, Rfl: 0    hydroCHLOROthiazide (HYDRODIURIL) 25 MG tablet, , Disp: , Rfl:     insulin aspart (NOVOLOG FLEXPEN) 100 unit/mL InPn pen, INJECT 20 UNITS UNDER THE SKIN THREE TIMES DAILY WITH MEALS, Disp: 45 mL, Rfl: 3    insulin detemir (LEVEMIR FLEXTOUCH) 100 unit/mL (3 mL) SubQ InPn pen, Inject 32 units bid, Disp: 45 mL, Rfl: 0    insulin needles, disposable, (NOVOFINE 30) 30 x 1/3 " Ndle, USE AS DIRECTED THREE TIMES DAILY, Disp: 100 each, Rfl: 5    lancets (FREESTYLE LANCETS) 28 gauge Misc, Inject 1 lancet into the skin 3 (three) times daily., Disp: 100 each, Rfl: 5    levoFLOXacin (LEVAQUIN) 500 MG tablet, , Disp: , Rfl:     lidocaine-prilocaine (EMLA) cream, , Disp: , Rfl:     LINZESS 145 mcg Cap capsule, TK 1 C PO ONCE A DAY ON AN EMPTY STOMACH, Disp: , Rfl: 2    LINZESS 145 mcg Cap capsule, , Disp: , Rfl:     lorazepam (ATIVAN) 1 MG tablet, Take 1 tablet (1 mg total) by mouth On call Procedure (for MRI)., Disp: 1 tablet, Rfl: 0    lovastatin (MEVACOR) 20 MG tablet, TAKE 1 TABLET BY MOUTH EVERY EVENING, Disp: 90 tablet, Rfl: 0    lovastatin (MEVACOR) 20 MG tablet, , Disp: , Rfl:     methocarbamol (ROBAXIN) 500 MG Tab, TK 2 TS PO TID, Disp: , Rfl: 0    MOVANTIK tablet, , Disp: , Rfl:     MOVANTIK tablet, , Disp: , Rfl:     NOVOLOG FLEXPEN 100 unit/mL InPn pen, , Disp: , Rfl:     omeprazole (PRILOSEC) 20 MG capsule, TAKE ONE CAPSULE BY MOUTH TWICE DAILY, Disp: 180 capsule, Rfl: 0    " "ondansetron (ZOFRAN) 4 MG tablet, TK 1 T PO Q 8 H PRF NAUSEA, Disp: , Rfl: 0    ondansetron (ZOFRAN-ODT) 4 MG TbDL, , Disp: , Rfl:     pantoprazole (PROTONIX) 40 MG tablet, TK 1 T PO ONCE A DAY, Disp: , Rfl: 1    pantoprazole (PROTONIX) 40 MG tablet, , Disp: , Rfl:     pen needle, diabetic (BD INSULIN PEN NEEDLE UF MINI) 31 gauge x 3/16" Ndle, USE AS DIRECTED TWICE DAILY, Disp: 100 each, Rfl: 3    traMADol (ULTRAM) 50 mg tablet, TAKE 1 TABLET(50 MG) BY MOUTH TWICE DAILY AS NEEDED FOR PAIN, Disp: 60 tablet, Rfl: 1    traMADol (ULTRAM) 50 mg tablet, , Disp: , Rfl:     TRUE METRIX GLUCOSE METER Misc, UTD, Disp: , Rfl: 0    VICTOZA 3-SHAHBAZ 0.6 mg/0.1 mL (18 mg/3 mL) PnIj, , Disp: , Rfl:     Review of patient's allergies indicates:  No Known Allergies    Family History   Problem Relation Age of Onset    Breast cancer Neg Hx     Ovarian cancer Neg Hx     Colon cancer Neg Hx     Alcohol abuse Father     Cancer Sister      throat ca    Prostate cancer Brother     Heart attack Paternal Uncle     Cirrhosis Sister     Stroke Mother     Diabetes Mother     Hypertension Mother     No Known Problems Daughter     No Known Problems Son     No Known Problems Maternal Grandmother     No Known Problems Daughter     No Known Problems Son     No Known Problems Sister     No Known Problems Sister     No Known Problems Sister     No Known Problems Brother     No Known Problems Brother     No Known Problems Brother     No Known Problems Brother     Cancer Paternal Aunt     Lupus Grandchild     No Known Problems Maternal Aunt     No Known Problems Maternal Uncle     No Known Problems Maternal Grandfather     No Known Problems Paternal Grandfather     No Known Problems Paternal Grandmother     No Known Problems Cousin     Heart disease Neg Hx     Heart failure Neg Hx        Social History     Social History    Marital status: Single     Spouse name: N/A    Number of children: 4    Years of education: " N/A     Occupational History     Disabled     Social History Main Topics    Smoking status: Never Smoker    Smokeless tobacco: Never Used    Alcohol use Yes      Comment: Rarely    Drug use: No    Sexual activity: Not Currently     Partners: Male     Birth control/ protection: Post-menopausal      Comment: ,  4 grown kids      Other Topics Concern    Not on file     Social History Narrative    ** Merged History Encounter **            COMPREHENSIVE GYN HISTORY:  PAP History: Denies abnormal Paps.  Infection History: Denies STDs. Denies PID.  Benign History: Denies uterine fibroids. Denies ovarian cysts. Denies endometriosis. Denies other conditions.  Cancer History: Denies cervical cancer. Denies uterine cancer or hyperplasia. Denies ovarian cancer. Denies vulvar cancer or pre-cancer. Denies vaginal cancer or pre-cancer. Denies breast cancer. Denies colon cancer.    ROS:  GENERAL: No weight changes. No swelling. No fatigue. No fever.  CARDIOVASCULAR: No chest pain. No shortness of breath. No leg cramps.   NEUROLOGICAL: No headaches. No vision changes.  BREASTS: No pain. No lumps. No discharge.  ABDOMEN: No pain. No nausea. No vomiting. No diarrhea. No constipation.  REPRODUCTIVE: No abnormal bleeding.   VULVA: No pain. No lesions. No itching.  VAGINA: No relaxation. No itching. No odor. No discharge. No lesions.  URINARY: No incontinence. No nocturia. No frequency. No dysuria.    Wt 100 kg (220 lb 7.4 oz)   LMP  (LMP Unknown)   BMI 35.58 kg/m²     PE:  APPEARANCE: Well nourished, well developed, in no acute distress.  ABDOMEN: Soft. No tenderness or masses. No hepatosplenomegaly. No hernias.  BREASTS, FUNDOSCOPIC, RECTAL DEFERRED  PELVIC: External female genitalia without lesions.  Female hair distribution. Adequate perineal body, Normal urethral meatus. Vagina moist and well rugated without lesions or discharge.  No significant cystocele or rectocele present. Cervix pink without lesions, discharge  or tenderness. Uterus is normal size, regular, mobile and nontender. Adnexa without masses or tenderness.  EXTREMITIES: No edema    PROCEDURES:  ENDOMETRIAL BIOPSY:    The patient was informed of the risk of bleeding, infection, uterine perforation, and pain.  She was counseled that the test will rule-out endometrial cancer with an accuracy greater than 96%.  She was counseled on the alternatives to endometrial biopsy and agrees to proceed.  A time out was performed.  Urine pregnancy test was not done.    The cervix was visualized with a speculum.  The cervix was swabbed with Betadinex3 times 3.  The anterior lip of the cervix was grasped with a single toothed tenaculum.  A uterine pipelle was inserted into the uterus, and the uterus sounded to  7cm. The tenaculum and speculum were removed, and the specimen was sent to Pathology for histologic evaluation.   The patient tolerated with above procedure well.    Post Endometrial Biopsy counseling:  The patient was instructed to manage post-biopsy cramping with NSAIDs or Tylenol.  She was counseled to expect spotting or light bleeding for a few days, and she was counseled to report heavy bleeding, fever greater then 101.0F, worsening pain, or foul-smelling vaginal discharge.  Counseling lasted about 15 minutes, and after counseling she had no further questions.      DIAGNOSIS:  1. PMB (postmenopausal bleeding)  Tissue Specimen To Pathology, Obstetrics/Gynecology   2. Thickened endometrium  Tissue Specimen To Pathology, Obstetrics/Gynecology       LABS AND TESTS ORDERED:    MEDICATIONS PRESCRIBED:    COUNSELING:   The patient was counseled AS ABOVE.  WILL F/U AFTER EM BX RESULTS AVAILABLE    FOLLOW-UP with me routine visit.

## 2017-11-29 ENCOUNTER — OFFICE VISIT (OUTPATIENT)
Dept: NEPHROLOGY | Facility: CLINIC | Age: 62
End: 2017-11-29
Payer: MEDICARE

## 2017-11-29 ENCOUNTER — LAB VISIT (OUTPATIENT)
Dept: LAB | Facility: HOSPITAL | Age: 62
End: 2017-11-29
Attending: INTERNAL MEDICINE
Payer: MEDICARE

## 2017-11-29 VITALS
OXYGEN SATURATION: 98 % | SYSTOLIC BLOOD PRESSURE: 140 MMHG | HEART RATE: 94 BPM | HEIGHT: 66 IN | BODY MASS INDEX: 35.5 KG/M2 | WEIGHT: 220.88 LBS | DIASTOLIC BLOOD PRESSURE: 70 MMHG

## 2017-11-29 DIAGNOSIS — N18.30 CHRONIC KIDNEY DISEASE (CKD), STAGE III (MODERATE): ICD-10-CM

## 2017-11-29 DIAGNOSIS — G47.33 OSA (OBSTRUCTIVE SLEEP APNEA): ICD-10-CM

## 2017-11-29 DIAGNOSIS — I10 ESSENTIAL HYPERTENSION: ICD-10-CM

## 2017-11-29 DIAGNOSIS — N18.30 CHRONIC KIDNEY DISEASE (CKD), STAGE III (MODERATE): Primary | ICD-10-CM

## 2017-11-29 DIAGNOSIS — E11.21 DIABETIC NEPHROPATHY ASSOCIATED WITH TYPE 2 DIABETES MELLITUS: ICD-10-CM

## 2017-11-29 LAB
ALBUMIN SERPL BCP-MCNC: 3.8 G/DL
ANION GAP SERPL CALC-SCNC: 10 MMOL/L
BUN SERPL-MCNC: 22 MG/DL
CALCIUM SERPL-MCNC: 10.6 MG/DL
CHLORIDE SERPL-SCNC: 103 MMOL/L
CO2 SERPL-SCNC: 29 MMOL/L
CREAT SERPL-MCNC: 1.6 MG/DL
EST. GFR  (AFRICAN AMERICAN): 39.3 ML/MIN/1.73 M^2
EST. GFR  (NON AFRICAN AMERICAN): 34 ML/MIN/1.73 M^2
GLUCOSE SERPL-MCNC: 175 MG/DL
PHOSPHATE SERPL-MCNC: 2.8 MG/DL
POTASSIUM SERPL-SCNC: 4.1 MMOL/L
SODIUM SERPL-SCNC: 142 MMOL/L

## 2017-11-29 PROCEDURE — 80069 RENAL FUNCTION PANEL: CPT

## 2017-11-29 PROCEDURE — 99203 OFFICE O/P NEW LOW 30 MIN: CPT | Mod: S$GLB,,, | Performed by: INTERNAL MEDICINE

## 2017-11-29 PROCEDURE — 36415 COLL VENOUS BLD VENIPUNCTURE: CPT

## 2017-11-29 PROCEDURE — 99999 PR PBB SHADOW E&M-EST. PATIENT-LVL V: CPT | Mod: PBBFAC,,, | Performed by: INTERNAL MEDICINE

## 2017-11-29 NOTE — LETTER
November 30, 2017      Liza Vee MD  [address] [place] [address]           [facility]  [address]  [facility] [address]  Phone: [phone]  Fax: [phone]          Patient: Jessie Gallardo   MR Number: [id number]   YOB: 1955   Date of Visit: 11/29/2017       Dear Dr. Liza Vee:    Thank you for referring Jessie Gallardo to me for evaluation. Attached you will find relevant portions of my assessment and plan of care.    If you have questions, please do not hesitate to call me. I look forward to following Jessie Gallardo along with you.    Sincerely,    Moises Dhillon MD    Enclosure  CC:  No Recipients    If you would like to receive this communication electronically, please contact [email or web address] or [phone] to request more information on [employer] access.    For providers and/or their staff who would like to refer a patient to [facility], please contact us through our one-stop-shop provider referral line, [facility], at [phone].    If you feel you have received this communication in error or would no longer like to receive these types of communications, please e-mail [email or web address]

## 2017-11-29 NOTE — PROGRESS NOTES
Patient is here today for evaluation of CKD III. Baseline Cr 1.3-1.6 mg/dl Phyllis most recent lab ( 10/28/15) Cr 1.3 mg/dl; eGFR; 51 ml/min; potassium 4.1 mmolL/There is a history of hypertension and diabetes ; last A1c; 8.7%. Current mes include ACEI. Denies gross hematuria; dysuria; kidney stone NSAIDs or family hx of renal disorders Todays lab; Cr 1.6 mg/dl; eGFR 39 ml/min; potassium; 4.1 mmol/L.       ROS;  Pleasant woman; no acute distress oriented x 3  Mood and Affect; Appropriate  Otherwise non-contributory  Exam  HEENT; Grossly Intact  CHEST; Clear P&A; no rales of rhonchi  HEART; RR; S1&S2; no murmur rub gallop  ABD; BS(+) non-tender; (-)CVAT  EXT; (-)Edema    Impression  CKD III Likely secondary to hypertension and diabetes;  RFP; Renal US; U/A; Urine P/C    Hypertension Borderline control  Diabetes Not at goal A1c    Plan   CKD III U/A; Urine P/C   Schedule  Renal US    Hypertension Borderline control    Diabetes Needs help; refer tp diabetes clinic    Return Visit; 3 mo; pendng above

## 2017-12-01 ENCOUNTER — PATIENT MESSAGE (OUTPATIENT)
Dept: OBSTETRICS AND GYNECOLOGY | Facility: CLINIC | Age: 62
End: 2017-12-01

## 2017-12-02 NOTE — TELEPHONE ENCOUNTER
Pathology results from your recent endometrial (uterine lining) sample did not contain enough cells for the pathologist to interpret.  While there is no mention of high-grade precancer or cancer, we'll need to start planning the D&C that we talked a little about during your last visit.  This is a same-day minor procedure that does not need any incisions on your belly.  I would use a speculum to see the cervix and look into the uterus with a tiny scope.  I could remove any polyps or other growths within the uterus that would make the lining thick on ultrasound, and we would send the scrapings off to the pathologist to confirm that the tissue we remove is benign.      I'll ask my nurse to contact you to pick out a day that would be good for the procedure.  You and I will get together to discuss it one more time and to sign consents before that day.  If this message reaches you before Edilma or Linda do, please call us to get things set up.  Please also call with questions or worries.

## 2017-12-04 ENCOUNTER — HOSPITAL ENCOUNTER (OUTPATIENT)
Dept: RADIOLOGY | Facility: OTHER | Age: 62
Discharge: HOME | End: 2017-12-04
Attending: INTERNAL MEDICINE
Payer: MEDICARE

## 2017-12-04 DIAGNOSIS — N18.30 CHRONIC KIDNEY DISEASE (CKD), STAGE III (MODERATE): ICD-10-CM

## 2017-12-04 PROCEDURE — 76770 US EXAM ABDO BACK WALL COMP: CPT | Mod: TC

## 2017-12-04 PROCEDURE — 76770 US EXAM ABDO BACK WALL COMP: CPT | Mod: 26,,, | Performed by: RADIOLOGY

## 2017-12-08 ENCOUNTER — TELEPHONE (OUTPATIENT)
Dept: OBSTETRICS AND GYNECOLOGY | Facility: CLINIC | Age: 62
End: 2017-12-08

## 2017-12-08 NOTE — TELEPHONE ENCOUNTER
----- Message from Marli Llamas MD sent at 12/8/2017 10:19 AM CST -----  Did she ever pick a date for her hysteroscopy?  So I can enter orders for surgery?

## 2017-12-11 ENCOUNTER — TELEPHONE (OUTPATIENT)
Dept: OBSTETRICS AND GYNECOLOGY | Facility: CLINIC | Age: 62
End: 2017-12-11

## 2017-12-11 ENCOUNTER — TELEPHONE (OUTPATIENT)
Dept: PAIN MEDICINE | Facility: CLINIC | Age: 62
End: 2017-12-11

## 2017-12-11 NOTE — TELEPHONE ENCOUNTER
----- Message from Cleopatra Nicole sent at 12/11/2017 12:40 PM CST -----  Contact: FATIMAH BURR [[id number]]  x_  1st Request  _  2nd Request  _  3rd Request      Who: FATIMAH BURR [[id number]]    Why: patient states she is returning a call     What Number to Call Back: [phone]    When to Expect a call back: (Before the end of the day)   -- if call after 3:00 call back will be tomorrow.

## 2017-12-11 NOTE — TELEPHONE ENCOUNTER
Staff contacted the patient to let her know there is note in the system that Lisa has tried to contact her.    Staff did see that the patient's OB/GYN did try to reach her concerning scheduling a surgery. Staff made patient aware of this and gave patient the Dr. Name Dr. Llamas  and number to her office [phone].     Patient expressed great thanks and verbalized understanding.

## 2017-12-11 NOTE — TELEPHONE ENCOUNTER
Pt was calling to inquire about rescheduling her surgery that is on 12/28/2017.  Pt stated she was scared regarding her recovery due to her living arrangements.  I instructed pt to keep her scheduled appt, due to time constrains with the holiday schedule. Pt agreed and thanked me for calling her and discussing what her options were.

## 2017-12-11 NOTE — TELEPHONE ENCOUNTER
----- Message from Xiomara Chaudhry MA sent at 12/7/2017  4:13 PM CST -----  Mateo VANEGAS Staff  Caller: Unspecified (Today,  3:27 PM)         X_  1st Request   _  2nd Request   _  3rd Request         Who: FATIMAH BURR [[id number]]     Why: Pt believes she has a missed call from Lisa. Please call back.     What Number to Call Back:[phone]     When to Expect a call back: (Within 24 hours)     Please return the call at earliest convenience. Thanks!

## 2017-12-14 DIAGNOSIS — R93.89 THICKENED ENDOMETRIUM: Primary | ICD-10-CM

## 2017-12-14 RX ORDER — MUPIROCIN 20 MG/G
OINTMENT TOPICAL
Status: CANCELLED | OUTPATIENT
Start: 2017-12-14

## 2017-12-18 ENCOUNTER — TELEPHONE (OUTPATIENT)
Dept: OBSTETRICS AND GYNECOLOGY | Facility: CLINIC | Age: 62
End: 2017-12-18

## 2017-12-19 ENCOUNTER — TELEPHONE (OUTPATIENT)
Dept: OBSTETRICS AND GYNECOLOGY | Facility: CLINIC | Age: 62
End: 2017-12-19

## 2017-12-19 NOTE — TELEPHONE ENCOUNTER
----- Message from Jaime Hidalgo sent at 12/19/2017  9:52 AM CST -----  Contact: pt  x_ 1st Request  _ 2nd Request  _ 3rd Request    Who: pt    Why: in regards to a date mix up for her procedure. Pt states her insurance company has a different date than what she has scheduled. Pt is needing to speak with staff    What Number to Call Back: [phone]    When to Expect a call back: (Before the end of the day)  -- if call after 3:00 call back will be tomorrow.

## 2017-12-19 NOTE — TELEPHONE ENCOUNTER
Pt had wrong date for surgery - pt thought it was the 29, her surgery is schedule for the 28th and that is the correct date that her insurance company has.

## 2017-12-26 ENCOUNTER — OFFICE VISIT (OUTPATIENT)
Dept: PAIN MEDICINE | Facility: CLINIC | Age: 62
End: 2017-12-26
Payer: MEDICARE

## 2017-12-26 VITALS
WEIGHT: 218.25 LBS | HEART RATE: 73 BPM | BODY MASS INDEX: 35.08 KG/M2 | DIASTOLIC BLOOD PRESSURE: 76 MMHG | TEMPERATURE: 98 F | SYSTOLIC BLOOD PRESSURE: 126 MMHG | HEIGHT: 66 IN | RESPIRATION RATE: 16 BRPM

## 2017-12-26 DIAGNOSIS — M25.512 CHRONIC PAIN OF BOTH SHOULDERS: ICD-10-CM

## 2017-12-26 DIAGNOSIS — M53.3 SACROILIAC JOINT PAIN: ICD-10-CM

## 2017-12-26 DIAGNOSIS — M25.511 CHRONIC PAIN OF BOTH SHOULDERS: ICD-10-CM

## 2017-12-26 DIAGNOSIS — M54.16 LUMBAR RADICULOPATHY: Primary | ICD-10-CM

## 2017-12-26 DIAGNOSIS — R52 PAIN: ICD-10-CM

## 2017-12-26 DIAGNOSIS — M51.36 ANNULAR TEAR OF LUMBAR DISC: ICD-10-CM

## 2017-12-26 DIAGNOSIS — M51.36 DDD (DEGENERATIVE DISC DISEASE), LUMBAR: ICD-10-CM

## 2017-12-26 DIAGNOSIS — G89.29 CHRONIC PAIN OF BOTH SHOULDERS: ICD-10-CM

## 2017-12-26 PROCEDURE — 99999 PR PBB SHADOW E&M-EST. PATIENT-LVL IV: CPT | Mod: PBBFAC,,, | Performed by: NURSE PRACTITIONER

## 2017-12-26 PROCEDURE — 99214 OFFICE O/P EST MOD 30 MIN: CPT | Mod: S$GLB,,, | Performed by: NURSE PRACTITIONER

## 2017-12-26 RX ORDER — TRAMADOL HYDROCHLORIDE 50 MG/1
TABLET ORAL
Qty: 60 TABLET | Refills: 1 | Status: SHIPPED | OUTPATIENT
Start: 2017-12-26 | End: 2018-02-27 | Stop reason: SDUPTHER

## 2017-12-26 NOTE — PROGRESS NOTES
Subjective:       Patient ID: Jessie Gallardo is a 62 y.o. female.    Chief Complaint: Shoulder Pain    Interval History 12/26/2017:  The patient returns today for follow up of lower back pain.  At her last OV, I placed a referral for neurosurgery.  She was evaluated by Dr. Jerome Dey and plans to undergo L4-5 fusion in the near future.  She is scheduled for D and C on 12/28/17 secondary to abnormal bleeding.  She continues to report lower back pain with radiation into the legs.  She continues to take Tramadol which does provide her some benefit. Her pain today is 8/10.      Interval History 10/24/2017:  The patient returns today for follow up.  She is s/p L5-S1 IL MIKE on 10/10/17 with about 40% pain relief.  Previous procedures provided significant benefit.  She did not previously see outside neurosurgeon as we discussed.  She would like for me to refer her to someone at [facility].  She continues to report back pain with radiation down her legs.  Her neck pain has somewhat improved since last OV.  She continues to take Tramadol which does help as needed.  Her pain today is 7/10.    Interval History 8/11/2017:  The patient returns today for follow up of lower back and neck pain.  She reports increased pain since her last visit.  She is having neck pain with radiation into her left arm.  She is also having lower back pain with radiation down the back and side of her right leg to her calf.  She does have mild left leg pain also.  She did go to the ED at [place] in June and reports that she was told to see a surgeon.  Her PCP referred her for an appointment.  She reports that she is seeing a neurosurgeon on [facility] later this month.  She states that they are waiting to receive her records to decide on a plan of care.  Her pain today is 7/10.  She continues to take Tramadol as needed with some benefit.      Interval History 6/2/2017:  The patient returns today for follow up of neck and lower back pain.  She is s/p  L5-S1 IL MIKE with significant benefit of leg pain.  She reports no change in her neck pain since her previous encounter.  She continues to take Tramadol and Gabapentin with significant benefit.  She has increased her activity level which she thinks is helping.  She has an appointment today with Dr. Ortiz for evaluation of bilateral hand pain.  Her pain today is 6/10.      Interval History 4/7/2017:  The patient returns today for follow up of neck and back pain.  She is s/p cervical MIKE on 3/15/17 with limited benefit.  She reports significant relief with this procedure in the past.  She does report having a headaches after the procedure.  She called the office and was told to lay down and drink caffeine.  She reports that this resolved her headache.  She also has left shoulder pain with radiation down her left arm which is worsening.  She reports an injury about 6 years ago during which she fell into a hole and was removed by her left arm.  She has had shoulder pain since this time, although it has severely worsened over the past month.  She has had XRAYs which show DJD.  She has not had an MRI.  She is also reporting worsening lower back pain and would like a repeat MIKE as previous provided significant benefit.  She continues to take Tramadol and Gabapentin with benefit.  She continues to try to control her diabetes through diet and reports that her sugars have been stable.  Her pain today is 8/10.  The patient denies any bowel or bladder incontinence or signs of saddle paresthesia.  The patient denies any major medical changes since last office visit.    Interval History 2/1/2017:  The patient returns today for follow up of neck and back pain.  Her back pain is tolerable today.  Her biggest complaint today is neck pain with radiation into her arms.  She has numbness to her fingers also.  She previously had significant benefit from cervical MIKE in the past.  She had an A1C completed at her PCP last week and  reports that it was 8.0.  She has been trying to control her sugars through diet and exercise.  She continues to take Gabapentin 1800 mg daily and Tramadol PRN.  She reports significant benefit of the medication without side effects.  Her pain today is 5/10.  The patient denies any bowel or bladder incontinence or signs of saddle paresthesia.      Interval History 12/2/2016:  The patient returns today for follow up of lower back and neck pain.  She is s/p L5-S1 IL MIKE on 11/16/16 with 70% pain relief of back and leg pain.  She reports that her pain is mild today.  She has increased her activity lately and has been cleaning her house.  She does have some increased pain after cleaning.  She is very happy with her results though.  Her neck pain has been tolerable.  She has not started PT because she recently moved and would like another order.  Her pain today is 5/10.  The patient denies any bowel or bladder incontinence or signs of saddle paresthesia.  The patient denies any major medical changes since last office visit.    Interval History 10/5/2016:  The patient returns today for follow up of lower back and leg pain.  She is s/p bilateral SI joint injections on 8/10/16 with 50% benefit.  She is still having radiating pain down the back and sides of both legs to the top and bottom of her feet.  She is also having numbness and tingling throughout her feet.  She was previously having swelling to her legs.  Dr. Ley instructed her to titrate down Gabapentin to see if this was causing this.  She states that this did not improve her swelling.  She then stopped vitamin supplements (Vit E and C).  Her leg swelling then resolved.  She then restarted Gabapentin and titrated up to 600 mg TID.  Her pain today is 8/10.  The patient denies any bowel or bladder incontinence or signs of saddle paresthesia.  The patient denies any major medical changes since last office visit.    Interval History 6/27/2016:  The patient returns  today for f/u of neck, bilateral shoulder and lower back pain.  She is s/p cervical MIKE on 6/8/16 with 80% improvement of her neck and left arm pain.  She is no longer having the shooting electric pain.  Her numbness has also improved since the procedure.  Her biggest complaint today is lower back pain which radiates down the back and sides of both legs to the bottom of her feet with numbness.  She also has a history of diabetes and diabetic neuropathy.  Her last A1C was 8.7, increased from 7.8.  She has had lumbar RFAs in the past with significant relief.  She has not had lumbar ESIs.  Her previous lumbar MRI from 2014 shows disc bulges at L3-4, L4-5, and L5-S1 without NF narrowing.  She is taking Tramadol PRN with relief.  I increased her Gabapentin last OV but she reports that she is taking 300 mg BID.  Her pain today is 6/10.  The patient denies any bowel/bladder incontinence or symptoms of saddle paresthesia.     Interval History 05/20/2016:  Patient is present today for a f/u for lower back, bilateral shoulder and neck pain.  Her back pain has been mild since her lumbar RFAs.  She is mostly complaining of neck pain which radiates down both arms with associated tingling.  She has had cervical ESIs in the past which she now states may have offered her more benefit than she previously believed.  She would like to try this treatment options again.  She did have a recent cervical MRI ordered by Dr. Herrera which does show muliple osteophytes with spinal narrowing.  She also had bilateral shoulder XRAYs which show DJD.   She continues to use Tramadol which provided relief.  She has a history of C4-5 fusion in 1999 with relief of her pain.  She is also taking Gabapentin 300 mg TID with limited relief.  Her pain today is a 6/10.  The patient denies any bowel/bladder incontinence.    Interval History 04/26/2016:  Patient is present today for a f/u for Low Back Pain. The pt recently had a Right Lumbar RFA @ L3,L4,L5 on  4/12/2016 where she reports a significant amount of relief. She reports her pain to be 4/10 today. The pt would like to address neck and shoulder pain that is radiating down through to both arms.  Patient has had a history of a C4-5 fusion, she had previous cervical intralaminar epidural steroidal injections in January 2015 which did not offer significant relief and the patient did have a cervical EMG/NCV which did not show evidence of radiculopathy at that time but some carpal tunnel syndrome.  Currently she states that the pain in her shoulders and in her arms is most significant and she describes her arm pain as feelings of swelling bilaterally.    Interval History 11/19/2015:  Patient here for f/u of chronic low back pain. Patient was last here in 7/15 and had B L3,4,5 RFA done. Patient states that she continues to have about 50% pain reduction of low back pain and some leg pain. She able to walk and stand more and feels that she is functioning much better over all. Patient continues to have some low back pain right> left with intermittent BLE pains right >left and bilateral foot numbess and tingling. Patient admits to not taking gabapentin as directed and had been taking it prn.     Interval history 07/7/2015:  Ms. Gallardo is a 62 y.o. female with neck and low back pain presents today for f/u s/p b/l MBB at L4, 5 and sacral Ala and right sided MBB at same level. Reports significantly more relief with b/l block. She was in physical therapy but has not gone for 2 months. She has a lot of anxiety and frustration associated with her chronic pain. She saw Dr. Rosenbaum today and it was recommended she continue f/u with Dr. Jean. Pt has been seen in the clinic before by Dr. Ley, however pt is new to me.   History below per Dr. Ley    Interval history 05/21/2015:  Since previous encounter patient reports neck pain and lower back pain, although the pain in her lower back is her worst pain. . Patient reports  pain as a 8/10 today. Patient does not take Soma and Hydrocodone anymore. She has a lot of anxiety and frustration associated with her chronic pain.    Interval history 04/20/2015:  Since previous encounter patient reports neck pain radiating into her upper extremities. Patient stated that she completed her EMG with  which did not show any evidence of radiculopathy and neurosurgery was evaluating the patient did not determine any need for further surgical intervention. Patient stated that she discontinued the Lyrica due to it makes her swell and have dry mouth. Patient stated that she still takes the Gabapentin. Patient reports no other health changes since her last visit. Patient reports her pain 8/10 today.     Interval history 03/16/2015:  Since previous encounter patient reports neck pain radiating into her upper extremities and sometimes causes her to have headaches. Patient reports low back also. Patient stated that she did receive relief from her last injection but the pain has returned. Patient stated that she still takes Norco but it only puts her to sleep and when she awakens the pain is back. Patient reports that she missed her EMG appt due to her pain, however she has been rescheduled to the end of the month Additionally the patient has not started Lyrica as previously prescribed. Patient reports memory loss. Patient reports no other health changes since her last visit. Patient reports her pain 8/10 today.   Initial encounter:    Jessie Gallardo presents to the clinic for the evaluation of neck and low back pain. The pain started 3 months ago following traumatic fall and symptoms have been worsening. Reports that her neck pain is the most concerning today.     Brief history:    She has h/o cervical fusion in 1999 in [place] and she did well after surgery. She fell on 9/2/14 when she was getting her oil changed. She was unable to stand right after the incident and she was taken by ambulance to the  "ED.     She complains of diffuse body pain. She feels like she is getting worse from the accident. She has pain from her "head to her toes."      Pain Description:    The pain is located in the neck and low back area and radiates to the shoulders and posterior thighs, respectively..     At BEST 6/10     At WORST  9/10 on the WORST day.     On average pain is rated as 7/10.     Today the pain is rated as 8/10    The pain is described as aching, burning, numbing, sharp, shooting, stabbing, tingling and weakness      Symptoms interfere with daily activity and sleeping.     Exacerbating factors: Sitting, Laying, Bending, Morning, Extension, Flexing, Lifting and Getting out of bed/chair.     Mitigating factors laying down and medications.     Patient denies night fever/night sweats, urinary incontinence, bowel incontinence, significant weight loss, significant motor weakness and loss of sensations.  Patient denies any suicidal or homicidal ideations    Pain Medications:    Current:  Neurontin 600 mg TID (1800 mg daily)  Tramadol 50 mg BID PRN  Compounding cream    Tried in Past:  NSAIDs -Aleve  TCA -Never  SNRI -Never  Muscle relaxer: Klonopin, Zanaflex, Zoloft  Opioid: Tramadol     Physical Therapy/Home Exercise: not currently     report: Reviewed and consistent with medication use as prescribed.    Pain Procedures:  1/7/2015-cervical epidural steroidal injection  1/21/2015-cervical epidural steroidal injection  6/10/2015 bilateral medial branch nerve block at the level of L3, L4, L5  6/17/2015-right medial branch nerve block at the level of L3, L4, L5  7/15/2015-left radiofrequency ablation at the level of L3, L4, L5  7/29/2015-right radiofrequency ablation at the level of L3, L4, L5  3/29/2016-left radio frequency ablation at the level of L3, L4, L5  4/12/2016-right radio frequency ablation at the level of L3, L4, L5  6/8/16 C7-T1 IL MIKE- 80% relief  8/10/16 Bilateral SI joint injections- 50% relief  11/16/16 L5-S1 " IL MIKE- 70% relief  3/15/17 C7-T1 IL MIKE- no relief  4/19/17 L5-S1 IL MIKE- 70% relief  10/10/17 L5-S1 IL MIKE- 40% relief    Chiropractor -never  Acupuncture - never  TENS unit -Yes, with PT  Spinal decompression -never  Joint replacement -never    Imaging:    MRI Cervical 05/17/2016:  Comparison is November 2014    Routine imaging of the cervical spine was obtained.    There is an osseous fusion at C4-5.  There is straightening and slight reversal of the normal cervical lordosis again seen.  Alignment of vertebral bodies is satisfactory.  Discs are desiccated throughout with disc space narrowing most pronounced at the C5-6 level, similar to prior study.  The spinal cord is normal in signal and paravertebral structures are unremarkable.    C2-3 demonstrates no significant abnormality.    C3-4 demonstrates a mild diffuse disc bulge asymmetric toward the right.  There is bilateral foraminal narrowing.  This disc bulge thins the posterior cerebrospinal fluid sleeve and abuts the anterior aspect of the spinal cord with resultant mild spinal canal narrowing.    C4-5 demonstrates osseous fusion with some posterior osteophytic spurring.  There is complete loss of the anterior cerebrospinal fluid sleeve,and the posterior cerebrospinal fluid sleeve and some flattening of the spinal cord but no abnormal spinal cord signal.  Findings appear fairly similar to prior study.  There is mild to moderate narrowing of the spinal canal.    C5-6 demonstrates posterior disc osteophyte complex asymmetric to the left more pronounced as compared to prior study.  There is thinning of the cerebrospinal fluid sleeve around the spinal cord and mild spinal canal narrowing.    C6-7 demonstrates a posterior disc osteophyte complex asymmetric toward the left.  There is thinning of the cerebrospinal fluid sleeve around the spinal cord and mild spinal canal narrowing.   Impression    In this patient with osseous fusion at C4-5, there is multilevel  spinal canal narrowing most significant at C4-5, as further detailed above.  ______________________________________     Electronically signed by: Sherie Rodriguez MD  Date: 05/18/16       Lumbar MRI 10/31/16  Narrative   MRI OF THE LUMBAR SPINE WITHOUT CONTRAST.     Technique:  Sagittal T1, sagittal T2, sagittal STIR, axial T1 and axial T2 weighted images of the lumbar spine obtained without contrast.     Comparison: MRI 11/20/2014.     Findings:  Minimal grade 1 anterolisthesis is seen at L3-4 and L4-L5.  Otherwise, sagittal vertebral body alignment is appropriate.  Vertebral body heights well-maintained.  No fracture is identified.  Mild multilevel disc degeneration is seen, most pronounced at L5-S1. No marrow signal abnormality suspicious for an infiltrative process.      The conus is normal in appearance, and terminates at the L1 level.  Multiple fibroids are seen in the uterus.      T12-L1: No focal disc herniation.  No significant spinal canal stenosis or neuroforaminal narrowing.  L1-L2: Bilateral facet arthropathy.  No focal disc herniation.  No significant spinal canal stenosis or neuroforaminal narrowing.  L2-L3: No focal disc herniation.  No significant spinal canal stenosis.  Bilateral facet arthropathy is noted, which contributes to mild bilateral neuroforaminal narrowing.  L3-L4: Uncovering of the disc with mild bulge, bilateral facet arthropathy and ligament of flavum thickening resulting in moderate right-sided and mild left-sided neuroforaminal narrowing.  L4-L5: Uncovering of the disc with mild bulge, bilateral facet arthropathy, and ligamentum flavum thickening resulting in moderate right-sided and mild left-sided neuroforaminal narrowing.  L5-S1: Broad-based disc bulge and bilateral facet arthropathy without significant spinal canal stenosis.  Moderate bilateral neuroforaminal narrowing is seen.   Impression      Multilevel lumbar spondylosis, as detailed above.         Lab Results   Component  "Value Date    HGBA1C 8.7 (H) 04/21/2016       REVIEW OF SYSTEMS:    GENERAL:  No weight loss, malaise or fevers.  HEENT:   No recent changes in vision or hearing  NECK:  Negative for lumps, no difficulty with swallowing.  RESPIRATORY:  Negative for cough, wheezing or shortness of breath, patient denies any recent URI.  CARDIOVASCULAR:  Negative for chest pain, leg swelling or palpitations. H/o htn.  GI:  Negative for abdominal discomfort, blood in stools or black stools or change in bowel habits.  MUSCULOSKELETAL:  See HPI.  SKIN:  Negative for lesions, rash, and itching.  PSYCH:  No mood disorder or recent psychosocial stressors.  Patients sleep is not disturbed secondary to pain.  HEMATOLOGY/LYMPHOLOGY:  Negative for prolonged bleeding, bruising easily or swollen nodes.  Patient is not currently taking any anti-coagulants  NEURO:   No history of syncope, paralysis, seizures or tremors. H/O headaches- followed by Dr. Herrera.  ENDO: H/O diabetes and peripheral neuropathy.  All other reviewed and negative other than HPI.      OBJECTIVE:    /76   Pulse 73   Temp 98.1 °F (36.7 °C) (Oral)   Resp 16   Ht 5' 6" (1.676 m)   Wt 99 kg (218 lb 4.1 oz)   LMP  (LMP Unknown)   BMI 35.23 kg/m²     PHYSICAL EXAMINATION:    GENERAL: Well appearing, in no acute distress, alert and oriented x3.  PSYCH:  Mood and affect appropriate.  SKIN: Skin color, texture, turgor normal, no rashes or lesions.  HEAD/FACE:  Normocephalic, atraumatic. Cranial nerves grossly intact.  NECK: There is pain to palpation over the cervical paraspinal and trapezius muscles bilaterally.  Spurling Negative.  Painful cervical extension.  Mild cervical facet loading bilaterally.  Negative Spurling.  CV: RRR with palpation of the radial artery.  PULM: No evidence of respiratory difficulty, symmetric chest rise.  BACK:  There is no pain with palpation over the facet joints of the lumbar spine bilaterally.  Limited ROM to lumbar spine with pain on " flexion and extension.  Positive facet loading bilaterally, R>L.  There is pain to palpation over the sacroiliac joints bilaterally.  FABERs is negative bilaterally.  EXTREMITIES: Peripheral joint ROM is full and pain free without obvious instability or laxity in all four extremities. No deformities, edema, or skin discoloration. Good capillary refill.  MUSCULOSKELETAL:  Pain to palpation of the subacromial bursa bilaterally.  Full ROM to bilateral shoulder with pain on internal and external rotation of left shoulder.  Bilateral upper  extremity strength is normal and symmetric.  No atrophy or tone abnormalities are noted.  NEURO: Bilateral upper extremity coordination and muscle stretch reflexes are physiologic and symmetric.  Brenda's negative. No clonus.  Decreased sensation to BLE.  GAIT: Antalgic- ambulates without cane.    Assessment:     Ms. Gallardo is a 62 y.o. female with neck, left shoulder and lower back pain consistent with the following diagnoses:    1. Lumbar radiculopathy    2. Annular tear of lumbar disc    3. DDD (degenerative disc disease), lumbar    4. Chronic pain of both shoulders    5. Sacroiliac joint pain    6. Pain        Plan:     - Previous imaging was reviewed and discussed with the patient today.    - She is having L4-5 fusion by Dr. Jerome Dey in the near future.  She is scheduled for D and C on 12/28/17 with Dr. Llamas.    - Continue Gabapentin 600 mg TID (1800 mg daily).    - Continue Tramadol 50 mg BID PRN pain, #60, 1 refill.  Phoned in today.    - The [place] Board of Pharmacy website for prescription monitoring was consulted today, and it does not suggest any deviations in conflict with the patient's controlled substance contract with our clinic. Will continue current therapy with frequent monitoring of the controlled substance database, and urine drug screens on followup. Refill approved.     - UDS from 8/11/17 reviewed and is consistent.  No UDS today.    - The patient will  continue a home exercise routine to help with pain and strengthening.      - RTC in 2 months or sooner if needed.     - Dr. Ley was consulted on the patient and agrees with this plan.      The above plan and management options were discussed at length with patient. Patient is in agreement with the above and verbalized understanding.     Lisa nAgel  12/26/2017

## 2017-12-27 ENCOUNTER — ANESTHESIA EVENT (OUTPATIENT)
Dept: SURGERY | Facility: OTHER | Age: 62
End: 2017-12-27
Payer: MEDICARE

## 2017-12-27 ENCOUNTER — HOSPITAL ENCOUNTER (OUTPATIENT)
Dept: PREADMISSION TESTING | Facility: OTHER | Age: 62
Discharge: HOME | End: 2017-12-27
Attending: OBSTETRICS & GYNECOLOGY
Payer: MEDICARE

## 2017-12-27 ENCOUNTER — OFFICE VISIT (OUTPATIENT)
Dept: OBSTETRICS AND GYNECOLOGY | Facility: CLINIC | Age: 62
End: 2017-12-27
Payer: MEDICARE

## 2017-12-27 VITALS
OXYGEN SATURATION: 97 % | HEIGHT: 66 IN | RESPIRATION RATE: 16 BRPM | TEMPERATURE: 98 F | WEIGHT: 218 LBS | HEART RATE: 70 BPM | DIASTOLIC BLOOD PRESSURE: 66 MMHG | SYSTOLIC BLOOD PRESSURE: 124 MMHG | BODY MASS INDEX: 35.03 KG/M2

## 2017-12-27 DIAGNOSIS — R93.89 THICKENED ENDOMETRIUM: Primary | ICD-10-CM

## 2017-12-27 PROCEDURE — 99499 UNLISTED E&M SERVICE: CPT | Mod: S$GLB,,, | Performed by: OBSTETRICS & GYNECOLOGY

## 2017-12-27 NOTE — ANESTHESIA PREPROCEDURE EVALUATION
12/27/2017  Jessie Gallardo is a 62 y.o., female.    Anesthesia Evaluation    I have reviewed the Patient Summary Reports.    I have reviewed the Nursing Notes.   I have reviewed the Medications.     Review of Systems  Anesthesia Hx:  Denies Family Hx of Anesthesia complications.   Denies Personal Hx of Anesthesia complications.   Social:  Non-Smoker    Hematology/Oncology:     Oncology Normal    -- Anemia: Hematology Comments: Mild anemia  [place]    EENT/Dental:EENT/Dental Normal   Cardiovascular:   Hypertension hyperlipidemia Hx abnormal EKG  Recent w/u echo/thallium stress test reportedly WNL    CP thought to be GI related   Pulmonary:   Shortness of breath ([place] one block) Sleep Apnea, CPAP    Renal/:   Chronic Renal Disease, CRI    Hepatic/GI:   GERD    Musculoskeletal:   Arthritis   Spine Disorders: lumbar Degenerative disease and Chronic Pain    Neurological:   fibromyalgia  Chronic Pain Syndrome  Peripheral Neuropathy    Endocrine:   Diabetes, type 2, using insulin    Dermatological:  Skin Normal    Psych:   anxiety depression          Physical Exam  General:  Obesity    Airway/Jaw/Neck:  Airway Findings: Mouth Opening: Small, but > 3cm Mallampati: II  TM Distance: 4 - 6 cm  Jaw/Neck Findings:  Neck ROM: Extension Decreased, Mild, Extension Painful      Dental:  Dental Findings: In tact        Mental Status:  Mental Status Findings:  Cooperative, Alert and Oriented         Anesthesia Plan  Type of Anesthesia, risks & benefits discussed:  Anesthesia Type:  general  Patient's Preference:   Intra-op Monitoring Plan: standard ASA monitors  Intra-op Monitoring Plan Comments:   Post Op Pain Control Plan: multimodal analgesia  Post Op Pain Control Plan Comments:   Induction:   IV  Beta Blocker:         Informed Consent: Patient understands risks and agrees with Anesthesia plan.  Questions  answered. Anesthesia consent signed with patient.  ASA Score: 3     Day of Surgery Review of History & Physical:    H&P update referred to the surgeon.     Anesthesia Plan Notes: Pt originally scheduled Dec 27, cx for abnormal EKG / chest pain and referred for w/u    Per pt, w/u completed and pt cleared. CP thought to be due to GI.  Cardiac clearance now on chart,ready for surgery [facility]        Ready For Surgery From Anesthesia Perspective.

## 2017-12-27 NOTE — PRE ADMISSION SCREENING
Dr Madera reviewed outside EKG. Cardiac clearance requested per Dr Madera. Dr Madera spoke with Dr Llamas and informed her  That pt would be cancelled for tomorrow and would have to see a cardilogist for cardiac clearance. Dr Llamas aware and pt instructed to set up appointment for clearance  Before proceeding with surgery.

## 2017-12-27 NOTE — PROGRESS NOTES
PHONED PT'S NUMBER, LEF MESSAGE - ADVISED HER TO F/U CARDIOLOGY AS ANESTHESIA HAS SUGGESTED, AND WE WILL PUSH SURGERY BACK, POSS END OF January.   WILL F/U RESCHEDULED PREOP VISIT

## 2017-12-27 NOTE — DISCHARGE INSTRUCTIONS
PRE-ADMIT TESTING -  [phone]    [address]  [place] [facility]          Your surgery has been scheduled at [facility]. We are pleased to have the opportunity to serve you. For Further Information please call [phone].    On the day of surgery please report to the Information Desk on the 1st floor.    · CONTACT YOUR PHYSICIAN'S OFFICE THE DAY PRIOR TO YOUR SURGERY TO OBTAIN YOUR ARRIVAL TIME.     · The evening before surgery do not eat anything after 9 p.m. ( this includes hard candy, chewing gum and mints).  You may only have GATORADE, POWERADE AND WATER  from 9 p.m. until you leave your home.   DO NOT DRINK ANY LIQUIDS ON THE WAY TO THE HOSPITAL.      SPECIAL MEDICATION INSTRUCTIONS: TAKE medications checked off by the Anesthesiologist on your Medication List.    Angiogram Patients: Take medications as instructed by your physician, including aspirin.     Surgery Patients:    If you take ASPIRIN - Your PHYSICIAN/SURGEON will need to inform you IF/OR when you need to stop taking aspirin prior to your surgery.     Do Not take any medications containing IBUPROFEN.  Do Not Wear any make-up or dark nail polish   (especially eye make-up) to surgery. If you come to surgery with makeup on you will be required to remove the makeup or nail polish.    Do not shave your surgical area at least 5 days prior to your surgery. The surgical prep will be performed at the hospital according to Infection Control regulations.    Leave all valuables at home.   Do Not wear any jewelry or watches, including any metal in body piercings.  Contact Lens must be removed before surgery. Either do not wear the contact lens or bring a case and solution for storage.  Please bring a container for eyeglasses or dentures as required.  Bring any paperwork your physician has provided, such as consent forms,  history and physicals, doctor's orders, etc.   Bring comfortable clothes that are loose fitting to wear upon  discharge. Take into consideration the type of surgery being performed.  Maintain your diet as advised per your physician the day prior to surgery.      Adequate rest the night before surgery is advised.   Park in the Parking lot behind the hospital or in the [facility] across the street from the parking lot. Parking is complimentary.  If you will be discharged the same day as your procedure, please arrange for a responsible adult to drive you home or to accompany you if traveling by taxi.   YOU WILL NOT BE PERMITTED TO DRIVE OR TO LEAVE THE HOSPITAL ALONE AFTER SURGERY.   It is strongly recommended that you arrange for someone to remain with you for the first 24 hrs following your surgery.       Thank you for your cooperation.  The Staff of [facility].        Bathing Instructions                                                                 Please shower the evening before and morning of your procedure with    ANTIBACTERIAL SOAP. ( DIAL, etc )  Concentrate on the surgical area   for at least 3 minutes and rinse completely. Dry off as usual.   Do not use any deodorant, powder, body lotions, perfume, after shave or    cologne.

## 2017-12-29 ENCOUNTER — TELEPHONE (OUTPATIENT)
Dept: OBSTETRICS AND GYNECOLOGY | Facility: CLINIC | Age: 62
End: 2017-12-29

## 2017-12-29 NOTE — TELEPHONE ENCOUNTER
----- Message from Violeta Short sent at 12/29/2017 10:51 AM CST -----  Contact: FATIMAH BURR [[id number]]  x_  1st Request  _  2nd Request  _  3rd Request        Who: FATIMAH BURR [[id number]]    Why: Requesting a call back in regards to a call from the office. Please call her back.     What Number to Call Back: [phone]    When to Expect a call back: (Within 24 hours)    Please return the call at earliest convenience. Thanks!

## 2017-12-29 NOTE — TELEPHONE ENCOUNTER
Patient was informed that she need to obtain cardiac clearance from her PCP before her surgery can be rescheduled,patient states that she will contact her PCP on Tuesday 1/2/18 to setup an appointment,patient was instructed to call the office back once the clearance is done to possible schedule surgery for 1/18 or 1/25,per Dr. Llamas,patient verbalized understanding.

## 2018-01-02 ENCOUNTER — TELEPHONE (OUTPATIENT)
Dept: OBSTETRICS AND GYNECOLOGY | Facility: CLINIC | Age: 63
End: 2018-01-02

## 2018-01-02 NOTE — TELEPHONE ENCOUNTER
I spoke to the pt and she will call in the am to schedule an apt in urgent care for pelvic pain and discharge

## 2018-01-02 NOTE — TELEPHONE ENCOUNTER
----- Message from Juan Celestin sent at 1/2/2018 10:45 AM CST -----  Contact: Patient  _x 1st Request   _ 2nd Request   _ 3rd Request     Who: FATIMAH BURR [[id number]]    Why: patient is requesting a call back in reference to questions she has about her upcoming procedure.     What Number to Call Back: [phone]    When to Expect a call back: (Before the end of the day)   -- if call after 3:00 call back will be tomorrow.

## 2018-01-04 ENCOUNTER — TELEPHONE (OUTPATIENT)
Dept: OBSTETRICS AND GYNECOLOGY | Facility: CLINIC | Age: 63
End: 2018-01-04

## 2018-01-04 ENCOUNTER — OFFICE VISIT (OUTPATIENT)
Dept: OBSTETRICS AND GYNECOLOGY | Facility: CLINIC | Age: 63
End: 2018-01-04
Payer: MEDICARE

## 2018-01-04 VITALS
BODY MASS INDEX: 35.04 KG/M2 | DIASTOLIC BLOOD PRESSURE: 66 MMHG | HEIGHT: 66 IN | SYSTOLIC BLOOD PRESSURE: 108 MMHG | WEIGHT: 218.06 LBS

## 2018-01-04 DIAGNOSIS — N76.0 ACUTE VAGINITIS: Primary | ICD-10-CM

## 2018-01-04 LAB
CANDIDA RRNA VAG QL PROBE: NEGATIVE
G VAGINALIS RRNA GENITAL QL PROBE: NEGATIVE
T VAGINALIS RRNA GENITAL QL PROBE: NEGATIVE

## 2018-01-04 PROCEDURE — 87480 CANDIDA DNA DIR PROBE: CPT

## 2018-01-04 PROCEDURE — 99213 OFFICE O/P EST LOW 20 MIN: CPT | Mod: S$GLB,,, | Performed by: NURSE PRACTITIONER

## 2018-01-04 PROCEDURE — 99999 PR PBB SHADOW E&M-EST. PATIENT-LVL IV: CPT | Mod: PBBFAC,,, | Performed by: NURSE PRACTITIONER

## 2018-01-04 RX ORDER — METRONIDAZOLE 500 MG/1
500 TABLET ORAL EVERY 12 HOURS
Qty: 14 TABLET | Refills: 0 | Status: SHIPPED | OUTPATIENT
Start: 2018-01-04 | End: 2018-01-11

## 2018-01-04 RX ORDER — OXYCODONE AND ACETAMINOPHEN 5; 325 MG/1; MG/1
TABLET ORAL
COMMUNITY
Start: 2017-11-22 | End: 2018-02-12

## 2018-01-04 NOTE — TELEPHONE ENCOUNTER
----- Message from Dru Richter sent at 1/4/2018 10:26 AM CST -----  PLEASE CALL PT SHE IS IN A LOT OF PAIN [phone]

## 2018-01-04 NOTE — TELEPHONE ENCOUNTER
Called pt who stated she was having pelvic pain with discharge and a bad odor.  I set up pt to go to  today at 2:20.  Pt stated she has an appointment with her Cardiologist on Monday and will be calling us back with results of that appointment so we reschedule her previously canceled surgery.  Pt is also concerned about her drastic weight loss and was worried that it was cancer.  I explained to pt that she should keep her upcomming appointments which will help her get to the bottom of her pain and weight loss issues.  Pt verbalized understanding and thanked me for the call.

## 2018-01-04 NOTE — PROGRESS NOTES
Jessie Gallardo is a 62 y.o. female  presents to urgent care with c/o a foul vaginal odor x 1 week. She has been bathing twice daily and cannot get rid of odor. Pt has multiple doctor appointments coming up to r/o a heart murmur before she can have a hysteroscopy D&C with Dr. Llamas. Denies abnormal discharge or itching.     Past Medical History:   Diagnosis Date    Anxiety     Chest tightness     Chronic back pain     See neuro     Chronic low back pain     Depression     Diabetes mellitus     type 2    Diabetes mellitus type II     Diabetic neuropathy     Diverticulosis     last colonoscopy was in     Encounter for blood transfusion     Fibroids     Fibromyalgia     GERD (gastroesophageal reflux disease)     History of palpitations     Hoarseness of voice     Hyperlipidemia     Hypertension     Obesity     Obstructive sleep apnea     wear CPAP machine nightly    Pancreatitis     Post menopausal syndrome      Past Surgical History:   Procedure Laterality Date    ABDOMINAL SURGERY      CHOLECYSTECTOMY      MIKE      fibroid      OOPHORECTOMY      xlap, right ovary removed due to infection?    right ovary removal Right     UTERINE ARTERY EMBOLIZATION  3/14     Social History   Substance Use Topics    Smoking status: Never Smoker    Smokeless tobacco: Never Used    Alcohol use No      Comment: Rarely     Family History   Problem Relation Age of Onset    Breast cancer Neg Hx     Ovarian cancer Neg Hx     Colon cancer Neg Hx     Alcohol abuse Father     Cancer Sister      throat ca    Prostate cancer Brother     Heart attack Paternal Uncle     Cirrhosis Sister     Stroke Mother     Diabetes Mother     Hypertension Mother     No Known Problems Daughter     No Known Problems Son     No Known Problems Maternal Grandmother     No Known Problems Daughter     No Known Problems Son     No Known Problems Sister     No Known Problems Sister     No Known Problems Sister   "   No Known Problems Brother     No Known Problems Brother     No Known Problems Brother     No Known Problems Brother     Cancer Paternal Aunt     Lupus Grandchild     No Known Problems Maternal Aunt     No Known Problems Maternal Uncle     No Known Problems Maternal Grandfather     No Known Problems Paternal Grandfather     No Known Problems Paternal Grandmother     No Known Problems Cousin     Heart disease Neg Hx     Heart failure Neg Hx      OB History    Para Term  AB Living   5 4 4 0 1 3   SAB TAB Ectopic Multiple Live Births   1 0 0   3      # Outcome Date GA Lbr Juan Miguel/2nd Weight Sex Delivery Anes PTL Lv   5 SAB            4 Term      Vag-Spont  N MICHELLE   3 Term      Vag-Spont  N MICHELLE   2 Term      Vag-Spont  N MICHELLE   1 Term      Vag-Spont  N           /66   Ht 5' 6" (1.676 m)   Wt 98.9 kg (218 lb 0.6 oz)   LMP  (LMP Unknown)   BMI 35.19 kg/m²     ROS:  GENERAL: No fever, chills, fatigability or weight loss.  VULVAR: No pain, no lesions and no itching.  VAGINAL: No relaxation, no itching, no discharge, no abnormal bleeding and no lesions. + odor  ABDOMEN: No abdominal pain. Denies nausea. Denies vomiting. No diarrhea. No constipation  BREAST: Denies pain. No lumps. No discharge.  URINARY: No incontinence, no nocturia, no frequency and no dysuria.  CARDIOVASCULAR: No chest pain. No shortness of breath. No leg cramps.  NEUROLOGICAL: No headaches. No vision changes.    PHYSICAL EXAM:  VULVA: normal appearing vulva with no masses, tenderness or lesions   VAGINA: normal appearing vagina with normal color. NO discharge, + odor, no lesions   CERVIX: normal appearing cervix without discharge or lesions   UTERUS: uterus is normal size, shape, consistency and nontender   ADNEXA: normal adnexa in size, nontender and no masses    ASSESSMENT and PLAN:    ICD-10-CM ICD-9-CM    1. Acute vaginitis N76.0 616.10 Vaginosis Screen by DNA Probe      metroNIDAZOLE (FLAGYL) 500 MG tablet      " CANCELED: C. trachomatis/N. gonorrhoeae by AMP DNA Cervix     -affirm pending  -probable BV based on pt complaints and exam  -rx flagyl    Patient was counseled today on vaginitis prevention including :  a. avoiding feminine products such as deoderant soaps, body wash, bubble bath, douches, scented toilet paper, deoderant tampons or pads, feminine wipes, chronic pad use, etc.  b. avoiding other vulvovaginal irritants such as long hot baths, humidity, tight, synthetic clothing, chlorine and sitting around in wet bathing suits  c. wearing cotton underwear, avoiding thong underwear and no underwear to bed  d. taking showers instead of baths and use a hair dryer on cool setting afterwards to dry  e. wearing cotton to exercise and shower immediately after exercise and change clothes  f. using polyurethane condoms without spermicide if sexually active and symptoms are triggered by intercourse    FOLLOW UP: PRN lack of improvement.

## 2018-01-17 ENCOUNTER — TELEPHONE (OUTPATIENT)
Dept: OBSTETRICS AND GYNECOLOGY | Facility: CLINIC | Age: 63
End: 2018-01-17

## 2018-01-17 DIAGNOSIS — R93.89 THICKENED ENDOMETRIUM: Primary | ICD-10-CM

## 2018-01-17 NOTE — TELEPHONE ENCOUNTER
----- Message from Jaime Hidalgo sent at 1/17/2018 10:55 AM CST -----  Contact: pt  x_ 1st Request  _ 2nd Request  _ 3rd Request    Who: pt    Why: is calling to confirm procedure appointment scheduled for 01/18/18. Pt is needing to speak with staff.     What Number to Call Back: [phone]    When to Expect a call back: (Before the end of the day)  -- if call after 3:00 call back will be tomorrow.

## 2018-01-18 NOTE — PROGRESS NOTES
PHONED RE RESCHEDULING SURGERY, WOULD LIKE AS SOON AS FEASIBLE.  SAYS SHE HAS COMPLETED HER CARDIAC EVALUATION AND WILL BRING CLEARANCE WITH HER TO AN ANESTHESIA PREOP EVAL.  WILL ENTER ORDERS FOR 1/25 TO FOLLOW OTHER CASES THAT DAY.

## 2018-01-19 RX ORDER — ENALAPRIL MALEATE 20 MG/1
TABLET ORAL
Qty: 180 TABLET | Refills: 0 | OUTPATIENT
Start: 2018-01-19

## 2018-01-19 RX ORDER — LOVASTATIN 20 MG/1
TABLET ORAL
Qty: 90 TABLET | Refills: 0 | OUTPATIENT
Start: 2018-01-19

## 2018-01-22 ENCOUNTER — OFFICE VISIT (OUTPATIENT)
Dept: OBSTETRICS AND GYNECOLOGY | Facility: CLINIC | Age: 63
End: 2018-01-22
Payer: MEDICARE

## 2018-01-22 ENCOUNTER — HOSPITAL ENCOUNTER (OUTPATIENT)
Dept: PREADMISSION TESTING | Facility: OTHER | Age: 63
Discharge: HOME | End: 2018-01-22
Attending: OBSTETRICS & GYNECOLOGY
Payer: MEDICARE

## 2018-01-22 VITALS
TEMPERATURE: 98 F | BODY MASS INDEX: 34.87 KG/M2 | HEIGHT: 66 IN | DIASTOLIC BLOOD PRESSURE: 67 MMHG | HEART RATE: 70 BPM | OXYGEN SATURATION: 99 % | WEIGHT: 217 LBS | SYSTOLIC BLOOD PRESSURE: 112 MMHG

## 2018-01-22 VITALS — BODY MASS INDEX: 35.08 KG/M2 | HEIGHT: 66 IN | WEIGHT: 218.25 LBS

## 2018-01-22 DIAGNOSIS — N95.0 PMB (POSTMENOPAUSAL BLEEDING): ICD-10-CM

## 2018-01-22 DIAGNOSIS — R93.89 THICKENED ENDOMETRIUM: ICD-10-CM

## 2018-01-22 DIAGNOSIS — Z01.818 PREOP EXAMINATION: Primary | ICD-10-CM

## 2018-01-22 PROCEDURE — 99999 PR PBB SHADOW E&M-EST. PATIENT-LVL II: CPT | Mod: PBBFAC,,, | Performed by: OBSTETRICS & GYNECOLOGY

## 2018-01-22 PROCEDURE — 99499 UNLISTED E&M SERVICE: CPT | Mod: 56,S$GLB,, | Performed by: OBSTETRICS & GYNECOLOGY

## 2018-01-22 RX ORDER — SODIUM CHLORIDE, SODIUM LACTATE, POTASSIUM CHLORIDE, CALCIUM CHLORIDE 600; 310; 30; 20 MG/100ML; MG/100ML; MG/100ML; MG/100ML
INJECTION, SOLUTION INTRAVENOUS CONTINUOUS
Status: CANCELLED | OUTPATIENT
Start: 2018-01-22

## 2018-01-22 RX ORDER — LIDOCAINE HYDROCHLORIDE 10 MG/ML
0.5 INJECTION, SOLUTION EPIDURAL; INFILTRATION; INTRACAUDAL; PERINEURAL ONCE
Status: CANCELLED | OUTPATIENT
Start: 2018-01-22 | End: 2018-01-22

## 2018-01-22 RX ORDER — ALBUTEROL SULFATE 90 UG/1
2 AEROSOL, METERED RESPIRATORY (INHALATION) EVERY 6 HOURS PRN
COMMUNITY
End: 2018-06-27

## 2018-01-22 RX ORDER — ALBUTEROL SULFATE 0.83 MG/ML
2.5 SOLUTION RESPIRATORY (INHALATION)
Status: CANCELLED | OUTPATIENT
Start: 2018-01-22 | End: 2018-01-22

## 2018-01-22 RX ORDER — DIVALPROEX SODIUM 500 MG/1
500 TABLET, FILM COATED, EXTENDED RELEASE ORAL NIGHTLY
COMMUNITY
End: 2018-06-27

## 2018-01-22 NOTE — PROGRESS NOTES
DISCUSSION OF PREOPERATIVE MANAGEMENT OPTIONS AND ANTICIPATED TREATMENT OUTCOMES:    Jessie Gallardo is a 62 y.o. female , No LMP recorded (lmp unknown). Patient is postmenopausal., who presents  presents today preoperatively for discussion of management options, projected postoperative recovery, and anticipated outcomes following her planned procedure. Alternatives to the procedure including possible medical therapy was reviewed.  The patient is scheduled for HYSTEROSCOPY D&C 2018 THICKENED ENDOMETRIUM AND PMB OVER THE PAST 2-3 MONTHS.      HISTORY AND PHYSICAL EXAMINATION DERIVED FORM PRIOR OFFICE VISITS REVIEWED:  Jessie Gallardo is a 61 y.o. female, , No LMP recorded (lmp unknown). Patient is postmenopausal.,  presents for a problem visit, complaining of ??ENDOMETRIOSIS.  WAS SEEN 3 WEEKS AGO BY NP WITH VAGINAL DISCHARGE, ATROPHIC VAGINITIS AND 2 WEEKS AGO FOR ROUTINE VISIT WITH DR ALVAREZ, C/O SEVERAL MONTHS LLQ PAIN.  HAS HX FIBROIDS, CONCERNED THAT ACUTE PAIN MIGHT BE RELATED BUT DISCUSSED CAUSES OF PELVIC PAIN - LLQ PAIN MORE LIKELY DUE TO CHRONIC GI ISSUES AND CONSTIPATION.  AS A SEPARATE ISSUE, RECENT PELVIC USG WITH THICKENED EM AND FLUID - ENDOMETRIAL BIOPSY ATTEMPTED  BUT COULD NOT NEGOTIATE STENOTIC CERVIX.  FURTHER QUESTIONING HAS HAD OVER THE PAST 2 MONTHS SEVERAL EPISODES OF MILD BLEEDING/ SPOTTING.  WAS INITIALLY SCHEDULED FOR HYSTEROSCOPY D&C  BUT NEEDED PREOP CARDIAC CLEARANCE, WHICH SHE SAYS SHE RECEIVED APPROX 1 WEEK AGO  .    2017:  Findings: Transabdominal and transvaginal imaging performed.  Uterus measures 7.2 x 5.1 x 5.3 cm. Endometrium measures 7.3 mm with fluid noted.  Multiple partially calcified fibroids noted, the largest 2.7 cm and may have a submucosal component at the anterior fundus  Ovaries not visualized. No free fluid.   Impression    Thickened endometrium with endometrial fluid. Correlate clinically and with endometrial biopsy as needed.  Multiple fibroids which appear at least partially calcified with one fibroid possibly partially submucosal.   11/28/2017 EM BX:  FINAL PATHOLOGIC DIAGNOSIS  ENDOMETRIAL BIOPSY:  TISSUE INSUFFICIENT FOR DIAGNOSIS SCANTY INNOCUOUS APPEARING GLANDULAR FRAGMENTS  MOST CONSISTENT WITH ENDOCERVICAL ORIGIN     ROUTINE VISIT 11/7/2017 DR ALVAREZ:  Jessie Gallardo is a 61 y.o. female who presents for an annual exam.   She reports her periods are: none   Patient reports left sided pelvic pain.  Had UAE a few years ago due to mass effect of fibroids.    She states the case was a success and she feels much better.   Complains of vaginitis No          Past Medical History:   Diagnosis Date    Anxiety      Chronic back pain       See neuro     Chronic low back pain      Depression      Diabetes mellitus       type 2    Diabetes mellitus type II      Diabetic neuropathy      Diverticulosis       last colonoscopy was in 2000    Encounter for blood transfusion      Fibroids      Fibromyalgia      GERD (gastroesophageal reflux disease)      Hyperlipidemia      Hypertension      Obesity      Obstructive sleep apnea       wear CPAP machine nightly    Pancreatitis      Post menopausal syndrome                 Past Surgical History:   Procedure Laterality Date    ABDOMINAL SURGERY        CHOLECYSTECTOMY        fibroid        OOPHORECTOMY         xlap, right ovary removed due to infection?    right ovary removal Right      SPINE SURGERY         cervical spine    UTERINE ARTERY EMBOLIZATION   3/14         MEDICATIONS AND ALLERGIES:        Current Outpatient Prescriptions:     albuterol 90 mcg/actuation inhaler, Inhale 2 puffs into the lungs every 6 (six) hours as needed for Wheezing or Shortness of Breath., Disp: 18 g, Rfl: 0    amlodipine (NORVASC) 10 MG tablet, Take 1 tablet (10 mg total) by mouth once daily., Disp: 90 tablet, Rfl: 3    amLODIPine (NORVASC) 10 MG tablet, , Disp: , Rfl:      "amoxicillin-clavulanate 875-125mg (AUGMENTIN) 875-125 mg per tablet, , Disp: , Rfl:     aspirin (ECOTRIN) 81 MG EC tablet, Take 1 tablet (81 mg total) by mouth once daily., Disp: 90 tablet, Rfl: 2    BD INSULIN PEN NEEDLE UF SHORT 31 gauge x 5/16" Ndle, , Disp: , Rfl:     blood sugar diagnostic (FREESTYLE LITE STRIPS) Strp, TEST BLOOD SUGAR 3 TIMES A DAY, Disp: 100 strip, Rfl: 5    butalbital-acetaminophen-caffeine -40 mg (FIORICET, ESGIC) -40 mg per tablet, TK 1 T PO  Q 6-8 HOURS PRN, Disp: , Rfl: 3    butalbital-acetaminophen-caffeine -40 mg (FIORICET, ESGIC) -40 mg per tablet, TK 1 T PO  Q 6-8 HOURS PRN, Disp: , Rfl: 3    divalproex (DEPAKOTE) 500 MG Tb24, Take 2 tablets (1,000 mg total) by mouth every evening., Disp: 180 tablet, Rfl: 3    divalproex ER (DEPAKOTE) 500 MG Tb24, TAKE 2 TABLETS BY MOUTH EVERY EVENING, Disp: 180 tablet, Rfl: 0    duloxetine (CYMBALTA) 30 MG capsule, , Disp: , Rfl:     enalapril (VASOTEC) 20 MG tablet, TAKE 1 TABLET(20 MG) BY MOUTH TWICE DAILY, Disp: 180 tablet, Rfl: 0    enalapril (VASOTEC) 20 MG tablet, , Disp: , Rfl:     fluconazole (DIFLUCAN) 150 MG Tab, , Disp: , Rfl:     fluticasone (FLONASE) 50 mcg/actuation nasal spray, USE 2 SPRAYS IN EACH NOSTRIL ONCE DAILY, Disp: 16 g, Rfl: 3    gabapentin (NEURONTIN) 300 MG capsule, Take 2 capsules (600 mg total) by mouth 3 (three) times daily., Disp: 540 capsule, Rfl: 0    gabapentin (NEURONTIN) 300 MG capsule, , Disp: , Rfl:     hydrochlorothiazide (HYDRODIURIL) 25 MG tablet, TAKE 1 TABLET BY MOUTH ONCE DAILY, Disp: 90 tablet, Rfl: 0    hydroCHLOROthiazide (HYDRODIURIL) 25 MG tablet, , Disp: , Rfl:     insulin aspart (NOVOLOG FLEXPEN) 100 unit/mL InPn pen, INJECT 20 UNITS UNDER THE SKIN THREE TIMES DAILY WITH MEALS, Disp: 45 mL, Rfl: 3    insulin detemir (LEVEMIR FLEXTOUCH) 100 unit/mL (3 mL) SubQ InPn pen, Inject 32 units bid, Disp: 45 mL, Rfl: 0    insulin needles, disposable, (NOVOFINE 30) 30 " "x 1/3 " Ndle, USE AS DIRECTED THREE TIMES DAILY, Disp: 100 each, Rfl: 5    lancets (FREESTYLE LANCETS) 28 gauge Misc, Inject 1 lancet into the skin 3 (three) times daily., Disp: 100 each, Rfl: 5    levoFLOXacin (LEVAQUIN) 500 MG tablet, , Disp: , Rfl:     lidocaine-prilocaine (EMLA) cream, , Disp: , Rfl:     LINZESS 145 mcg Cap capsule, TK 1 C PO ONCE A DAY ON AN EMPTY STOMACH, Disp: , Rfl: 2    LINZESS 145 mcg Cap capsule, , Disp: , Rfl:     lorazepam (ATIVAN) 1 MG tablet, Take 1 tablet (1 mg total) by mouth On call Procedure (for MRI)., Disp: 1 tablet, Rfl: 0    lovastatin (MEVACOR) 20 MG tablet, TAKE 1 TABLET BY MOUTH EVERY EVENING, Disp: 90 tablet, Rfl: 0    lovastatin (MEVACOR) 20 MG tablet, , Disp: , Rfl:     methocarbamol (ROBAXIN) 500 MG Tab, TK 2 TS PO TID, Disp: , Rfl: 0    MOVANTIK tablet, , Disp: , Rfl:     MOVANTIK tablet, , Disp: , Rfl:     NOVOLOG FLEXPEN 100 unit/mL InPn pen, , Disp: , Rfl:     omeprazole (PRILOSEC) 20 MG capsule, TAKE ONE CAPSULE BY MOUTH TWICE DAILY, Disp: 180 capsule, Rfl: 0    ondansetron (ZOFRAN) 4 MG tablet, TK 1 T PO Q 8 H PRF NAUSEA, Disp: , Rfl: 0    ondansetron (ZOFRAN-ODT) 4 MG TbDL, , Disp: , Rfl:     pantoprazole (PROTONIX) 40 MG tablet, TK 1 T PO ONCE A DAY, Disp: , Rfl: 1    pantoprazole (PROTONIX) 40 MG tablet, , Disp: , Rfl:     pen needle, diabetic (BD INSULIN PEN NEEDLE UF MINI) 31 gauge x 3/16" Ndle, USE AS DIRECTED TWICE DAILY, Disp: 100 each, Rfl: 3    traMADol (ULTRAM) 50 mg tablet, TAKE 1 TABLET(50 MG) BY MOUTH TWICE DAILY AS NEEDED FOR PAIN, Disp: 60 tablet, Rfl: 1    traMADol (ULTRAM) 50 mg tablet, , Disp: , Rfl:     TRUE METRIX GLUCOSE METER Misc, UTD, Disp: , Rfl: 0    VICTOZA 3-SHAHBAZ 0.6 mg/0.1 mL (18 mg/3 mL) PnIj, , Disp: , Rfl:      Review of patient's allergies indicates:  No Known Allergies            Family History   Problem Relation Age of Onset    Breast cancer Neg Hx      Ovarian cancer Neg Hx      Colon cancer Neg Hx      " Alcohol abuse Father      Cancer Sister         throat ca    Prostate cancer Brother      Heart attack Paternal Uncle      Cirrhosis Sister      Stroke Mother      Diabetes Mother      Hypertension Mother      No Known Problems Daughter      No Known Problems Son      No Known Problems Maternal Grandmother      No Known Problems Daughter      No Known Problems Son      No Known Problems Sister      No Known Problems Sister      No Known Problems Sister      No Known Problems Brother      No Known Problems Brother      No Known Problems Brother      No Known Problems Brother      Cancer Paternal Aunt      Lupus Grandchild      No Known Problems Maternal Aunt      No Known Problems Maternal Uncle      No Known Problems Maternal Grandfather      No Known Problems Paternal Grandfather      No Known Problems Paternal Grandmother      No Known Problems Cousin      Heart disease Neg Hx      Heart failure Neg Hx           Social History   Social History            Social History    Marital status: Single       Spouse name: N/A    Number of children: 4    Years of education: N/A           Occupational History      Disabled             Social History Main Topics    Smoking status: Never Smoker    Smokeless tobacco: Never Used    Alcohol use Yes         Comment: Rarely    Drug use: No    Sexual activity: Not Currently       Partners: Male       Birth control/ protection: Post-menopausal         Comment: ,  4 grown kids            Other Topics Concern    Not on file          Social History Narrative     ** Merged History Encounter **                  COMPREHENSIVE GYN HISTORY:  PAP History: Denies abnormal Paps.  Infection History: Denies STDs. Denies PID.  Benign History: Denies uterine fibroids. Denies ovarian cysts. Denies endometriosis. Denies other conditions.  Cancer History: Denies cervical cancer. Denies uterine cancer or hyperplasia. Denies ovarian cancer. Denies vulvar  cancer or pre-cancer. Denies vaginal cancer or pre-cancer. Denies breast cancer. Denies colon cancer.   ROS:  GENERAL: No weight changes. No swelling. No fatigue. No fever.  CARDIOVASCULAR: No chest pain. No shortness of breath. No leg cramps.   NEUROLOGICAL: No headaches. No vision changes.  BREASTS: No pain. No lumps. No discharge.  ABDOMEN: No pain. No nausea. No vomiting. No diarrhea. No constipation.  REPRODUCTIVE: No abnormal bleeding.   VULVA: No pain. No lesions. No itching.  VAGINA: No relaxation. No itching. No odor. No discharge. No lesions.  URINARY: No incontinence. No nocturia. No frequency. No dysuria.     Wt 100 kg (220 lb 7.4 oz)   LMP  (LMP Unknown)   BMI 35.58 kg/m²      PE:  APPEARANCE: Well nourished, well developed, in no acute distress.  ABDOMEN: Soft. No tenderness or masses. No hepatosplenomegaly. No hernias.  BREASTS, FUNDOSCOPIC, RECTAL DEFERRED  PELVIC: External female genitalia without lesions.  Female hair distribution. Adequate perineal body, Normal urethral meatus. Vagina moist and well rugated without lesions or discharge.  No significant cystocele or rectocele present. Cervix pink without lesions, discharge or tenderness. Uterus is TOP normal size, regular, mobile and nontender. Adnexa without masses or tenderness.  EXTREMITIES: No edema       A procedure-specific consent form was reviewed with the patient and signed.  The patient seemed to understand the procedure and it potential complications.  She did not have further questions at the end of the visit.      PLAN:  Proceed with the scheduled procedure as planned.    FOLLOW-UP:  After hospitalization.  A postoperative visit 2-3 weeks following the procedure was advised and scheduled.

## 2018-01-22 NOTE — DISCHARGE INSTRUCTIONS
PRE-ADMIT TESTING -  [phone]    [address]  [place] [facility]          Your surgery has been scheduled at [facility]. We are pleased to have the opportunity to serve you. For Further Information please call [phone].    On the day of surgery please report to the Information Desk on the 1st floor.    · CONTACT YOUR PHYSICIAN'S OFFICE THE DAY PRIOR TO YOUR SURGERY TO OBTAIN YOUR ARRIVAL TIME.     · The evening before surgery do not eat anything after 9 p.m. ( this includes hard candy, chewing gum and mints).  You may only have GATORADE, POWERADE AND WATER  from 9 p.m. until you leave your home.   DO NOT DRINK ANY LIQUIDS ON THE WAY TO THE HOSPITAL.      SPECIAL MEDICATION INSTRUCTIONS: TAKE medications checked off by the Anesthesiologist on your Medication List.    Angiogram Patients: Take medications as instructed by your physician, including aspirin.     Surgery Patients:    If you take ASPIRIN - Your PHYSICIAN/SURGEON will need to inform you IF/OR when you need to stop taking aspirin prior to your surgery.     Do Not take any medications containing IBUPROFEN.  Do Not Wear any make-up or dark nail polish   (especially eye make-up) to surgery. If you come to surgery with makeup on you will be required to remove the makeup or nail polish.    Do not shave your surgical area at least 5 days prior to your surgery. The surgical prep will be performed at the hospital according to Infection Control regulations.    Leave all valuables at home.   Do Not wear any jewelry or watches, including any metal in body piercings.  Contact Lens must be removed before surgery. Either do not wear the contact lens or bring a case and solution for storage.  Please bring a container for eyeglasses or dentures as required.  Bring any paperwork your physician has provided, such as consent forms,  history and physicals, doctor's orders, etc.   Bring comfortable clothes that are loose fitting to wear upon  discharge. Take into consideration the type of surgery being performed.  Maintain your diet as advised per your physician the day prior to surgery.      Adequate rest the night before surgery is advised.   Park in the Parking lot behind the hospital or in the [facility] across the street from the parking lot. Parking is complimentary.  If you will be discharged the same day as your procedure, please arrange for a responsible adult to drive you home or to accompany you if traveling by taxi.   YOU WILL NOT BE PERMITTED TO DRIVE OR TO LEAVE THE HOSPITAL ALONE AFTER SURGERY.   It is strongly recommended that you arrange for someone to remain with you for the first 24 hrs following your surgery.       Thank you for your cooperation.  The Staff of [facility].        Bathing Instructions                                                                 Please shower the evening before and morning of your procedure with    ANTIBACTERIAL SOAP. ( DIAL, etc )  Concentrate on the surgical area   for at least 3 minutes and rinse completely. Dry off as usual.   Do not use any deodorant, powder, body lotions, perfume, after shave or    cologne.

## 2018-01-25 ENCOUNTER — HOSPITAL ENCOUNTER (OUTPATIENT)
Facility: OTHER | Age: 63
Discharge: HOME | End: 2018-01-25
Attending: OBSTETRICS & GYNECOLOGY | Admitting: OBSTETRICS & GYNECOLOGY
Payer: MEDICARE

## 2018-01-25 ENCOUNTER — SURGERY (OUTPATIENT)
Age: 63
End: 2018-01-25

## 2018-01-25 ENCOUNTER — ANESTHESIA (OUTPATIENT)
Dept: SURGERY | Facility: OTHER | Age: 63
End: 2018-01-25
Payer: MEDICARE

## 2018-01-25 VITALS
DIASTOLIC BLOOD PRESSURE: 78 MMHG | BODY MASS INDEX: 35.03 KG/M2 | HEART RATE: 76 BPM | RESPIRATION RATE: 18 BRPM | HEIGHT: 66 IN | SYSTOLIC BLOOD PRESSURE: 132 MMHG | OXYGEN SATURATION: 100 % | WEIGHT: 218 LBS | TEMPERATURE: 98 F

## 2018-01-25 DIAGNOSIS — Z98.890 STATUS POST HYSTEROSCOPY: Primary | ICD-10-CM

## 2018-01-25 DIAGNOSIS — R93.89 THICKENED ENDOMETRIUM: ICD-10-CM

## 2018-01-25 LAB
POCT GLUCOSE: 89 MG/DL (ref 70–110)
POCT GLUCOSE: 95 MG/DL (ref 70–110)
POCT GLUCOSE: 96 MG/DL (ref 70–110)

## 2018-01-25 PROCEDURE — 71000016 HC POSTOP RECOV ADDL HR: Performed by: OBSTETRICS & GYNECOLOGY

## 2018-01-25 PROCEDURE — 94761 N-INVAS EAR/PLS OXIMETRY MLT: CPT

## 2018-01-25 PROCEDURE — 63600175 PHARM REV CODE 636 W HCPCS: Performed by: NURSE ANESTHETIST, CERTIFIED REGISTERED

## 2018-01-25 PROCEDURE — 94640 AIRWAY INHALATION TREATMENT: CPT

## 2018-01-25 PROCEDURE — 37000008 HC ANESTHESIA 1ST 15 MINUTES: Performed by: OBSTETRICS & GYNECOLOGY

## 2018-01-25 PROCEDURE — 36000707: Performed by: OBSTETRICS & GYNECOLOGY

## 2018-01-25 PROCEDURE — 37000009 HC ANESTHESIA EA ADD 15 MINS: Performed by: OBSTETRICS & GYNECOLOGY

## 2018-01-25 PROCEDURE — 71000039 HC RECOVERY, EACH ADD'L HOUR: Performed by: OBSTETRICS & GYNECOLOGY

## 2018-01-25 PROCEDURE — 71000015 HC POSTOP RECOV 1ST HR: Performed by: OBSTETRICS & GYNECOLOGY

## 2018-01-25 PROCEDURE — 27201423 OPTIME MED/SURG SUP & DEVICES STERILE SUPPLY: Performed by: OBSTETRICS & GYNECOLOGY

## 2018-01-25 PROCEDURE — 25000242 PHARM REV CODE 250 ALT 637 W/ HCPCS: Performed by: ANESTHESIOLOGY

## 2018-01-25 PROCEDURE — 88305 TISSUE EXAM BY PATHOLOGIST: CPT | Mod: 26,,, | Performed by: PATHOLOGY

## 2018-01-25 PROCEDURE — 63600175 PHARM REV CODE 636 W HCPCS: Performed by: STUDENT IN AN ORGANIZED HEALTH CARE EDUCATION/TRAINING PROGRAM

## 2018-01-25 PROCEDURE — 58558 HYSTEROSCOPY BIOPSY: CPT | Mod: ,,, | Performed by: OBSTETRICS & GYNECOLOGY

## 2018-01-25 PROCEDURE — 82962 GLUCOSE BLOOD TEST: CPT | Performed by: OBSTETRICS & GYNECOLOGY

## 2018-01-25 PROCEDURE — 25000003 PHARM REV CODE 250: Performed by: SPECIALIST

## 2018-01-25 PROCEDURE — 25000003 PHARM REV CODE 250: Performed by: ANESTHESIOLOGY

## 2018-01-25 PROCEDURE — 25000003 PHARM REV CODE 250: Performed by: NURSE ANESTHETIST, CERTIFIED REGISTERED

## 2018-01-25 PROCEDURE — 36000706: Performed by: OBSTETRICS & GYNECOLOGY

## 2018-01-25 PROCEDURE — 25000003 PHARM REV CODE 250: Performed by: STUDENT IN AN ORGANIZED HEALTH CARE EDUCATION/TRAINING PROGRAM

## 2018-01-25 PROCEDURE — 63600175 PHARM REV CODE 636 W HCPCS: Performed by: SPECIALIST

## 2018-01-25 PROCEDURE — 71000033 HC RECOVERY, INTIAL HOUR: Performed by: OBSTETRICS & GYNECOLOGY

## 2018-01-25 PROCEDURE — 88305 TISSUE EXAM BY PATHOLOGIST: CPT | Performed by: PATHOLOGY

## 2018-01-25 RX ORDER — DIPHENHYDRAMINE HYDROCHLORIDE 50 MG/ML
25 INJECTION INTRAMUSCULAR; INTRAVENOUS EVERY 6 HOURS PRN
Status: DISCONTINUED | OUTPATIENT
Start: 2018-01-25 | End: 2018-01-25 | Stop reason: HOSPADM

## 2018-01-25 RX ORDER — DIPHENHYDRAMINE HYDROCHLORIDE 50 MG/ML
25 INJECTION INTRAMUSCULAR; INTRAVENOUS EVERY 4 HOURS PRN
Status: DISCONTINUED | OUTPATIENT
Start: 2018-01-25 | End: 2018-01-25 | Stop reason: HOSPADM

## 2018-01-25 RX ORDER — MIDAZOLAM HYDROCHLORIDE 1 MG/ML
INJECTION, SOLUTION INTRAMUSCULAR; INTRAVENOUS
Status: DISCONTINUED | OUTPATIENT
Start: 2018-01-25 | End: 2018-01-25

## 2018-01-25 RX ORDER — HYDROMORPHONE HYDROCHLORIDE 2 MG/ML
0.4 INJECTION, SOLUTION INTRAMUSCULAR; INTRAVENOUS; SUBCUTANEOUS EVERY 5 MIN PRN
Status: DISCONTINUED | OUTPATIENT
Start: 2018-01-25 | End: 2018-01-25 | Stop reason: HOSPADM

## 2018-01-25 RX ORDER — MEPERIDINE HYDROCHLORIDE 50 MG/ML
12.5 INJECTION INTRAMUSCULAR; INTRAVENOUS; SUBCUTANEOUS ONCE AS NEEDED
Status: DISCONTINUED | OUTPATIENT
Start: 2018-01-25 | End: 2018-01-25 | Stop reason: HOSPADM

## 2018-01-25 RX ORDER — LIDOCAINE HYDROCHLORIDE 10 MG/ML
0.5 INJECTION, SOLUTION EPIDURAL; INFILTRATION; INTRACAUDAL; PERINEURAL ONCE
Status: DISCONTINUED | OUTPATIENT
Start: 2018-01-25 | End: 2018-01-25 | Stop reason: HOSPADM

## 2018-01-25 RX ORDER — LIDOCAINE HCL/PF 100 MG/5ML
SYRINGE (ML) INTRAVENOUS
Status: DISCONTINUED | OUTPATIENT
Start: 2018-01-25 | End: 2018-01-25

## 2018-01-25 RX ORDER — PROPOFOL 10 MG/ML
VIAL (ML) INTRAVENOUS
Status: DISCONTINUED | OUTPATIENT
Start: 2018-01-25 | End: 2018-01-25

## 2018-01-25 RX ORDER — HYDROCODONE BITARTRATE AND ACETAMINOPHEN 5; 325 MG/1; MG/1
1 TABLET ORAL EVERY 4 HOURS PRN
Status: DISCONTINUED | OUTPATIENT
Start: 2018-01-25 | End: 2018-01-25 | Stop reason: HOSPADM

## 2018-01-25 RX ORDER — FENTANYL CITRATE 50 UG/ML
INJECTION, SOLUTION INTRAMUSCULAR; INTRAVENOUS
Status: DISCONTINUED | OUTPATIENT
Start: 2018-01-25 | End: 2018-01-25

## 2018-01-25 RX ORDER — GLYCOPYRROLATE 0.2 MG/ML
INJECTION INTRAMUSCULAR; INTRAVENOUS
Status: DISCONTINUED | OUTPATIENT
Start: 2018-01-25 | End: 2018-01-25

## 2018-01-25 RX ORDER — HYDROCODONE BITARTRATE AND ACETAMINOPHEN 10; 325 MG/1; MG/1
1 TABLET ORAL EVERY 4 HOURS PRN
Status: DISCONTINUED | OUTPATIENT
Start: 2018-01-25 | End: 2018-01-25 | Stop reason: HOSPADM

## 2018-01-25 RX ORDER — FENTANYL CITRATE 50 UG/ML
25 INJECTION, SOLUTION INTRAMUSCULAR; INTRAVENOUS EVERY 5 MIN PRN
Status: COMPLETED | OUTPATIENT
Start: 2018-01-25 | End: 2018-01-25

## 2018-01-25 RX ORDER — SODIUM CHLORIDE, SODIUM LACTATE, POTASSIUM CHLORIDE, CALCIUM CHLORIDE 600; 310; 30; 20 MG/100ML; MG/100ML; MG/100ML; MG/100ML
INJECTION, SOLUTION INTRAVENOUS CONTINUOUS
Status: DISCONTINUED | OUTPATIENT
Start: 2018-01-25 | End: 2018-01-25 | Stop reason: HOSPADM

## 2018-01-25 RX ORDER — SODIUM CHLORIDE 0.9 % (FLUSH) 0.9 %
3 SYRINGE (ML) INJECTION
Status: DISCONTINUED | OUTPATIENT
Start: 2018-01-25 | End: 2018-01-25 | Stop reason: HOSPADM

## 2018-01-25 RX ORDER — ALBUTEROL SULFATE 0.83 MG/ML
2.5 SOLUTION RESPIRATORY (INHALATION)
Status: COMPLETED | OUTPATIENT
Start: 2018-01-25 | End: 2018-01-25

## 2018-01-25 RX ORDER — KETOROLAC TROMETHAMINE 30 MG/ML
INJECTION, SOLUTION INTRAMUSCULAR; INTRAVENOUS
Status: DISCONTINUED | OUTPATIENT
Start: 2018-01-25 | End: 2018-01-25

## 2018-01-25 RX ORDER — DEXAMETHASONE SODIUM PHOSPHATE 4 MG/ML
INJECTION, SOLUTION INTRA-ARTICULAR; INTRALESIONAL; INTRAMUSCULAR; INTRAVENOUS; SOFT TISSUE
Status: DISCONTINUED | OUTPATIENT
Start: 2018-01-25 | End: 2018-01-25

## 2018-01-25 RX ORDER — HYDROCODONE BITARTRATE AND ACETAMINOPHEN 5; 325 MG/1; MG/1
1 TABLET ORAL EVERY 6 HOURS PRN
Qty: 5 TABLET | Refills: 0 | Status: SHIPPED | OUTPATIENT
Start: 2018-01-25 | End: 2018-02-12

## 2018-01-25 RX ORDER — OXYCODONE HYDROCHLORIDE 5 MG/1
5 TABLET ORAL
Status: DISCONTINUED | OUTPATIENT
Start: 2018-01-25 | End: 2018-01-25 | Stop reason: HOSPADM

## 2018-01-25 RX ORDER — ONDANSETRON HYDROCHLORIDE 2 MG/ML
INJECTION, SOLUTION INTRAMUSCULAR; INTRAVENOUS
Status: DISCONTINUED | OUTPATIENT
Start: 2018-01-25 | End: 2018-01-25

## 2018-01-25 RX ORDER — ONDANSETRON 2 MG/ML
4 INJECTION INTRAMUSCULAR; INTRAVENOUS DAILY PRN
Status: DISCONTINUED | OUTPATIENT
Start: 2018-01-25 | End: 2018-01-25 | Stop reason: HOSPADM

## 2018-01-25 RX ORDER — DIPHENHYDRAMINE HCL 25 MG
25 CAPSULE ORAL EVERY 4 HOURS PRN
Status: DISCONTINUED | OUTPATIENT
Start: 2018-01-25 | End: 2018-01-25 | Stop reason: HOSPADM

## 2018-01-25 RX ORDER — ONDANSETRON 8 MG/1
8 TABLET, ORALLY DISINTEGRATING ORAL EVERY 8 HOURS PRN
Status: DISCONTINUED | OUTPATIENT
Start: 2018-01-25 | End: 2018-01-25 | Stop reason: HOSPADM

## 2018-01-25 RX ADMIN — CARBOXYMETHYLCELLULOSE SODIUM 2 DROP: 2.5 SOLUTION/ DROPS OPHTHALMIC at 01:01

## 2018-01-25 RX ADMIN — PROPOFOL 200 MG: 10 INJECTION, EMULSION INTRAVENOUS at 01:01

## 2018-01-25 RX ADMIN — LIDOCAINE HYDROCHLORIDE 60 MG: 20 INJECTION, SOLUTION INTRAVENOUS at 01:01

## 2018-01-25 RX ADMIN — FENTANYL CITRATE 25 MCG: 50 INJECTION, SOLUTION INTRAMUSCULAR; INTRAVENOUS at 02:01

## 2018-01-25 RX ADMIN — ONDANSETRON 4 MG: 2 INJECTION INTRAMUSCULAR; INTRAVENOUS at 02:01

## 2018-01-25 RX ADMIN — ONDANSETRON 4 MG: 2 INJECTION, SOLUTION INTRAMUSCULAR; INTRAVENOUS at 01:01

## 2018-01-25 RX ADMIN — ALBUTEROL SULFATE 2.5 MG: 2.5 SOLUTION RESPIRATORY (INHALATION) at 09:01

## 2018-01-25 RX ADMIN — MIDAZOLAM 2 MG: 1 INJECTION INTRAMUSCULAR; INTRAVENOUS at 01:01

## 2018-01-25 RX ADMIN — KETOROLAC TROMETHAMINE 30 MG: 30 INJECTION, SOLUTION INTRAMUSCULAR; INTRAVENOUS at 01:01

## 2018-01-25 RX ADMIN — FENTANYL CITRATE 100 MCG: 50 INJECTION, SOLUTION INTRAMUSCULAR; INTRAVENOUS at 01:01

## 2018-01-25 RX ADMIN — LIDOCAINE HYDROCHLORIDE 40 MG: 20 INJECTION, SOLUTION INTRAVENOUS at 01:01

## 2018-01-25 RX ADMIN — GLYCOPYRROLATE 0.2 MG: 0.2 INJECTION, SOLUTION INTRAMUSCULAR; INTRAVENOUS at 01:01

## 2018-01-25 RX ADMIN — SODIUM CHLORIDE, SODIUM LACTATE, POTASSIUM CHLORIDE, AND CALCIUM CHLORIDE: 600; 310; 30; 20 INJECTION, SOLUTION INTRAVENOUS at 12:01

## 2018-01-25 RX ADMIN — PROPOFOL 50 MG: 10 INJECTION, EMULSION INTRAVENOUS at 01:01

## 2018-01-25 RX ADMIN — OXYCODONE HYDROCHLORIDE 5 MG: 5 TABLET ORAL at 03:01

## 2018-01-25 RX ADMIN — DEXAMETHASONE SODIUM PHOSPHATE 4 MG: 4 INJECTION, SOLUTION INTRAMUSCULAR; INTRAVENOUS at 01:01

## 2018-01-25 RX ADMIN — PROMETHAZINE HYDROCHLORIDE 12.5 MG: 25 INJECTION INTRAMUSCULAR; INTRAVENOUS at 02:01

## 2018-01-25 RX ADMIN — EPHEDRINE SULFATE 10 MG: 50 INJECTION INTRAMUSCULAR; INTRAVENOUS; SUBCUTANEOUS at 01:01

## 2018-01-25 NOTE — TRANSFER OF CARE
"Anesthesia Transfer of Care Note    Patient: Jessie Gallardo    Procedure(s) Performed: Procedure(s) (LRB):  [id number] (N/A)    Patient location: PACU    Anesthesia Type: general    Transport from OR: Transported from OR on 2-3 L/min O2 by NC with adequate spontaneous ventilation    Post pain: adequate analgesia    Post assessment: no apparent anesthetic complications    Post vital signs: stable    Level of consciousness: awake and alert    Nausea/Vomiting: no nausea/vomiting    Complications: none    Transfer of care protocol was followed      Last vitals:   Visit Vitals  /81 (BP Location: Left arm, Patient Position: Lying)   Pulse 95   Temp 36.4 °C (97.6 °F) (Oral)   Resp 16   Ht 5' 6" (1.676 m)   Wt 98.9 kg (218 lb)   LMP  (LMP Unknown)   SpO2 100%   Breastfeeding? No   BMI 35.19 kg/m²     "

## 2018-01-25 NOTE — H&P
PATIENT SEEN AND EXAMINED - NO INTERVAL CHANGES FROM HER H&P DONE , BELOW    DISCUSSION OF PREOPERATIVE MANAGEMENT OPTIONS AND ANTICIPATED TREATMENT OUTCOMES:     Jessie Gallardo is a 62 y.o. female , No LMP recorded (lmp unknown). Patient is postmenopausal., who presents  presents today preoperatively for discussion of management options, projected postoperative recovery, and anticipated outcomes following her planned procedure. Alternatives to the procedure including possible medical therapy was reviewed.  The patient is scheduled for HYSTEROSCOPY D&C 2018 THICKENED ENDOMETRIUM AND PMB OVER THE PAST 2-3 MONTHS.       HISTORY AND PHYSICAL EXAMINATION DERIVED FORM PRIOR OFFICE VISITS REVIEWED:  Jessie Gallardo is a 61 y.o. female, , No LMP recorded (lmp unknown). Patient is postmenopausal.,  presents for a problem visit, complaining of ??ENDOMETRIOSIS.  WAS SEEN 3 WEEKS AGO BY NP WITH VAGINAL DISCHARGE, ATROPHIC VAGINITIS AND 2 WEEKS AGO FOR ROUTINE VISIT WITH DR ALVAREZ, C/O SEVERAL MONTHS LLQ PAIN.  HAS HX FIBROIDS, CONCERNED THAT ACUTE PAIN MIGHT BE RELATED BUT DISCUSSED CAUSES OF PELVIC PAIN - LLQ PAIN MORE LIKELY DUE TO CHRONIC GI ISSUES AND CONSTIPATION.  AS A SEPARATE ISSUE, RECENT PELVIC USG WITH THICKENED EM AND FLUID - ENDOMETRIAL BIOPSY ATTEMPTED  BUT COULD NOT NEGOTIATE STENOTIC CERVIX.  FURTHER QUESTIONING HAS HAD OVER THE PAST 2 MONTHS SEVERAL EPISODES OF MILD BLEEDING/ SPOTTING.  WAS INITIALLY SCHEDULED FOR HYSTEROSCOPY D&C  BUT NEEDED PREOP CARDIAC CLEARANCE, WHICH SHE SAYS SHE RECEIVED APPROX 1 WEEK AGO  .    2017:  Findings: Transabdominal and transvaginal imaging performed.  Uterus measures 7.2 x 5.1 x 5.3 cm. Endometrium measures 7.3 mm with fluid noted.  Multiple partially calcified fibroids noted, the largest 2.7 cm and may have a submucosal component at the anterior fundus  Ovaries not visualized. No free fluid.   Impression    Thickened endometrium with  endometrial fluid. Correlate clinically and with endometrial biopsy as needed. Multiple fibroids which appear at least partially calcified with one fibroid possibly partially submucosal.   11/28/2017 EM BX:  FINAL PATHOLOGIC DIAGNOSIS  ENDOMETRIAL BIOPSY:  TISSUE INSUFFICIENT FOR DIAGNOSIS SCANTY INNOCUOUS APPEARING GLANDULAR FRAGMENTS  MOST CONSISTENT WITH ENDOCERVICAL ORIGIN     ROUTINE VISIT 11/7/2017 DR ALVAREZ:  Jessie Gallardo is a 61 y.o. female who presents for an annual exam.   She reports her periods are: none   Patient reports left sided pelvic pain.  Had UAE a few years ago due to mass effect of fibroids.    She states the case was a success and she feels much better.   Complains of vaginitis No             Past Medical History:   Diagnosis Date    Anxiety      Chronic back pain       See neuro     Chronic low back pain      Depression      Diabetes mellitus       type 2    Diabetes mellitus type II      Diabetic neuropathy      Diverticulosis       last colonoscopy was in 2000    Encounter for blood transfusion      Fibroids      Fibromyalgia      GERD (gastroesophageal reflux disease)      Hyperlipidemia      Hypertension      Obesity      Obstructive sleep apnea       wear CPAP machine nightly    Pancreatitis      Post menopausal syndrome                     Past Surgical History:   Procedure Laterality Date    ABDOMINAL SURGERY        CHOLECYSTECTOMY        fibroid        OOPHORECTOMY         xlap, right ovary removed due to infection?    right ovary removal Right      SPINE SURGERY         cervical spine    UTERINE ARTERY EMBOLIZATION   3/14         MEDICATIONS AND ALLERGIES:        Current Outpatient Prescriptions:     albuterol 90 mcg/actuation inhaler, Inhale 2 puffs into the lungs every 6 (six) hours as needed for Wheezing or Shortness of Breath., Disp: 18 g, Rfl: 0    amlodipine (NORVASC) 10 MG tablet, Take 1 tablet (10 mg total) by mouth once daily., Disp: 90 tablet,  "Rfl: 3    amLODIPine (NORVASC) 10 MG tablet, , Disp: , Rfl:     amoxicillin-clavulanate 875-125mg (AUGMENTIN) 875-125 mg per tablet, , Disp: , Rfl:     aspirin (ECOTRIN) 81 MG EC tablet, Take 1 tablet (81 mg total) by mouth once daily., Disp: 90 tablet, Rfl: 2    BD INSULIN PEN NEEDLE UF SHORT 31 gauge x 5/16" Ndle, , Disp: , Rfl:     blood sugar diagnostic (FREESTYLE LITE STRIPS) Strp, TEST BLOOD SUGAR 3 TIMES A DAY, Disp: 100 strip, Rfl: 5    butalbital-acetaminophen-caffeine -40 mg (FIORICET, ESGIC) -40 mg per tablet, TK 1 T PO  Q 6-8 HOURS PRN, Disp: , Rfl: 3    butalbital-acetaminophen-caffeine -40 mg (FIORICET, ESGIC) -40 mg per tablet, TK 1 T PO  Q 6-8 HOURS PRN, Disp: , Rfl: 3    divalproex (DEPAKOTE) 500 MG Tb24, Take 2 tablets (1,000 mg total) by mouth every evening., Disp: 180 tablet, Rfl: 3    divalproex ER (DEPAKOTE) 500 MG Tb24, TAKE 2 TABLETS BY MOUTH EVERY EVENING, Disp: 180 tablet, Rfl: 0    duloxetine (CYMBALTA) 30 MG capsule, , Disp: , Rfl:     enalapril (VASOTEC) 20 MG tablet, TAKE 1 TABLET(20 MG) BY MOUTH TWICE DAILY, Disp: 180 tablet, Rfl: 0    enalapril (VASOTEC) 20 MG tablet, , Disp: , Rfl:     fluconazole (DIFLUCAN) 150 MG Tab, , Disp: , Rfl:     fluticasone (FLONASE) 50 mcg/actuation nasal spray, USE 2 SPRAYS IN EACH NOSTRIL ONCE DAILY, Disp: 16 g, Rfl: 3    gabapentin (NEURONTIN) 300 MG capsule, Take 2 capsules (600 mg total) by mouth 3 (three) times daily., Disp: 540 capsule, Rfl: 0    gabapentin (NEURONTIN) 300 MG capsule, , Disp: , Rfl:     hydrochlorothiazide (HYDRODIURIL) 25 MG tablet, TAKE 1 TABLET BY MOUTH ONCE DAILY, Disp: 90 tablet, Rfl: 0    hydroCHLOROthiazide (HYDRODIURIL) 25 MG tablet, , Disp: , Rfl:     insulin aspart (NOVOLOG FLEXPEN) 100 unit/mL InPn pen, INJECT 20 UNITS UNDER THE SKIN THREE TIMES DAILY WITH MEALS, Disp: 45 mL, Rfl: 3    insulin detemir (LEVEMIR FLEXTOUCH) 100 unit/mL (3 mL) SubQ InPn pen, Inject 32 units bid, " "Disp: 45 mL, Rfl: 0    insulin needles, disposable, (NOVOFINE 30) 30 x 1/3 " Ndle, USE AS DIRECTED THREE TIMES DAILY, Disp: 100 each, Rfl: 5    lancets (FREESTYLE LANCETS) 28 gauge Misc, Inject 1 lancet into the skin 3 (three) times daily., Disp: 100 each, Rfl: 5    levoFLOXacin (LEVAQUIN) 500 MG tablet, , Disp: , Rfl:     lidocaine-prilocaine (EMLA) cream, , Disp: , Rfl:     LINZESS 145 mcg Cap capsule, TK 1 C PO ONCE A DAY ON AN EMPTY STOMACH, Disp: , Rfl: 2    LINZESS 145 mcg Cap capsule, , Disp: , Rfl:     lorazepam (ATIVAN) 1 MG tablet, Take 1 tablet (1 mg total) by mouth On call Procedure (for MRI)., Disp: 1 tablet, Rfl: 0    lovastatin (MEVACOR) 20 MG tablet, TAKE 1 TABLET BY MOUTH EVERY EVENING, Disp: 90 tablet, Rfl: 0    lovastatin (MEVACOR) 20 MG tablet, , Disp: , Rfl:     methocarbamol (ROBAXIN) 500 MG Tab, TK 2 TS PO TID, Disp: , Rfl: 0    MOVANTIK tablet, , Disp: , Rfl:     MOVANTIK tablet, , Disp: , Rfl:     NOVOLOG FLEXPEN 100 unit/mL InPn pen, , Disp: , Rfl:     omeprazole (PRILOSEC) 20 MG capsule, TAKE ONE CAPSULE BY MOUTH TWICE DAILY, Disp: 180 capsule, Rfl: 0    ondansetron (ZOFRAN) 4 MG tablet, TK 1 T PO Q 8 H PRF NAUSEA, Disp: , Rfl: 0    ondansetron (ZOFRAN-ODT) 4 MG TbDL, , Disp: , Rfl:     pantoprazole (PROTONIX) 40 MG tablet, TK 1 T PO ONCE A DAY, Disp: , Rfl: 1    pantoprazole (PROTONIX) 40 MG tablet, , Disp: , Rfl:     pen needle, diabetic (BD INSULIN PEN NEEDLE UF MINI) 31 gauge x 3/16" Ndle, USE AS DIRECTED TWICE DAILY, Disp: 100 each, Rfl: 3    traMADol (ULTRAM) 50 mg tablet, TAKE 1 TABLET(50 MG) BY MOUTH TWICE DAILY AS NEEDED FOR PAIN, Disp: 60 tablet, Rfl: 1    traMADol (ULTRAM) 50 mg tablet, , Disp: , Rfl:     TRUE METRIX GLUCOSE METER Misc, UTD, Disp: , Rfl: 0    VICTOZA 3-SHAHBAZ 0.6 mg/0.1 mL (18 mg/3 mL) PnIj, , Disp: , Rfl:      Review of patient's allergies indicates:  No Known Allergies                 Family History   Problem Relation Age of Onset    " Breast cancer Neg Hx      Ovarian cancer Neg Hx      Colon cancer Neg Hx      Alcohol abuse Father      Cancer Sister         throat ca    Prostate cancer Brother      Heart attack Paternal Uncle      Cirrhosis Sister      Stroke Mother      Diabetes Mother      Hypertension Mother      No Known Problems Daughter      No Known Problems Son      No Known Problems Maternal Grandmother      No Known Problems Daughter      No Known Problems Son      No Known Problems Sister      No Known Problems Sister      No Known Problems Sister      No Known Problems Brother      No Known Problems Brother      No Known Problems Brother      No Known Problems Brother      Cancer Paternal Aunt      Lupus Grandchild      No Known Problems Maternal Aunt      No Known Problems Maternal Uncle      No Known Problems Maternal Grandfather      No Known Problems Paternal Grandfather      No Known Problems Paternal Grandmother      No Known Problems Cousin      Heart disease Neg Hx      Heart failure Neg Hx           Social History   Social History                Social History    Marital status: Single       Spouse name: N/A    Number of children: 4    Years of education: N/A              Occupational History      Disabled                  Social History Main Topics    Smoking status: Never Smoker    Smokeless tobacco: Never Used    Alcohol use Yes         Comment: Rarely    Drug use: No    Sexual activity: Not Currently       Partners: Male       Birth control/ protection: Post-menopausal         Comment: ,  4 grown kids               Other Topics Concern    Not on file            Social History Narrative     ** Merged History Encounter **                  COMPREHENSIVE GYN HISTORY:  PAP History: Denies abnormal Paps.  Infection History: Denies STDs. Denies PID.  Benign History: Denies uterine fibroids. Denies ovarian cysts. Denies endometriosis. Denies other conditions.  Cancer History:  Denies cervical cancer. Denies uterine cancer or hyperplasia. Denies ovarian cancer. Denies vulvar cancer or pre-cancer. Denies vaginal cancer or pre-cancer. Denies breast cancer. Denies colon cancer.   ROS:  GENERAL: No weight changes. No swelling. No fatigue. No fever.  CARDIOVASCULAR: No chest pain. No shortness of breath. No leg cramps.   NEUROLOGICAL: No headaches. No vision changes.  BREASTS: No pain. No lumps. No discharge.  ABDOMEN: No pain. No nausea. No vomiting. No diarrhea. No constipation.  REPRODUCTIVE: No abnormal bleeding.   VULVA: No pain. No lesions. No itching.  VAGINA: No relaxation. No itching. No odor. No discharge. No lesions.  URINARY: No incontinence. No nocturia. No frequency. No dysuria.     Wt 100 kg (220 lb 7.4 oz)   LMP  (LMP Unknown)   BMI 35.58 kg/m²      PE:  APPEARANCE: Well nourished, well developed, in no acute distress.  ABDOMEN: Soft. No tenderness or masses. No hepatosplenomegaly. No hernias.  BREASTS, FUNDOSCOPIC, RECTAL DEFERRED  PELVIC: External female genitalia without lesions.  Female hair distribution. Adequate perineal body, Normal urethral meatus. Vagina moist and well rugated without lesions or discharge.  No significant cystocele or rectocele present. Cervix pink without lesions, discharge or tenderness. Uterus is TOP normal size, regular, mobile and nontender. Adnexa without masses or tenderness.  EXTREMITIES: No edema        A procedure-specific consent form was reviewed with the patient and signed.  The patient seemed to understand the procedure and it potential complications.  She did not have further questions at the end of the visit.       PLAN:  Proceed with the scheduled procedure as planned.     FOLLOW-UP:  After hospitalization.  A postoperative visit 2-3 weeks following the procedure was advised and scheduled.

## 2018-01-25 NOTE — DISCHARGE INSTRUCTIONS

## 2018-01-25 NOTE — OP NOTE
OPERATIVE REPORT    PREOPERATIVE DIAGNOSIS  1. Post-menopausal bleeding    POSTOPERATIVE DIAGNOSIS  1. same    PROCEDURE:  1. Hysteroscopy  2. Dilation and curettage     SURGEON: Dr. Marli Llamas    ASSISTANT: Dr. Lola Covington (RES)    ANESTHESIA: General    COMPLICATIONS: None    EBL: minimal    IV FLUIDS: 300  cc    URINE OUTPUT: 150  Cc    Hysteroscopy fluid deficit: 360 cc (total volume 1000 cc)    FINDINGS:   1. Uterus sounded to 8 cm  2. Small intramural fibroid on right aspect of cavity that slightly distorts cavity; endometrium overlying sl ragged appearance, poss due to dilation of cervix  3. Otherwise normal endometrial cavity. Bilateral ostia visualized without difficulty.  4. Endometrial curettings to pathology     PROCEDURE: Patient was taking to the operating room where general anesthesia was administered and found to be adequate.  She was prepped and draped in the dorsal lithotomy position.  Right angle x 2 placed in the vagina.  The anterior lip of the cervix was grasped with a single tooth tenaculum.  The uterus was sounded to approximately 8 cm. There cervix was serially dilated. The 5mm hysteroscope was advanced through the cervical os and the uterus was distended with normal saline.  The endometrium was inspected, see findings above.  The tubal ostia were identified without difficulty, and appeared normal. The hysteroscope was withdrawn without difficulty.     The uterus was curetted in a clockwise fashion until gritty feeling was noted in all aspects of the uterus. The endometrial scrapings were sent to pathology. The tenaculum was removed and hemostasis was noted at the puncture sites in the cervix.     Sponge, lap, needle counts were correct x 2. The patient was taken to the recovery room in stable condition.    Lola Covington MD  OB/GYN, PGY-2    I WAS SCRUBBED AND PRESENT THROUGHOUT

## 2018-01-25 NOTE — ANESTHESIA POSTPROCEDURE EVALUATION
"Anesthesia Post Evaluation    Patient: Jessie Gallardo    Procedure(s) Performed: Procedure(s) (LRB):  [id number] (N/A)    Final Anesthesia Type: general  Patient location during evaluation: PACU  Patient participation: Yes- Able to Participate  Level of consciousness: awake and alert and oriented  Post-procedure vital signs: reviewed and stable  Pain management: adequate  Airway patency: patent  PONV status at discharge: No PONV  Anesthetic complications: no      Cardiovascular status: blood pressure returned to baseline and hemodynamically stable  Respiratory status: unassisted, spontaneous ventilation and room air  Hydration status: euvolemic  Follow-up not needed.        Visit Vitals  BP (!) 145/67 (BP Location: Right arm, Patient Position: Lying)   Pulse 86   Temp 36.7 °C (98.1 °F) (Oral)   Resp 16   Ht 5' 6" (1.676 m)   Wt 98.9 kg (218 lb)   LMP  (LMP Unknown)   SpO2 100%   Breastfeeding? No   BMI 35.19 kg/m²       Pain/Amada Score: Pain Assessment Performed: Yes (1/25/2018  3:18 PM)  Presence of Pain: complains of pain/discomfort (1/25/2018  3:18 PM)  Pain Rating Prior to Med Admin: 2 (1/25/2018  3:04 PM)  Pain Rating Post Med Admin: 5 (1/25/2018  2:32 PM)  Amada Score: 10 (1/25/2018  3:18 PM)      "

## 2018-01-25 NOTE — PROGRESS NOTES
Pt states pain is 2 out of 10 on pain scale.   Pt states pain is tolerable.  Pt is sleeping/resting appears to be comfortable.  Vitals signs are within normal limits   Pt on roomair @ 100%

## 2018-01-25 NOTE — BRIEF OP NOTE
[facility]  Brief Operative Note     SUMMARY     Surgery Date: 1/25/2018     Surgeon(s) and Role:     * Marli Llamas MD - Primary    Assisting Surgeon: None    Pre-op Diagnosis:  Thickened endometrium [R93.8]    Post-op Diagnosis:  Post-Op Diagnosis Codes:     * Thickened endometrium [R93.8]    Procedure(s) (LRB):  [id number] (N/A)    Anesthesia: Choice    Description of the findings of the procedure:   1. Uterus sounded to 8 cm  2. Small intramural fibroid on right aspect of cavity that slightly distorts cavity; endometrium overlying sl ragged appearance, poss due to dilation of cervix  3. Otherwise normal endometrial cavity. Bilateral ostia visualized without difficulty.  4. Endometrial curettings to pathology     Estimated Blood Loss: minimal     Specimens:   Specimen (12h ago through future)    Start     Ordered    01/25/18 1349  Specimen to Pathology - Surgery  Once     Comments:  1) [facility]      01/25/18 1348          Discharge Note    SUMMARY     Admit Date: 1/25/2018    Discharge Date and Time:  01/25/2018 2:20 PM    Hospital Course: Patient presented for scheduled procedure. Patient was passed back to OR for hysteroscopy and D&C for PMB. Please see OP note for further details. Tolerated procedure well and patient was taken to recovery in a stable condition. Prior to discharge patient was able to void, ambulate, tolerate PO and pain was well controlled with PO meds. Patient was given routine post-op instructions for which patient voiced understanding. Patient was subsequently discharged home.      Final Diagnosis: Post-Op Diagnosis Codes:     * Thickened endometrium [R93.8]    Disposition: Home or Self Care    Follow Up/Patient Instructions:     Medications:  Reconciled Home Medications:   Current Discharge Medication List      START taking these medications    Details   hydrocodone-acetaminophen 5-325mg (NORCO) 5-325 mg per tablet Take 1 tablet by mouth every 6 (six)  hours as needed for Pain.  Qty: 5 tablet, Refills: 0         CONTINUE these medications which have NOT CHANGED    Details   amlodipine (NORVASC) 10 MG tablet Take 1 tablet (10 mg total) by mouth once daily.  Qty: 90 tablet, Refills: 3      aspirin (ECOTRIN) 81 MG EC tablet Take 1 tablet (81 mg total) by mouth once daily.  Qty: 90 tablet, Refills: 2      butalbital-acetaminophen-caffeine -40 mg (FIORICET, ESGIC) -40 mg per tablet TK 1 T PO  Q 6-8 HOURS PRN  Refills: 3      divalproex ER (DEPAKOTE) 500 MG Tb24 Take 500 mg by mouth every evening.      enalapril (VASOTEC) 20 MG tablet TAKE 1 TABLET(20 MG) BY MOUTH TWICE DAILY  Qty: 180 tablet, Refills: 0      fluticasone (FLONASE) 50 mcg/actuation nasal spray USE 2 SPRAYS IN EACH NOSTRIL ONCE DAILY  Qty: 16 g, Refills: 3      gabapentin (NEURONTIN) 300 MG capsule Take 2 capsules (600 mg total) by mouth 3 (three) times daily.  Qty: 540 capsule, Refills: 0    Comments: **Patient requests 90 days supply**  Associated Diagnoses: Bilateral lumbar radiculopathy; Sacroiliac joint pain; Lumbar facet arthropathy; Physical deconditioning; Spondylosis of lumbar region without myelopathy or radiculopathy      hydrochlorothiazide (HYDRODIURIL) 25 MG tablet TAKE 1 TABLET BY MOUTH ONCE DAILY  Qty: 90 tablet, Refills: 0      insulin aspart (NOVOLOG FLEXPEN) 100 unit/mL InPn pen INJECT 20 UNITS UNDER THE SKIN THREE TIMES DAILY WITH MEALS  Qty: 45 mL, Refills: 3      insulin detemir (LEVEMIR FLEXTOUCH) 100 unit/mL (3 mL) SubQ InPn pen Inject 32 units bid  Qty: 45 mL, Refills: 0      LINZESS 145 mcg Cap capsule TK 1 C PO ONCE A DAY ON AN EMPTY STOMACH  Refills: 2      lovastatin (MEVACOR) 20 MG tablet TAKE 1 TABLET BY MOUTH EVERY EVENING  Qty: 90 tablet, Refills: 0      MOVANTIK tablet       omeprazole (PRILOSEC) 20 MG capsule TAKE ONE CAPSULE BY MOUTH TWICE DAILY  Qty: 180 capsule, Refills: 0      oxyCODONE-acetaminophen (PERCOCET) 5-325 mg per tablet       traMADol (ULTRAM)  "50 mg tablet TAKE 1 TABLET(50 MG) BY MOUTH TWICE DAILY AS NEEDED FOR PAIN  Qty: 60 tablet, Refills: 1    Associated Diagnoses: Pain      albuterol 90 mcg/actuation inhaler Inhale 2 puffs into the lungs every 6 (six) hours as needed for Wheezing. Rescue      BD INSULIN PEN NEEDLE UF SHORT 31 gauge x 5/16" Ndle       blood sugar diagnostic (FREESTYLE LITE STRIPS) Strp TEST BLOOD SUGAR 3 TIMES A DAY  Qty: 100 strip, Refills: 5    Comments: DX Code Needed .250.00      insulin needles, disposable, (NOVOFINE 30) 30 x 1/3 " Ndle USE AS DIRECTED THREE TIMES DAILY  Qty: 100 each, Refills: 5      lancets (FREESTYLE LANCETS) 28 gauge Misc Inject 1 lancet into the skin 3 (three) times daily.  Qty: 100 each, Refills: 5    Comments: DX Code Needed . 250.00      lidocaine-prilocaine (EMLA) cream       ondansetron (ZOFRAN) 4 MG tablet TK 1 T PO Q 8 H PRF NAUSEA  Refills: 0      pen needle, diabetic (BD INSULIN PEN NEEDLE UF MINI) 31 gauge x 3/16" Ndle USE AS DIRECTED TWICE DAILY  Qty: 100 each, Refills: 3      TRUE METRIX GLUCOSE METER Misc UTD  Refills: 0             Discharge Procedure Orders  Diet general     Activity as tolerated   Order Comments: Pelvic rest (nothing in the vagina) until seen by Dr. Llamas       Follow-up Information     Marli Llamas MD. Schedule an appointment as soon as possible for a visit in 3 weeks.    Specialties:  Obstetrics, Obstetrics and Gynecology  Why:  post-operative checkup  Contact information:  [address] [address] [phone]                 Lola Covington MD  OB/GYN, PGY-2    I WAS SCRUBBED AND PRESENT THROUGHOUT  "

## 2018-01-26 NOTE — PLAN OF CARE
Jessie Gallardo has met all discharge criteria from Phase II. Vital Signs are stable, ambulating  without difficulty. Discharge instructions given, patient verbalized understanding. Discharged from facility via wheelchair in stable condition.

## 2018-01-29 ENCOUNTER — PATIENT MESSAGE (OUTPATIENT)
Dept: OBSTETRICS AND GYNECOLOGY | Facility: HOSPITAL | Age: 63
End: 2018-01-29

## 2018-01-31 ENCOUNTER — TELEPHONE (OUTPATIENT)
Dept: OBSTETRICS AND GYNECOLOGY | Facility: CLINIC | Age: 63
End: 2018-01-31

## 2018-01-31 NOTE — TELEPHONE ENCOUNTER
Spoke with pt who was calling about her vaginal discharge with some mild bleeding and vaginal discharge after surgery last week.      Pt denies fever, or vomiting.  Pt states she has some abdominal pressure and sorness on a pain scale of 4/5.    Per Dr. Llamas - symptoms are normal 1 week out from surgery.  Pt was scheduled with her Post-Op appt and was instructed to call back if bleeding increased.    Pt verblized understanding and thanked me for call.

## 2018-01-31 NOTE — TELEPHONE ENCOUNTER
----- Message from Juan Celestin sent at 1/31/2018  1:13 PM CST -----  Contact: patient  x_ 1st Request   _ 2nd Request   _ 3rd Request     Who: FATIMAH BURR [[id number]]    Why: patient is requesting a call back in reference to her pain level.    What Number to Call Back: [phone]    When to Expect a call back: (Before the end of the day)   -- if call after 3:00 call back will be tomorrow.

## 2018-02-06 ENCOUNTER — OFFICE VISIT (OUTPATIENT)
Dept: NEPHROLOGY | Facility: CLINIC | Age: 63
End: 2018-02-06
Payer: MEDICARE

## 2018-02-06 ENCOUNTER — LAB VISIT (OUTPATIENT)
Dept: LAB | Facility: OTHER | Age: 63
End: 2018-02-06
Payer: MEDICARE

## 2018-02-06 VITALS
HEART RATE: 73 BPM | HEIGHT: 66 IN | SYSTOLIC BLOOD PRESSURE: 136 MMHG | OXYGEN SATURATION: 97 % | BODY MASS INDEX: 34.65 KG/M2 | DIASTOLIC BLOOD PRESSURE: 60 MMHG | WEIGHT: 215.63 LBS

## 2018-02-06 DIAGNOSIS — K31.84 GASTROPARESIS: ICD-10-CM

## 2018-02-06 DIAGNOSIS — N18.30 CHRONIC KIDNEY DISEASE (CKD), STAGE III (MODERATE): Primary | ICD-10-CM

## 2018-02-06 DIAGNOSIS — K86.1 CHRONIC PANCREATITIS: ICD-10-CM

## 2018-02-06 DIAGNOSIS — R63.4 ABNORMAL WEIGHT LOSS: ICD-10-CM

## 2018-02-06 DIAGNOSIS — I10 ESSENTIAL HYPERTENSION: ICD-10-CM

## 2018-02-06 DIAGNOSIS — K21.9 ESOPHAGEAL REFLUX: Primary | ICD-10-CM

## 2018-02-06 DIAGNOSIS — K21.9 ESOPHAGEAL REFLUX: ICD-10-CM

## 2018-02-06 DIAGNOSIS — E11.21 DIABETIC NEPHROPATHY ASSOCIATED WITH TYPE 2 DIABETES MELLITUS: ICD-10-CM

## 2018-02-06 LAB
BASOPHILS # BLD AUTO: 0.01 K/UL
BASOPHILS NFR BLD: 0.1 %
DIFFERENTIAL METHOD: ABNORMAL
EOSINOPHIL # BLD AUTO: 0.1 K/UL
EOSINOPHIL NFR BLD: 1 %
ERYTHROCYTE [DISTWIDTH] IN BLOOD BY AUTOMATED COUNT: 13.7 %
HCT VFR BLD AUTO: 34.8 %
HGB BLD-MCNC: 11.2 G/DL
LYMPHOCYTES # BLD AUTO: 3.7 K/UL
LYMPHOCYTES NFR BLD: 49.7 %
MCH RBC QN AUTO: 26.6 PG
MCHC RBC AUTO-ENTMCNC: 32.2 G/DL
MCV RBC AUTO: 83 FL
MONOCYTES # BLD AUTO: 0.5 K/UL
MONOCYTES NFR BLD: 7.1 %
NEUTROPHILS # BLD AUTO: 3.1 K/UL
NEUTROPHILS NFR BLD: 42 %
PLATELET # BLD AUTO: 187 K/UL
PMV BLD AUTO: 10.9 FL
RBC # BLD AUTO: 4.21 M/UL
WBC # BLD AUTO: 7.35 K/UL

## 2018-02-06 PROCEDURE — 99214 OFFICE O/P EST MOD 30 MIN: CPT | Performed by: INTERNAL MEDICINE

## 2018-02-06 PROCEDURE — 85025 COMPLETE CBC W/AUTO DIFF WBC: CPT

## 2018-02-06 PROCEDURE — 99213 OFFICE O/P EST LOW 20 MIN: CPT | Mod: S$GLB,,, | Performed by: INTERNAL MEDICINE

## 2018-02-06 PROCEDURE — 36415 COLL VENOUS BLD VENIPUNCTURE: CPT

## 2018-02-06 PROCEDURE — 99999 PR PBB SHADOW E&M-EST. PATIENT-LVL IV: CPT | Mod: PBBFAC,,, | Performed by: INTERNAL MEDICINE

## 2018-02-06 PROCEDURE — 3008F BODY MASS INDEX DOCD: CPT | Mod: S$GLB,,, | Performed by: INTERNAL MEDICINE

## 2018-02-08 NOTE — PROGRESS NOTES
Patient is here today for follow up evaluation of CKD III. Last seen in [facility] office 11/29/17 At that visit Cr 1. mg/dl; eGFR; 39 ml/mn; potassium 4.1 mmol/L There is hx of hypertension and diabetes; Today she has no new complaints    ROS  Pleasant woman; no acute distress; oriented x 3  Mood and Affect; Appropriate  Otherwise non-contributory\  Exam  HEENT; Grossly Intact  CHEST; Clear P&A; no rales or rhonchi  HEART; RR; S1&S2 no murmur rub gallop  ABD; BS(+); non-tender; (-)CVAT  EXT; (-)Edema    Impression  CKD III Lab pending  Hypertension Satisfactgory control      Plan  Return Visit; 6 mo; pending shima

## 2018-02-12 ENCOUNTER — OFFICE VISIT (OUTPATIENT)
Dept: OBSTETRICS AND GYNECOLOGY | Facility: CLINIC | Age: 63
End: 2018-02-12
Payer: MEDICARE

## 2018-02-12 VITALS
DIASTOLIC BLOOD PRESSURE: 70 MMHG | WEIGHT: 219.81 LBS | BODY MASS INDEX: 35.32 KG/M2 | SYSTOLIC BLOOD PRESSURE: 140 MMHG | HEIGHT: 66 IN

## 2018-02-12 DIAGNOSIS — Z09 POSTOP CHECK: Primary | ICD-10-CM

## 2018-02-12 PROCEDURE — 99024 POSTOP FOLLOW-UP VISIT: CPT | Mod: S$GLB,,, | Performed by: OBSTETRICS & GYNECOLOGY

## 2018-02-12 PROCEDURE — 99999 PR PBB SHADOW E&M-EST. PATIENT-LVL II: CPT | Mod: PBBFAC,,, | Performed by: OBSTETRICS & GYNECOLOGY

## 2018-02-12 RX ORDER — GABAPENTIN 300 MG/1
CAPSULE ORAL
Status: ON HOLD | COMMUNITY
Start: 2018-02-07 | End: 2021-01-20 | Stop reason: HOSPADM

## 2018-02-12 RX ORDER — MELOXICAM 15 MG/1
TABLET ORAL
COMMUNITY
Start: 2018-02-08 | End: 2018-06-27

## 2018-02-12 RX ORDER — NAPROXEN 500 MG/1
TABLET ORAL
Status: ON HOLD | COMMUNITY
Start: 2018-02-01 | End: 2021-01-20 | Stop reason: HOSPADM

## 2018-02-12 RX ORDER — TIZANIDINE 4 MG/1
TABLET ORAL
COMMUNITY
Start: 2018-02-08 | End: 2018-06-27

## 2018-02-12 NOTE — PROGRESS NOTES
POSTOPERATIVE FOLLOW-UP:    The patient presents today following HYSTEROSCOPY 1/25/2018.  There are no complaints, and both the procedure and the hospital course were uncomplicated. BLEEDING STOPPED ABOUT A WEEK AGO.  HAS SOME LLQ AND MILD RLQ PAIN - ADVISED NOT SUSPICIOUS OF OVARIAN PROCESS AND MAY BE DUE TO HER GI ISSUES.  REASSURED NO EVIDENT CA OR PRECANCER ON PATHOLOGY.  WILL F/U 1YR VISIT    The pathology revealed:  FINAL PATHOLOGIC DIAGNOSIS  STRIPS OF STRATIFIED SQUAMOUS MUCOSA AND SEPARATE FRAGMENT OF FIBROMUSCULAR TISSUE;  INSUFFICIENT ENDOMETRIUM FOR DIAGNOSIS.    PHYSICAL EXAM:  Appearance: Well developed, well nourished, in no acute distress.  Skin: Normal turgor and no visible lesions.  Abdomen:  Soft, non tender, non distended, without hepatosplenomegaly, No masses appreciated.    Pelvic: Normal female external genitalia without lesions.  Normal hair distribution.  Adequate perineal body and normal urethral meatus.  Vagina moist and well-ruga lupe.  Cervix pink and without lesions.  No significant cystocele or rectocele.  Bimanual examination shows uterus normal size, normal position, regular shape, mobile, and non tender.    ASSESSMENT:  Doing well following her procedure.  She is following the anticipated course of recovery, and was advised regarding future wound care, activity, and need for follow up.      PLAN:  Follow up next routine visit.  The patient is advised to call if she has fever greater than 101.0F, increased bleeding/discharge, new-onset or increased pain, deviation from the anticipated subsequent course of recovery, questions, or concerns.

## 2018-02-23 DIAGNOSIS — R52 PAIN: ICD-10-CM

## 2018-02-27 DIAGNOSIS — R52 PAIN: ICD-10-CM

## 2018-02-27 DIAGNOSIS — M54.16 LUMBAR RADICULOPATHY: Primary | ICD-10-CM

## 2018-02-27 RX ORDER — TRAMADOL HYDROCHLORIDE 50 MG/1
TABLET ORAL
Qty: 60 TABLET | Refills: 0 | Status: ON HOLD | OUTPATIENT
Start: 2018-02-27 | End: 2022-03-08

## 2018-02-27 RX ORDER — TRAMADOL HYDROCHLORIDE 50 MG/1
TABLET ORAL
Qty: 60 TABLET | Refills: 1 | Status: ON HOLD | OUTPATIENT
Start: 2018-02-27 | End: 2021-01-20 | Stop reason: HOSPADM

## 2018-03-01 RX ORDER — LOVASTATIN 20 MG/1
TABLET ORAL
Qty: 90 TABLET | Refills: 0 | OUTPATIENT
Start: 2018-03-01

## 2018-04-10 ENCOUNTER — TELEPHONE (OUTPATIENT)
Dept: OBSTETRICS AND GYNECOLOGY | Facility: CLINIC | Age: 63
End: 2018-04-10

## 2018-04-10 NOTE — TELEPHONE ENCOUNTER
Called patient back,patient was advised to fax over MRI results and Dr. Llamas will call patient back on Monday to discuss..

## 2018-04-10 NOTE — TELEPHONE ENCOUNTER
----- Message from Violeta Short sent at 4/10/2018 12:41 PM CDT -----  Contact: FATIMAH BURR [[id number]]            Name of Who is Calling: FATIMAH BURR [[id number]]    What is the request in detail: Patient call she stated is in pain and she had an MRI and they saw something and she is a little nervous about it, please call her,       Can the clinic reply by [facility]:No      What Number to Call Back if not in [facility]: [phone]

## 2018-05-16 ENCOUNTER — TELEPHONE (OUTPATIENT)
Dept: OBSTETRICS AND GYNECOLOGY | Facility: CLINIC | Age: 63
End: 2018-05-16

## 2018-05-16 NOTE — TELEPHONE ENCOUNTER
----- Message from Leora Dye sent at 5/16/2018  1:43 PM CDT -----  Contact: FATIMAH BURR [[id number]]            Name of Who is Calling: FATIMAH BURR [[id number]      What is the request in detail:pt would like to speak to staff in regards to brown vaginal discharge/w pain, please advise       Can the clinic reply by [facility]: no      What [facility] Back [facility][phone]

## 2018-06-11 DIAGNOSIS — M51.36 ANNULAR TEAR OF LUMBAR DISC: Primary | ICD-10-CM

## 2018-06-11 DIAGNOSIS — M25.512 CHRONIC PAIN OF BOTH SHOULDERS: ICD-10-CM

## 2018-06-11 DIAGNOSIS — R52 PAIN: ICD-10-CM

## 2018-06-11 DIAGNOSIS — M54.16 LUMBAR RADICULOPATHY: ICD-10-CM

## 2018-06-11 DIAGNOSIS — M25.511 CHRONIC PAIN OF BOTH SHOULDERS: ICD-10-CM

## 2018-06-11 DIAGNOSIS — M53.3 SACROILIAC JOINT PAIN: ICD-10-CM

## 2018-06-11 DIAGNOSIS — M51.36 DDD (DEGENERATIVE DISC DISEASE), LUMBAR: ICD-10-CM

## 2018-06-11 DIAGNOSIS — G89.29 CHRONIC PAIN OF BOTH SHOULDERS: ICD-10-CM

## 2018-06-11 RX ORDER — GABAPENTIN 300 MG/1
CAPSULE ORAL
Qty: 540 CAPSULE | Refills: 0 | Status: ON HOLD | OUTPATIENT
Start: 2018-06-11 | End: 2022-03-08

## 2018-06-11 NOTE — TELEPHONE ENCOUNTER
Refill request for Gabapentin    Last ov: 12/26/17  Last refill: 02/23/18 (90 day supply)    Please review and advise.

## 2018-06-25 DIAGNOSIS — R52 PAIN: ICD-10-CM

## 2018-06-25 RX ORDER — TRAMADOL HYDROCHLORIDE 50 MG/1
TABLET ORAL
Qty: 60 TABLET | Refills: 0 | OUTPATIENT
Start: 2018-06-25

## 2018-06-27 ENCOUNTER — OFFICE VISIT (OUTPATIENT)
Dept: PAIN MEDICINE | Facility: CLINIC | Age: 63
End: 2018-06-27
Payer: MEDICARE

## 2018-06-27 VITALS
HEART RATE: 68 BPM | SYSTOLIC BLOOD PRESSURE: 133 MMHG | DIASTOLIC BLOOD PRESSURE: 77 MMHG | HEIGHT: 66 IN | TEMPERATURE: 99 F | BODY MASS INDEX: 34.98 KG/M2 | WEIGHT: 217.63 LBS

## 2018-06-27 DIAGNOSIS — G89.4 CHRONIC PAIN SYNDROME: ICD-10-CM

## 2018-06-27 DIAGNOSIS — M79.10 MYALGIA: ICD-10-CM

## 2018-06-27 DIAGNOSIS — M51.36 DDD (DEGENERATIVE DISC DISEASE), LUMBAR: ICD-10-CM

## 2018-06-27 DIAGNOSIS — M54.17 LUMBOSACRAL RADICULOPATHY: ICD-10-CM

## 2018-06-27 DIAGNOSIS — M54.16 LUMBAR RADICULOPATHY: Primary | ICD-10-CM

## 2018-06-27 PROCEDURE — 3008F BODY MASS INDEX DOCD: CPT | Mod: CPTII,S$GLB,, | Performed by: NURSE PRACTITIONER

## 2018-06-27 PROCEDURE — 99213 OFFICE O/P EST LOW 20 MIN: CPT | Mod: S$GLB,,, | Performed by: NURSE PRACTITIONER

## 2018-06-27 PROCEDURE — 3078F DIAST BP <80 MM HG: CPT | Mod: CPTII,S$GLB,, | Performed by: NURSE PRACTITIONER

## 2018-06-27 PROCEDURE — 3075F SYST BP GE 130 - 139MM HG: CPT | Mod: CPTII,S$GLB,, | Performed by: NURSE PRACTITIONER

## 2018-06-27 PROCEDURE — 99999 PR PBB SHADOW E&M-EST. PATIENT-LVL IV: CPT | Mod: PBBFAC,,, | Performed by: NURSE PRACTITIONER

## 2018-06-27 NOTE — PROGRESS NOTES
"Subjective:       Patient ID: Jessie Gallardo is a 62 y.o. female.    Chief Complaint: No chief complaint on file.    Interval History 6/27/2018:  The patient presents for follow up today.  We have not seen her since December 2017.  Since that time she was supposed to have a lumbar fusion with Dr. Dey.  She ultimately decided on SCS implant which was placed by Dr. James with his practice.  She feels as though this is helping somewhat.  She is still having back and leg pain.  She also has leg numbness and tingling.  She also reports that she has seen Dr. Caballero recently and reports having RFAs which "was messed up," worsening her pain.  We were previously providing her with Tramadol.  She has filled [place] from Dr. James, last on 5/24/18.  Her pain today is 7/10.    Interval History 12/26/2017:  The patient returns today for follow up of lower back pain.  At her last OV, I placed a referral for neurosurgery.  She was evaluated by Dr. Jerome Dey and plans to undergo L4-5 fusion in the near future.  She is scheduled for D and C on 12/28/17 secondary to abnormal bleeding.  She continues to report lower back pain with radiation into the legs.  She continues to take Tramadol which does provide her some benefit. Her pain today is 8/10.      Interval History 10/24/2017:  The patient returns today for follow up.  She is s/p L5-S1 IL MIKE on 10/10/17 with about 40% pain relief.  Previous procedures provided significant benefit.  She did not previously see outside neurosurgeon as we discussed.  She would like for me to refer her to someone at [facility].  She continues to report back pain with radiation down her legs.  Her neck pain has somewhat improved since last OV.  She continues to take Tramadol which does help as needed.  Her pain today is 7/10.    Interval History 8/11/2017:  The patient returns today for follow up of lower back and neck pain.  She reports increased pain since her last visit.  She is having neck pain with " radiation into her left arm.  She is also having lower back pain with radiation down the back and side of her right leg to her calf.  She does have mild left leg pain also.  She did go to the ED at [facility] in June and reports that she was told to see a surgeon.  Her PCP referred her for an appointment.  She reports that she is seeing a neurosurgeon on [place] later this month.  She states that they are waiting to receive her records to decide on a plan of care.  Her pain today is 7/10.  She continues to take Tramadol as needed with some benefit.      Interval History 6/2/2017:  The patient returns today for follow up of neck and lower back pain.  She is s/p L5-S1 IL MIKE with significant benefit of leg pain.  She reports no change in her neck pain since her previous encounter.  She continues to take Tramadol and Gabapentin with significant benefit.  She has increased her activity level which she thinks is helping.  She has an appointment today with Dr. Ortiz for evaluation of bilateral hand pain.  Her pain today is 6/10.      Interval History 4/7/2017:  The patient returns today for follow up of neck and back pain.  She is s/p cervical MIKE on 3/15/17 with limited benefit.  She reports significant relief with this procedure in the past.  She does report having a headaches after the procedure.  She called the office and was told to lay down and drink caffeine.  She reports that this resolved her headache.  She also has left shoulder pain with radiation down her left arm which is worsening.  She reports an injury about 6 years ago during which she fell into a hole and was removed by her left arm.  She has had shoulder pain since this time, although it has severely worsened over the past month.  She has had XRAYs which show DJD.  She has not had an MRI.  She is also reporting worsening lower back pain and would like a repeat MIKE as previous provided significant benefit.  She continues to take Tramadol and  Gabapentin with benefit.  She continues to try to control her diabetes through diet and reports that her sugars have been stable.  Her pain today is 8/10.  The patient denies any bowel or bladder incontinence or signs of saddle paresthesia.  The patient denies any major medical changes since last office visit.    Interval History 2/1/2017:  The patient returns today for follow up of neck and back pain.  Her back pain is tolerable today.  Her biggest complaint today is neck pain with radiation into her arms.  She has numbness to her fingers also.  She previously had significant benefit from cervical MIKE in the past.  She had an A1C completed at her PCP last week and reports that it was 8.0.  She has been trying to control her sugars through diet and exercise.  She continues to take Gabapentin 1800 mg daily and Tramadol PRN.  She reports significant benefit of the medication without side effects.  Her pain today is 5/10.  The patient denies any bowel or bladder incontinence or signs of saddle paresthesia.      Interval History 12/2/2016:  The patient returns today for follow up of lower back and neck pain.  She is s/p L5-S1 IL MIKE on 11/16/16 with 70% pain relief of back and leg pain.  She reports that her pain is mild today.  She has increased her activity lately and has been cleaning her house.  She does have some increased pain after cleaning.  She is very happy with her results though.  Her neck pain has been tolerable.  She has not started PT because she recently moved and would like another order.  Her pain today is 5/10.  The patient denies any bowel or bladder incontinence or signs of saddle paresthesia.  The patient denies any major medical changes since last office visit.    Interval History 10/5/2016:  The patient returns today for follow up of lower back and leg pain.  She is s/p bilateral SI joint injections on 8/10/16 with 50% benefit.  She is still having radiating pain down the back and sides of both  legs to the top and bottom of her feet.  She is also having numbness and tingling throughout her feet.  She was previously having swelling to her legs.  Dr. Ley instructed her to titrate down Gabapentin to see if this was causing this.  She states that this did not improve her swelling.  She then stopped vitamin supplements (Vit E and C).  Her leg swelling then resolved.  She then restarted Gabapentin and titrated up to 600 mg TID.  Her pain today is 8/10.  The patient denies any bowel or bladder incontinence or signs of saddle paresthesia.  The patient denies any major medical changes since last office visit.    Interval History 6/27/2016:  The patient returns today for f/u of neck, bilateral shoulder and lower back pain.  She is s/p cervical MIKE on 6/8/16 with 80% improvement of her neck and left arm pain.  She is no longer having the shooting electric pain.  Her numbness has also improved since the procedure.  Her biggest complaint today is lower back pain which radiates down the back and sides of both legs to the bottom of her feet with numbness.  She also has a history of diabetes and diabetic neuropathy.  Her last A1C was 8.7, increased from 7.8.  She has had lumbar RFAs in the past with significant relief.  She has not had lumbar ESIs.  Her previous lumbar MRI from 2014 shows disc bulges at L3-4, L4-5, and L5-S1 without NF narrowing.  She is taking Tramadol PRN with relief.  I increased her Gabapentin last OV but she reports that she is taking 300 mg BID.  Her pain today is 6/10.  The patient denies any bowel/bladder incontinence or symptoms of saddle paresthesia.     Interval History 05/20/2016:  Patient is present today for a f/u for lower back, bilateral shoulder and neck pain.  Her back pain has been mild since her lumbar RFAs.  She is mostly complaining of neck pain which radiates down both arms with associated tingling.  She has had cervical ESIs in the past which she now states may have offered her  more benefit than she previously believed.  She would like to try this treatment options again.  She did have a recent cervical MRI ordered by Dr. Herrera which does show muliple osteophytes with spinal narrowing.  She also had bilateral shoulder XRAYs which show DJD.   She continues to use Tramadol which provided relief.  She has a history of C4-5 fusion in 1999 with relief of her pain.  She is also taking Gabapentin 300 mg TID with limited relief.  Her pain today is a 6/10.  The patient denies any bowel/bladder incontinence.    Interval History 04/26/2016:  Patient is present today for a f/u for Low Back Pain. The pt recently had a Right Lumbar RFA @ L3,L4,L5 on 4/12/2016 where she reports a significant amount of relief. She reports her pain to be 4/10 today. The pt would like to address neck and shoulder pain that is radiating down through to both arms.  Patient has had a history of a C4-5 fusion, she had previous cervical intralaminar epidural steroidal injections in January 2015 which did not offer significant relief and the patient did have a cervical EMG/NCV which did not show evidence of radiculopathy at that time but some carpal tunnel syndrome.  Currently she states that the pain in her shoulders and in her arms is most significant and she describes her arm pain as feelings of swelling bilaterally.    Interval History 11/19/2015:  Patient here for f/u of chronic low back pain. Patient was last here in 7/15 and had B L3,4,5 RFA done. Patient states that she continues to have about 50% pain reduction of low back pain and some leg pain. She able to walk and stand more and feels that she is functioning much better over all. Patient continues to have some low back pain right> left with intermittent BLE pains right >left and bilateral foot numbess and tingling. Patient admits to not taking gabapentin as directed and had been taking it prn.     Interval history 07/7/2015:  Ms. Gallardo is a 62 y.o. female with neck  and low back pain presents today for f/u s/p b/l MBB at L4, 5 and sacral Ala and right sided MBB at same level. Reports significantly more relief with b/l block. She was in physical therapy but has not gone for 2 months. She has a lot of anxiety and frustration associated with her chronic pain. She saw Dr. Rosenbaum today and it was recommended she continue f/u with Dr. Jean. Pt has been seen in the clinic before by Dr. Ley, however pt is new to me.   History below per Dr. Ley    Interval history 05/21/2015:  Since previous encounter patient reports neck pain and lower back pain, although the pain in her lower back is her worst pain. . Patient reports pain as a 8/10 today. Patient does not take Soma and Hydrocodone anymore. She has a lot of anxiety and frustration associated with her chronic pain.    Interval history 04/20/2015:  Since previous encounter patient reports neck pain radiating into her upper extremities. Patient stated that she completed her EMG with  which did not show any evidence of radiculopathy and neurosurgery was evaluating the patient did not determine any need for further surgical intervention. Patient stated that she discontinued the Lyrica due to it makes her swell and have dry mouth. Patient stated that she still takes the Gabapentin. Patient reports no other health changes since her last visit. Patient reports her pain 8/10 today.     Interval history 03/16/2015:  Since previous encounter patient reports neck pain radiating into her upper extremities and sometimes causes her to have headaches. Patient reports low back also. Patient stated that she did receive relief from her last injection but the pain has returned. Patient stated that she still takes Norco but it only puts her to sleep and when she awakens the pain is back. Patient reports that she missed her EMG appt due to her pain, however she has been rescheduled to the end of the month Additionally the patient has  "not started Lyrica as previously prescribed. Patient reports memory loss. Patient reports no other health changes since her last visit. Patient reports her pain 8/10 today.   Initial encounter:    Jessie Gallardo presents to the clinic for the evaluation of neck and low back pain. The pain started 3 months ago following traumatic fall and symptoms have been worsening. Reports that her neck pain is the most concerning today.     Brief history:    She has h/o cervical fusion in 1999 in [place] and she did well after surgery. She fell on 9/2/14 when she was getting her oil changed. She was unable to stand right after the incident and she was taken by ambulance to the ED.     She complains of diffuse body pain. She feels like she is getting worse from the accident. She has pain from her "head to her toes."      Pain Description:    The pain is located in the neck and low back area and radiates to the shoulders and posterior thighs, respectively..     At BEST 6/10     At WORST  9/10 on the WORST day.     On average pain is rated as 7/10.     Today the pain is rated as 8/10    The pain is described as aching, burning, numbing, sharp, shooting, stabbing, tingling and weakness      Symptoms interfere with daily activity and sleeping.     Exacerbating factors: Sitting, Laying, Bending, Morning, Extension, Flexing, Lifting and Getting out of bed/chair.     Mitigating factors laying down and medications.     Patient denies night fever/night sweats, urinary incontinence, bowel incontinence, significant weight loss, significant motor weakness and loss of sensations.  Patient denies any suicidal or homicidal ideations    Pain Medications:    Current:  Neurontin 600 mg TID (1800 mg daily)  Tramadol 50 mg PRN  Norco 5/325 mg PRN- Dr. James    Tried in Past:  NSAIDs -Aleve  TCA -Never  SNRI -Never  Muscle relaxer: Klonopin, Zanaflex, Zoloft  Opioid: Tramadol and [place]    Physical Therapy/Home Exercise: not currently     report: " Reviewed and consistent with medication use as prescribed.    Pain Procedures:  1/7/2015-cervical epidural steroidal injection  1/21/2015-cervical epidural steroidal injection  6/10/2015 bilateral medial branch nerve block at the level of L3, L4, L5  6/17/2015-right medial branch nerve block at the level of L3, L4, L5  7/15/2015-left radiofrequency ablation at the level of L3, L4, L5  7/29/2015-right radiofrequency ablation at the level of L3, L4, L5  3/29/2016-left radio frequency ablation at the level of L3, L4, L5  4/12/2016-right radio frequency ablation at the level of L3, L4, L5  6/8/16 C7-T1 IL MIKE- 80% relief  8/10/16 Bilateral SI joint injections- 50% relief  11/16/16 L5-S1 IL MIKE- 70% relief  3/15/17 C7-T1 IL MIKE- no relief  4/19/17 L5-S1 IL MIKE- 70% relief  10/10/17 L5-S1 IL MIKE- 40% relief     Chiropractor -never  Acupuncture - never  TENS unit -Yes, with PT  Spinal decompression -never  Joint replacement -never    Imaging:    MRI Cervical 05/17/2016:  Comparison is November 2014    Routine imaging of the cervical spine was obtained.    There is an osseous fusion at C4-5.  There is straightening and slight reversal of the normal cervical lordosis again seen.  Alignment of vertebral bodies is satisfactory.  Discs are desiccated throughout with disc space narrowing most pronounced at the C5-6 level, similar to prior study.  The spinal cord is normal in signal and paravertebral structures are unremarkable.    C2-3 demonstrates no significant abnormality.    C3-4 demonstrates a mild diffuse disc bulge asymmetric toward the right.  There is bilateral foraminal narrowing.  This disc bulge thins the posterior cerebrospinal fluid sleeve and abuts the anterior aspect of the spinal cord with resultant mild spinal canal narrowing.    C4-5 demonstrates osseous fusion with some posterior osteophytic spurring.  There is complete loss of the anterior cerebrospinal fluid sleeve,and the posterior cerebrospinal fluid  sleeve and some flattening of the spinal cord but no abnormal spinal cord signal.  Findings appear fairly similar to prior study.  There is mild to moderate narrowing of the spinal canal.    C5-6 demonstrates posterior disc osteophyte complex asymmetric to the left more pronounced as compared to prior study.  There is thinning of the cerebrospinal fluid sleeve around the spinal cord and mild spinal canal narrowing.    C6-7 demonstrates a posterior disc osteophyte complex asymmetric toward the left.  There is thinning of the cerebrospinal fluid sleeve around the spinal cord and mild spinal canal narrowing.   Impression    In this patient with osseous fusion at C4-5, there is multilevel spinal canal narrowing most significant at C4-5, as further detailed above.  ______________________________________     Electronically signed by: Sherie Rodriguez MD  Date: 05/18/16       Lumbar MRI 10/31/16  Narrative   MRI OF THE LUMBAR SPINE WITHOUT CONTRAST.     Technique:  Sagittal T1, sagittal T2, sagittal STIR, axial T1 and axial T2 weighted images of the lumbar spine obtained without contrast.     Comparison: MRI 11/20/2014.     Findings:  Minimal grade 1 anterolisthesis is seen at L3-4 and L4-L5.  Otherwise, sagittal vertebral body alignment is appropriate.  Vertebral body heights well-maintained.  No fracture is identified.  Mild multilevel disc degeneration is seen, most pronounced at L5-S1. No marrow signal abnormality suspicious for an infiltrative process.      The conus is normal in appearance, and terminates at the L1 level.  Multiple fibroids are seen in the uterus.      T12-L1: No focal disc herniation.  No significant spinal canal stenosis or neuroforaminal narrowing.  L1-L2: Bilateral facet arthropathy.  No focal disc herniation.  No significant spinal canal stenosis or neuroforaminal narrowing.  L2-L3: No focal disc herniation.  No significant spinal canal stenosis.  Bilateral facet arthropathy is noted, which  "contributes to mild bilateral neuroforaminal narrowing.  L3-L4: Uncovering of the disc with mild bulge, bilateral facet arthropathy and ligament of flavum thickening resulting in moderate right-sided and mild left-sided neuroforaminal narrowing.  L4-L5: Uncovering of the disc with mild bulge, bilateral facet arthropathy, and ligamentum flavum thickening resulting in moderate right-sided and mild left-sided neuroforaminal narrowing.  L5-S1: Broad-based disc bulge and bilateral facet arthropathy without significant spinal canal stenosis.  Moderate bilateral neuroforaminal narrowing is seen.   Impression      Multilevel lumbar spondylosis, as detailed above.         Lab Results   Component Value Date    HGBA1C 8.7 (H) 04/21/2016       REVIEW OF SYSTEMS:    GENERAL:  No weight loss, malaise or fevers.  HEENT:   No recent changes in vision or hearing  NECK:  Negative for lumps, no difficulty with swallowing.  RESPIRATORY:  Negative for cough, wheezing or shortness of breath, patient denies any recent URI.  CARDIOVASCULAR:  Negative for chest pain, leg swelling or palpitations. H/o htn.  GI:  Negative for abdominal discomfort, blood in stools or black stools or change in bowel habits.  MUSCULOSKELETAL:  See HPI.  SKIN:  Negative for lesions, rash, and itching.  PSYCH:  No mood disorder or recent psychosocial stressors.  Patients sleep is not disturbed secondary to pain.  HEMATOLOGY/LYMPHOLOGY:  Negative for prolonged bleeding, bruising easily or swollen nodes.  Patient is not currently taking any anti-coagulants  NEURO:   No history of syncope, paralysis, seizures or tremors. H/O headaches- followed by Dr. Herrera.  ENDO: H/O diabetes and peripheral neuropathy.  All other reviewed and negative other than HPI.      OBJECTIVE:    /77   Pulse 68   Temp 99 °F (37.2 °C)   Ht 5' 6" (1.676 m)   Wt 98.7 kg (217 lb 9.5 oz)   LMP  (LMP Unknown)   BMI 35.12 kg/m²     PHYSICAL EXAMINATION:    GENERAL: Well appearing, in no " acute distress, alert and oriented x3.  PSYCH:  Mood and affect appropriate.  SKIN: Skin color, texture, turgor normal, no rashes or lesions.  Well healed incision x2 from recent SCS implant.  HEAD/FACE:  Normocephalic, atraumatic. Cranial nerves grossly intact.  CV: RRR with palpation of the radial artery.  PULM: No evidence of respiratory difficulty, symmetric chest rise.  BACK:  There is no pain with palpation over the facet joints of the lumbar spine bilaterally.  Limited ROM to lumbar spine with pain on flexion and extension.  Positive facet loading bilaterally.  There is pain to palpation over the sacroiliac joints bilaterally.  DEVIN is positive bilaterally.  EXTREMITIES: Peripheral joint ROM is full and pain free without obvious instability or laxity in all four extremities. No deformities, edema, or skin discoloration. Good capillary refill.  MUSCULOSKELETAL: Bilateral upper  extremity strength is normal and symmetric.  No atrophy or tone abnormalities are noted.  NEURO: Bilateral upper extremity coordination and muscle stretch reflexes are physiologic and symmetric.  Brenda's negative. No clonus.  Decreased sensation to BLE.  GAIT: Antalgic.    Assessment:     Ms. Gallardo is a 62 y.o. female with neck, left shoulder and lower back pain consistent with the following diagnoses:    1. Lumbar radiculopathy    2. DDD (degenerative disc disease), lumbar    3. Chronic pain syndrome    4. Lumbosacral radiculopathy    5. Myalgia        Plan:     - Previous imaging was reviewed and discussed with the patient today.    - She recently had SCS implant by Dr. James.  She chose not to have lumbar fusion with Dr. Dey.  Since her last OV, she also reports having lumbar RFAs with Dr. Caballero.  I informed her today that she cannot see 3 different pain physicians for treatment.  She does not wish to see Dr. Caballero and has a f/u scheduled with Dr. James.     - We will no longer fill Tramadol.  She needs to obtain from   [facility].    - The patient will continue a home exercise routine to help with pain and strengthening.      - RTC PRN.      The above plan and management options were discussed at length with patient. Patient is in agreement with the above and verbalized understanding.     Lisa Angel  06/27/2018

## 2018-09-10 ENCOUNTER — OFFICE VISIT (OUTPATIENT)
Dept: PSYCHIATRY | Facility: CLINIC | Age: 63
End: 2018-09-10
Payer: MEDICARE

## 2018-09-10 VITALS
HEART RATE: 77 BPM | WEIGHT: 223 LBS | SYSTOLIC BLOOD PRESSURE: 176 MMHG | DIASTOLIC BLOOD PRESSURE: 82 MMHG | BODY MASS INDEX: 35.84 KG/M2 | HEIGHT: 66 IN

## 2018-09-10 DIAGNOSIS — F33.1 MODERATE EPISODE OF RECURRENT MAJOR DEPRESSIVE DISORDER: Primary | ICD-10-CM

## 2018-09-10 DIAGNOSIS — M79.7 FIBROMYALGIA: Chronic | ICD-10-CM

## 2018-09-10 DIAGNOSIS — G47.30 SLEEP APNEA, UNSPECIFIED TYPE: ICD-10-CM

## 2018-09-10 DIAGNOSIS — F41.1 GENERALIZED ANXIETY DISORDER: ICD-10-CM

## 2018-09-10 PROCEDURE — 3079F DIAST BP 80-89 MM HG: CPT | Mod: CPTII,S$GLB,, | Performed by: NURSE PRACTITIONER

## 2018-09-10 PROCEDURE — 99999 PR PBB SHADOW E&M-EST. PATIENT-LVL II: CPT | Mod: PBBFAC,,, | Performed by: NURSE PRACTITIONER

## 2018-09-10 PROCEDURE — 3077F SYST BP >= 140 MM HG: CPT | Mod: CPTII,S$GLB,, | Performed by: NURSE PRACTITIONER

## 2018-09-10 PROCEDURE — 99212 OFFICE O/P EST SF 10 MIN: CPT | Mod: PBBFAC | Performed by: NURSE PRACTITIONER

## 2018-09-10 PROCEDURE — 99215 OFFICE O/P EST HI 40 MIN: CPT | Mod: S$GLB,,, | Performed by: NURSE PRACTITIONER

## 2018-09-10 PROCEDURE — 3008F BODY MASS INDEX DOCD: CPT | Mod: CPTII,S$GLB,, | Performed by: NURSE PRACTITIONER

## 2018-09-10 RX ORDER — SERTRALINE HYDROCHLORIDE 50 MG/1
50 TABLET, FILM COATED ORAL DAILY
Qty: 30 TABLET | Refills: 1 | Status: ON HOLD | OUTPATIENT
Start: 2018-09-10 | End: 2022-03-23 | Stop reason: HOSPADM
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
## 2019-04-17 ENCOUNTER — TELEPHONE (OUTPATIENT)
Dept: HEMATOLOGY/ONCOLOGY | Facility: CLINIC | Age: 64
End: 2019-04-17

## 2019-04-25 ENCOUNTER — LAB VISIT (OUTPATIENT)
Dept: LAB | Facility: HOSPITAL | Age: 64
End: 2019-04-25
Attending: STUDENT IN AN ORGANIZED HEALTH CARE EDUCATION/TRAINING PROGRAM
Payer: MEDICARE

## 2019-04-25 ENCOUNTER — INITIAL CONSULT (OUTPATIENT)
Dept: HEMATOLOGY/ONCOLOGY | Facility: CLINIC | Age: 64
End: 2019-04-25
Payer: MEDICARE

## 2019-04-25 VITALS
RESPIRATION RATE: 20 BRPM | BODY MASS INDEX: 35.89 KG/M2 | OXYGEN SATURATION: 97 % | SYSTOLIC BLOOD PRESSURE: 139 MMHG | HEIGHT: 66 IN | DIASTOLIC BLOOD PRESSURE: 77 MMHG | HEART RATE: 81 BPM | WEIGHT: 223.31 LBS

## 2019-04-25 DIAGNOSIS — D64.9 NORMOCYTIC ANEMIA: ICD-10-CM

## 2019-04-25 DIAGNOSIS — D47.2 MGUS (MONOCLONAL GAMMOPATHY OF UNKNOWN SIGNIFICANCE): Primary | ICD-10-CM

## 2019-04-25 DIAGNOSIS — D47.2 MGUS (MONOCLONAL GAMMOPATHY OF UNKNOWN SIGNIFICANCE): ICD-10-CM

## 2019-04-25 LAB
25(OH)D3+25(OH)D2 SERPL-MCNC: 20 NG/ML (ref 30–96)
FERRITIN SERPL-MCNC: 77 NG/ML (ref 20–300)
IGA SERPL-MCNC: 142 MG/DL (ref 40–350)
IGG SERPL-MCNC: 1730 MG/DL (ref 650–1600)
IGM SERPL-MCNC: 60 MG/DL (ref 50–300)
IRON SERPL-MCNC: 62 UG/DL (ref 30–160)
RETICS/RBC NFR AUTO: 1.6 % (ref 0.5–2.5)
SATURATED IRON: 15 % (ref 20–50)
TOTAL IRON BINDING CAPACITY: 422 UG/DL (ref 250–450)
TRANSFERRIN SERPL-MCNC: 285 MG/DL (ref 200–375)

## 2019-04-25 PROCEDURE — 99999 PR PBB SHADOW E&M-EST. PATIENT-LVL III: ICD-10-PCS | Mod: PBBFAC,GC,, | Performed by: STUDENT IN AN ORGANIZED HEALTH CARE EDUCATION/TRAINING PROGRAM

## 2019-04-25 PROCEDURE — 99999 PR PBB SHADOW E&M-EST. PATIENT-LVL III: CPT | Mod: PBBFAC,GC,, | Performed by: STUDENT IN AN ORGANIZED HEALTH CARE EDUCATION/TRAINING PROGRAM

## 2019-04-25 PROCEDURE — 85045 AUTOMATED RETICULOCYTE COUNT: CPT

## 2019-04-25 PROCEDURE — 82668 ASSAY OF ERYTHROPOIETIN: CPT

## 2019-04-25 PROCEDURE — 84165 PATHOLOGIST INTERPRETATION SPE: ICD-10-PCS | Mod: 26,,, | Performed by: PATHOLOGY

## 2019-04-25 PROCEDURE — 99203 OFFICE O/P NEW LOW 30 MIN: CPT | Mod: S$PBB,,, | Performed by: STUDENT IN AN ORGANIZED HEALTH CARE EDUCATION/TRAINING PROGRAM

## 2019-04-25 PROCEDURE — 99203 PR OFFICE/OUTPT VISIT, NEW, LEVL III, 30-44 MIN: ICD-10-PCS | Mod: S$PBB,,, | Performed by: STUDENT IN AN ORGANIZED HEALTH CARE EDUCATION/TRAINING PROGRAM

## 2019-04-25 PROCEDURE — 36415 COLL VENOUS BLD VENIPUNCTURE: CPT

## 2019-04-25 PROCEDURE — 84165 PROTEIN E-PHORESIS SERUM: CPT

## 2019-04-25 PROCEDURE — 82728 ASSAY OF FERRITIN: CPT

## 2019-04-25 PROCEDURE — 84165 PROTEIN E-PHORESIS SERUM: CPT | Mod: 26,,, | Performed by: PATHOLOGY

## 2019-04-25 PROCEDURE — 83540 ASSAY OF IRON: CPT

## 2019-04-25 PROCEDURE — 99213 OFFICE O/P EST LOW 20 MIN: CPT | Mod: PBBFAC | Performed by: STUDENT IN AN ORGANIZED HEALTH CARE EDUCATION/TRAINING PROGRAM

## 2019-04-25 PROCEDURE — 82306 VITAMIN D 25 HYDROXY: CPT

## 2019-04-25 PROCEDURE — 82784 ASSAY IGA/IGD/IGG/IGM EACH: CPT

## 2019-04-25 NOTE — PROGRESS NOTES
PATIENT: Jessie Gallardo  MRN: [id number]  DATE: 4/25/2019      Diagnosis:   1. MGUS (monoclonal gammopathy of unknown significance)    2. Normocytic anemia        Chief Complaint: MGUS    Oncologic History:   Oncologic History    Oncologic History      Oncologic Treatment      Pathology          Subjective:    Interval History: Ms. Gallardo is here for new patient consultation for MGUS.    This is a 63 year old woman with chronic back pain and has a back stimulator, DM2, CKD, and established record of paraproteinemia.  She is referred from her PCP for further evaluation and follow up of her paraproteinemia.  Denies fevers, chills, nightsweats, or unintentional weight loss.  Back pain is stable and she has not noticed change in severity or character of her pain.  She has not experienced any new bone pain.  No family history of myeloma; however her family history is significant for pancreatic cancer and breast cancer in her maternal aunt and uncle.  She reports being up to date on her mammograms and colonoscopy.  She has not had a hysterectomy and has known fibroids.  She knows she needs to follow up with her gynecologist for a pap smear.    Past Medical History:   Past Medical History:   Diagnosis Date    Anxiety     Chest tightness     Chronic back pain     See neuro     Chronic low back pain     CKD (chronic kidney disease) stage 3, GFR 30-59 ml/min     Depression     Diabetes mellitus     type 2    Diabetes mellitus type II     Diabetic neuropathy     Diverticulosis     last colonoscopy was in 2000    Encounter for blood transfusion     Fibroids     Fibromyalgia     GERD (gastroesophageal reflux disease)     History of palpitations     Hoarseness of voice     Hyperlipidemia     Hypertension     Obesity     Obstructive sleep apnea     not using cpap now    Pancreatitis     Patient is Rastafari     Post menopausal syndrome        Past Surgical HIstory:   Past Surgical History:    Procedure Laterality Date    ABDOMINAL SURGERY      BLOCK-NERVE-MEDIAL BRANCH-LUMBAR Bilateral 6/17/2015    [facility]    BLOCK-NERVE-MEDIAL BRANCH-LUMBAR Bilateral 6/10/2015    [facility]    CHOLECYSTECTOMY      EMBOLIZATION, BLOOD VESSEL N/A 3/11/2014    [facility] OR 2ND FLR    MIKE      fibroid      [id number] N/A 1/25/2018    Per[facility] at [facility] OR    INJECTION-JOINT Bilateral 8/10/2016    [facility]    INJECTION-STEROID-EPIDURAL-CERVICAL N/A 3/15/2017    [facility]    INJECTION-STEROID-EPIDURAL-CERVICAL N/A 6/8/2016    [facility]    INJECTION-STEROID-EPIDURAL-CERVICAL N/A 1/21/2015    [facility]    INJECTION-STEROID-EPIDURAL-CERVICAL N/A 1/7/2015    [facility]    INJECTION-STEROID-EPIDURAL-LUMBAR N/A 10/10/2017    [facility]    INJECTION-STEROID-EPIDURAL-LUMBAR N/A 4/19/2017    [facility]    INJECTION-STEROID-EPIDURAL-LUMBAR N/A 11/16/2016    [facility]    OOPHORECTOMY      xlap, right ovary removed due to infection?    RADIOFREQUENCY THERMOCOAGULATION (RFTC)-NERVE-MEDIAN BRANCH-LUMBAR Right 4/12/2016    [facility]    RADIOFREQUENCY THERMOCOAGULATION (RFTC)-NERVE-MEDIAN BRANCH-LUMBAR Left 3/29/2016    [facility]    RADIOFREQUENCY THERMOCOAGULATION (RFTC)-NERVE-MEDIAN BRANCH-LUMBAR Right 7/29/2015    [facility] at [facility] MGT    RADIOFREQUENCY THERMOCOAGULATION (RFTC)-NERVE-MEDIAN BRANCH-LUMBAR Left 7/15/2015  "   Performed [facility] at [facility] MGT    right ovary removal Right     UTERINE ARTERY EMBOLIZATION  3/14       Family History:   Family History   Problem Relation Age of Onset    Breast cancer Neg Hx     Ovarian cancer Neg Hx     Colon cancer Neg Hx     Alcohol abuse Father     Cancer Sister         throat ca    Prostate cancer Brother     Heart attack Paternal Uncle     Cirrhosis Sister     Stroke Mother     Diabetes Mother     Hypertension Mother     No Known Problems Daughter     No Known Problems Son     No Known Problems Maternal Grandmother     No Known Problems Daughter     No Known Problems Son     No Known Problems Sister     No Known Problems Sister     No Known Problems Sister     No Known Problems Brother     No Known Problems Brother     No Known Problems Brother     No Known Problems Brother     Cancer Paternal Aunt     Lupus Grandchild     No Known Problems Maternal Aunt     No Known Problems Maternal Uncle     No Known Problems Maternal Grandfather     No Known Problems Paternal Grandfather     No Known Problems Paternal Grandmother     No Known Problems Cousin     Heart disease Neg Hx     Heart failure Neg Hx        Social History:  reports that she has never smoked. She has never used smokeless tobacco. She reports that she does not drink alcohol or use drugs.    Allergies:  Review of patient's allergies indicates:  No Known Allergies    Medications:  Current Outpatient Medications   Medication Sig Dispense Refill    amlodipine (NORVASC) 10 MG tablet Take 1 tablet (10 mg total) by mouth once daily. 90 tablet 3    aspirin (ECOTRIN) 81 MG EC tablet Take 1 tablet (81 mg total) by mouth once daily. 90 tablet 2    BD INSULIN PEN NEEDLE UF SHORT 31 gauge x 5/16" Ndle       blood sugar diagnostic (FREESTYLE LITE STRIPS) Strp TEST BLOOD SUGAR 3 TIMES A  strip 5    enalapril (VASOTEC) 20 MG tablet TAKE 1 TABLET(20 MG) BY MOUTH TWICE DAILY 180 " "tablet 0    fluticasone (FLONASE) 50 mcg/actuation nasal spray USE 2 SPRAYS IN EACH NOSTRIL ONCE DAILY 16 g 3    gabapentin (NEURONTIN) 300 MG capsule       gabapentin (NEURONTIN) 300 MG capsule Take 2 capsules by mouth three times daily 540 capsule 0    hydrochlorothiazide (HYDRODIURIL) 25 MG tablet TAKE 1 TABLET BY MOUTH ONCE DAILY 90 tablet 0    insulin aspart (NOVOLOG FLEXPEN) 100 unit/mL InPn pen INJECT 20 UNITS UNDER THE SKIN THREE TIMES DAILY WITH MEALS 45 mL 3    insulin detemir (LEVEMIR FLEXTOUCH) 100 unit/mL (3 mL) SubQ InPn pen Inject 32 units bid 45 mL 0    insulin needles, disposable, (NOVOFINE 30) 30 x 1/3 " Ndle USE AS DIRECTED THREE TIMES DAILY 100 each 5    lancets (FREESTYLE LANCETS) 28 gauge Misc Inject 1 lancet into the skin 3 (three) times daily. 100 each 5    lidocaine-prilocaine (EMLA) cream       LINZESS 145 mcg Cap capsule TK 1 C PO ONCE A DAY ON AN EMPTY STOMACH  2    lovastatin (MEVACOR) 20 MG tablet TAKE 1 TABLET BY MOUTH EVERY EVENING 90 tablet 0    MOVANTIK tablet       naproxen (NAPROSYN) 500 MG tablet       pen needle, diabetic (BD INSULIN PEN NEEDLE UF MINI) 31 gauge x 3/16" Ndle USE AS DIRECTED TWICE DAILY 100 each 3    sertraline (ZOLOFT) 50 MG tablet Take 1 tablet (50 mg total) by mouth once daily. 30 tablet 1    traMADol (ULTRAM) 50 mg tablet TAKE 1 TABLET(50 MG) BY MOUTH TWICE DAILY AS NEEDED FOR PAIN 60 tablet 0    traMADol (ULTRAM) 50 mg tablet TAKE 1 TABLET(50 MG) BY MOUTH TWICE DAILY AS NEEDED FOR PAIN 60 tablet 1    TRUE METRIX GLUCOSE METER [facility] UTD  0     No current facility-administered medications for this visit.        Review of Systems   Constitutional: Negative for chills, fatigue and fever.   HENT: Negative for mouth sores and nosebleeds.    Eyes: Negative for visual disturbance.   Respiratory: Negative for cough and shortness of breath.    Cardiovascular: Negative for chest pain and leg swelling.   Gastrointestinal: Negative for abdominal " "distention, abdominal pain, anal bleeding, blood in stool, constipation, diarrhea and nausea.   Genitourinary: Negative for hematuria and vaginal bleeding.   Musculoskeletal: Positive for arthralgias and back pain.   Skin: Negative for color change and rash.   Neurological: Negative for dizziness, weakness and light-headedness.   Hematological: Negative for adenopathy. Does not bruise/bleed easily.   Psychiatric/Behavioral: Negative for confusion. The patient is nervous/anxious.        ECOG Performance Status: 0   Objective:      Vitals:   Vitals:    04/25/19 1606   BP: 139/77   BP Location: Left arm   Patient Position: Sitting   BP Method: Large (Automatic)   Pulse: 81   Resp: 20   SpO2: 97%   Weight: 101.3 kg (223 lb 5.2 oz)   Height: 5' 6" (1.676 m)     BMI: Body mass index is 36.05 kg/m².    Physical Exam   Constitutional: She appears well-developed.   HENT:   Head: Normocephalic.   Mouth/Throat: No oropharyngeal exudate.   Eyes: Pupils are equal, round, and reactive to light. EOM are normal. No scleral icterus.   Neck: Normal range of motion. No tracheal deviation present.   Cardiovascular: Normal rate, regular rhythm and normal heart sounds. Exam reveals no gallop and no friction rub.   No murmur heard.  Pulmonary/Chest: Effort normal and breath sounds normal. No respiratory distress. She has no wheezes. She has no rales.   Abdominal: Soft. Bowel sounds are normal. She exhibits no distension and no mass. There is no tenderness. There is no guarding.   Musculoskeletal: Normal range of motion. She exhibits no edema.   Lymphadenopathy:     She has no cervical adenopathy.   Neurological: She is alert.   Skin: Skin is warm and dry.   Psychiatric: She has a normal mood and affect.       Laboratory Data: Results for FATIMAH BURR (MRN [id number]) as of 4/25/2019 11:43   Ref. Range 2/6/2018 16:33   WBC Latest Ref Range: 3.90 - 12.70 K/uL 7.35   RBC Latest Ref Range: 4.00 - 5.40 M/uL 4.21   Hemoglobin Latest Ref Range: " 12.0 - 16.0 g/dL 11.2 (L)   Hematocrit Latest Ref Range: 37.0 - 48.5 % 34.8 (L)   MCV Latest Ref Range: 82 - 98 fL 83   MCH Latest Ref Range: 27.0 - 31.0 pg 26.6 (L)   MCHC Latest Ref Range: 32.0 - 36.0 g/dL 32.2   RDW Latest Ref Range: 11.5 - 14.5 % 13.7   Platelets Latest Ref Range: 150 - 350 K/uL 187   MPV Latest Ref Range: 9.2 - 12.9 fL 10.9   Gran% Latest Ref Range: 38.0 - 73.0 % 42.0   Gran # (ANC) Latest Ref Range: 1.8 - 7.7 K/uL 3.1   Lymph% Latest Ref Range: 18.0 - 48.0 % 49.7 (H)   Lymph # Latest Ref Range: 1.0 - 4.8 K/uL 3.7   Mono% Latest Ref Range: 4.0 - 15.0 % 7.1   Mono # Latest Ref Range: 0.3 - 1.0 K/uL 0.5   Eosinophil% Latest Ref Range: 0.0 - 8.0 % 1.0   Eos # Latest Ref Range: 0.0 - 0.5 K/uL 0.1   Basophil% Latest Ref Range: 0.0 - 1.9 % 0.1   Baso # Latest Ref Range: 0.00 - 0.20 K/uL 0.01     Results for FATIMAH BURR (MRN [id number]) as of 4/25/2019 11:43   Ref. Range 11/29/2017 10:31   Sodium Latest Ref Range: 136 - 145 mmol/L 142   Potassium Latest Ref Range: 3.5 - 5.1 mmol/L 4.1   Chloride Latest Ref Range: 95 - 110 mmol/L 103   CO2 Latest Ref Range: 23 - 29 mmol/L 29   Anion Gap Latest Ref Range: 8 - 16 mmol/L 10   BUN, Bld Latest Ref Range: 8 - 23 mg/dL 22   Creatinine Latest Ref Range: 0.5 - 1.4 mg/dL 1.6 (H)   eGFR if non African American Latest Ref Range: >60 mL/min/1.73 m^2 34.0 (A)   eGFR if African American Latest Ref Range: >60 mL/min/1.73 m^2 39.3 (A)   Glucose Latest Ref Range: 70 - 110 mg/dL 175 (H)   Calcium Latest Ref Range: 8.7 - 10.5 mg/dL 10.6 (H)   Phosphorus Latest Ref Range: 2.7 - 4.5 mg/dL 2.8   Results for FATIMAH BURR (MRN [id number]) as of 4/25/2019 11:43   Ref. Range 12/29/2015 08:16   Protein, Serum Latest Ref Range: 6.0 - 8.4 g/dL 7.2   Albumin grams/dl Latest Ref Range: 3.35 - 5.55 g/dL 3.76   Alpha-1 grams/dl Latest Ref Range: 0.17 - 0.41 g/dL 0.26   Alpha-2 grams/dl Latest Ref Range: 0.43 - 0.99 g/dL 0.75   Beta grams/dl Latest Ref Range: 0.50 - 1.10 g/dL 0.88    Gamma grams/dl Latest Ref Range: 0.67 - 1.58 g/dL 1.56   Pathologist Interpretation SPE Unknown There is a paraprotein band in mid-gamma (IgG kappa) = 0.84 g/dL.    Pathologist Interpretation YINA Unknown IgG kappa specific monoclonal band in gamma.    [place] Free Light Chains Latest Ref Range: 0.33 - 1.94 mg/dL 4.13 (H)   Lambda Free Light Chains Latest Ref Range: 0.57 - 2.63 mg/dL 2.25   Kappa/Lambda FLC Ratio Latest Ref Range: 0.26 - 1.60  1.84 (H)   Immunofix Interp. Unknown SEE COMMENT       Care everywhere  1/2019 SPEP/YINA  SPE Total Protein 7.4 6.0 - 8.0 g/dl   SPE Albumin 4.0 3.5 - 5.6 g/dL   Alpha 1 Globulin 0.3 0.1 - 0.4 g/dl   Alpha 2 Globulin 0.8 0.4 - 1.0 g/dl   Beta Globulin 1.0 0.5 - 1.1 g/dl   Gamma Globulin 1.4 0.5 - 1.5 g/dl   M Kapil 0.3 (H)   Comment: A second M Kapil of 0.1 g/dl is present in late beta region. 0.0 - 0.0 g/dl   PROTEIN ELECTRO INTERPRETATION Two small monoclonal bands - one is in gamma region, and the other is in late beta region.    See immunofixation electrophoresis.        Imaging:    Assessment:       1. MGUS (monoclonal gammopathy of unknown significance)           Plan:       1. Monoclonal paraprotein - Suspicion for MGUS.  Reviewed lab work from 2015 and she has had OSH repeat SPEP showing decline in M protein although there are two M spikes (one in gamma, one in beta).  Based on lack of increase in her M protein over the years, stability in her anemia and creatinine without other CRAB criteria, and IgG M protein <1.5 g/dL, suspect she may fit into the low risk MGUS group.  -Complete MGUS workup with SPEP/YINA, quantitative immunoglobulins, UPEP with YINA, and skeletal survey  --She is in the process of moving; told her to complete the UPEP/YINA at her convenience.  --If MGUS, will need DEXA scan and vitamin D (increased risk for osteoporosis)  ---If repeat lab work confirms low risk (serum M protein ?1.5 g/dL, IgG subtype, and normal serum free light chain ratio), estimated  risk of progression of only 5 percent over 20 years.  First follow up in 6 months with repeat SPEP, YINA, serum FLC, quantitative Ig and anticipate annually with labs thereafter.  --If labs suggestive of or diagnostic of Mm, will bring her in for bone marrow biopsy and further evaluation (LDH, b2 microglobulin)  -Will perform basic workup of suspected anemia of chronic disease (CKD) with erythropoietin level and iron studies.      Orders Placed This Encounter   Procedures    X-Ray Metastic Survey Complete    Bone Density Spine And Hip    IMMUNOGLOBULINS (IGG, IGA, IGM) QUANTITATIVE    Protein electrophoresis, timed urine    Immunofixation, 24 hour Urine    Vitamin D    Iron and TIBC    Ferritin    Erythropoietin    Reticulocytes    Protein electrophoresis, serum    IMMUNOFIXATION ELECTROPHORESIS, SERUM    Protein, urine, timed           Orlando Griffith MD  Hematology Oncology Fellow PGY5  Pager [phone]  Distress Screening Results: Psychosocial Distress screening score of Distress Score: 0 noted and reviewed. No intervention indicated.

## 2019-04-25 NOTE — Clinical Note
1. 10/28/19 labs only cmp, cbc, free light chains, quant ig, spep and douglas.  2. Follow up 10/31/19 to discuss results.  3. Needs dxa bone density and metastatic skeletal survey scheduled within the next few weeks at her convenience.  Thanks -e

## 2019-04-26 LAB
ALBUMIN SERPL ELPH-MCNC: 4.15 G/DL (ref 3.35–5.55)
ALPHA1 GLOB SERPL ELPH-MCNC: 0.28 G/DL (ref 0.17–0.41)
ALPHA2 GLOB SERPL ELPH-MCNC: 0.73 G/DL (ref 0.43–0.99)
B-GLOBULIN SERPL ELPH-MCNC: 1.07 G/DL (ref 0.5–1.1)
GAMMA GLOB SERPL ELPH-MCNC: 1.37 G/DL (ref 0.67–1.58)
PATHOLOGIST INTERPRETATION SPE: NORMAL
PROT SERPL-MCNC: 7.6 G/DL (ref 6–8.4)

## 2019-04-29 LAB — EPO SERPL-ACNC: 18.9 MIU/ML (ref 2.6–18.5)

## 2019-04-30 ENCOUNTER — LAB VISIT (OUTPATIENT)
Dept: LAB | Facility: HOSPITAL | Age: 64
End: 2019-04-30
Attending: STUDENT IN AN ORGANIZED HEALTH CARE EDUCATION/TRAINING PROGRAM
Payer: MEDICARE

## 2019-04-30 DIAGNOSIS — D47.2 MGUS (MONOCLONAL GAMMOPATHY OF UNKNOWN SIGNIFICANCE): ICD-10-CM

## 2019-04-30 LAB
PROT 24H UR-MRATE: NORMAL MG/SPEC (ref 0–100)
PROT UR-MCNC: <7 MG/DL (ref 0–15)
URINE COLLECTION DURATION: 24 HR
URINE VOLUME: 1200 ML

## 2019-04-30 PROCEDURE — 84156 ASSAY OF PROTEIN URINE: CPT

## 2019-04-30 PROCEDURE — 84166 PATHOLOGIST INTERPRETATION UPE: ICD-10-PCS | Mod: 26,,, | Performed by: PATHOLOGY

## 2019-04-30 PROCEDURE — 84166 PROTEIN E-PHORESIS/URINE/CSF: CPT | Mod: 26,,, | Performed by: PATHOLOGY

## 2019-04-30 PROCEDURE — 84166 PROTEIN E-PHORESIS/URINE/CSF: CPT

## 2019-05-02 LAB
PATHOLOGIST INTERPRETATION UPE: NORMAL
PROT PATTERN UR ELPH-IMP: NORMAL

## 2019-05-08 ENCOUNTER — TELEPHONE (OUTPATIENT)
Dept: HEMATOLOGY/ONCOLOGY | Facility: CLINIC | Age: 64
End: 2019-05-08

## 2019-06-03 ENCOUNTER — HOSPITAL ENCOUNTER (OUTPATIENT)
Dept: RADIOLOGY | Facility: HOSPITAL | Age: 64
Discharge: HOME | End: 2019-06-03
Attending: STUDENT IN AN ORGANIZED HEALTH CARE EDUCATION/TRAINING PROGRAM
Payer: MEDICARE

## 2019-06-03 ENCOUNTER — HOSPITAL ENCOUNTER (OUTPATIENT)
Dept: RADIOLOGY | Facility: CLINIC | Age: 64
Discharge: HOME | End: 2019-06-03
Attending: STUDENT IN AN ORGANIZED HEALTH CARE EDUCATION/TRAINING PROGRAM
Payer: MEDICARE

## 2019-06-03 DIAGNOSIS — D47.2 MGUS (MONOCLONAL GAMMOPATHY OF UNKNOWN SIGNIFICANCE): ICD-10-CM

## 2019-06-03 PROCEDURE — 77080 DEXA BONE DENSITY SPINE HIP: ICD-10-PCS | Mod: 26,,, | Performed by: INTERNAL MEDICINE

## 2019-06-03 PROCEDURE — 77075 RADEX OSSEOUS SURVEY COMPL: CPT | Mod: 26,,, | Performed by: RADIOLOGY

## 2019-06-03 PROCEDURE — 77075 XR METASTATIC SURVEY: ICD-10-PCS | Mod: 26,,, | Performed by: RADIOLOGY

## 2019-06-03 PROCEDURE — 77080 DXA BONE DENSITY AXIAL: CPT | Mod: 26,,, | Performed by: INTERNAL MEDICINE

## 2019-06-03 PROCEDURE — 77075 RADEX OSSEOUS SURVEY COMPL: CPT | Mod: TC

## 2019-06-03 PROCEDURE — 77080 DXA BONE DENSITY AXIAL: CPT | Mod: TC

## 2019-06-09 ENCOUNTER — TELEPHONE (OUTPATIENT)
Dept: HEMATOLOGY/ONCOLOGY | Facility: CLINIC | Age: 64
End: 2019-06-09

## 2019-06-09 DIAGNOSIS — M89.9 LYTIC LESION OF BONE ON X-RAY: Primary | ICD-10-CM

## 2019-06-11 ENCOUNTER — TELEPHONE (OUTPATIENT)
Dept: HEMATOLOGY/ONCOLOGY | Facility: CLINIC | Age: 64
End: 2019-06-11

## 2019-06-13 ENCOUNTER — TELEPHONE (OUTPATIENT)
Dept: HEMATOLOGY/ONCOLOGY | Facility: CLINIC | Age: 64
End: 2019-06-13

## 2019-06-13 NOTE — TELEPHONE ENCOUNTER
----- Message from Jannie Armendariz sent at 6/13/2019  4:02 PM CDT -----  Contact: Pt      ----- Message -----  From: Jacki Ceron RN  Sent: 6/13/2019   1:23 PM  To: Jannie Armendariz        ----- Message -----  From: Donna Morgan  Sent: 6/13/2019  12:58 PM  To: Nacho [facility] Staff    [phone]  Please contact the pt to schedule a f/u appt,   She has a time preference for later this month that she needs to discuss.

## 2021-01-17 ENCOUNTER — HOSPITAL ENCOUNTER (INPATIENT)
Facility: HOSPITAL | Age: 66
LOS: 3 days | Discharge: HOME HEALTH | DRG: 177 | End: 2021-01-20
Attending: EMERGENCY MEDICINE | Admitting: STUDENT IN AN ORGANIZED HEALTH CARE EDUCATION/TRAINING PROGRAM
Payer: COMMERCIAL

## 2021-01-17 DIAGNOSIS — U07.1 COVID-19 VIRUS INFECTION: ICD-10-CM

## 2021-01-17 DIAGNOSIS — Z91.89 AT RISK FOR PROLONGED QT INTERVAL SYNDROME: ICD-10-CM

## 2021-01-17 DIAGNOSIS — G93.40 ENCEPHALOPATHY: ICD-10-CM

## 2021-01-17 DIAGNOSIS — M47.812 FACET ARTHRITIS OF CERVICAL REGION: ICD-10-CM

## 2021-01-17 DIAGNOSIS — M51.36 ANNULAR TEAR OF LUMBAR DISC: ICD-10-CM

## 2021-01-17 DIAGNOSIS — R05.9 COUGH: ICD-10-CM

## 2021-01-17 DIAGNOSIS — R07.9 CHEST PAIN: ICD-10-CM

## 2021-01-17 DIAGNOSIS — R41.82 ALTERED MENTAL STATUS, UNSPECIFIED ALTERED MENTAL STATUS TYPE: Primary | ICD-10-CM

## 2021-01-17 DIAGNOSIS — F22 PARANOIA: ICD-10-CM

## 2021-01-17 LAB
BASOPHILS # BLD AUTO: 0.02 K/UL (ref 0–0.2)
BASOPHILS NFR BLD: 0.4 % (ref 0–1.9)
CTP QC/QA: YES
DIFFERENTIAL METHOD: ABNORMAL
EOSINOPHIL # BLD AUTO: 0 K/UL (ref 0–0.5)
EOSINOPHIL NFR BLD: 0.6 % (ref 0–8)
ERYTHROCYTE [DISTWIDTH] IN BLOOD BY AUTOMATED COUNT: 15.4 % (ref 11.5–14.5)
HCT VFR BLD AUTO: 30.6 % (ref 37–48.5)
HGB BLD-MCNC: 9.2 G/DL (ref 12–16)
IMM GRANULOCYTES # BLD AUTO: 0.02 K/UL (ref 0–0.04)
IMM GRANULOCYTES NFR BLD AUTO: 0.4 % (ref 0–0.5)
LYMPHOCYTES # BLD AUTO: 2.3 K/UL (ref 1–4.8)
LYMPHOCYTES NFR BLD: 42.2 % (ref 18–48)
MCH RBC QN AUTO: 25.6 PG (ref 27–31)
MCHC RBC AUTO-ENTMCNC: 30.1 G/DL (ref 32–36)
MCV RBC AUTO: 85 FL (ref 82–98)
MONOCYTES # BLD AUTO: 0.6 K/UL (ref 0.3–1)
MONOCYTES NFR BLD: 11.8 % (ref 4–15)
NEUTROPHILS # BLD AUTO: 2.4 K/UL (ref 1.8–7.7)
NEUTROPHILS NFR BLD: 44.6 % (ref 38–73)
NRBC BLD-RTO: 0 /100 WBC
PLATELET # BLD AUTO: 312 K/UL (ref 150–350)
PMV BLD AUTO: 10.4 FL (ref 9.2–12.9)
RBC # BLD AUTO: 3.59 M/UL (ref 4–5.4)
SARS-COV-2 RDRP RESP QL NAA+PROBE: POSITIVE
WBC # BLD AUTO: 5.33 K/UL (ref 3.9–12.7)

## 2021-01-17 PROCEDURE — 85025 COMPLETE CBC W/AUTO DIFF WBC: CPT

## 2021-01-17 PROCEDURE — 99285 EMERGENCY DEPT VISIT HI MDM: CPT | Mod: 25

## 2021-01-17 PROCEDURE — 84484 ASSAY OF TROPONIN QUANT: CPT

## 2021-01-17 PROCEDURE — 99285 PR EMERGENCY DEPT VISIT,LEVEL V: ICD-10-PCS | Mod: CS,,, | Performed by: EMERGENCY MEDICINE

## 2021-01-17 PROCEDURE — 99285 EMERGENCY DEPT VISIT HI MDM: CPT | Mod: CS,,, | Performed by: EMERGENCY MEDICINE

## 2021-01-17 PROCEDURE — 87040 BLOOD CULTURE FOR BACTERIA: CPT

## 2021-01-17 PROCEDURE — 83605 ASSAY OF LACTIC ACID: CPT

## 2021-01-17 PROCEDURE — 12000002 HC ACUTE/MED SURGE SEMI-PRIVATE ROOM

## 2021-01-17 PROCEDURE — 80053 COMPREHEN METABOLIC PANEL: CPT

## 2021-01-17 PROCEDURE — U0002 COVID-19 LAB TEST NON-CDC: HCPCS | Performed by: EMERGENCY MEDICINE

## 2021-01-17 PROCEDURE — P9612 CATHETERIZE FOR URINE SPEC: HCPCS

## 2021-01-18 PROBLEM — D64.9 ANEMIA: Status: ACTIVE | Noted: 2021-01-18

## 2021-01-18 PROBLEM — U07.1 COVID-19 VIRUS INFECTION: Status: ACTIVE | Noted: 2021-01-18

## 2021-01-18 PROBLEM — G93.40 ENCEPHALOPATHY: Status: ACTIVE | Noted: 2021-01-18

## 2021-01-18 PROBLEM — F22 PARANOIA: Status: ACTIVE | Noted: 2021-01-18

## 2021-01-18 PROBLEM — R41.82 ALTERED MENTAL STATUS: Status: ACTIVE | Noted: 2021-01-18

## 2021-01-18 LAB
25(OH)D3+25(OH)D2 SERPL-MCNC: 17 NG/ML (ref 30–96)
ALBUMIN SERPL BCP-MCNC: 2.3 G/DL (ref 3.5–5.2)
ALBUMIN SERPL BCP-MCNC: 2.3 G/DL (ref 3.5–5.2)
ALP SERPL-CCNC: 193 U/L (ref 55–135)
ALP SERPL-CCNC: 207 U/L (ref 55–135)
ALT SERPL W/O P-5'-P-CCNC: 14 U/L (ref 10–44)
ALT SERPL W/O P-5'-P-CCNC: 15 U/L (ref 10–44)
AMMONIA PLAS-SCNC: 31 UMOL/L (ref 10–50)
AMPHET+METHAMPHET UR QL: NEGATIVE
ANION GAP SERPL CALC-SCNC: 10 MMOL/L (ref 8–16)
ANION GAP SERPL CALC-SCNC: 11 MMOL/L (ref 8–16)
AST SERPL-CCNC: 20 U/L (ref 10–40)
AST SERPL-CCNC: 23 U/L (ref 10–40)
BACTERIA #/AREA URNS AUTO: NORMAL /HPF
BARBITURATES UR QL SCN>200 NG/ML: NEGATIVE
BENZODIAZ UR QL SCN>200 NG/ML: NEGATIVE
BILIRUB SERPL-MCNC: 0.3 MG/DL (ref 0.1–1)
BILIRUB SERPL-MCNC: 0.3 MG/DL (ref 0.1–1)
BILIRUB UR QL STRIP: NEGATIVE
BILIRUB UR QL STRIP: NEGATIVE
BNP SERPL-MCNC: 57 PG/ML (ref 0–99)
BUN SERPL-MCNC: 19 MG/DL (ref 8–23)
BUN SERPL-MCNC: 20 MG/DL (ref 8–23)
BZE UR QL SCN: NEGATIVE
CALCIUM SERPL-MCNC: 10 MG/DL (ref 8.7–10.5)
CALCIUM SERPL-MCNC: 10.2 MG/DL (ref 8.7–10.5)
CANNABINOIDS UR QL SCN: NEGATIVE
CHLORIDE SERPL-SCNC: 106 MMOL/L (ref 95–110)
CHLORIDE SERPL-SCNC: 107 MMOL/L (ref 95–110)
CHOLEST SERPL-MCNC: 132 MG/DL (ref 120–199)
CHOLEST/HDLC SERPL: 3.4 {RATIO} (ref 2–5)
CK SERPL-CCNC: 74 U/L (ref 20–180)
CLARITY UR REFRACT.AUTO: CLEAR
CLARITY UR REFRACT.AUTO: CLEAR
CO2 SERPL-SCNC: 27 MMOL/L (ref 23–29)
CO2 SERPL-SCNC: 28 MMOL/L (ref 23–29)
COLOR UR AUTO: NORMAL
COLOR UR AUTO: YELLOW
CREAT SERPL-MCNC: 1.3 MG/DL (ref 0.5–1.4)
CREAT SERPL-MCNC: 1.3 MG/DL (ref 0.5–1.4)
CREAT UR-MCNC: 46 MG/DL (ref 15–325)
CRP SERPL-MCNC: 22.3 MG/L (ref 0–8.2)
ERYTHROCYTE [SEDIMENTATION RATE] IN BLOOD BY WESTERGREN METHOD: >120 MM/HR (ref 0–36)
EST. GFR  (AFRICAN AMERICAN): 49.1 ML/MIN/1.73 M^2
EST. GFR  (AFRICAN AMERICAN): 49.1 ML/MIN/1.73 M^2
EST. GFR  (NON AFRICAN AMERICAN): 42.6 ML/MIN/1.73 M^2
EST. GFR  (NON AFRICAN AMERICAN): 42.6 ML/MIN/1.73 M^2
ESTIMATED AVG GLUCOSE: 146 MG/DL (ref 68–131)
ETHANOL UR-MCNC: <10 MG/DL
FERRITIN SERPL-MCNC: 573 NG/ML (ref 20–300)
FOLATE SERPL-MCNC: 7.7 NG/ML (ref 4–24)
GLUCOSE SERPL-MCNC: 110 MG/DL (ref 70–110)
GLUCOSE SERPL-MCNC: 111 MG/DL (ref 70–110)
GLUCOSE UR QL STRIP: NEGATIVE
GLUCOSE UR QL STRIP: NEGATIVE
HBA1C MFR BLD HPLC: 6.7 % (ref 4–5.6)
HDLC SERPL-MCNC: 39 MG/DL (ref 40–75)
HDLC SERPL: 29.5 % (ref 20–50)
HGB UR QL STRIP: NEGATIVE
HGB UR QL STRIP: NEGATIVE
HIV 1+2 AB+HIV1 P24 AG SERPL QL IA: NEGATIVE
HYALINE CASTS UR QL AUTO: 1 /LPF
KETONES UR QL STRIP: NEGATIVE
KETONES UR QL STRIP: NEGATIVE
LACTATE SERPL-SCNC: 1 MMOL/L (ref 0.5–2.2)
LDH SERPL L TO P-CCNC: 303 U/L (ref 110–260)
LDLC SERPL CALC-MCNC: 62.4 MG/DL (ref 63–159)
LEUKOCYTE ESTERASE UR QL STRIP: NEGATIVE
LEUKOCYTE ESTERASE UR QL STRIP: NEGATIVE
MAGNESIUM SERPL-MCNC: 1.6 MG/DL (ref 1.6–2.6)
METHADONE UR QL SCN>300 NG/ML: NEGATIVE
MICROSCOPIC COMMENT: NORMAL
NITRITE UR QL STRIP: NEGATIVE
NITRITE UR QL STRIP: NEGATIVE
NONHDLC SERPL-MCNC: 93 MG/DL
OPIATES UR QL SCN: NEGATIVE
PCP UR QL SCN>25 NG/ML: NEGATIVE
PH UR STRIP: 7 [PH] (ref 5–8)
PH UR STRIP: 8 [PH] (ref 5–8)
PHOSPHATE SERPL-MCNC: 2.9 MG/DL (ref 2.7–4.5)
POTASSIUM SERPL-SCNC: 3.6 MMOL/L (ref 3.5–5.1)
POTASSIUM SERPL-SCNC: 3.6 MMOL/L (ref 3.5–5.1)
PROCALCITONIN SERPL IA-MCNC: 0.09 NG/ML
PROT SERPL-MCNC: 7.3 G/DL (ref 6–8.4)
PROT SERPL-MCNC: 7.4 G/DL (ref 6–8.4)
PROT UR QL STRIP: ABNORMAL
PROT UR QL STRIP: NEGATIVE
RBC #/AREA URNS AUTO: 1 /HPF (ref 0–4)
SODIUM SERPL-SCNC: 144 MMOL/L (ref 136–145)
SODIUM SERPL-SCNC: 145 MMOL/L (ref 136–145)
SP GR UR STRIP: 1.01 (ref 1–1.03)
SP GR UR STRIP: 1.01 (ref 1–1.03)
SQUAMOUS #/AREA URNS AUTO: 2 /HPF
T4 FREE SERPL-MCNC: 1.21 NG/DL (ref 0.71–1.51)
TOXICOLOGY INFORMATION: NORMAL
TRIGL SERPL-MCNC: 153 MG/DL (ref 30–150)
TROPONIN I SERPL DL<=0.01 NG/ML-MCNC: 0.12 NG/ML (ref 0–0.03)
TROPONIN I SERPL DL<=0.01 NG/ML-MCNC: 0.14 NG/ML (ref 0–0.03)
TSH SERPL DL<=0.005 MIU/L-ACNC: 8.65 UIU/ML (ref 0.4–4)
URN SPEC COLLECT METH UR: ABNORMAL
URN SPEC COLLECT METH UR: NORMAL
VIT B12 SERPL-MCNC: 632 PG/ML (ref 210–950)
WBC #/AREA URNS AUTO: 0 /HPF (ref 0–5)

## 2021-01-18 PROCEDURE — 83519 RIA NONANTIBODY: CPT | Mod: 59

## 2021-01-18 PROCEDURE — 81001 URINALYSIS AUTO W/SCOPE: CPT

## 2021-01-18 PROCEDURE — G0378 HOSPITAL OBSERVATION PER HR: HCPCS

## 2021-01-18 PROCEDURE — 82140 ASSAY OF AMMONIA: CPT

## 2021-01-18 PROCEDURE — 85652 RBC SED RATE AUTOMATED: CPT

## 2021-01-18 PROCEDURE — 86703 HIV-1/HIV-2 1 RESULT ANTBDY: CPT

## 2021-01-18 PROCEDURE — 99220 PR INITIAL OBSERVATION CARE,LEVL III: CPT | Mod: ,,, | Performed by: PHYSICIAN ASSISTANT

## 2021-01-18 PROCEDURE — 82746 ASSAY OF FOLIC ACID SERUM: CPT

## 2021-01-18 PROCEDURE — 84443 ASSAY THYROID STIM HORMONE: CPT

## 2021-01-18 PROCEDURE — 20000000 HC ICU ROOM

## 2021-01-18 PROCEDURE — 25000003 PHARM REV CODE 250: Performed by: PHYSICIAN ASSISTANT

## 2021-01-18 PROCEDURE — 82550 ASSAY OF CK (CPK): CPT

## 2021-01-18 PROCEDURE — 80061 LIPID PANEL: CPT

## 2021-01-18 PROCEDURE — 84439 ASSAY OF FREE THYROXINE: CPT

## 2021-01-18 PROCEDURE — 90792 PR PSYCHIATRIC DIAGNOSTIC EVALUATION W/MEDICAL SERVICES: ICD-10-PCS | Mod: ,,, | Performed by: PSYCHIATRY & NEUROLOGY

## 2021-01-18 PROCEDURE — 25000003 PHARM REV CODE 250: Performed by: STUDENT IN AN ORGANIZED HEALTH CARE EDUCATION/TRAINING PROGRAM

## 2021-01-18 PROCEDURE — 86255 FLUORESCENT ANTIBODY SCREEN: CPT | Mod: 59

## 2021-01-18 PROCEDURE — 80307 DRUG TEST PRSMV CHEM ANLYZR: CPT

## 2021-01-18 PROCEDURE — 86140 C-REACTIVE PROTEIN: CPT

## 2021-01-18 PROCEDURE — 84100 ASSAY OF PHOSPHORUS: CPT

## 2021-01-18 PROCEDURE — 90792 PSYCH DIAG EVAL W/MED SRVCS: CPT | Mod: ,,, | Performed by: PSYCHIATRY & NEUROLOGY

## 2021-01-18 PROCEDURE — 82728 ASSAY OF FERRITIN: CPT

## 2021-01-18 PROCEDURE — 83735 ASSAY OF MAGNESIUM: CPT

## 2021-01-18 PROCEDURE — 86592 SYPHILIS TEST NON-TREP QUAL: CPT

## 2021-01-18 PROCEDURE — 36415 COLL VENOUS BLD VENIPUNCTURE: CPT

## 2021-01-18 PROCEDURE — 83880 ASSAY OF NATRIURETIC PEPTIDE: CPT

## 2021-01-18 PROCEDURE — 82607 VITAMIN B-12: CPT

## 2021-01-18 PROCEDURE — 81003 URINALYSIS AUTO W/O SCOPE: CPT

## 2021-01-18 PROCEDURE — 84484 ASSAY OF TROPONIN QUANT: CPT

## 2021-01-18 PROCEDURE — 82306 VITAMIN D 25 HYDROXY: CPT

## 2021-01-18 PROCEDURE — 99220 PR INITIAL OBSERVATION CARE,LEVL III: ICD-10-PCS | Mod: ,,, | Performed by: PHYSICIAN ASSISTANT

## 2021-01-18 PROCEDURE — 83036 HEMOGLOBIN GLYCOSYLATED A1C: CPT

## 2021-01-18 PROCEDURE — 80053 COMPREHEN METABOLIC PANEL: CPT

## 2021-01-18 PROCEDURE — 83615 LACTATE (LD) (LDH) ENZYME: CPT

## 2021-01-18 PROCEDURE — 84145 PROCALCITONIN (PCT): CPT

## 2021-01-18 RX ORDER — IBUPROFEN 200 MG
24 TABLET ORAL
Status: DISCONTINUED | OUTPATIENT
Start: 2021-01-18 | End: 2021-01-20 | Stop reason: HOSPADM

## 2021-01-18 RX ORDER — AMLODIPINE BESYLATE 10 MG/1
10 TABLET ORAL DAILY
Status: DISCONTINUED | OUTPATIENT
Start: 2021-01-18 | End: 2021-01-20 | Stop reason: HOSPADM

## 2021-01-18 RX ORDER — ONDANSETRON 8 MG/1
8 TABLET, ORALLY DISINTEGRATING ORAL EVERY 8 HOURS PRN
Status: DISCONTINUED | OUTPATIENT
Start: 2021-01-18 | End: 2021-01-20 | Stop reason: HOSPADM

## 2021-01-18 RX ORDER — SODIUM CHLORIDE 0.9 % (FLUSH) 0.9 %
10 SYRINGE (ML) INJECTION
Status: DISCONTINUED | OUTPATIENT
Start: 2021-01-18 | End: 2021-01-20 | Stop reason: HOSPADM

## 2021-01-18 RX ORDER — HYDRALAZINE HYDROCHLORIDE 25 MG/1
25 TABLET, FILM COATED ORAL EVERY 8 HOURS PRN
Status: DISCONTINUED | OUTPATIENT
Start: 2021-01-18 | End: 2021-01-20 | Stop reason: HOSPADM

## 2021-01-18 RX ORDER — POLYETHYLENE GLYCOL 3350 17 G/17G
17 POWDER, FOR SOLUTION ORAL DAILY
Status: DISCONTINUED | OUTPATIENT
Start: 2021-01-18 | End: 2021-01-20 | Stop reason: HOSPADM

## 2021-01-18 RX ORDER — ACETAMINOPHEN 325 MG/1
650 TABLET ORAL EVERY 8 HOURS PRN
Status: DISCONTINUED | OUTPATIENT
Start: 2021-01-18 | End: 2021-01-20 | Stop reason: HOSPADM

## 2021-01-18 RX ORDER — TALC
6 POWDER (GRAM) TOPICAL NIGHTLY PRN
Status: DISCONTINUED | OUTPATIENT
Start: 2021-01-18 | End: 2021-01-20 | Stop reason: HOSPADM

## 2021-01-18 RX ORDER — ALBUTEROL SULFATE 90 UG/1
2 AEROSOL, METERED RESPIRATORY (INHALATION) EVERY 6 HOURS PRN
Status: DISCONTINUED | OUTPATIENT
Start: 2021-01-18 | End: 2021-01-20 | Stop reason: HOSPADM

## 2021-01-18 RX ORDER — IBUPROFEN 200 MG
16 TABLET ORAL
Status: DISCONTINUED | OUTPATIENT
Start: 2021-01-18 | End: 2021-01-20 | Stop reason: HOSPADM

## 2021-01-18 RX ORDER — GLUCAGON 1 MG
1 KIT INJECTION
Status: DISCONTINUED | OUTPATIENT
Start: 2021-01-18 | End: 2021-01-20 | Stop reason: HOSPADM

## 2021-01-18 RX ORDER — ONDANSETRON 2 MG/ML
4 INJECTION INTRAMUSCULAR; INTRAVENOUS EVERY 8 HOURS PRN
Status: DISCONTINUED | OUTPATIENT
Start: 2021-01-18 | End: 2021-01-20 | Stop reason: HOSPADM

## 2021-01-18 RX ADMIN — AMLODIPINE BESYLATE 10 MG: 10 TABLET ORAL at 09:01

## 2021-01-18 RX ADMIN — Medication 6 MG: at 08:01

## 2021-01-18 RX ADMIN — POLYETHYLENE GLYCOL 3350 17 G: 17 POWDER, FOR SOLUTION ORAL at 09:01

## 2021-01-19 LAB
ALBUMIN SERPL BCP-MCNC: 2.2 G/DL (ref 3.5–5.2)
ALP SERPL-CCNC: 160 U/L (ref 55–135)
ALT SERPL W/O P-5'-P-CCNC: 12 U/L (ref 10–44)
ANION GAP SERPL CALC-SCNC: 12 MMOL/L (ref 8–16)
AST SERPL-CCNC: 22 U/L (ref 10–40)
BILIRUB SERPL-MCNC: 0.4 MG/DL (ref 0.1–1)
BUN SERPL-MCNC: 16 MG/DL (ref 8–23)
CALCIUM SERPL-MCNC: 10.1 MG/DL (ref 8.7–10.5)
CHLORIDE SERPL-SCNC: 108 MMOL/L (ref 95–110)
CO2 SERPL-SCNC: 25 MMOL/L (ref 23–29)
CREAT SERPL-MCNC: 1.1 MG/DL (ref 0.5–1.4)
EST. GFR  (AFRICAN AMERICAN): >60 ML/MIN/1.73 M^2
EST. GFR  (NON AFRICAN AMERICAN): 52.1 ML/MIN/1.73 M^2
GLUCOSE SERPL-MCNC: 87 MG/DL (ref 70–110)
MAGNESIUM SERPL-MCNC: 1.5 MG/DL (ref 1.6–2.6)
PHOSPHATE SERPL-MCNC: 3.2 MG/DL (ref 2.7–4.5)
POCT GLUCOSE: 159 MG/DL (ref 70–110)
POCT GLUCOSE: 85 MG/DL (ref 70–110)
POTASSIUM SERPL-SCNC: 3.6 MMOL/L (ref 3.5–5.1)
PROT SERPL-MCNC: 7.3 G/DL (ref 6–8.4)
RPR SER QL: NORMAL
SODIUM SERPL-SCNC: 145 MMOL/L (ref 136–145)

## 2021-01-19 PROCEDURE — 36415 COLL VENOUS BLD VENIPUNCTURE: CPT

## 2021-01-19 PROCEDURE — 80053 COMPREHEN METABOLIC PANEL: CPT

## 2021-01-19 PROCEDURE — 25000003 PHARM REV CODE 250: Performed by: PHYSICIAN ASSISTANT

## 2021-01-19 PROCEDURE — 97530 THERAPEUTIC ACTIVITIES: CPT

## 2021-01-19 PROCEDURE — 25000003 PHARM REV CODE 250: Performed by: STUDENT IN AN ORGANIZED HEALTH CARE EDUCATION/TRAINING PROGRAM

## 2021-01-19 PROCEDURE — 97535 SELF CARE MNGMENT TRAINING: CPT

## 2021-01-19 PROCEDURE — 97162 PT EVAL MOD COMPLEX 30 MIN: CPT

## 2021-01-19 PROCEDURE — 97116 GAIT TRAINING THERAPY: CPT

## 2021-01-19 PROCEDURE — 20000000 HC ICU ROOM

## 2021-01-19 PROCEDURE — 83735 ASSAY OF MAGNESIUM: CPT

## 2021-01-19 PROCEDURE — 97165 OT EVAL LOW COMPLEX 30 MIN: CPT

## 2021-01-19 PROCEDURE — 84100 ASSAY OF PHOSPHORUS: CPT

## 2021-01-19 RX ADMIN — AMLODIPINE BESYLATE 10 MG: 10 TABLET ORAL at 08:01

## 2021-01-19 RX ADMIN — Medication 6 MG: at 08:01

## 2021-01-19 RX ADMIN — POLYETHYLENE GLYCOL 3350 17 G: 17 POWDER, FOR SOLUTION ORAL at 08:01

## 2021-01-20 VITALS
DIASTOLIC BLOOD PRESSURE: 77 MMHG | BODY MASS INDEX: 29.31 KG/M2 | OXYGEN SATURATION: 98 % | HEART RATE: 77 BPM | RESPIRATION RATE: 19 BRPM | SYSTOLIC BLOOD PRESSURE: 110 MMHG | TEMPERATURE: 98 F | WEIGHT: 182.38 LBS | HEIGHT: 66 IN

## 2021-01-20 LAB
ALBUMIN SERPL BCP-MCNC: 2.4 G/DL (ref 3.5–5.2)
ALP SERPL-CCNC: 132 U/L (ref 55–135)
ALT SERPL W/O P-5'-P-CCNC: 10 U/L (ref 10–44)
ANION GAP SERPL CALC-SCNC: 8 MMOL/L (ref 8–16)
AST SERPL-CCNC: 18 U/L (ref 10–40)
BILIRUB SERPL-MCNC: 0.3 MG/DL (ref 0.1–1)
BUN SERPL-MCNC: 19 MG/DL (ref 8–23)
CALCIUM SERPL-MCNC: 9.9 MG/DL (ref 8.7–10.5)
CHLORIDE SERPL-SCNC: 106 MMOL/L (ref 95–110)
CO2 SERPL-SCNC: 29 MMOL/L (ref 23–29)
CREAT SERPL-MCNC: 1.3 MG/DL (ref 0.5–1.4)
EST. GFR  (AFRICAN AMERICAN): 49.1 ML/MIN/1.73 M^2
EST. GFR  (NON AFRICAN AMERICAN): 42.6 ML/MIN/1.73 M^2
GLUCOSE SERPL-MCNC: 90 MG/DL (ref 70–110)
MAGNESIUM SERPL-MCNC: 1.7 MG/DL (ref 1.6–2.6)
PHOSPHATE SERPL-MCNC: 3.3 MG/DL (ref 2.7–4.5)
POTASSIUM SERPL-SCNC: 3.5 MMOL/L (ref 3.5–5.1)
PROT SERPL-MCNC: 6.9 G/DL (ref 6–8.4)
SODIUM SERPL-SCNC: 143 MMOL/L (ref 136–145)

## 2021-01-20 PROCEDURE — 99499 UNLISTED E&M SERVICE: CPT | Mod: ,,, | Performed by: STUDENT IN AN ORGANIZED HEALTH CARE EDUCATION/TRAINING PROGRAM

## 2021-01-20 PROCEDURE — 93010 ELECTROCARDIOGRAM REPORT: CPT | Mod: ,,, | Performed by: INTERNAL MEDICINE

## 2021-01-20 PROCEDURE — 80053 COMPREHEN METABOLIC PANEL: CPT

## 2021-01-20 PROCEDURE — 93005 ELECTROCARDIOGRAM TRACING: CPT

## 2021-01-20 PROCEDURE — 83735 ASSAY OF MAGNESIUM: CPT

## 2021-01-20 PROCEDURE — 25000003 PHARM REV CODE 250: Performed by: PHYSICIAN ASSISTANT

## 2021-01-20 PROCEDURE — 99499 NO LOS: ICD-10-PCS | Mod: ,,, | Performed by: STUDENT IN AN ORGANIZED HEALTH CARE EDUCATION/TRAINING PROGRAM

## 2021-01-20 PROCEDURE — 36415 COLL VENOUS BLD VENIPUNCTURE: CPT

## 2021-01-20 PROCEDURE — 93010 EKG 12-LEAD: ICD-10-PCS | Mod: ,,, | Performed by: INTERNAL MEDICINE

## 2021-01-20 PROCEDURE — 84100 ASSAY OF PHOSPHORUS: CPT

## 2021-01-20 RX ORDER — INSULIN DETEMIR 100 [IU]/ML
20 INJECTION, SOLUTION SUBCUTANEOUS 2 TIMES DAILY
Qty: 45 ML | Refills: 0 | Status: ON HOLD
Start: 2021-01-20 | End: 2022-03-08

## 2021-01-20 RX ORDER — QUETIAPINE FUMARATE 25 MG/1
25 TABLET, FILM COATED ORAL NIGHTLY
Qty: 30 TABLET | Refills: 1 | Status: SHIPPED | OUTPATIENT
Start: 2021-01-20 | End: 2022-03-01

## 2021-01-20 RX ADMIN — POLYETHYLENE GLYCOL 3350 17 G: 17 POWDER, FOR SOLUTION ORAL at 08:01

## 2021-01-20 RX ADMIN — AMLODIPINE BESYLATE 10 MG: 10 TABLET ORAL at 08:01

## 2021-01-21 DIAGNOSIS — U07.1 COVID-19 VIRUS DETECTED: ICD-10-CM

## 2021-01-23 LAB
BACTERIA BLD CULT: NORMAL
BACTERIA BLD CULT: NORMAL

## 2021-01-25 ENCOUNTER — TELEPHONE (OUTPATIENT)
Dept: HEMATOLOGY/ONCOLOGY | Facility: CLINIC | Age: 66
End: 2021-01-25

## 2021-01-26 ENCOUNTER — TELEPHONE (OUTPATIENT)
Dept: HEMATOLOGY/ONCOLOGY | Facility: CLINIC | Age: 66
End: 2021-01-26

## 2021-02-01 LAB
AMPHIPHYSIN AB TITR SER: NEGATIVE TITER
CV2 IGG TITR SER: NEGATIVE TITER
GLIAL NUC TYPE 1 AB TITR SER: NEGATIVE TITER
HU1 AB TITR SER: NEGATIVE TITER
HU2 AB TITR SER IF: NEGATIVE TITER
HU3 AB TITR SER: NEGATIVE TITER
IMMUNOLOGIST REVIEW: ABNORMAL
NACHR AB SER-SCNC: 0 NMOL/L
PAVAL REFLEX TEST ADDED: ABNORMAL
PCA-1 AB TITR SER: NEGATIVE TITER
PCA-2 AB TITR SER: NEGATIVE TITER
PCA-TR AB TITR SER: NEGATIVE TITER
STRIA MUS AB TITR SER: NEGATIVE TITER
VGCC-N BIND AB SER-SCNC: 0 NMOL/L
VGCC-P/Q BIND AB SER-SCNC: 0.04 NMOL/L
VGKC AB SER-SCNC: 0 NMOL/L

## 2021-03-08 ENCOUNTER — NURSE TRIAGE (OUTPATIENT)
Dept: ADMINISTRATIVE | Facility: CLINIC | Age: 66
End: 2021-03-08

## 2021-03-09 ENCOUNTER — TELEPHONE (OUTPATIENT)
Dept: HEMATOLOGY/ONCOLOGY | Facility: CLINIC | Age: 66
End: 2021-03-09

## 2021-03-19 ENCOUNTER — TELEPHONE (OUTPATIENT)
Dept: HEMATOLOGY/ONCOLOGY | Facility: CLINIC | Age: 66
End: 2021-03-19

## 2021-03-24 ENCOUNTER — TELEPHONE (OUTPATIENT)
Dept: HEMATOLOGY/ONCOLOGY | Facility: CLINIC | Age: 66
End: 2021-03-24

## 2021-03-25 ENCOUNTER — TELEPHONE (OUTPATIENT)
Dept: HEMATOLOGY/ONCOLOGY | Facility: CLINIC | Age: 66
End: 2021-03-25

## 2021-03-29 ENCOUNTER — TELEPHONE (OUTPATIENT)
Dept: HEMATOLOGY/ONCOLOGY | Facility: CLINIC | Age: 66
End: 2021-03-29

## 2021-03-29 ENCOUNTER — OFFICE VISIT (OUTPATIENT)
Dept: HEMATOLOGY/ONCOLOGY | Facility: CLINIC | Age: 66
End: 2021-03-29
Payer: COMMERCIAL

## 2021-03-29 VITALS
SYSTOLIC BLOOD PRESSURE: 172 MMHG | WEIGHT: 192 LBS | HEIGHT: 66 IN | DIASTOLIC BLOOD PRESSURE: 71 MMHG | TEMPERATURE: 98 F | HEART RATE: 68 BPM | BODY MASS INDEX: 30.86 KG/M2

## 2021-03-29 DIAGNOSIS — D63.1 ANEMIA DUE TO STAGE 3A CHRONIC KIDNEY DISEASE: ICD-10-CM

## 2021-03-29 DIAGNOSIS — D47.2 MGUS (MONOCLONAL GAMMOPATHY OF UNKNOWN SIGNIFICANCE): Primary | ICD-10-CM

## 2021-03-29 DIAGNOSIS — D21.9 FIBROIDS: ICD-10-CM

## 2021-03-29 DIAGNOSIS — M25.511 CHRONIC PAIN OF BOTH SHOULDERS: ICD-10-CM

## 2021-03-29 DIAGNOSIS — G89.29 CHRONIC PAIN OF BOTH SHOULDERS: ICD-10-CM

## 2021-03-29 DIAGNOSIS — N18.31 ANEMIA DUE TO STAGE 3A CHRONIC KIDNEY DISEASE: ICD-10-CM

## 2021-03-29 DIAGNOSIS — M25.512 CHRONIC PAIN OF BOTH SHOULDERS: ICD-10-CM

## 2021-03-29 DIAGNOSIS — R63.0 POOR APPETITE: ICD-10-CM

## 2021-03-29 PROCEDURE — 99999 PR PBB SHADOW E&M-EST. PATIENT-LVL IV: ICD-10-PCS | Mod: PBBFAC,,, | Performed by: STUDENT IN AN ORGANIZED HEALTH CARE EDUCATION/TRAINING PROGRAM

## 2021-03-29 PROCEDURE — 99999 PR PBB SHADOW E&M-EST. PATIENT-LVL IV: CPT | Mod: PBBFAC,,, | Performed by: STUDENT IN AN ORGANIZED HEALTH CARE EDUCATION/TRAINING PROGRAM

## 2021-03-29 PROCEDURE — 99215 PR OFFICE/OUTPT VISIT, EST, LEVL V, 40-54 MIN: ICD-10-PCS | Mod: S$GLB,,, | Performed by: STUDENT IN AN ORGANIZED HEALTH CARE EDUCATION/TRAINING PROGRAM

## 2021-03-29 PROCEDURE — 99215 OFFICE O/P EST HI 40 MIN: CPT | Mod: S$GLB,,, | Performed by: STUDENT IN AN ORGANIZED HEALTH CARE EDUCATION/TRAINING PROGRAM

## 2021-03-31 ENCOUNTER — CLINICAL SUPPORT (OUTPATIENT)
Dept: HEMATOLOGY/ONCOLOGY | Facility: CLINIC | Age: 66
End: 2021-03-31
Payer: COMMERCIAL

## 2021-03-31 VITALS — HEIGHT: 66 IN | BODY MASS INDEX: 30.58 KG/M2 | WEIGHT: 190.25 LBS

## 2021-03-31 DIAGNOSIS — E11.65 UNCONTROLLED TYPE 2 DIABETES MELLITUS WITH HYPERGLYCEMIA: ICD-10-CM

## 2021-03-31 DIAGNOSIS — R63.0 POOR APPETITE: ICD-10-CM

## 2021-03-31 DIAGNOSIS — N18.31 ANEMIA DUE TO STAGE 3A CHRONIC KIDNEY DISEASE: ICD-10-CM

## 2021-03-31 DIAGNOSIS — R63.4 WEIGHT LOSS, UNINTENTIONAL: ICD-10-CM

## 2021-03-31 DIAGNOSIS — D47.2 MGUS (MONOCLONAL GAMMOPATHY OF UNKNOWN SIGNIFICANCE): ICD-10-CM

## 2021-03-31 DIAGNOSIS — E11.9 TYPE 2 DIABETES MELLITUS WITHOUT COMPLICATION, WITHOUT LONG-TERM CURRENT USE OF INSULIN: ICD-10-CM

## 2021-03-31 DIAGNOSIS — D63.1 ANEMIA DUE TO STAGE 3A CHRONIC KIDNEY DISEASE: ICD-10-CM

## 2021-03-31 DIAGNOSIS — Z71.3 NUTRITIONAL COUNSELING: Primary | ICD-10-CM

## 2021-03-31 PROCEDURE — 99999 PR PBB SHADOW E&M-EST. PATIENT-LVL I: ICD-10-PCS | Mod: PBBFAC,,, | Performed by: DIETITIAN, REGISTERED

## 2021-03-31 PROCEDURE — 97802 PR MED NUTR THER, 1ST, INDIV, EA 15 MIN: ICD-10-PCS | Mod: S$GLB,,, | Performed by: DIETITIAN, REGISTERED

## 2021-03-31 PROCEDURE — 99999 PR PBB SHADOW E&M-EST. PATIENT-LVL I: CPT | Mod: PBBFAC,,, | Performed by: DIETITIAN, REGISTERED

## 2021-03-31 PROCEDURE — 97802 MEDICAL NUTRITION INDIV IN: CPT | Mod: S$GLB,,, | Performed by: DIETITIAN, REGISTERED

## 2021-04-06 ENCOUNTER — HOSPITAL ENCOUNTER (EMERGENCY)
Facility: HOSPITAL | Age: 66
Discharge: HOME | End: 2021-04-06
Attending: EMERGENCY MEDICINE
Payer: MEDICARE

## 2021-04-06 VITALS
DIASTOLIC BLOOD PRESSURE: 86 MMHG | SYSTOLIC BLOOD PRESSURE: 181 MMHG | OXYGEN SATURATION: 98 % | RESPIRATION RATE: 16 BRPM | HEIGHT: 66 IN | WEIGHT: 190 LBS | BODY MASS INDEX: 30.53 KG/M2 | TEMPERATURE: 98 F | HEART RATE: 70 BPM

## 2021-04-06 DIAGNOSIS — I10 HYPERTENSION, UNSPECIFIED TYPE: ICD-10-CM

## 2021-04-06 DIAGNOSIS — B34.9 ACUTE VIRAL SYNDROME: Primary | ICD-10-CM

## 2021-04-06 DIAGNOSIS — R05.9 COUGH: ICD-10-CM

## 2021-04-06 LAB
ALBUMIN SERPL BCP-MCNC: 3.7 G/DL (ref 3.5–5.2)
ALP SERPL-CCNC: 97 U/L (ref 55–135)
ALT SERPL W/O P-5'-P-CCNC: 7 U/L (ref 10–44)
ANION GAP SERPL CALC-SCNC: 10 MMOL/L (ref 8–16)
AST SERPL-CCNC: 15 U/L (ref 10–40)
BASOPHILS # BLD AUTO: 0.01 K/UL (ref 0–0.2)
BASOPHILS NFR BLD: 0.1 % (ref 0–1.9)
BILIRUB SERPL-MCNC: 0.3 MG/DL (ref 0.1–1)
BNP SERPL-MCNC: <10 PG/ML (ref 0–99)
BUN SERPL-MCNC: 45 MG/DL (ref 8–23)
CALCIUM SERPL-MCNC: 10 MG/DL (ref 8.7–10.5)
CHLORIDE SERPL-SCNC: 107 MMOL/L (ref 95–110)
CO2 SERPL-SCNC: 21 MMOL/L (ref 23–29)
CREAT SERPL-MCNC: 1.7 MG/DL (ref 0.5–1.4)
CTP QC/QA: YES
DIFFERENTIAL METHOD: ABNORMAL
EOSINOPHIL # BLD AUTO: 0.1 K/UL (ref 0–0.5)
EOSINOPHIL NFR BLD: 1.3 % (ref 0–8)
ERYTHROCYTE [DISTWIDTH] IN BLOOD BY AUTOMATED COUNT: 15.8 % (ref 11.5–14.5)
EST. GFR  (AFRICAN AMERICAN): 35.5 ML/MIN/1.73 M^2
EST. GFR  (NON AFRICAN AMERICAN): 30.8 ML/MIN/1.73 M^2
GLUCOSE SERPL-MCNC: 116 MG/DL (ref 70–110)
HCT VFR BLD AUTO: 32.2 % (ref 37–48.5)
HGB BLD-MCNC: 10.2 G/DL (ref 12–16)
IMM GRANULOCYTES # BLD AUTO: 0.02 K/UL (ref 0–0.04)
IMM GRANULOCYTES NFR BLD AUTO: 0.3 % (ref 0–0.5)
LYMPHOCYTES # BLD AUTO: 2.9 K/UL (ref 1–4.8)
LYMPHOCYTES NFR BLD: 36.3 % (ref 18–48)
MCH RBC QN AUTO: 26 PG (ref 27–31)
MCHC RBC AUTO-ENTMCNC: 31.7 G/DL (ref 32–36)
MCV RBC AUTO: 82 FL (ref 82–98)
MONOCYTES # BLD AUTO: 0.7 K/UL (ref 0.3–1)
MONOCYTES NFR BLD: 8.4 % (ref 4–15)
NEUTROPHILS # BLD AUTO: 4.2 K/UL (ref 1.8–7.7)
NEUTROPHILS NFR BLD: 53.6 % (ref 38–73)
NRBC BLD-RTO: 0 /100 WBC
PLATELET # BLD AUTO: 166 K/UL (ref 150–450)
PMV BLD AUTO: 10.7 FL (ref 9.2–12.9)
POTASSIUM SERPL-SCNC: 3.8 MMOL/L (ref 3.5–5.1)
PROT SERPL-MCNC: 8.1 G/DL (ref 6–8.4)
RBC # BLD AUTO: 3.93 M/UL (ref 4–5.4)
SARS-COV-2 RDRP RESP QL NAA+PROBE: NEGATIVE
SODIUM SERPL-SCNC: 138 MMOL/L (ref 136–145)
WBC # BLD AUTO: 7.9 K/UL (ref 3.9–12.7)

## 2021-04-06 PROCEDURE — 80053 COMPREHEN METABOLIC PANEL: CPT | Performed by: EMERGENCY MEDICINE

## 2021-04-06 PROCEDURE — 99284 EMERGENCY DEPT VISIT MOD MDM: CPT | Mod: 25

## 2021-04-06 PROCEDURE — 99284 EMERGENCY DEPT VISIT MOD MDM: CPT | Mod: CS,,, | Performed by: EMERGENCY MEDICINE

## 2021-04-06 PROCEDURE — 99284 PR EMERGENCY DEPT VISIT,LEVEL IV: ICD-10-PCS | Mod: CS,,, | Performed by: EMERGENCY MEDICINE

## 2021-04-06 PROCEDURE — 96372 THER/PROPH/DIAG INJ SC/IM: CPT

## 2021-04-06 PROCEDURE — 83880 ASSAY OF NATRIURETIC PEPTIDE: CPT | Performed by: EMERGENCY MEDICINE

## 2021-04-06 PROCEDURE — 63600175 PHARM REV CODE 636 W HCPCS: Performed by: STUDENT IN AN ORGANIZED HEALTH CARE EDUCATION/TRAINING PROGRAM

## 2021-04-06 PROCEDURE — 85025 COMPLETE CBC W/AUTO DIFF WBC: CPT | Performed by: EMERGENCY MEDICINE

## 2021-04-06 PROCEDURE — U0002 COVID-19 LAB TEST NON-CDC: HCPCS | Performed by: EMERGENCY MEDICINE

## 2021-04-06 RX ORDER — DEXAMETHASONE SODIUM PHOSPHATE 4 MG/ML
8 INJECTION, SOLUTION INTRA-ARTICULAR; INTRALESIONAL; INTRAMUSCULAR; INTRAVENOUS; SOFT TISSUE
Status: COMPLETED | OUTPATIENT
Start: 2021-04-06 | End: 2021-04-06

## 2021-04-06 RX ORDER — KETOROLAC TROMETHAMINE 30 MG/ML
15 INJECTION, SOLUTION INTRAMUSCULAR; INTRAVENOUS
Status: COMPLETED | OUTPATIENT
Start: 2021-04-06 | End: 2021-04-06

## 2021-04-06 RX ADMIN — DEXAMETHASONE SODIUM PHOSPHATE 8 MG: 4 INJECTION INTRA-ARTICULAR; INTRALESIONAL; INTRAMUSCULAR; INTRAVENOUS; SOFT TISSUE at 02:04

## 2021-04-06 RX ADMIN — KETOROLAC TROMETHAMINE 15 MG: 30 INJECTION, SOLUTION INTRAMUSCULAR; INTRAVENOUS at 02:04

## 2021-04-08 ENCOUNTER — LAB VISIT (OUTPATIENT)
Dept: LAB | Facility: HOSPITAL | Age: 66
End: 2021-04-08
Attending: STUDENT IN AN ORGANIZED HEALTH CARE EDUCATION/TRAINING PROGRAM
Payer: MEDICARE

## 2021-04-08 DIAGNOSIS — D47.2 MGUS (MONOCLONAL GAMMOPATHY OF UNKNOWN SIGNIFICANCE): ICD-10-CM

## 2021-04-08 LAB
ALBUMIN SERPL BCP-MCNC: 3.3 G/DL (ref 3.5–5.2)
ALP SERPL-CCNC: 88 U/L (ref 55–135)
ALT SERPL W/O P-5'-P-CCNC: <5 U/L (ref 10–44)
ANION GAP SERPL CALC-SCNC: 9 MMOL/L (ref 8–16)
AST SERPL-CCNC: 13 U/L (ref 10–40)
BASOPHILS # BLD AUTO: 0.01 K/UL (ref 0–0.2)
BASOPHILS NFR BLD: 0.1 % (ref 0–1.9)
BILIRUB SERPL-MCNC: 0.3 MG/DL (ref 0.1–1)
BUN SERPL-MCNC: 48 MG/DL (ref 8–23)
CALCIUM SERPL-MCNC: 9.8 MG/DL (ref 8.7–10.5)
CHLORIDE SERPL-SCNC: 107 MMOL/L (ref 95–110)
CO2 SERPL-SCNC: 23 MMOL/L (ref 23–29)
CREAT SERPL-MCNC: 1.6 MG/DL (ref 0.5–1.4)
DIFFERENTIAL METHOD: ABNORMAL
EOSINOPHIL # BLD AUTO: 0.1 K/UL (ref 0–0.5)
EOSINOPHIL NFR BLD: 0.9 % (ref 0–8)
ERYTHROCYTE [DISTWIDTH] IN BLOOD BY AUTOMATED COUNT: 15.7 % (ref 11.5–14.5)
EST. GFR  (AFRICAN AMERICAN): 38.2 ML/MIN/1.73 M^2
EST. GFR  (NON AFRICAN AMERICAN): 33.1 ML/MIN/1.73 M^2
FERRITIN SERPL-MCNC: 97 NG/ML (ref 20–300)
FOLATE SERPL-MCNC: 6.6 NG/ML (ref 4–24)
GLUCOSE SERPL-MCNC: 168 MG/DL (ref 70–110)
HCT VFR BLD AUTO: 32.1 % (ref 37–48.5)
HGB BLD-MCNC: 10.1 G/DL (ref 12–16)
IGA SERPL-MCNC: 108 MG/DL (ref 40–350)
IGG SERPL-MCNC: 1723 MG/DL (ref 650–1600)
IGM SERPL-MCNC: 49 MG/DL (ref 50–300)
IMM GRANULOCYTES # BLD AUTO: 0.02 K/UL (ref 0–0.04)
IMM GRANULOCYTES NFR BLD AUTO: 0.3 % (ref 0–0.5)
IRON SERPL-MCNC: 68 UG/DL (ref 30–160)
LYMPHOCYTES # BLD AUTO: 3.4 K/UL (ref 1–4.8)
LYMPHOCYTES NFR BLD: 46.1 % (ref 18–48)
MCH RBC QN AUTO: 25.6 PG (ref 27–31)
MCHC RBC AUTO-ENTMCNC: 31.5 G/DL (ref 32–36)
MCV RBC AUTO: 82 FL (ref 82–98)
MONOCYTES # BLD AUTO: 0.5 K/UL (ref 0.3–1)
MONOCYTES NFR BLD: 7.2 % (ref 4–15)
NEUTROPHILS # BLD AUTO: 3.4 K/UL (ref 1.8–7.7)
NEUTROPHILS NFR BLD: 45.4 % (ref 38–73)
NRBC BLD-RTO: 0 /100 WBC
PLATELET # BLD AUTO: 188 K/UL (ref 150–450)
PMV BLD AUTO: 11.3 FL (ref 9.2–12.9)
POTASSIUM SERPL-SCNC: 3.6 MMOL/L (ref 3.5–5.1)
PROT SERPL-MCNC: 7.4 G/DL (ref 6–8.4)
RBC # BLD AUTO: 3.94 M/UL (ref 4–5.4)
SATURATED IRON: 21 % (ref 20–50)
SODIUM SERPL-SCNC: 139 MMOL/L (ref 136–145)
TOTAL IRON BINDING CAPACITY: 326 UG/DL (ref 250–450)
TRANSFERRIN SERPL-MCNC: 220 MG/DL (ref 200–375)
VIT B12 SERPL-MCNC: 272 PG/ML (ref 210–950)
WBC # BLD AUTO: 7.38 K/UL (ref 3.9–12.7)

## 2021-04-08 PROCEDURE — 84165 PROTEIN E-PHORESIS SERUM: CPT | Mod: 26,,, | Performed by: PATHOLOGY

## 2021-04-08 PROCEDURE — 83520 IMMUNOASSAY QUANT NOS NONAB: CPT | Performed by: STUDENT IN AN ORGANIZED HEALTH CARE EDUCATION/TRAINING PROGRAM

## 2021-04-08 PROCEDURE — 84165 PROTEIN E-PHORESIS SERUM: CPT | Performed by: STUDENT IN AN ORGANIZED HEALTH CARE EDUCATION/TRAINING PROGRAM

## 2021-04-08 PROCEDURE — 82728 ASSAY OF FERRITIN: CPT | Performed by: STUDENT IN AN ORGANIZED HEALTH CARE EDUCATION/TRAINING PROGRAM

## 2021-04-08 PROCEDURE — 82746 ASSAY OF FOLIC ACID SERUM: CPT | Performed by: STUDENT IN AN ORGANIZED HEALTH CARE EDUCATION/TRAINING PROGRAM

## 2021-04-08 PROCEDURE — 80053 COMPREHEN METABOLIC PANEL: CPT | Performed by: STUDENT IN AN ORGANIZED HEALTH CARE EDUCATION/TRAINING PROGRAM

## 2021-04-08 PROCEDURE — 36415 COLL VENOUS BLD VENIPUNCTURE: CPT | Performed by: STUDENT IN AN ORGANIZED HEALTH CARE EDUCATION/TRAINING PROGRAM

## 2021-04-08 PROCEDURE — 84165 PATHOLOGIST INTERPRETATION SPE: ICD-10-PCS | Mod: 26,,, | Performed by: PATHOLOGY

## 2021-04-08 PROCEDURE — 86334 PATHOLOGIST INTERPRETATION IFE: ICD-10-PCS | Mod: 26,,, | Performed by: PATHOLOGY

## 2021-04-08 PROCEDURE — 82607 VITAMIN B-12: CPT | Performed by: STUDENT IN AN ORGANIZED HEALTH CARE EDUCATION/TRAINING PROGRAM

## 2021-04-08 PROCEDURE — 82668 ASSAY OF ERYTHROPOIETIN: CPT | Performed by: STUDENT IN AN ORGANIZED HEALTH CARE EDUCATION/TRAINING PROGRAM

## 2021-04-08 PROCEDURE — 86334 IMMUNOFIX E-PHORESIS SERUM: CPT | Mod: 26,,, | Performed by: PATHOLOGY

## 2021-04-08 PROCEDURE — 85025 COMPLETE CBC W/AUTO DIFF WBC: CPT | Performed by: STUDENT IN AN ORGANIZED HEALTH CARE EDUCATION/TRAINING PROGRAM

## 2021-04-08 PROCEDURE — 82784 ASSAY IGA/IGD/IGG/IGM EACH: CPT | Mod: 59 | Performed by: STUDENT IN AN ORGANIZED HEALTH CARE EDUCATION/TRAINING PROGRAM

## 2021-04-08 PROCEDURE — 86334 IMMUNOFIX E-PHORESIS SERUM: CPT | Performed by: STUDENT IN AN ORGANIZED HEALTH CARE EDUCATION/TRAINING PROGRAM

## 2021-04-08 PROCEDURE — 83540 ASSAY OF IRON: CPT | Performed by: STUDENT IN AN ORGANIZED HEALTH CARE EDUCATION/TRAINING PROGRAM

## 2021-04-09 LAB
ALBUMIN SERPL ELPH-MCNC: 3.83 G/DL (ref 3.35–5.55)
ALPHA1 GLOB SERPL ELPH-MCNC: 0.25 G/DL (ref 0.17–0.41)
ALPHA2 GLOB SERPL ELPH-MCNC: 0.83 G/DL (ref 0.43–0.99)
B-GLOBULIN SERPL ELPH-MCNC: 0.93 G/DL (ref 0.5–1.1)
GAMMA GLOB SERPL ELPH-MCNC: 1.26 G/DL (ref 0.67–1.58)
INTERPRETATION SERPL IFE-IMP: NORMAL
KAPPA LC SER QL IA: 2.43 MG/DL (ref 0.33–1.94)
KAPPA LC/LAMBDA SER IA: 1.55 (ref 0.26–1.65)
LAMBDA LC SER QL IA: 1.57 MG/DL (ref 0.57–2.63)
PROT SERPL-MCNC: 7.1 G/DL (ref 6–8.4)

## 2021-04-12 LAB
EPO SERPL-ACNC: 8.8 MIU/ML (ref 2.6–18.5)
PATHOLOGIST INTERPRETATION IFE: NORMAL
PATHOLOGIST INTERPRETATION SPE: NORMAL

## 2021-04-21 ENCOUNTER — TELEPHONE (OUTPATIENT)
Dept: HEMATOLOGY/ONCOLOGY | Facility: CLINIC | Age: 66
End: 2021-04-21

## 2021-04-21 DIAGNOSIS — D47.2 MGUS (MONOCLONAL GAMMOPATHY OF UNKNOWN SIGNIFICANCE): Primary | ICD-10-CM

## 2021-10-14 ENCOUNTER — HOSPITAL ENCOUNTER (OUTPATIENT)
Facility: HOSPITAL | Age: 66
Discharge: HOME | End: 2021-10-15
Attending: EMERGENCY MEDICINE | Admitting: INTERNAL MEDICINE
Payer: MEDICARE

## 2021-10-14 DIAGNOSIS — I16.0 HYPERTENSIVE URGENCY: Primary | ICD-10-CM

## 2021-10-14 DIAGNOSIS — R07.9 CHEST PAIN: ICD-10-CM

## 2021-10-14 DIAGNOSIS — D64.9 ANEMIA, UNSPECIFIED TYPE: ICD-10-CM

## 2021-10-14 DIAGNOSIS — R07.89 CHEST TIGHTNESS: ICD-10-CM

## 2021-10-14 DIAGNOSIS — R94.31 QT PROLONGATION: ICD-10-CM

## 2021-10-14 DIAGNOSIS — I10 MALIGNANT HYPERTENSION: ICD-10-CM

## 2021-10-14 DIAGNOSIS — N18.9 CHRONIC KIDNEY DISEASE, UNSPECIFIED CKD STAGE: ICD-10-CM

## 2021-10-14 DIAGNOSIS — I50.33 ACUTE ON CHRONIC DIASTOLIC HEART FAILURE: ICD-10-CM

## 2021-10-14 DIAGNOSIS — F03.90 DEMENTIA WITHOUT BEHAVIORAL DISTURBANCE, UNSPECIFIED DEMENTIA TYPE: ICD-10-CM

## 2021-10-14 DIAGNOSIS — R60.0 BILATERAL LEG EDEMA: ICD-10-CM

## 2021-10-14 LAB
ALBUMIN SERPL BCP-MCNC: 3.1 G/DL (ref 3.5–5.2)
ALP SERPL-CCNC: 81 U/L (ref 55–135)
ALT SERPL W/O P-5'-P-CCNC: 6 U/L (ref 10–44)
ANION GAP SERPL CALC-SCNC: 7 MMOL/L (ref 8–16)
AST SERPL-CCNC: 16 U/L (ref 10–40)
BACTERIA #/AREA URNS AUTO: ABNORMAL /HPF
BASOPHILS # BLD AUTO: 0.01 K/UL (ref 0–0.2)
BASOPHILS NFR BLD: 0.1 % (ref 0–1.9)
BILIRUB SERPL-MCNC: 0.2 MG/DL (ref 0.1–1)
BILIRUB UR QL STRIP: NEGATIVE
BNP SERPL-MCNC: 279 PG/ML (ref 0–99)
BUN SERPL-MCNC: 25 MG/DL (ref 8–23)
BUN SERPL-MCNC: 26 MG/DL (ref 6–30)
CALCIUM SERPL-MCNC: 10 MG/DL (ref 8.7–10.5)
CHLORIDE SERPL-SCNC: 110 MMOL/L (ref 95–110)
CHLORIDE SERPL-SCNC: 113 MMOL/L (ref 95–110)
CLARITY UR REFRACT.AUTO: CLEAR
CO2 SERPL-SCNC: 24 MMOL/L (ref 23–29)
COLOR UR AUTO: ABNORMAL
CREAT SERPL-MCNC: 1.6 MG/DL (ref 0.5–1.4)
CREAT SERPL-MCNC: 1.7 MG/DL (ref 0.5–1.4)
CTP QC/QA: YES
DIFFERENTIAL METHOD: ABNORMAL
EOSINOPHIL # BLD AUTO: 0.1 K/UL (ref 0–0.5)
EOSINOPHIL NFR BLD: 0.9 % (ref 0–8)
ERYTHROCYTE [DISTWIDTH] IN BLOOD BY AUTOMATED COUNT: 17.2 % (ref 11.5–14.5)
EST. GFR  (AFRICAN AMERICAN): 38.2 ML/MIN/1.73 M^2
EST. GFR  (NON AFRICAN AMERICAN): 33.1 ML/MIN/1.73 M^2
GLUCOSE SERPL-MCNC: 80 MG/DL (ref 70–110)
GLUCOSE SERPL-MCNC: 87 MG/DL (ref 70–110)
GLUCOSE UR QL STRIP: NEGATIVE
HCT VFR BLD AUTO: 28.2 % (ref 37–48.5)
HCT VFR BLD CALC: 26 %PCV (ref 36–54)
HGB BLD-MCNC: 8.9 G/DL (ref 12–16)
HGB UR QL STRIP: NEGATIVE
HYALINE CASTS UR QL AUTO: 0 /LPF
IMM GRANULOCYTES # BLD AUTO: 0.01 K/UL (ref 0–0.04)
IMM GRANULOCYTES NFR BLD AUTO: 0.1 % (ref 0–0.5)
KETONES UR QL STRIP: NEGATIVE
LEUKOCYTE ESTERASE UR QL STRIP: NEGATIVE
LYMPHOCYTES # BLD AUTO: 3 K/UL (ref 1–4.8)
LYMPHOCYTES NFR BLD: 44.4 % (ref 18–48)
MCH RBC QN AUTO: 27.3 PG (ref 27–31)
MCHC RBC AUTO-ENTMCNC: 31.6 G/DL (ref 32–36)
MCV RBC AUTO: 87 FL (ref 82–98)
MICROSCOPIC COMMENT: ABNORMAL
MONOCYTES # BLD AUTO: 0.6 K/UL (ref 0.3–1)
MONOCYTES NFR BLD: 9.4 % (ref 4–15)
NEUTROPHILS # BLD AUTO: 3 K/UL (ref 1.8–7.7)
NEUTROPHILS NFR BLD: 45.1 % (ref 38–73)
NITRITE UR QL STRIP: NEGATIVE
NRBC BLD-RTO: 0 /100 WBC
PH UR STRIP: 7 [PH] (ref 5–8)
PLATELET # BLD AUTO: 171 K/UL (ref 150–450)
PMV BLD AUTO: 12.3 FL (ref 9.2–12.9)
POC IONIZED CALCIUM: 1.28 MMOL/L (ref 1.06–1.42)
POC TCO2 (MEASURED): 25 MMOL/L (ref 23–29)
POTASSIUM BLD-SCNC: 3.8 MMOL/L (ref 3.5–5.1)
POTASSIUM SERPL-SCNC: 3.8 MMOL/L (ref 3.5–5.1)
PROT SERPL-MCNC: 6.6 G/DL (ref 6–8.4)
PROT UR QL STRIP: ABNORMAL
RBC # BLD AUTO: 3.26 M/UL (ref 4–5.4)
RBC #/AREA URNS AUTO: 2 /HPF (ref 0–4)
SAMPLE: ABNORMAL
SARS-COV-2 RDRP RESP QL NAA+PROBE: NEGATIVE
SODIUM BLD-SCNC: 145 MMOL/L (ref 136–145)
SODIUM SERPL-SCNC: 144 MMOL/L (ref 136–145)
SP GR UR STRIP: 1.01 (ref 1–1.03)
SQUAMOUS #/AREA URNS AUTO: 2 /HPF
TROPONIN I SERPL DL<=0.01 NG/ML-MCNC: 0.02 NG/ML (ref 0–0.03)
TROPONIN I SERPL DL<=0.01 NG/ML-MCNC: 0.02 NG/ML (ref 0–0.03)
URN SPEC COLLECT METH UR: ABNORMAL
WBC # BLD AUTO: 6.69 K/UL (ref 3.9–12.7)
WBC #/AREA URNS AUTO: 1 /HPF (ref 0–5)

## 2021-10-14 PROCEDURE — 84484 ASSAY OF TROPONIN QUANT: CPT | Mod: 91 | Performed by: PHYSICIAN ASSISTANT

## 2021-10-14 PROCEDURE — 81001 URINALYSIS AUTO W/SCOPE: CPT | Performed by: PHYSICIAN ASSISTANT

## 2021-10-14 PROCEDURE — 93010 EKG 12-LEAD: ICD-10-PCS | Mod: ,,, | Performed by: INTERNAL MEDICINE

## 2021-10-14 PROCEDURE — 83880 ASSAY OF NATRIURETIC PEPTIDE: CPT | Performed by: PHYSICIAN ASSISTANT

## 2021-10-14 PROCEDURE — 25000003 PHARM REV CODE 250: Performed by: PHYSICIAN ASSISTANT

## 2021-10-14 PROCEDURE — 63600175 PHARM REV CODE 636 W HCPCS: Performed by: PHYSICIAN ASSISTANT

## 2021-10-14 PROCEDURE — G0378 HOSPITAL OBSERVATION PER HR: HCPCS

## 2021-10-14 PROCEDURE — 93010 ELECTROCARDIOGRAM REPORT: CPT | Mod: ,,, | Performed by: INTERNAL MEDICINE

## 2021-10-14 PROCEDURE — 80053 COMPREHEN METABOLIC PANEL: CPT | Performed by: PHYSICIAN ASSISTANT

## 2021-10-14 PROCEDURE — 96374 THER/PROPH/DIAG INJ IV PUSH: CPT

## 2021-10-14 PROCEDURE — U0002 COVID-19 LAB TEST NON-CDC: HCPCS | Performed by: PHYSICIAN ASSISTANT

## 2021-10-14 PROCEDURE — 99220 PR INITIAL OBSERVATION CARE,LEVL III: ICD-10-PCS | Mod: ,,, | Performed by: PHYSICIAN ASSISTANT

## 2021-10-14 PROCEDURE — 99220 PR INITIAL OBSERVATION CARE,LEVL III: CPT | Mod: ,,, | Performed by: PHYSICIAN ASSISTANT

## 2021-10-14 PROCEDURE — 93005 ELECTROCARDIOGRAM TRACING: CPT

## 2021-10-14 PROCEDURE — 99285 PR EMERGENCY DEPT VISIT,LEVEL V: ICD-10-PCS | Mod: CR,CS,, | Performed by: PHYSICIAN ASSISTANT

## 2021-10-14 PROCEDURE — 96375 TX/PRO/DX INJ NEW DRUG ADDON: CPT

## 2021-10-14 PROCEDURE — 82330 ASSAY OF CALCIUM: CPT

## 2021-10-14 PROCEDURE — 99285 EMERGENCY DEPT VISIT HI MDM: CPT | Mod: 25

## 2021-10-14 PROCEDURE — 99285 EMERGENCY DEPT VISIT HI MDM: CPT | Mod: CR,CS,, | Performed by: PHYSICIAN ASSISTANT

## 2021-10-14 PROCEDURE — 85025 COMPLETE CBC W/AUTO DIFF WBC: CPT | Performed by: PHYSICIAN ASSISTANT

## 2021-10-14 PROCEDURE — 86803 HEPATITIS C AB TEST: CPT | Performed by: EMERGENCY MEDICINE

## 2021-10-14 PROCEDURE — 80047 BASIC METABLC PNL IONIZED CA: CPT

## 2021-10-14 RX ORDER — IPRATROPIUM BROMIDE AND ALBUTEROL SULFATE 2.5; .5 MG/3ML; MG/3ML
3 SOLUTION RESPIRATORY (INHALATION) EVERY 4 HOURS PRN
Status: DISCONTINUED | OUTPATIENT
Start: 2021-10-14 | End: 2021-10-15 | Stop reason: HOSPADM

## 2021-10-14 RX ORDER — ATORVASTATIN CALCIUM 20 MG/1
40 TABLET, FILM COATED ORAL DAILY
Status: DISCONTINUED | OUTPATIENT
Start: 2021-10-15 | End: 2021-10-15 | Stop reason: HOSPADM

## 2021-10-14 RX ORDER — NIFEDIPINE 60 MG/1
60 TABLET, EXTENDED RELEASE ORAL DAILY
Status: DISCONTINUED | OUTPATIENT
Start: 2021-10-15 | End: 2021-10-15 | Stop reason: HOSPADM

## 2021-10-14 RX ORDER — HYDRALAZINE HYDROCHLORIDE 50 MG/1
50 TABLET, FILM COATED ORAL NIGHTLY
Status: ON HOLD | COMMUNITY
Start: 2021-10-14 | End: 2022-03-08

## 2021-10-14 RX ORDER — ACETAMINOPHEN 325 MG/1
650 TABLET ORAL
Status: COMPLETED | OUTPATIENT
Start: 2021-10-14 | End: 2021-10-14

## 2021-10-14 RX ORDER — IBUPROFEN 200 MG
24 TABLET ORAL
Status: DISCONTINUED | OUTPATIENT
Start: 2021-10-14 | End: 2021-10-15 | Stop reason: HOSPADM

## 2021-10-14 RX ORDER — HYDROXYZINE PAMOATE 25 MG/1
25 CAPSULE ORAL
Status: COMPLETED | OUTPATIENT
Start: 2021-10-14 | End: 2021-10-14

## 2021-10-14 RX ORDER — LEVOTHYROXINE SODIUM 75 UG/1
75 TABLET ORAL
Status: DISCONTINUED | OUTPATIENT
Start: 2021-10-15 | End: 2021-10-15 | Stop reason: HOSPADM

## 2021-10-14 RX ORDER — BISACODYL 10 MG
10 SUPPOSITORY, RECTAL RECTAL DAILY PRN
Status: DISCONTINUED | OUTPATIENT
Start: 2021-10-14 | End: 2021-10-15 | Stop reason: HOSPADM

## 2021-10-14 RX ORDER — FUROSEMIDE 10 MG/ML
40 INJECTION INTRAMUSCULAR; INTRAVENOUS ONCE
Status: DISCONTINUED | OUTPATIENT
Start: 2021-10-15 | End: 2021-10-14

## 2021-10-14 RX ORDER — ATORVASTATIN CALCIUM 40 MG/1
40 TABLET, FILM COATED ORAL DAILY
COMMUNITY
Start: 2021-10-14 | End: 2021-10-14

## 2021-10-14 RX ORDER — FUROSEMIDE 10 MG/ML
40 INJECTION INTRAMUSCULAR; INTRAVENOUS ONCE
Status: COMPLETED | OUTPATIENT
Start: 2021-10-14 | End: 2021-10-14

## 2021-10-14 RX ORDER — GLUCAGON 1 MG
1 KIT INJECTION
Status: DISCONTINUED | OUTPATIENT
Start: 2021-10-14 | End: 2021-10-15 | Stop reason: HOSPADM

## 2021-10-14 RX ORDER — ONDANSETRON 8 MG/1
8 TABLET, ORALLY DISINTEGRATING ORAL EVERY 8 HOURS PRN
Status: DISCONTINUED | OUTPATIENT
Start: 2021-10-14 | End: 2021-10-14 | Stop reason: ALTCHOICE

## 2021-10-14 RX ORDER — HYDRALAZINE HYDROCHLORIDE 25 MG/1
50 TABLET, FILM COATED ORAL EVERY 12 HOURS
Status: DISCONTINUED | OUTPATIENT
Start: 2021-10-15 | End: 2021-10-15 | Stop reason: HOSPADM

## 2021-10-14 RX ORDER — LABETALOL HCL 20 MG/4 ML
10 SYRINGE (ML) INTRAVENOUS EVERY 6 HOURS PRN
Status: DISCONTINUED | OUTPATIENT
Start: 2021-10-15 | End: 2021-10-15 | Stop reason: HOSPADM

## 2021-10-14 RX ORDER — HYDRALAZINE HYDROCHLORIDE 25 MG/1
50 TABLET, FILM COATED ORAL ONCE
Status: COMPLETED | OUTPATIENT
Start: 2021-10-14 | End: 2021-10-14

## 2021-10-14 RX ORDER — LEVOTHYROXINE SODIUM 75 UG/1
75 TABLET ORAL
COMMUNITY
Start: 2021-07-30

## 2021-10-14 RX ORDER — NIFEDIPINE 30 MG/1
60 TABLET, EXTENDED RELEASE ORAL ONCE
Status: COMPLETED | OUTPATIENT
Start: 2021-10-14 | End: 2021-10-14

## 2021-10-14 RX ORDER — IBUPROFEN 200 MG
16 TABLET ORAL
Status: DISCONTINUED | OUTPATIENT
Start: 2021-10-14 | End: 2021-10-15 | Stop reason: HOSPADM

## 2021-10-14 RX ORDER — TALC
6 POWDER (GRAM) TOPICAL NIGHTLY PRN
Status: DISCONTINUED | OUTPATIENT
Start: 2021-10-14 | End: 2021-10-15 | Stop reason: HOSPADM

## 2021-10-14 RX ORDER — ASPIRIN 81 MG/1
81 TABLET ORAL DAILY
Status: DISCONTINUED | OUTPATIENT
Start: 2021-10-15 | End: 2021-10-15 | Stop reason: HOSPADM

## 2021-10-14 RX ORDER — PROMETHAZINE HYDROCHLORIDE 25 MG/1
25 TABLET ORAL EVERY 6 HOURS PRN
Status: DISCONTINUED | OUTPATIENT
Start: 2021-10-14 | End: 2021-10-15

## 2021-10-14 RX ORDER — ENOXAPARIN SODIUM 100 MG/ML
40 INJECTION SUBCUTANEOUS EVERY 24 HOURS
Status: DISCONTINUED | OUTPATIENT
Start: 2021-10-14 | End: 2021-10-14

## 2021-10-14 RX ORDER — FUROSEMIDE 10 MG/ML
20 INJECTION INTRAMUSCULAR; INTRAVENOUS ONCE
Status: DISCONTINUED | OUTPATIENT
Start: 2021-10-14 | End: 2021-10-14

## 2021-10-14 RX ORDER — INSULIN ASPART 100 [IU]/ML
0-5 INJECTION, SOLUTION INTRAVENOUS; SUBCUTANEOUS
Status: DISCONTINUED | OUTPATIENT
Start: 2021-10-14 | End: 2021-10-15 | Stop reason: HOSPADM

## 2021-10-14 RX ORDER — LABETALOL HCL 20 MG/4 ML
20 SYRINGE (ML) INTRAVENOUS ONCE
Status: COMPLETED | OUTPATIENT
Start: 2021-10-14 | End: 2021-10-14

## 2021-10-14 RX ORDER — POLYETHYLENE GLYCOL 3350 17 G/17G
17 POWDER, FOR SOLUTION ORAL DAILY PRN
Status: DISCONTINUED | OUTPATIENT
Start: 2021-10-14 | End: 2021-10-15 | Stop reason: HOSPADM

## 2021-10-14 RX ORDER — ENOXAPARIN SODIUM 100 MG/ML
40 INJECTION SUBCUTANEOUS EVERY 24 HOURS
Status: DISCONTINUED | OUTPATIENT
Start: 2021-10-15 | End: 2021-10-15 | Stop reason: HOSPADM

## 2021-10-14 RX ORDER — CLOPIDOGREL BISULFATE 75 MG/1
75 TABLET ORAL DAILY
COMMUNITY
Start: 2021-10-14

## 2021-10-14 RX ORDER — ASPIRIN 325 MG
325 TABLET ORAL
Status: COMPLETED | OUTPATIENT
Start: 2021-10-14 | End: 2021-10-14

## 2021-10-14 RX ORDER — CLOPIDOGREL BISULFATE 75 MG/1
75 TABLET ORAL DAILY
Status: DISCONTINUED | OUTPATIENT
Start: 2021-10-15 | End: 2021-10-15 | Stop reason: HOSPADM

## 2021-10-14 RX ORDER — ACETAMINOPHEN 325 MG/1
650 TABLET ORAL EVERY 4 HOURS PRN
Status: DISCONTINUED | OUTPATIENT
Start: 2021-10-14 | End: 2021-10-15 | Stop reason: HOSPADM

## 2021-10-14 RX ORDER — NIFEDIPINE 60 MG/1
60 TABLET, EXTENDED RELEASE ORAL DAILY
Status: ON HOLD | COMMUNITY
Start: 2021-10-14 | End: 2022-03-08

## 2021-10-14 RX ORDER — HYDRALAZINE HYDROCHLORIDE 20 MG/ML
10 INJECTION INTRAMUSCULAR; INTRAVENOUS
Status: COMPLETED | OUTPATIENT
Start: 2021-10-14 | End: 2021-10-14

## 2021-10-14 RX ADMIN — ASPIRIN 325 MG ORAL TABLET 325 MG: 325 PILL ORAL at 03:10

## 2021-10-14 RX ADMIN — HYDROXYZINE PAMOATE 25 MG: 25 CAPSULE ORAL at 08:10

## 2021-10-14 RX ADMIN — HYDRALAZINE HYDROCHLORIDE 50 MG: 25 TABLET, FILM COATED ORAL at 05:10

## 2021-10-14 RX ADMIN — LABETALOL HYDROCHLORIDE 20 MG: 5 INJECTION, SOLUTION INTRAVENOUS at 10:10

## 2021-10-14 RX ADMIN — NIFEDIPINE 60 MG: 30 TABLET, FILM COATED, EXTENDED RELEASE ORAL at 05:10

## 2021-10-14 RX ADMIN — HYDRALAZINE HYDROCHLORIDE 10 MG: 20 INJECTION, SOLUTION INTRAMUSCULAR; INTRAVENOUS at 08:10

## 2021-10-14 RX ADMIN — ACETAMINOPHEN 650 MG: 325 TABLET ORAL at 09:10

## 2021-10-14 RX ADMIN — FUROSEMIDE 40 MG: 10 INJECTION, SOLUTION INTRAMUSCULAR; INTRAVENOUS at 11:10

## 2021-10-15 VITALS
HEART RATE: 82 BPM | WEIGHT: 197 LBS | TEMPERATURE: 98 F | RESPIRATION RATE: 18 BRPM | OXYGEN SATURATION: 100 % | SYSTOLIC BLOOD PRESSURE: 135 MMHG | DIASTOLIC BLOOD PRESSURE: 68 MMHG | BODY MASS INDEX: 32.82 KG/M2 | HEIGHT: 65 IN

## 2021-10-15 PROBLEM — I25.10 CAD (CORONARY ARTERY DISEASE): Status: ACTIVE | Noted: 2021-10-15

## 2021-10-15 LAB
ANION GAP SERPL CALC-SCNC: 13 MMOL/L (ref 8–16)
ASCENDING AORTA: 3.01 CM
AV INDEX (PROSTH): 0.68
AV MEAN GRADIENT: 8 MMHG
AV PEAK GRADIENT: 3 MMHG
AV VALVE AREA: 2.45 CM2
AV VELOCITY RATIO: 1.56
BASOPHILS # BLD AUTO: 0.01 K/UL (ref 0–0.2)
BASOPHILS NFR BLD: 0.2 % (ref 0–1.9)
BSA FOR ECHO PROCEDURE: 2.02 M2
BUN SERPL-MCNC: 21 MG/DL (ref 8–23)
CALCIUM SERPL-MCNC: 10.4 MG/DL (ref 8.7–10.5)
CHLORIDE SERPL-SCNC: 111 MMOL/L (ref 95–110)
CO2 SERPL-SCNC: 19 MMOL/L (ref 23–29)
CREAT SERPL-MCNC: 1.3 MG/DL (ref 0.5–1.4)
CV ECHO LV RWT: 0.43 CM
DIFFERENTIAL METHOD: ABNORMAL
DOP CALC AO PEAK VEL: 0.87 M/S
DOP CALC AO VTI: 42.7 CM
DOP CALC LVOT AREA: 3.6 CM2
DOP CALC LVOT DIAMETER: 2.14 CM
DOP CALC LVOT PEAK VEL: 1.36 M/S
DOP CALC LVOT STROKE VOLUME: 104.76 CM3
DOP CALCLVOT PEAK VEL VTI: 29.14 CM
E WAVE DECELERATION TIME: 190.86 MSEC
E/A RATIO: 0.8
E/E' RATIO: 12.31 M/S
ECHO LV POSTERIOR WALL: 1 CM (ref 0.6–1.1)
EJECTION FRACTION: 65 %
EOSINOPHIL # BLD AUTO: 0.1 K/UL (ref 0–0.5)
EOSINOPHIL NFR BLD: 1 % (ref 0–8)
ERYTHROCYTE [DISTWIDTH] IN BLOOD BY AUTOMATED COUNT: 18 % (ref 11.5–14.5)
EST. GFR  (AFRICAN AMERICAN): 49.1 ML/MIN/1.73 M^2
EST. GFR  (NON AFRICAN AMERICAN): 42.6 ML/MIN/1.73 M^2
ESTIMATED AVG GLUCOSE: 100 MG/DL (ref 68–131)
FERRITIN SERPL-MCNC: 90 NG/ML (ref 20–300)
FOLATE SERPL-MCNC: 15 NG/ML (ref 4–24)
FRACTIONAL SHORTENING: 32 % (ref 28–44)
GLUCOSE SERPL-MCNC: 88 MG/DL (ref 70–110)
HBA1C MFR BLD: 5.1 % (ref 4–5.6)
HCT VFR BLD AUTO: 30.2 % (ref 37–48.5)
HCV AB SERPL QL IA: NEGATIVE
HGB BLD-MCNC: 9.1 G/DL (ref 12–16)
IMM GRANULOCYTES # BLD AUTO: 0.01 K/UL (ref 0–0.04)
IMM GRANULOCYTES NFR BLD AUTO: 0.2 % (ref 0–0.5)
INTERVENTRICULAR SEPTUM: 1.4 CM (ref 0.6–1.1)
IRON SERPL-MCNC: 45 UG/DL (ref 30–160)
IVRT: 142.72 MSEC
LA MAJOR: 6.64 CM
LA MINOR: 6.49 CM
LA WIDTH: 5.13 CM
LEFT ATRIUM SIZE: 3.27 CM
LEFT ATRIUM VOLUME INDEX MOD: 37.6 ML/M2
LEFT ATRIUM VOLUME INDEX: 47.5 ML/M2
LEFT ATRIUM VOLUME MOD: 73.99 CM3
LEFT ATRIUM VOLUME: 93.6 CM3
LEFT INTERNAL DIMENSION IN SYSTOLE: 3.21 CM (ref 2.1–4)
LEFT VENTRICLE DIASTOLIC VOLUME INDEX: 44.16 ML/M2
LEFT VENTRICLE DIASTOLIC VOLUME: 86.99 ML
LEFT VENTRICLE MASS INDEX: 108 G/M2
LEFT VENTRICLE SYSTOLIC VOLUME INDEX: 20.9 ML/M2
LEFT VENTRICLE SYSTOLIC VOLUME: 41.25 ML
LEFT VENTRICULAR INTERNAL DIMENSION IN DIASTOLE: 4.7 CM (ref 3.5–6)
LEFT VENTRICULAR MASS: 212 G
LV LATERAL E/E' RATIO: 13.33 M/S
LV SEPTAL E/E' RATIO: 11.43 M/S
LYMPHOCYTES # BLD AUTO: 2.2 K/UL (ref 1–4.8)
LYMPHOCYTES NFR BLD: 35.7 % (ref 18–48)
MAGNESIUM SERPL-MCNC: 1.6 MG/DL (ref 1.6–2.6)
MCH RBC QN AUTO: 27.3 PG (ref 27–31)
MCHC RBC AUTO-ENTMCNC: 30.1 G/DL (ref 32–36)
MCV RBC AUTO: 91 FL (ref 82–98)
MONOCYTES # BLD AUTO: 0.6 K/UL (ref 0.3–1)
MONOCYTES NFR BLD: 9.2 % (ref 4–15)
MV PEAK A VEL: 1 M/S
MV PEAK E VEL: 0.8 M/S
MV STENOSIS PRESSURE HALF TIME: 55.35 MS
MV VALVE AREA P 1/2 METHOD: 3.97 CM2
NEUTROPHILS # BLD AUTO: 3.3 K/UL (ref 1.8–7.7)
NEUTROPHILS NFR BLD: 53.7 % (ref 38–73)
NRBC BLD-RTO: 0 /100 WBC
PHOSPHATE SERPL-MCNC: 3.5 MG/DL (ref 2.7–4.5)
PISA TR MAX VEL: 2.74 M/S
PLATELET # BLD AUTO: 127 K/UL (ref 150–450)
PMV BLD AUTO: 11.9 FL (ref 9.2–12.9)
POCT GLUCOSE: 108 MG/DL (ref 70–110)
POCT GLUCOSE: 87 MG/DL (ref 70–110)
POTASSIUM SERPL-SCNC: 4 MMOL/L (ref 3.5–5.1)
RA MAJOR: 4.86 CM
RA PRESSURE: 3 MMHG
RA WIDTH: 3.35 CM
RBC # BLD AUTO: 3.33 M/UL (ref 4–5.4)
RIGHT VENTRICULAR END-DIASTOLIC DIMENSION: 3.35 CM
RV TISSUE DOPPLER FREE WALL SYSTOLIC VELOCITY 1 (APICAL 4 CHAMBER VIEW): 15.56 CM/S
SATURATED IRON: 14 % (ref 20–50)
SINUS: 2.83 CM
SODIUM SERPL-SCNC: 143 MMOL/L (ref 136–145)
STJ: 2.68 CM
TDI LATERAL: 0.06 M/S
TDI SEPTAL: 0.07 M/S
TDI: 0.07 M/S
TOTAL IRON BINDING CAPACITY: 324 UG/DL (ref 250–450)
TR MAX PG: 30 MMHG
TRANSFERRIN SERPL-MCNC: 219 MG/DL (ref 200–375)
TRICUSPID ANNULAR PLANE SYSTOLIC EXCURSION: 2.86 CM
TROPONIN I SERPL DL<=0.01 NG/ML-MCNC: 0.02 NG/ML (ref 0–0.03)
TV REST PULMONARY ARTERY PRESSURE: 33 MMHG
VIT B12 SERPL-MCNC: 307 PG/ML (ref 210–950)
WBC # BLD AUTO: 6.07 K/UL (ref 3.9–12.7)

## 2021-10-15 PROCEDURE — 84100 ASSAY OF PHOSPHORUS: CPT | Performed by: PHYSICIAN ASSISTANT

## 2021-10-15 PROCEDURE — 83036 HEMOGLOBIN GLYCOSYLATED A1C: CPT | Performed by: PHYSICIAN ASSISTANT

## 2021-10-15 PROCEDURE — 82746 ASSAY OF FOLIC ACID SERUM: CPT | Performed by: PHYSICIAN ASSISTANT

## 2021-10-15 PROCEDURE — 80048 BASIC METABOLIC PNL TOTAL CA: CPT | Performed by: PHYSICIAN ASSISTANT

## 2021-10-15 PROCEDURE — 82728 ASSAY OF FERRITIN: CPT | Performed by: PHYSICIAN ASSISTANT

## 2021-10-15 PROCEDURE — 82962 GLUCOSE BLOOD TEST: CPT | Mod: 91

## 2021-10-15 PROCEDURE — 82607 VITAMIN B-12: CPT | Performed by: PHYSICIAN ASSISTANT

## 2021-10-15 PROCEDURE — 25000003 PHARM REV CODE 250: Performed by: PHYSICIAN ASSISTANT

## 2021-10-15 PROCEDURE — 84466 ASSAY OF TRANSFERRIN: CPT | Performed by: PHYSICIAN ASSISTANT

## 2021-10-15 PROCEDURE — 84484 ASSAY OF TROPONIN QUANT: CPT | Performed by: PHYSICIAN ASSISTANT

## 2021-10-15 PROCEDURE — 99217 PR OBSERVATION CARE DISCHARGE: CPT | Mod: ,,, | Performed by: PHYSICIAN ASSISTANT

## 2021-10-15 PROCEDURE — 99217 PR OBSERVATION CARE DISCHARGE: ICD-10-PCS | Mod: ,,, | Performed by: PHYSICIAN ASSISTANT

## 2021-10-15 PROCEDURE — 85025 COMPLETE CBC W/AUTO DIFF WBC: CPT | Performed by: PHYSICIAN ASSISTANT

## 2021-10-15 PROCEDURE — G0378 HOSPITAL OBSERVATION PER HR: HCPCS

## 2021-10-15 PROCEDURE — 93005 ELECTROCARDIOGRAM TRACING: CPT

## 2021-10-15 PROCEDURE — 93010 EKG 12-LEAD: ICD-10-PCS | Mod: ,,, | Performed by: INTERNAL MEDICINE

## 2021-10-15 PROCEDURE — 83735 ASSAY OF MAGNESIUM: CPT | Performed by: PHYSICIAN ASSISTANT

## 2021-10-15 PROCEDURE — 93010 ELECTROCARDIOGRAM REPORT: CPT | Mod: ,,, | Performed by: INTERNAL MEDICINE

## 2021-10-15 RX ORDER — HYDROXYZINE HYDROCHLORIDE 25 MG/1
25 TABLET, FILM COATED ORAL 3 TIMES DAILY PRN
Status: DISCONTINUED | OUTPATIENT
Start: 2021-10-15 | End: 2021-10-15 | Stop reason: HOSPADM

## 2021-10-15 RX ORDER — PANTOPRAZOLE SODIUM 40 MG/1
40 TABLET, DELAYED RELEASE ORAL
Status: ON HOLD | COMMUNITY
Start: 2021-01-07 | End: 2022-03-08

## 2021-10-15 RX ORDER — METOPROLOL TARTRATE 25 MG/1
25 TABLET, FILM COATED ORAL 2 TIMES DAILY
COMMUNITY
End: 2022-03-01

## 2021-10-15 RX ORDER — METOPROLOL TARTRATE 25 MG/1
25 TABLET, FILM COATED ORAL 2 TIMES DAILY
Status: DISCONTINUED | OUTPATIENT
Start: 2021-10-15 | End: 2021-10-15 | Stop reason: HOSPADM

## 2021-10-15 RX ORDER — FUROSEMIDE 20 MG/1
20 TABLET ORAL DAILY
Qty: 30 TABLET | Refills: 3 | Status: ON HOLD | OUTPATIENT
Start: 2021-10-15 | End: 2022-03-08

## 2021-10-15 RX ADMIN — NIFEDIPINE 60 MG: 60 TABLET, FILM COATED, EXTENDED RELEASE ORAL at 08:10

## 2021-10-15 RX ADMIN — METOPROLOL TARTRATE 25 MG: 25 TABLET, FILM COATED ORAL at 08:10

## 2021-10-15 RX ADMIN — CLOPIDOGREL 75 MG: 75 TABLET, FILM COATED ORAL at 08:10

## 2021-10-15 RX ADMIN — ATORVASTATIN CALCIUM 40 MG: 20 TABLET, FILM COATED ORAL at 08:10

## 2021-10-15 RX ADMIN — ASPIRIN 81 MG: 81 TABLET, COATED ORAL at 08:10

## 2021-10-15 RX ADMIN — ACETAMINOPHEN 650 MG: 325 TABLET ORAL at 02:10

## 2021-10-15 RX ADMIN — LEVOTHYROXINE SODIUM 75 MCG: 75 TABLET ORAL at 06:10

## 2021-10-15 RX ADMIN — HYDRALAZINE HYDROCHLORIDE 50 MG: 25 TABLET, FILM COATED ORAL at 08:10

## 2021-10-15 RX ADMIN — ACETAMINOPHEN 650 MG: 325 TABLET ORAL at 08:10

## 2021-11-02 ENCOUNTER — TELEPHONE (OUTPATIENT)
Dept: NEUROLOGY | Facility: CLINIC | Age: 66
End: 2021-11-02
Payer: MEDICAID

## 2021-11-18 ENCOUNTER — TELEPHONE (OUTPATIENT)
Dept: HEMATOLOGY/ONCOLOGY | Facility: CLINIC | Age: 66
End: 2021-11-18
Payer: MEDICAID

## 2022-03-01 ENCOUNTER — HOSPITAL ENCOUNTER (INPATIENT)
Facility: OTHER | Age: 67
LOS: 31 days | Discharge: NURSING, REHAB OR LONG-TERM CARE | DRG: 682 | End: 2022-04-04
Attending: EMERGENCY MEDICINE | Admitting: EMERGENCY MEDICINE
Payer: MEDICARE

## 2022-03-01 DIAGNOSIS — N17.9 AKI (ACUTE KIDNEY INJURY): Primary | ICD-10-CM

## 2022-03-01 DIAGNOSIS — N17.9 ACUTE KIDNEY INJURY SUPERIMPOSED ON CKD: ICD-10-CM

## 2022-03-01 DIAGNOSIS — Z91.89 AT RISK FOR LONG QT SYNDROME: ICD-10-CM

## 2022-03-01 DIAGNOSIS — N18.9 ACUTE KIDNEY INJURY SUPERIMPOSED ON CKD: ICD-10-CM

## 2022-03-01 DIAGNOSIS — D47.2 MGUS (MONOCLONAL GAMMOPATHY OF UNKNOWN SIGNIFICANCE): ICD-10-CM

## 2022-03-01 DIAGNOSIS — R41.82 ALTERED MENTAL STATUS, UNSPECIFIED ALTERED MENTAL STATUS TYPE: ICD-10-CM

## 2022-03-01 DIAGNOSIS — F03.90 MAJOR NEUROCOGNITIVE DISORDER: ICD-10-CM

## 2022-03-01 LAB
ALBUMIN SERPL BCP-MCNC: 3.7 G/DL (ref 3.5–5.2)
ALP SERPL-CCNC: 100 U/L (ref 55–135)
ALT SERPL W/O P-5'-P-CCNC: 48 U/L (ref 10–44)
ANION GAP SERPL CALC-SCNC: 12 MMOL/L (ref 8–16)
AST SERPL-CCNC: 57 U/L (ref 10–40)
BASOPHILS # BLD AUTO: 0.01 K/UL (ref 0–0.2)
BASOPHILS NFR BLD: 0.2 % (ref 0–1.9)
BILIRUB SERPL-MCNC: 0.3 MG/DL (ref 0.1–1)
BUN SERPL-MCNC: 76 MG/DL (ref 8–23)
CALCIUM SERPL-MCNC: 11.1 MG/DL (ref 8.7–10.5)
CHLORIDE SERPL-SCNC: 112 MMOL/L (ref 95–110)
CO2 SERPL-SCNC: 22 MMOL/L (ref 23–29)
CREAT SERPL-MCNC: 2.3 MG/DL (ref 0.5–1.4)
CTP QC/QA: YES
DIFFERENTIAL METHOD: ABNORMAL
EOSINOPHIL # BLD AUTO: 0.1 K/UL (ref 0–0.5)
EOSINOPHIL NFR BLD: 0.9 % (ref 0–8)
ERYTHROCYTE [DISTWIDTH] IN BLOOD BY AUTOMATED COUNT: 17.2 % (ref 11.5–14.5)
EST. GFR  (AFRICAN AMERICAN): 24 ML/MIN/1.73 M^2
EST. GFR  (NON AFRICAN AMERICAN): 21 ML/MIN/1.73 M^2
GLUCOSE SERPL-MCNC: 93 MG/DL (ref 70–110)
HCT VFR BLD AUTO: 27.8 % (ref 37–48.5)
HGB BLD-MCNC: 8.7 G/DL (ref 12–16)
IMM GRANULOCYTES # BLD AUTO: 0.02 K/UL (ref 0–0.04)
IMM GRANULOCYTES NFR BLD AUTO: 0.3 % (ref 0–0.5)
LYMPHOCYTES # BLD AUTO: 2 K/UL (ref 1–4.8)
LYMPHOCYTES NFR BLD: 31.3 % (ref 18–48)
MCH RBC QN AUTO: 25.5 PG (ref 27–31)
MCHC RBC AUTO-ENTMCNC: 31.3 G/DL (ref 32–36)
MCV RBC AUTO: 82 FL (ref 82–98)
MONOCYTES # BLD AUTO: 0.5 K/UL (ref 0.3–1)
MONOCYTES NFR BLD: 7.4 % (ref 4–15)
NEUTROPHILS # BLD AUTO: 3.9 K/UL (ref 1.8–7.7)
NEUTROPHILS NFR BLD: 59.9 % (ref 38–73)
NRBC BLD-RTO: 0 /100 WBC
PLATELET # BLD AUTO: 132 K/UL (ref 150–450)
PMV BLD AUTO: 11.6 FL (ref 9.2–12.9)
POTASSIUM SERPL-SCNC: 5.1 MMOL/L (ref 3.5–5.1)
PROT SERPL-MCNC: 7.8 G/DL (ref 6–8.4)
RBC # BLD AUTO: 3.41 M/UL (ref 4–5.4)
SARS-COV-2 RDRP RESP QL NAA+PROBE: NEGATIVE
SODIUM SERPL-SCNC: 146 MMOL/L (ref 136–145)
WBC # BLD AUTO: 6.45 K/UL (ref 3.9–12.7)

## 2022-03-01 PROCEDURE — 85025 COMPLETE CBC W/AUTO DIFF WBC: CPT | Mod: 91 | Performed by: EMERGENCY MEDICINE

## 2022-03-01 PROCEDURE — G0378 HOSPITAL OBSERVATION PER HR: HCPCS

## 2022-03-01 PROCEDURE — 99285 EMERGENCY DEPT VISIT HI MDM: CPT | Mod: 25

## 2022-03-01 PROCEDURE — U0002 COVID-19 LAB TEST NON-CDC: HCPCS | Performed by: EMERGENCY MEDICINE

## 2022-03-01 PROCEDURE — 80053 COMPREHEN METABOLIC PANEL: CPT | Mod: 91 | Performed by: EMERGENCY MEDICINE

## 2022-03-01 RX ORDER — INSULIN ASPART 100 [IU]/ML
0-5 INJECTION, SOLUTION INTRAVENOUS; SUBCUTANEOUS
Status: DISCONTINUED | OUTPATIENT
Start: 2022-03-01 | End: 2022-03-08

## 2022-03-01 RX ORDER — ARIPIPRAZOLE 10 MG/1
10 TABLET ORAL
Status: ON HOLD | COMMUNITY
Start: 2022-02-03 | End: 2022-03-08

## 2022-03-01 RX ORDER — METOPROLOL TARTRATE 25 MG/1
25 TABLET, FILM COATED ORAL 2 TIMES DAILY
Status: DISCONTINUED | OUTPATIENT
Start: 2022-03-02 | End: 2022-03-01

## 2022-03-01 RX ORDER — IBUPROFEN 200 MG
24 TABLET ORAL
Status: DISCONTINUED | OUTPATIENT
Start: 2022-03-01 | End: 2022-03-08

## 2022-03-01 RX ORDER — CLOPIDOGREL BISULFATE 75 MG/1
75 TABLET ORAL DAILY
Status: DISCONTINUED | OUTPATIENT
Start: 2022-03-02 | End: 2022-04-04 | Stop reason: HOSPADM

## 2022-03-01 RX ORDER — IBUPROFEN 200 MG
16 TABLET ORAL
Status: DISCONTINUED | OUTPATIENT
Start: 2022-03-01 | End: 2022-03-08

## 2022-03-01 RX ORDER — FERROUS SULFATE 325(65) MG
325 TABLET ORAL
COMMUNITY
Start: 2022-02-03 | End: 2023-02-03

## 2022-03-01 RX ORDER — GLUCAGON 1 MG
1 KIT INJECTION
Status: DISCONTINUED | OUTPATIENT
Start: 2022-03-01 | End: 2022-03-08

## 2022-03-01 RX ORDER — HYDRALAZINE HYDROCHLORIDE 25 MG/1
50 TABLET, FILM COATED ORAL NIGHTLY
Status: DISCONTINUED | OUTPATIENT
Start: 2022-03-01 | End: 2022-03-01

## 2022-03-01 RX ORDER — ASPIRIN 81 MG/1
81 TABLET ORAL DAILY
Status: DISCONTINUED | OUTPATIENT
Start: 2022-03-02 | End: 2022-04-04 | Stop reason: HOSPADM

## 2022-03-01 RX ORDER — ATORVASTATIN CALCIUM 40 MG/1
40 TABLET, FILM COATED ORAL
COMMUNITY
Start: 2022-02-03 | End: 2023-02-03

## 2022-03-01 RX ORDER — PRAVASTATIN SODIUM 20 MG/1
20 TABLET ORAL DAILY
Refills: 0 | Status: DISCONTINUED | OUTPATIENT
Start: 2022-03-02 | End: 2022-03-01

## 2022-03-01 RX ORDER — NIFEDIPINE 30 MG/1
60 TABLET, EXTENDED RELEASE ORAL DAILY
Status: DISCONTINUED | OUTPATIENT
Start: 2022-03-02 | End: 2022-03-01

## 2022-03-01 RX ORDER — CLONAZEPAM 0.5 MG/1
1 TABLET ORAL NIGHTLY
Status: DISCONTINUED | OUTPATIENT
Start: 2022-03-02 | End: 2022-03-02

## 2022-03-01 RX ORDER — CLONAZEPAM 1 MG/1
1 TABLET ORAL
Status: ON HOLD | COMMUNITY
Start: 2022-02-02 | End: 2022-03-23 | Stop reason: HOSPADM

## 2022-03-01 RX ORDER — PANTOPRAZOLE SODIUM 40 MG/1
40 TABLET, DELAYED RELEASE ORAL DAILY
Status: DISCONTINUED | OUTPATIENT
Start: 2022-03-02 | End: 2022-03-02

## 2022-03-01 RX ORDER — MULTIVITAMIN
1 TABLET ORAL
COMMUNITY
Start: 2022-02-03 | End: 2023-02-03

## 2022-03-01 RX ORDER — CLOPIDOGREL BISULFATE 75 MG/1
75 TABLET ORAL DAILY
Status: DISCONTINUED | OUTPATIENT
Start: 2022-03-02 | End: 2022-03-01

## 2022-03-01 RX ORDER — SODIUM CHLORIDE 0.9 % (FLUSH) 0.9 %
10 SYRINGE (ML) INJECTION
Status: DISCONTINUED | OUTPATIENT
Start: 2022-03-01 | End: 2022-04-04 | Stop reason: HOSPADM

## 2022-03-01 RX ORDER — ARIPIPRAZOLE 10 MG/1
10 TABLET ORAL DAILY
Status: DISCONTINUED | OUTPATIENT
Start: 2022-03-02 | End: 2022-03-02

## 2022-03-01 RX ORDER — TALC
6 POWDER (GRAM) TOPICAL NIGHTLY PRN
Status: CANCELLED | OUTPATIENT
Start: 2022-03-01

## 2022-03-01 RX ORDER — HALOPERIDOL 5 MG/ML
5 INJECTION INTRAMUSCULAR ONCE
Status: COMPLETED | OUTPATIENT
Start: 2022-03-02 | End: 2022-03-01

## 2022-03-01 RX ORDER — ATORVASTATIN CALCIUM 20 MG/1
40 TABLET, FILM COATED ORAL DAILY
Status: DISCONTINUED | OUTPATIENT
Start: 2022-03-02 | End: 2022-04-04 | Stop reason: HOSPADM

## 2022-03-01 RX ORDER — LANOLIN ALCOHOL/MO/W.PET/CERES
1 CREAM (GRAM) TOPICAL DAILY
Status: DISCONTINUED | OUTPATIENT
Start: 2022-03-02 | End: 2022-04-04 | Stop reason: HOSPADM

## 2022-03-01 RX ORDER — LABETALOL 300 MG/1
300 TABLET, FILM COATED ORAL EVERY 12 HOURS
COMMUNITY
Start: 2022-02-02 | End: 2023-02-02

## 2022-03-01 RX ORDER — LEVOTHYROXINE SODIUM 75 UG/1
75 TABLET ORAL
Status: DISCONTINUED | OUTPATIENT
Start: 2022-03-02 | End: 2022-04-04 | Stop reason: HOSPADM

## 2022-03-01 RX ADMIN — HALOPERIDOL LACTATE 5 MG: 5 INJECTION, SOLUTION INTRAMUSCULAR at 11:03

## 2022-03-02 LAB
ESTIMATED AVG GLUCOSE: 120 MG/DL (ref 68–131)
HBA1C MFR BLD: 5.8 % (ref 4–5.6)
POCT GLUCOSE: 66 MG/DL (ref 70–110)
POCT GLUCOSE: 72 MG/DL (ref 70–110)
POCT GLUCOSE: 82 MG/DL (ref 70–110)
POCT GLUCOSE: 83 MG/DL (ref 70–110)

## 2022-03-02 PROCEDURE — 36415 COLL VENOUS BLD VENIPUNCTURE: CPT | Performed by: NURSE PRACTITIONER

## 2022-03-02 PROCEDURE — 63600175 PHARM REV CODE 636 W HCPCS: Performed by: NURSE PRACTITIONER

## 2022-03-02 PROCEDURE — G0378 HOSPITAL OBSERVATION PER HR: HCPCS

## 2022-03-02 PROCEDURE — 99220 PR INITIAL OBSERVATION CARE,LEVL III: CPT | Mod: ,,, | Performed by: INTERNAL MEDICINE

## 2022-03-02 PROCEDURE — 99220 PR INITIAL OBSERVATION CARE,LEVL III: ICD-10-PCS | Mod: ,,, | Performed by: INTERNAL MEDICINE

## 2022-03-02 PROCEDURE — 99220 PR INITIAL OBSERVATION CARE,LEVL III: CPT | Mod: ,,, | Performed by: NURSE PRACTITIONER

## 2022-03-02 PROCEDURE — S5010 5% DEXTROSE AND 0.45% SALINE: HCPCS | Performed by: INTERNAL MEDICINE

## 2022-03-02 PROCEDURE — 94761 N-INVAS EAR/PLS OXIMETRY MLT: CPT

## 2022-03-02 PROCEDURE — 83036 HEMOGLOBIN GLYCOSYLATED A1C: CPT | Performed by: NURSE PRACTITIONER

## 2022-03-02 PROCEDURE — 25000003 PHARM REV CODE 250: Performed by: NURSE PRACTITIONER

## 2022-03-02 PROCEDURE — 25000003 PHARM REV CODE 250: Performed by: INTERNAL MEDICINE

## 2022-03-02 PROCEDURE — 99220 PR INITIAL OBSERVATION CARE,LEVL III: ICD-10-PCS | Mod: ,,, | Performed by: NURSE PRACTITIONER

## 2022-03-02 RX ORDER — MIRTAZAPINE 7.5 MG/1
7.5 TABLET, FILM COATED ORAL NIGHTLY
Status: DISCONTINUED | OUTPATIENT
Start: 2022-03-02 | End: 2022-03-14

## 2022-03-02 RX ORDER — DEXTROSE, SODIUM CHLORIDE, SODIUM LACTATE, POTASSIUM CHLORIDE, AND CALCIUM CHLORIDE 5; .6; .31; .03; .02 G/100ML; G/100ML; G/100ML; G/100ML; G/100ML
INJECTION, SOLUTION INTRAVENOUS CONTINUOUS
Status: DISCONTINUED | OUTPATIENT
Start: 2022-03-02 | End: 2022-03-02

## 2022-03-02 RX ORDER — LANOLIN ALCOHOL/MO/W.PET/CERES
1000 CREAM (GRAM) TOPICAL DAILY
Status: DISCONTINUED | OUTPATIENT
Start: 2022-03-02 | End: 2022-04-04 | Stop reason: HOSPADM

## 2022-03-02 RX ORDER — SODIUM CHLORIDE, SODIUM LACTATE, POTASSIUM CHLORIDE, CALCIUM CHLORIDE 600; 310; 30; 20 MG/100ML; MG/100ML; MG/100ML; MG/100ML
INJECTION, SOLUTION INTRAVENOUS CONTINUOUS
Status: DISCONTINUED | OUTPATIENT
Start: 2022-03-02 | End: 2022-03-02

## 2022-03-02 RX ORDER — DEXTROSE MONOHYDRATE AND SODIUM CHLORIDE 5; .45 G/100ML; G/100ML
INJECTION, SOLUTION INTRAVENOUS CONTINUOUS
Status: DISCONTINUED | OUTPATIENT
Start: 2022-03-02 | End: 2022-03-03

## 2022-03-02 RX ORDER — AMLODIPINE BESYLATE 5 MG/1
10 TABLET ORAL DAILY
Status: DISCONTINUED | OUTPATIENT
Start: 2022-03-02 | End: 2022-04-04 | Stop reason: HOSPADM

## 2022-03-02 RX ORDER — CLONAZEPAM 0.5 MG/1
1 TABLET ORAL NIGHTLY
Status: DISCONTINUED | OUTPATIENT
Start: 2022-03-02 | End: 2022-03-03

## 2022-03-02 RX ORDER — VALPROIC ACID 250 MG/5ML
250 SOLUTION ORAL DAILY
Status: DISCONTINUED | OUTPATIENT
Start: 2022-03-02 | End: 2022-03-08

## 2022-03-02 RX ORDER — LABETALOL 100 MG/1
300 TABLET, FILM COATED ORAL EVERY 12 HOURS
Status: DISCONTINUED | OUTPATIENT
Start: 2022-03-02 | End: 2022-04-04 | Stop reason: HOSPADM

## 2022-03-02 RX ORDER — HALOPERIDOL 0.5 MG/1
0.5 TABLET ORAL NIGHTLY
Status: DISCONTINUED | OUTPATIENT
Start: 2022-03-02 | End: 2022-03-03

## 2022-03-02 RX ADMIN — CLONAZEPAM 1 MG: 0.5 TABLET ORAL at 09:03

## 2022-03-02 RX ADMIN — MIRTAZAPINE 7.5 MG: 7.5 TABLET ORAL at 12:03

## 2022-03-02 RX ADMIN — CLONAZEPAM 1 MG: 0.5 TABLET ORAL at 12:03

## 2022-03-02 RX ADMIN — MIRTAZAPINE 7.5 MG: 7.5 TABLET ORAL at 10:03

## 2022-03-02 RX ADMIN — DEXTROSE AND SODIUM CHLORIDE: 5; .45 INJECTION, SOLUTION INTRAVENOUS at 11:03

## 2022-03-02 RX ADMIN — SODIUM CHLORIDE, SODIUM LACTATE, POTASSIUM CHLORIDE, AND CALCIUM CHLORIDE: .6; .31; .03; .02 INJECTION, SOLUTION INTRAVENOUS at 12:03

## 2022-03-02 RX ADMIN — LABETALOL HYDROCHLORIDE 300 MG: 100 TABLET, FILM COATED ORAL at 09:03
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
## 2022-03-02 NOTE — ASSESSMENT & PLAN NOTE
Patient recently released from [facility]; became acutely combative today    Haldol x 1 dose  Continue Haldol, Depakene, Seroquel, mirtazapine

## 2022-03-02 NOTE — SUBJECTIVE & OBJECTIVE
"Past Medical History:   Diagnosis Date    Anxiety     Chest tightness     Chronic back pain     See neuro     Chronic low back pain     CKD (chronic kidney disease) stage 3, GFR 30-59 ml/min     Depression     Diabetes mellitus     type 2    Diabetes mellitus type II     Diabetic neuropathy     Diverticulosis     last colonoscopy was in 2000    Encounter for blood transfusion     Fibroids     Fibromyalgia     GERD (gastroesophageal reflux disease)     History of palpitations     Hoarseness of voice     Hyperlipidemia     Hypertension     Obesity     Obstructive sleep apnea     not using cpap now    Pancreatitis     Patient is Taoist     Post menopausal syndrome        Past Surgical History:   Procedure Laterality Date    ABDOMINAL SURGERY      CHOLECYSTECTOMY      MIKE      fibroid      OOPHORECTOMY      xlap, right ovary removed due to infection?    right ovary removal Right     UTERINE ARTERY EMBOLIZATION  3/14       Review of patient's allergies indicates:  No Known Allergies    No current facility-administered medications on file prior to encounter.     Current Outpatient Medications on File Prior to Encounter   Medication Sig    ARIPiprazole (ABILIFY) 10 MG Tab Take 10 mg by mouth.    atorvastatin (LIPITOR) 40 MG tablet Take 40 mg by mouth.    clonazePAM (KLONOPIN) 1 MG tablet Take 1 mg by mouth.    ferrous sulfate (FEOSOL) 325 mg (65 mg iron) Tab tablet Take 325 mg by mouth.    labetaloL (NORMODYNE) 300 MG tablet Take 300 mg by mouth.    multivitamin with folic acid 400 mcg Tab Take 1 tablet by mouth.    aspirin (ECOTRIN) 81 MG EC tablet Take 1 tablet (81 mg total) by mouth once daily.    BD INSULIN PEN NEEDLE UF SHORT 31 gauge x 5/16" Ndle     blood sugar diagnostic (FREESTYLE LITE STRIPS) Strp TEST BLOOD SUGAR 3 TIMES A DAY    clopidogreL (PLAVIX) 75 mg tablet Take 75 mg by mouth once daily.    fluticasone (FLONASE) 50 mcg/actuation nasal spray USE 2 SPRAYS IN EACH NOSTRIL ONCE DAILY    furosemide " "(LASIX) 20 MG tablet Take 1 tablet (20 mg total) by mouth once daily.    gabapentin (NEURONTIN) 300 MG capsule Take 2 capsules by mouth three times daily    hydrALAZINE (APRESOLINE) 50 MG tablet Take 50 mg by mouth nightly.    insulin aspart (NOVOLOG FLEXPEN) 100 unit/mL InPn pen INJECT 20 UNITS UNDER THE SKIN THREE TIMES DAILY WITH MEALS    insulin detemir U-100 (LEVEMIR FLEXTOUCH U-100 INSULN) 100 unit/mL (3 mL) InPn pen Inject 20 Units into the skin 2 (two) times daily. Inject 32 units bid    insulin needles, disposable, (NOVOFINE 30) 30 x 1/3 " Ndle USE AS DIRECTED THREE TIMES DAILY    lancets (FREESTYLE LANCETS) 28 gauge Misc Inject 1 lancet into the skin 3 (three) times daily.    levothyroxine (SYNTHROID) 75 MCG tablet Take 75 mcg by mouth.    lidocaine-prilocaine (EMLA) cream     LINZESS 145 mcg Cap capsule TK 1 C PO ONCE A DAY ON AN EMPTY STOMACH    MOVANTIK tablet     NIFEdipine (PROCARDIA-XL) 60 MG (OSM) 24 hr tablet Take 60 mg by mouth once daily.    pantoprazole (PROTONIX) 40 MG tablet Take 40 mg by mouth.    pen needle, diabetic (BD INSULIN PEN NEEDLE UF MINI) 31 gauge x 3/16" Ndle USE AS DIRECTED TWICE DAILY    sertraline (ZOLOFT) 50 MG tablet Take 1 tablet (50 mg total) by mouth once daily. (Patient not taking: Reported on 3/29/2021)    traMADol (ULTRAM) 50 mg tablet TAKE 1 TABLET(50 MG) BY MOUTH TWICE DAILY AS NEEDED FOR PAIN (Patient not taking: Reported on 3/29/2021)    TRUE METRIX GLUCOSE METER Misc UTD     Family History       Problem Relation (Age of Onset)    Alcohol abuse Father    Cancer Sister, Paternal Aunt    Cirrhosis Sister    Diabetes Mother    Heart attack Paternal Uncle    Hypertension Mother    Lupus Grandchild    No Known Problems Daughter, Son, Maternal Grandmother, Daughter, Son, Sister, Sister, Sister, Brother, Brother, Brother, Brother, Maternal Aunt, Maternal Uncle, Maternal Grandfather, Paternal Grandfather, Paternal Grandmother, Cousin    Prostate cancer Brother    Stroke " Mother          Tobacco Use    Smoking status: Never Smoker    Smokeless tobacco: Never Used   Substance and Sexual Activity    Alcohol use: No     Comment: Rarely    Drug use: No    Sexual activity: Not Currently     Partners: Male     Birth control/protection: Post-menopausal     Comment: ,  4 grown kids      Review of Systems   Unable to perform ROS: Dementia   Objective:     Vital Signs (Most Recent):  Temp: 97.2 °F (36.2 °C) (03/01/22 2347)  Pulse: 81 (03/01/22 2347)  Resp: 20 (03/01/22 2347)  BP: (!) 166/89 (03/01/22 2347)  SpO2: 99 % (03/01/22 2347)   Vital Signs (24h Range):  Temp:  [97.2 °F (36.2 °C)-98.9 °F (37.2 °C)] 97.2 °F (36.2 °C)  Pulse:  [64-85] 81  Resp:  [16-20] 20  SpO2:  [99 %-100 %] 99 %  BP: (133-167)/(58-95) 166/89        There is no height or weight on file to calculate BMI.    Physical Exam  Constitutional:       Appearance: Normal appearance. She is well-developed.   HENT:      Head: Normocephalic.   Eyes:      General:         Right eye: No discharge.         Left eye: No discharge.      Conjunctiva/sclera: Conjunctivae normal.   Cardiovascular:      Rate and Rhythm: Normal rate and regular rhythm.      Pulses:           Radial pulses are 2+ on the right side and 2+ on the left side.      Heart sounds: Normal heart sounds.   Pulmonary:      Effort: Pulmonary effort is normal. No respiratory distress.      Breath sounds: Normal breath sounds.   Abdominal:      General: Bowel sounds are normal. There is no distension.      Palpations: Abdomen is soft.      Tenderness: There is no abdominal tenderness.   Musculoskeletal:         General: Normal range of motion.      Cervical back: Normal range of motion and neck supple.   Skin:     General: Skin is warm and dry.   Neurological:      Mental Status: She is alert.      GCS: GCS eye subscore is 4. GCS verbal subscore is 4. GCS motor subscore is 5.   Psychiatric:         Mood and Affect: Affect is angry.         Speech: Speech normal.          Behavior: Behavior is agitated.           Significant Labs: All pertinent labs within the past 24 hours have been reviewed.  CBC:   Recent Labs   Lab 03/01/22 1219 03/1953   WBC 6.03 6.45   HGB 8.6* 8.7*   HCT 27.9* 27.8*   * 132*     CMP:   Recent Labs   Lab 03/01/22 1219 03/1953    146*   K 4.9 5.1    112*   CO2 23 22*   GLU 92 93   BUN 76* 76*   CREATININE 2.2* 2.3*   CALCIUM 10.9* 11.1*   PROT 7.6 7.8   ALBUMIN 3.3* 3.7   BILITOT 0.3 0.3   ALKPHOS 92 100   AST 55* 57*   ALT 43 48*   ANIONGAP 10 12   EGFRNONAA 22.4* 21*       Significant Imaging: I have reviewed all pertinent imaging results/findings within the past 24 hours.

## 2022-03-02 NOTE — PLAN OF CARE
PEDRO from [facility] recently; daughter wants placement now per nursing report; Dr. Copeland consulting psych; any placement plans pending psych eval and recs.

## 2022-03-02 NOTE — PLAN OF CARE
[facility] - Med Surg ([facility])  Initial Discharge Assessment       Primary Care Provider: Anna Corbett NP (Inactive)    Admission Diagnosis: JOSÉ MIGUEL (acute kidney injury) [N17.9]  Altered mental status, unspecified altered mental status type [R41.82]    Admission Date: 3/1/2022  Expected Discharge Date:          Payor: [facility] MCARE / Plan: [facility] DUAL COMPLETE HMO SNP / Product Type: Medicare Advantage /     Extended Emergency Contact Information  Primary Emergency Contact: Nicolle Gallardo  Mobile Phone: [phone]  Relation: Daughter  Preferred language: English   needed? No    Discharge Plan A: (P) Home         [id number] - [place], [place] - [address] AT [facility] [address]  [address]  [facility] [address]  Phone: [phone] Fax: [phone]    [id number] - [place], [place] - [address]  [address]  [place] [address]  Phone: [phone] Fax: [phone]    [id number] - [place], [place] - [address] AT [facility] [address]  [address]  [facility] [address]  Phone: [phone] Fax: [phone]    [id number] - [place], [place] - [address] AT [facility] [address]  [address]  [place] [address]  Phone: [phone] Fax: [phone]      Initial Assessment (most recent)       Adult Discharge Assessment - 03/02/22 1438          Discharge Assessment    Assessment Type Discharge Planning Assessment     Source of Information health record (P)      Communicated JAGDEEP with patient/caregiver Date not available/Unable to determine (P)      Reason For Admission JOSÉ MIGUEL (P)      Do you have prescription coverage? Yes (P)      Coverage [facility] MCR (P)      Discharge Plan A Home (P)      SDOH -- (P)    recent dc from [facility]; c/s to psych planned or psych recs planned per hospitalist provider                        DENISE Sup attempted to speak with patient via phone room and cell; VM left on cell.

## 2022-03-02 NOTE — PLAN OF CARE
Disoriented X4. VSS. Two person assist per adls and transfers. Patient refused skin assessment. Patient tolerated meds well. 20G IV @ right AC infusing LR @ 75 mL/hr, patent and secured with transparent dressing. No complications. Sitter at bedside, call bell and bedside table within reach. Bed in lowest position with hourly purposeful rounding. Alarms on and audible with door opened. Will continue to monitor.   Problem: Adult Inpatient Plan of Care  Goal: Plan of Care Review  Outcome: Ongoing, Progressing  Goal: Patient-Specific Goal (Individualized)  Outcome: Ongoing, Progressing  Goal: Absence of Hospital-Acquired Illness or Injury  Outcome: Ongoing, Progressing  Goal: Optimal Comfort and Wellbeing  Outcome: Ongoing, Progressing  Goal: Readiness for Transition of Care  Outcome: Ongoing, Progressing     Problem: Diabetes Comorbidity  Goal: Blood Glucose Level Within Targeted Range  Outcome: Ongoing, Progressing     Problem: Fluid and Electrolyte Imbalance (Acute Kidney Injury/Impairment)  Goal: Fluid and Electrolyte Balance  Outcome: Ongoing, Progressing     Problem: Oral Intake Inadequate (Acute Kidney Injury/Impairment)  Goal: Optimal Nutrition Intake  Outcome: Ongoing, Progressing     Problem: Renal Function Impairment (Acute Kidney Injury/Impairment)  Goal: Effective Renal Function  Outcome: Ongoing, Progressing

## 2022-03-02 NOTE — ASSESSMENT & PLAN NOTE
Hypertensive currently    Continue amlodipine, labetalol; hold losartan and hctz with JOSÉ MIGUEL

## 2022-03-02 NOTE — ASSESSMENT & PLAN NOTE
Creatinine 2.3, baseline 1.3; reports patient is not eating or drinking currently    IVF  CMP in AM  Consider Nephrology consult if persists  Renal US
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
## 2022-03-02 NOTE — ASSESSMENT & PLAN NOTE
BG- 93; appears to be dietary controlled    A1c pending  Low dose SSI  BG AC/HS    Recent Labs   Lab 03/02/22  0738 03/02/22  1052 03/02/22  1715 03/02/22 1959   POCTGLUCOSE 72 66* 83 82

## 2022-03-03 LAB
AMMONIA PLAS-SCNC: 21 UMOL/L (ref 10–50)
ANION GAP SERPL CALC-SCNC: 9 MMOL/L (ref 8–16)
BUN SERPL-MCNC: 57 MG/DL (ref 8–23)
CALCIUM SERPL-MCNC: 11.5 MG/DL (ref 8.7–10.5)
CHLORIDE SERPL-SCNC: 112 MMOL/L (ref 95–110)
CO2 SERPL-SCNC: 24 MMOL/L (ref 23–29)
CREAT SERPL-MCNC: 1.8 MG/DL (ref 0.5–1.4)
EST. GFR  (AFRICAN AMERICAN): 33 ML/MIN/1.73 M^2
EST. GFR  (NON AFRICAN AMERICAN): 29 ML/MIN/1.73 M^2
GLUCOSE SERPL-MCNC: 76 MG/DL (ref 70–110)
POCT GLUCOSE: 140 MG/DL (ref 70–110)
POCT GLUCOSE: 158 MG/DL (ref 70–110)
POCT GLUCOSE: 82 MG/DL (ref 70–110)
POCT GLUCOSE: 94 MG/DL (ref 70–110)
POTASSIUM SERPL-SCNC: 4.6 MMOL/L (ref 3.5–5.1)
SODIUM SERPL-SCNC: 145 MMOL/L (ref 136–145)
VALPROATE SERPL-MCNC: 14.1 UG/ML (ref 50–100)

## 2022-03-03 PROCEDURE — 99225 PR SUBSEQUENT OBSERVATION CARE,LEVEL II: ICD-10-PCS | Mod: ,,, | Performed by: INTERNAL MEDICINE

## 2022-03-03 PROCEDURE — 25000003 PHARM REV CODE 250: Performed by: NURSE PRACTITIONER

## 2022-03-03 PROCEDURE — G0378 HOSPITAL OBSERVATION PER HR: HCPCS

## 2022-03-03 PROCEDURE — G0425 INPT/ED TELECONSULT30: HCPCS | Mod: 95,,, | Performed by: PSYCHIATRY & NEUROLOGY

## 2022-03-03 PROCEDURE — 94761 N-INVAS EAR/PLS OXIMETRY MLT: CPT

## 2022-03-03 PROCEDURE — 82140 ASSAY OF AMMONIA: CPT | Performed by: INTERNAL MEDICINE

## 2022-03-03 PROCEDURE — 99225 PR SUBSEQUENT OBSERVATION CARE,LEVEL II: CPT | Mod: ,,, | Performed by: INTERNAL MEDICINE

## 2022-03-03 PROCEDURE — 36415 COLL VENOUS BLD VENIPUNCTURE: CPT | Performed by: INTERNAL MEDICINE

## 2022-03-03 PROCEDURE — 80164 ASSAY DIPROPYLACETIC ACD TOT: CPT | Performed by: INTERNAL MEDICINE

## 2022-03-03 PROCEDURE — G0425 PR INPT TELEHEALTH CONSULT 30M: ICD-10-PCS | Mod: 95,,, | Performed by: PSYCHIATRY & NEUROLOGY

## 2022-03-03 PROCEDURE — 80048 BASIC METABOLIC PNL TOTAL CA: CPT | Performed by: INTERNAL MEDICINE

## 2022-03-03 PROCEDURE — 25000003 PHARM REV CODE 250: Performed by: INTERNAL MEDICINE

## 2022-03-03 RX ORDER — CLONAZEPAM 0.5 MG/1
1 TABLET ORAL NIGHTLY PRN
Status: DISCONTINUED | OUTPATIENT
Start: 2022-03-03 | End: 2022-03-08

## 2022-03-03 RX ORDER — HYDRALAZINE HYDROCHLORIDE 25 MG/1
25 TABLET, FILM COATED ORAL EVERY 8 HOURS
Status: DISCONTINUED | OUTPATIENT
Start: 2022-03-03 | End: 2022-03-04

## 2022-03-03 RX ADMIN — QUETIAPINE FUMARATE 12.5 MG: 25 TABLET, FILM COATED ORAL at 06:03

## 2022-03-03 RX ADMIN — ASPIRIN 81 MG: 81 TABLET, COATED ORAL at 09:03

## 2022-03-03 RX ADMIN — LABETALOL HYDROCHLORIDE 300 MG: 100 TABLET, FILM COATED ORAL at 09:03

## 2022-03-03 RX ADMIN — ATORVASTATIN CALCIUM 40 MG: 20 TABLET, FILM COATED ORAL at 09:03

## 2022-03-03 RX ADMIN — CYANOCOBALAMIN TAB 1000 MCG 1000 MCG: 1000 TAB at 09:03

## 2022-03-03 RX ADMIN — AMLODIPINE BESYLATE 10 MG: 5 TABLET ORAL at 09:03

## 2022-03-03 RX ADMIN — VALPROIC ACID 250 MG: 250 SOLUTION ORAL at 11:03

## 2022-03-03 RX ADMIN — HYDRALAZINE HYDROCHLORIDE 25 MG: 25 TABLET, FILM COATED ORAL at 11:03

## 2022-03-03 RX ADMIN — CLOPIDOGREL BISULFATE 75 MG: 75 TABLET, FILM COATED ORAL at 09:03

## 2022-03-03 RX ADMIN — Medication 1 TABLET: at 09:03

## 2022-03-03 RX ADMIN — LEVOTHYROXINE SODIUM 75 MCG: 75 TABLET ORAL at 06:03

## 2022-03-03 RX ADMIN — FERROUS SULFATE TAB 325 MG (65 MG ELEMENTAL FE) 1 EACH: 325 (65 FE) TAB at 09:03
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
## 2022-03-03 NOTE — ASSESSMENT & PLAN NOTE
Creatinine 2.3 admit , baseline 1.3    improving  IVF  Daily labs    Renal US shows Mild right hydronephrosis  Will consider renal stone study if pt can cooperate

## 2022-03-03 NOTE — ASSESSMENT & PLAN NOTE
Patient recently released from [facility]; became acutely combative today.  PMH includes dx of schizophrenai     Haldol 5 mg IV given in ED  Continue meds from recent psyc discharge.  Will ask psyc to review meds and help with med managemnt

## 2022-03-03 NOTE — SUBJECTIVE & OBJECTIVE
Interval History:   She is awake and talking this AM - I believe yesterday she was somnolent from the IV haldol she received in the ED.    Will see if she eats this AM - says she is hungry.    Continues on IVF with improvement in Na and Cr.    Will ask psyc for input on meds- was discharged from [facility] on klonopin 1 mg qhs, remeron 7.5 mg qhs, haldol 0.5 mg qhs, depakote 125mg daily, and seroquel 12.5mg every evening    Xray of her hip where she is bruised is neg      Review of Systems   Reason unable to perform ROS: mental status.   Objective:     Vital Signs (Most Recent):  Temp: 97.8 °F (36.6 °C) (03/03/22 0741)  Pulse: 66 (03/03/22 0741)  Resp: 16 (03/03/22 0741)  BP: (!) 183/75 (03/03/22 0741)  SpO2: 97 % (03/03/22 0800)   Vital Signs (24h Range):  Temp:  [96.8 °F (36 °C)-98.6 °F (37 °C)] 97.8 °F (36.6 °C)  Pulse:  [64-70] 66  Resp:  [16-18] 16  SpO2:  [95 %-97 %] 97 %  BP: (140-183)/(67-92) 183/75     Weight: 89.4 kg (197 lb 1.5 oz)  Body mass index is 38.49 kg/m².    Intake/Output Summary (Last 24 hours) at 3/3/2022 1002  Last data filed at 3/2/2022 1919  Gross per 24 hour   Intake 991.71 ml   Output --   Net 991.71 ml      Physical Exam  Constitutional:       General: She is not in acute distress.  HENT:      Head: Normocephalic and atraumatic.   Eyes:      Conjunctiva/sclera: Conjunctivae normal.   Cardiovascular:      Rate and Rhythm: Regular rhythm.      Heart sounds: Normal heart sounds. No murmur heard.  Pulmonary:      Effort: Pulmonary effort is normal.      Breath sounds: Normal breath sounds.   Abdominal:      General: Bowel sounds are normal. There is no distension.      Palpations: Abdomen is soft.   Skin:     General: Skin is warm and dry.      Findings: No rash.   Neurological:      General: No focal deficit present.   Psychiatric:      Comments: Laughs inappropriately. Answers questions but mostly in nonsequetors       Significant Labs: All pertinent labs within the past 24  hours have been reviewed.  BMP:   Recent Labs   Lab 03/01/22  1219 03/1953 03/03/22  0458   GLU 92   < > 76      < > 145   K 4.9   < > 4.6      < > 112*   CO2 23   < > 24   BUN 76*   < > 57*   CREATININE 2.2*   < > 1.8*   CALCIUM 10.9*   < > 11.5*   MG 2.0  --   --     < > = values in this interval not displayed.       Significant Imaging: I have reviewed all pertinent imaging results/findings within the past 24 hours.
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
## 2022-03-03 NOTE — PLAN OF CARE
Patient remains free from injury or falls. Vital signs stable throughout day on room air. Positions self independently. Incontinent x1 bladder. No complaints of pain. Patient very sleepy only aroused to voice. Patient sitter at the bed. Bed in low locked position and call light within reach. Will continue to monitor.   Problem: Adult Inpatient Plan of Care  Goal: Plan of Care Review  Outcome: Ongoing, Progressing  Goal: Patient-Specific Goal (Individualized)  Outcome: Ongoing, Progressing  Goal: Absence of Hospital-Acquired Illness or Injury  Outcome: Ongoing, Progressing  Goal: Optimal Comfort and Wellbeing  Outcome: Ongoing, Progressing  Goal: Readiness for Transition of Care  Outcome: Ongoing, Progressing     Problem: Diabetes Comorbidity  Goal: Blood Glucose Level Within Targeted Range  Outcome: Ongoing, Progressing     Problem: Fluid and Electrolyte Imbalance (Acute Kidney Injury/Impairment)  Goal: Fluid and Electrolyte Balance  Outcome: Ongoing, Progressing     Problem: Oral Intake Inadequate (Acute Kidney Injury/Impairment)  Goal: Optimal Nutrition Intake  Outcome: Ongoing, Progressing     Problem: Renal Function Impairment (Acute Kidney Injury/Impairment)  Goal: Effective Renal Function  Outcome: Ongoing, Progressing

## 2022-03-03 NOTE — ASSESSMENT & PLAN NOTE
Continue amlodipine, labetalol; hold losartan and hctz with JOSÉ MIGUEL.  BP still high will add hydralazine for control until JOSÉ MIGUEL resolves then restart the losartan/hctz

## 2022-03-03 NOTE — NURSING
VSS and afebrile. Patient pleasant this shift, slept most of the night. Sitter remains at bedside. Regular diet tolerated well. Plan of care reviewed with patient. Purposeful rounding done, call light at bed side, bed at lowest position, brakes on, non-skid socks on, will continue to monitor.

## 2022-03-04 ENCOUNTER — PATIENT OUTREACH (OUTPATIENT)
Dept: ADMINISTRATIVE | Facility: OTHER | Age: 67
End: 2022-03-04
Payer: MEDICARE

## 2022-03-04 LAB
ANION GAP SERPL CALC-SCNC: 9 MMOL/L (ref 8–16)
BUN SERPL-MCNC: 52 MG/DL (ref 8–23)
CALCIUM SERPL-MCNC: 11.3 MG/DL (ref 8.7–10.5)
CHLORIDE SERPL-SCNC: 111 MMOL/L (ref 95–110)
CO2 SERPL-SCNC: 21 MMOL/L (ref 23–29)
CREAT SERPL-MCNC: 2 MG/DL (ref 0.5–1.4)
EST. GFR  (AFRICAN AMERICAN): 29 ML/MIN/1.73 M^2
EST. GFR  (NON AFRICAN AMERICAN): 25 ML/MIN/1.73 M^2
GLUCOSE SERPL-MCNC: 69 MG/DL (ref 70–110)
POCT GLUCOSE: 130 MG/DL (ref 70–110)
POCT GLUCOSE: 131 MG/DL (ref 70–110)
POCT GLUCOSE: 69 MG/DL (ref 70–110)
POCT GLUCOSE: 87 MG/DL (ref 70–110)
POTASSIUM SERPL-SCNC: 4.6 MMOL/L (ref 3.5–5.1)
SODIUM SERPL-SCNC: 141 MMOL/L (ref 136–145)

## 2022-03-04 PROCEDURE — 25000003 PHARM REV CODE 250: Performed by: NURSE PRACTITIONER

## 2022-03-04 PROCEDURE — 36415 COLL VENOUS BLD VENIPUNCTURE: CPT | Performed by: INTERNAL MEDICINE

## 2022-03-04 PROCEDURE — 11000001 HC ACUTE MED/SURG PRIVATE ROOM

## 2022-03-04 PROCEDURE — 80048 BASIC METABOLIC PNL TOTAL CA: CPT | Performed by: INTERNAL MEDICINE

## 2022-03-04 PROCEDURE — 99225 PR SUBSEQUENT OBSERVATION CARE,LEVEL II: CPT | Mod: ,,, | Performed by: INTERNAL MEDICINE

## 2022-03-04 PROCEDURE — 99225 PR SUBSEQUENT OBSERVATION CARE,LEVEL II: ICD-10-PCS | Mod: ,,, | Performed by: INTERNAL MEDICINE

## 2022-03-04 PROCEDURE — 25000003 PHARM REV CODE 250: Performed by: INTERNAL MEDICINE

## 2022-03-04 PROCEDURE — S5010 5% DEXTROSE AND 0.45% SALINE: HCPCS | Performed by: INTERNAL MEDICINE

## 2022-03-04 RX ORDER — HYDRALAZINE HYDROCHLORIDE 25 MG/1
50 TABLET, FILM COATED ORAL EVERY 8 HOURS
Status: DISCONTINUED | OUTPATIENT
Start: 2022-03-04 | End: 2022-03-27

## 2022-03-04 RX ORDER — DEXTROSE MONOHYDRATE AND SODIUM CHLORIDE 5; .45 G/100ML; G/100ML
INJECTION, SOLUTION INTRAVENOUS CONTINUOUS
Status: DISCONTINUED | OUTPATIENT
Start: 2022-03-04 | End: 2022-03-08

## 2022-03-04 RX ADMIN — FERROUS SULFATE TAB 325 MG (65 MG ELEMENTAL FE) 1 EACH: 325 (65 FE) TAB at 10:03

## 2022-03-04 RX ADMIN — VALPROIC ACID 250 MG: 250 SOLUTION ORAL at 10:03

## 2022-03-04 RX ADMIN — AMLODIPINE BESYLATE 10 MG: 5 TABLET ORAL at 10:03

## 2022-03-04 RX ADMIN — ASPIRIN 81 MG: 81 TABLET, COATED ORAL at 10:03

## 2022-03-04 RX ADMIN — LABETALOL HYDROCHLORIDE 300 MG: 100 TABLET, FILM COATED ORAL at 10:03

## 2022-03-04 RX ADMIN — HYDRALAZINE HYDROCHLORIDE 25 MG: 25 TABLET, FILM COATED ORAL at 05:03

## 2022-03-04 RX ADMIN — CYANOCOBALAMIN TAB 1000 MCG 1000 MCG: 1000 TAB at 10:03

## 2022-03-04 RX ADMIN — MIRTAZAPINE 7.5 MG: 7.5 TABLET ORAL at 10:03

## 2022-03-04 RX ADMIN — ATORVASTATIN CALCIUM 40 MG: 20 TABLET, FILM COATED ORAL at 10:03

## 2022-03-04 RX ADMIN — CLONAZEPAM 1 MG: 0.5 TABLET ORAL at 10:03

## 2022-03-04 RX ADMIN — QUETIAPINE FUMARATE 12.5 MG: 25 TABLET, FILM COATED ORAL at 04:03

## 2022-03-04 RX ADMIN — CLOPIDOGREL BISULFATE 75 MG: 75 TABLET, FILM COATED ORAL at 10:03

## 2022-03-04 RX ADMIN — DEXTROSE AND SODIUM CHLORIDE: 5; .45 INJECTION, SOLUTION INTRAVENOUS at 01:03

## 2022-03-04 RX ADMIN — LEVOTHYROXINE SODIUM 75 MCG: 75 TABLET ORAL at 05:03

## 2022-03-04 RX ADMIN — Medication 1 TABLET: at 10:03

## 2022-03-04 RX ADMIN — HYDRALAZINE HYDROCHLORIDE 50 MG: 25 TABLET, FILM COATED ORAL at 04:03

## 2022-03-04 RX ADMIN — CLONAZEPAM 1 MG: 0.5 TABLET ORAL at 01:03
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
## 2022-03-04 NOTE — NURSING
Pt was going to take meds and then spit them out and stated she wasn't taking them. Pt not being aggressive. Sitter still at bedside, will continue to monitor.

## 2022-03-04 NOTE — PROGRESS NOTES
03/04/22 0703   Prabhakar Risk Assessment   Sensory Perception 3-->slightly limited   Moisture 3-->occasionally moist   Activity 2-->chairfast   Mobility 3-->slightly limited   Nutrition 3-->adequate   Friction and Shear 3-->no apparent problem   Prabhakar Score 17   [facility] ([facility])  Wound Care  Progress Note    Patient Name: Jessie Gallardo  MRN: [id number]  Admission Date: 3/1/2022  Hospital Length of Stay: 0 days  Attending Physician: Jaquan Copeland MD  Primary Care Provider: Anna Corbett NP (Inactive)   No new subjective & objective note has been filed under this hospital service since the last note was generated.    Assessment/Plan:         HAPI prevention , Prabhakar <18 PIP orders placed .    Savannah Yuan LPN  Wound Care  [facility] ([facility])

## 2022-03-04 NOTE — ASSESSMENT & PLAN NOTE
A1c pending  Low dose SSI  BG AC/HS    Recent Labs   Lab 03/03/22  0805 03/03/22  1218 03/03/22  1615 03/03/22  2039 03/04/22  0741 03/04/22  0947   POCTGLUCOSE 82 140* 94 158* 69* 131*

## 2022-03-04 NOTE — ASSESSMENT & PLAN NOTE
Patient recently released from [facility]; became acutely combative after discharge.  PMH includes dx of schizophrenai     Haldol 5 mg IV given in ED  Continue meds from recent psyc discharge.   psyc to review meds and help with med managemnt

## 2022-03-04 NOTE — SUBJECTIVE & OBJECTIVE
Interval History: refusing to take meds this AM. Awake. Just laughs when asked questions    Ammonia level nl  Depakote level 14      Cr increased overnight   Po intake seems minimal    Review of Systems   Reason unable to perform ROS: mental status.   Objective:     Vital Signs (Most Recent):  Temp: 98.2 °F (36.8 °C) (03/04/22 0726)  Pulse: 74 (03/04/22 0726)  Resp: 18 (03/04/22 0726)  BP: 135/64 (03/04/22 0726)  SpO2: 98 % (03/04/22 0726) Vital Signs (24h Range):  Temp:  [96.8 °F (36 °C)-98.2 °F (36.8 °C)] 98.2 °F (36.8 °C)  Pulse:  [62-74] 74  Resp:  [18] 18  SpO2:  [96 %-99 %] 98 %  BP: (133-174)/(63-86) 135/64     Weight: 89.4 kg (197 lb 1.5 oz)  Body mass index is 38.49 kg/m².    Intake/Output Summary (Last 24 hours) at 3/4/2022 1151  Last data filed at 3/3/2022 1726  Gross per 24 hour   Intake 660 ml   Output --   Net 660 ml      Physical Exam  Constitutional:       General: She is not in acute distress.  HENT:      Head: Normocephalic and atraumatic.   Eyes:      Conjunctiva/sclera: Conjunctivae normal.   Cardiovascular:      Rate and Rhythm: Regular rhythm.      Heart sounds: Normal heart sounds. No murmur heard.  Pulmonary:      Effort: Pulmonary effort is normal.      Breath sounds: Normal breath sounds.   Abdominal:      General: Bowel sounds are normal. There is no distension.      Palpations: Abdomen is soft.   Skin:     General: Skin is warm and dry.      Findings: No rash.   Neurological:      General: No focal deficit present.   Psychiatric:      Comments: Laughs inappropriately. Answers questions but mostly in nonsequetors       Significant Labs: All pertinent labs within the past 24 hours have been reviewed.  BMP:   Recent Labs   Lab 03/04/22  0805   GLU 69*      K 4.6   *   CO2 21*   BUN 52*   CREATININE 2.0*   CALCIUM 11.3*       Significant Imaging: I have reviewed all pertinent imaging results/findings within the past 24 hours.

## 2022-03-04 NOTE — PROGRESS NOTES
03/04/2022 @ 2:54pm- RSW attempted to meet with patient to complete assessment. RSW unable to complete initial visit due to pt sleeping and not responding to RSW attempt to wake pt up. RSW will follow up with patient at a later time.    NATE Cannon

## 2022-03-04 NOTE — ASSESSMENT & PLAN NOTE
Continue amlodipine, labetalol; hold losartan and hctz with JOSÉ MIGUEL.   hydralazineadded for better  for control until JOSÉ MIGUEL resolves then restart the losartan/hctz

## 2022-03-04 NOTE — NURSING
Patient went down to ct for scan with sitter no noted distress will continue with current plan of care.

## 2022-03-04 NOTE — ASSESSMENT & PLAN NOTE
Creatinine 2.3 admit , baseline 1.3    improving  IVF  Daily labs    Renal US shows Mild right hydronephrosis  Will attempt  renal stone study if pt can cooperate

## 2022-03-05 LAB
ANION GAP SERPL CALC-SCNC: 8 MMOL/L (ref 8–16)
BUN SERPL-MCNC: 50 MG/DL (ref 8–23)
CALCIUM SERPL-MCNC: 11 MG/DL (ref 8.7–10.5)
CHLORIDE SERPL-SCNC: 110 MMOL/L (ref 95–110)
CO2 SERPL-SCNC: 21 MMOL/L (ref 23–29)
CREAT SERPL-MCNC: 1.9 MG/DL (ref 0.5–1.4)
EST. GFR  (AFRICAN AMERICAN): 31 ML/MIN/1.73 M^2
EST. GFR  (NON AFRICAN AMERICAN): 27 ML/MIN/1.73 M^2
GLUCOSE SERPL-MCNC: 76 MG/DL (ref 70–110)
POCT GLUCOSE: 139 MG/DL (ref 70–110)
POCT GLUCOSE: 76 MG/DL (ref 70–110)
POTASSIUM SERPL-SCNC: 4.6 MMOL/L (ref 3.5–5.1)
SODIUM SERPL-SCNC: 139 MMOL/L (ref 136–145)

## 2022-03-05 PROCEDURE — 80048 BASIC METABOLIC PNL TOTAL CA: CPT | Performed by: INTERNAL MEDICINE

## 2022-03-05 PROCEDURE — 25000003 PHARM REV CODE 250: Performed by: INTERNAL MEDICINE

## 2022-03-05 PROCEDURE — 99232 PR SUBSEQUENT HOSPITAL CARE,LEVL II: ICD-10-PCS | Mod: ,,, | Performed by: INTERNAL MEDICINE

## 2022-03-05 PROCEDURE — 25000003 PHARM REV CODE 250: Performed by: NURSE PRACTITIONER

## 2022-03-05 PROCEDURE — 36415 COLL VENOUS BLD VENIPUNCTURE: CPT | Performed by: INTERNAL MEDICINE

## 2022-03-05 PROCEDURE — 11000001 HC ACUTE MED/SURG PRIVATE ROOM

## 2022-03-05 PROCEDURE — 99232 SBSQ HOSP IP/OBS MODERATE 35: CPT | Mod: ,,, | Performed by: INTERNAL MEDICINE

## 2022-03-05 RX ADMIN — CLOPIDOGREL BISULFATE 75 MG: 75 TABLET, FILM COATED ORAL at 09:03

## 2022-03-05 RX ADMIN — QUETIAPINE FUMARATE 12.5 MG: 25 TABLET, FILM COATED ORAL at 06:03

## 2022-03-05 RX ADMIN — ASPIRIN 81 MG: 81 TABLET, COATED ORAL at 09:03

## 2022-03-05 RX ADMIN — FERROUS SULFATE TAB 325 MG (65 MG ELEMENTAL FE) 1 EACH: 325 (65 FE) TAB at 09:03

## 2022-03-05 RX ADMIN — CYANOCOBALAMIN TAB 1000 MCG 1000 MCG: 1000 TAB at 09:03

## 2022-03-05 RX ADMIN — CLONAZEPAM 1 MG: 0.5 TABLET ORAL at 09:03

## 2022-03-05 RX ADMIN — Medication 1 TABLET: at 09:03

## 2022-03-05 RX ADMIN — LABETALOL HYDROCHLORIDE 300 MG: 100 TABLET, FILM COATED ORAL at 09:03

## 2022-03-05 RX ADMIN — HYDRALAZINE HYDROCHLORIDE 50 MG: 25 TABLET, FILM COATED ORAL at 03:03

## 2022-03-05 RX ADMIN — AMLODIPINE BESYLATE 10 MG: 5 TABLET ORAL at 09:03

## 2022-03-05 RX ADMIN — HYDRALAZINE HYDROCHLORIDE 50 MG: 25 TABLET, FILM COATED ORAL at 06:03

## 2022-03-05 RX ADMIN — ATORVASTATIN CALCIUM 40 MG: 20 TABLET, FILM COATED ORAL at 09:03

## 2022-03-05 RX ADMIN — VALPROIC ACID 250 MG: 250 SOLUTION ORAL at 09:03

## 2022-03-05 RX ADMIN — LEVOTHYROXINE SODIUM 75 MCG: 75 TABLET ORAL at 06:03

## 2022-03-05 RX ADMIN — MIRTAZAPINE 7.5 MG: 7.5 TABLET ORAL at 09:03
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
## 2022-03-05 NOTE — ASSESSMENT & PLAN NOTE
A1c pending  Low dose SSI  BG AC/HS    Recent Labs   Lab 03/03/22  2039 03/04/22  0741 03/04/22  0947 03/04/22  1714 03/04/22  2156 03/05/22  0733   POCTGLUCOSE 158* 69* 131* 87 130* 76

## 2022-03-05 NOTE — SUBJECTIVE & OBJECTIVE
Interval History: NAEON    Review of Systems   Unable to perform ROS: Mental status change   Objective:     Vital Signs (Most Recent):  Temp: 97.6 °F (36.4 °C) (03/05/22 0757)  Pulse: 69 (03/05/22 0757)  Resp: 18 (03/05/22 0757)  BP: (!) 130/58 (03/05/22 0757)  SpO2: 96 % (03/05/22 0757)   Vital Signs (24h Range):  Temp:  [97.4 °F (36.3 °C)-97.9 °F (36.6 °C)] 97.6 °F (36.4 °C)  Pulse:  [62-69] 69  Resp:  [18] 18  SpO2:  [96 %-98 %] 96 %  BP: (130-167)/(58-73) 130/58     Weight: 89.4 kg (197 lb 1.5 oz)  Body mass index is 38.49 kg/m².  No intake or output data in the 24 hours ending 03/05/22 1003   Physical Exam  Constitutional:       Appearance: Normal appearance.   HENT:      Head: Normocephalic and atraumatic.      Mouth/Throat:      Mouth: Mucous membranes are dry.   Cardiovascular:      Rate and Rhythm: Normal rate and regular rhythm.      Pulses: Normal pulses.      Heart sounds: Normal heart sounds.   Pulmonary:      Effort: Pulmonary effort is normal.      Breath sounds: Normal breath sounds. No rales.   Abdominal:      General: Abdomen is flat. Bowel sounds are normal. There is no distension.      Palpations: Abdomen is soft.      Tenderness: There is no abdominal tenderness.   Musculoskeletal:         General: Normal range of motion.      Cervical back: Normal range of motion and neck supple.   Skin:     General: Skin is warm and dry.   Neurological:      General: No focal deficit present.      Mental Status: She is alert.   Psychiatric:         Mood and Affect: Mood normal.       Significant Labs: All pertinent labs within the past 24 hours have been reviewed.    Significant Imaging: I have reviewed all pertinent imaging results/findings within the past 24 hours.
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
## 2022-03-06 LAB
POCT GLUCOSE: 102 MG/DL (ref 70–110)
POCT GLUCOSE: 117 MG/DL (ref 70–110)
POCT GLUCOSE: 82 MG/DL (ref 70–110)
POCT GLUCOSE: 99 MG/DL (ref 70–110)

## 2022-03-06 PROCEDURE — 99232 PR SUBSEQUENT HOSPITAL CARE,LEVL II: ICD-10-PCS | Mod: ,,, | Performed by: INTERNAL MEDICINE

## 2022-03-06 PROCEDURE — 25000003 PHARM REV CODE 250: Performed by: INTERNAL MEDICINE

## 2022-03-06 PROCEDURE — 94761 N-INVAS EAR/PLS OXIMETRY MLT: CPT

## 2022-03-06 PROCEDURE — 99232 SBSQ HOSP IP/OBS MODERATE 35: CPT | Mod: ,,, | Performed by: INTERNAL MEDICINE

## 2022-03-06 PROCEDURE — S5010 5% DEXTROSE AND 0.45% SALINE: HCPCS | Performed by: INTERNAL MEDICINE

## 2022-03-06 PROCEDURE — 11000001 HC ACUTE MED/SURG PRIVATE ROOM

## 2022-03-06 PROCEDURE — 25000003 PHARM REV CODE 250: Performed by: NURSE PRACTITIONER

## 2022-03-06 RX ADMIN — CLOPIDOGREL BISULFATE 75 MG: 75 TABLET, FILM COATED ORAL at 08:03

## 2022-03-06 RX ADMIN — MIRTAZAPINE 7.5 MG: 7.5 TABLET ORAL at 08:03

## 2022-03-06 RX ADMIN — DEXTROSE AND SODIUM CHLORIDE: 5; .45 INJECTION, SOLUTION INTRAVENOUS at 03:03

## 2022-03-06 RX ADMIN — HYDRALAZINE HYDROCHLORIDE 50 MG: 25 TABLET, FILM COATED ORAL at 06:03

## 2022-03-06 RX ADMIN — HYDRALAZINE HYDROCHLORIDE 50 MG: 25 TABLET, FILM COATED ORAL at 10:03

## 2022-03-06 RX ADMIN — ASPIRIN 81 MG: 81 TABLET, COATED ORAL at 08:03

## 2022-03-06 RX ADMIN — ATORVASTATIN CALCIUM 40 MG: 20 TABLET, FILM COATED ORAL at 08:03

## 2022-03-06 RX ADMIN — QUETIAPINE FUMARATE 12.5 MG: 25 TABLET, FILM COATED ORAL at 05:03

## 2022-03-06 RX ADMIN — AMLODIPINE BESYLATE 10 MG: 5 TABLET ORAL at 08:03

## 2022-03-06 RX ADMIN — CYANOCOBALAMIN TAB 1000 MCG 1000 MCG: 1000 TAB at 08:03

## 2022-03-06 RX ADMIN — VALPROIC ACID 250 MG: 250 SOLUTION ORAL at 08:03

## 2022-03-06 RX ADMIN — LABETALOL HYDROCHLORIDE 300 MG: 100 TABLET, FILM COATED ORAL at 08:03

## 2022-03-06 RX ADMIN — LEVOTHYROXINE SODIUM 75 MCG: 75 TABLET ORAL at 06:03

## 2022-03-06 RX ADMIN — CLONAZEPAM 1 MG: 0.5 TABLET ORAL at 08:03

## 2022-03-06 RX ADMIN — Medication 1 TABLET: at 08:03

## 2022-03-06 RX ADMIN — FERROUS SULFATE TAB 325 MG (65 MG ELEMENTAL FE) 1 EACH: 325 (65 FE) TAB at 08:03

## 2022-03-06 NOTE — ASSESSMENT & PLAN NOTE
Creatinine 2.3 admit , baseline 1.3    improving  IVF  Daily labs    Renal US shows Mild right hydronephrosis

## 2022-03-06 NOTE — ASSESSMENT & PLAN NOTE
A1c pending  Low dose SSI  BG AC/HS    Recent Labs   Lab 03/04/22  0947 03/04/22  1714 03/04/22  2156 03/05/22  0733 03/05/22 2010 03/06/22  0738   POCTGLUCOSE 131* 87 130* 76 139* 82

## 2022-03-06 NOTE — SUBJECTIVE & OBJECTIVE
Interval History: NAEON    Review of Systems   Unable to perform ROS: Dementia   Objective:     Vital Signs (Most Recent):  Temp: 98.1 °F (36.7 °C) (03/06/22 0703)  Pulse: 68 (03/06/22 0703)  Resp: 18 (03/06/22 0703)  BP: (!) 121/58 (03/06/22 0703)  SpO2: 95 % (03/06/22 0703)   Vital Signs (24h Range):  Temp:  [98.1 °F (36.7 °C)-98.3 °F (36.8 °C)] 98.1 °F (36.7 °C)  Pulse:  [59-68] 68  Resp:  [18-20] 18  SpO2:  [95 %-98 %] 95 %  BP: (120-136)/(58-65) 121/58     Weight: 89.4 kg (197 lb 1.5 oz)  Body mass index is 38.49 kg/m².    Intake/Output Summary (Last 24 hours) at 3/6/2022 0947  Last data filed at 3/5/2022 1700  Gross per 24 hour   Intake 300 ml   Output 650 ml   Net -350 ml      Physical Exam  Constitutional:       Appearance: Normal appearance.   HENT:      Head: Normocephalic and atraumatic.      Mouth/Throat:      Mouth: Mucous membranes are dry.   Cardiovascular:      Rate and Rhythm: Normal rate and regular rhythm.      Pulses: Normal pulses.      Heart sounds: Normal heart sounds.   Pulmonary:      Effort: Pulmonary effort is normal.      Breath sounds: Normal breath sounds. No rales.   Abdominal:      General: Abdomen is flat. Bowel sounds are normal. There is no distension.      Palpations: Abdomen is soft.      Tenderness: There is no abdominal tenderness.   Musculoskeletal:         General: Normal range of motion.      Cervical back: Normal range of motion and neck supple.   Skin:     General: Skin is warm and dry.   Neurological:      General: No focal deficit present.      Mental Status: She is alert.   Psychiatric:         Mood and Affect: Mood normal.       Significant Labs: All pertinent labs within the past 24 hours have been reviewed.    Significant Imaging: I have reviewed all pertinent imaging results/findings within the past 24 hours.
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
## 2022-03-07 LAB
ANION GAP SERPL CALC-SCNC: 10 MMOL/L (ref 8–16)
BUN SERPL-MCNC: 56 MG/DL (ref 8–23)
CALCIUM SERPL-MCNC: 10.7 MG/DL (ref 8.7–10.5)
CHLORIDE SERPL-SCNC: 111 MMOL/L (ref 95–110)
CO2 SERPL-SCNC: 21 MMOL/L (ref 23–29)
CREAT SERPL-MCNC: 2.6 MG/DL (ref 0.5–1.4)
EST. GFR  (AFRICAN AMERICAN): 21 ML/MIN/1.73 M^2
EST. GFR  (NON AFRICAN AMERICAN): 18 ML/MIN/1.73 M^2
GLUCOSE SERPL-MCNC: 94 MG/DL (ref 70–110)
POCT GLUCOSE: 106 MG/DL (ref 70–110)
POCT GLUCOSE: 122 MG/DL (ref 70–110)
POCT GLUCOSE: 84 MG/DL (ref 70–110)
POCT GLUCOSE: 93 MG/DL (ref 70–110)
POTASSIUM SERPL-SCNC: 4.1 MMOL/L (ref 3.5–5.1)
SODIUM SERPL-SCNC: 142 MMOL/L (ref 136–145)

## 2022-03-07 PROCEDURE — 99232 PR SUBSEQUENT HOSPITAL CARE,LEVL II: ICD-10-PCS | Mod: ,,, | Performed by: INTERNAL MEDICINE

## 2022-03-07 PROCEDURE — S5010 5% DEXTROSE AND 0.45% SALINE: HCPCS | Performed by: INTERNAL MEDICINE

## 2022-03-07 PROCEDURE — 25000003 PHARM REV CODE 250: Performed by: NURSE PRACTITIONER

## 2022-03-07 PROCEDURE — 25000003 PHARM REV CODE 250: Performed by: INTERNAL MEDICINE

## 2022-03-07 PROCEDURE — 36415 COLL VENOUS BLD VENIPUNCTURE: CPT | Performed by: INTERNAL MEDICINE

## 2022-03-07 PROCEDURE — 11000001 HC ACUTE MED/SURG PRIVATE ROOM

## 2022-03-07 PROCEDURE — 94761 N-INVAS EAR/PLS OXIMETRY MLT: CPT

## 2022-03-07 PROCEDURE — 80048 BASIC METABOLIC PNL TOTAL CA: CPT | Performed by: INTERNAL MEDICINE

## 2022-03-07 PROCEDURE — 99232 SBSQ HOSP IP/OBS MODERATE 35: CPT | Mod: ,,, | Performed by: INTERNAL MEDICINE

## 2022-03-07 RX ADMIN — CYANOCOBALAMIN TAB 1000 MCG 1000 MCG: 1000 TAB at 09:03

## 2022-03-07 RX ADMIN — CLOPIDOGREL BISULFATE 75 MG: 75 TABLET, FILM COATED ORAL at 09:03

## 2022-03-07 RX ADMIN — QUETIAPINE FUMARATE 12.5 MG: 25 TABLET, FILM COATED ORAL at 04:03

## 2022-03-07 RX ADMIN — HYDRALAZINE HYDROCHLORIDE 50 MG: 25 TABLET, FILM COATED ORAL at 10:03

## 2022-03-07 RX ADMIN — HYDRALAZINE HYDROCHLORIDE 50 MG: 25 TABLET, FILM COATED ORAL at 02:03

## 2022-03-07 RX ADMIN — Medication 1 TABLET: at 09:03

## 2022-03-07 RX ADMIN — LEVOTHYROXINE SODIUM 75 MCG: 75 TABLET ORAL at 05:03

## 2022-03-07 RX ADMIN — AMLODIPINE BESYLATE 10 MG: 5 TABLET ORAL at 09:03

## 2022-03-07 RX ADMIN — ASPIRIN 81 MG: 81 TABLET, COATED ORAL at 09:03

## 2022-03-07 RX ADMIN — HYDRALAZINE HYDROCHLORIDE 50 MG: 25 TABLET, FILM COATED ORAL at 05:03

## 2022-03-07 RX ADMIN — DEXTROSE AND SODIUM CHLORIDE: 5; .45 INJECTION, SOLUTION INTRAVENOUS at 04:03

## 2022-03-07 RX ADMIN — LABETALOL HYDROCHLORIDE 300 MG: 100 TABLET, FILM COATED ORAL at 08:03

## 2022-03-07 RX ADMIN — ATORVASTATIN CALCIUM 40 MG: 20 TABLET, FILM COATED ORAL at 08:03

## 2022-03-07 RX ADMIN — VALPROIC ACID 250 MG: 250 SOLUTION ORAL at 09:03

## 2022-03-07 RX ADMIN — FERROUS SULFATE TAB 325 MG (65 MG ELEMENTAL FE) 1 EACH: 325 (65 FE) TAB at 09:03

## 2022-03-07 RX ADMIN — LABETALOL HYDROCHLORIDE 300 MG: 100 TABLET, FILM COATED ORAL at 10:03

## 2022-03-07 RX ADMIN — MIRTAZAPINE 7.5 MG: 7.5 TABLET ORAL at 10:03

## 2022-03-07 NOTE — PROGRESS NOTES
Patient sleeping, sitter stated patient most likely unable to answer questions. RSW tried to contact patient daughter, no answer.     NATE Cannon

## 2022-03-07 NOTE — SUBJECTIVE & OBJECTIVE
Interval History: NAEON    Review of Systems   Unable to perform ROS: Dementia   Objective:     Vital Signs (Most Recent):  Temp: 97.5 °F (36.4 °C) (03/07/22 0701)  Pulse: 68 (03/07/22 0701)  Resp: 18 (03/07/22 0701)  BP: 123/60 (03/07/22 0701)  SpO2: 98 % (03/07/22 0800) Vital Signs (24h Range):  Temp:  [97.5 °F (36.4 °C)-98.2 °F (36.8 °C)] 97.5 °F (36.4 °C)  Pulse:  [63-73] 68  Resp:  [18-20] 18  SpO2:  [96 %-99 %] 98 %  BP: (116-138)/(57-74) 123/60     Weight: 89.4 kg (197 lb 1.5 oz)  Body mass index is 38.49 kg/m².    Intake/Output Summary (Last 24 hours) at 3/7/2022 1033  Last data filed at 3/7/2022 0500  Gross per 24 hour   Intake 240 ml   Output 200 ml   Net 40 ml      Physical Exam  Constitutional:       Appearance: Normal appearance.   HENT:      Head: Normocephalic and atraumatic.      Mouth/Throat:      Mouth: Mucous membranes are dry.   Cardiovascular:      Rate and Rhythm: Normal rate and regular rhythm.      Pulses: Normal pulses.      Heart sounds: Normal heart sounds.   Pulmonary:      Effort: Pulmonary effort is normal.      Breath sounds: Normal breath sounds. No rales.   Abdominal:      General: Abdomen is flat. Bowel sounds are normal. There is no distension.      Palpations: Abdomen is soft.      Tenderness: There is no abdominal tenderness.   Musculoskeletal:         General: Normal range of motion.      Cervical back: Normal range of motion and neck supple.   Skin:     General: Skin is warm and dry.   Neurological:      General: No focal deficit present.      Mental Status: She is alert.   Psychiatric:         Mood and Affect: Mood normal.       Significant Labs: All pertinent labs within the past 24 hours have been reviewed.    Significant Imaging: I have reviewed all pertinent imaging results/findings within the past 24 hours.

## 2022-03-07 NOTE — PLAN OF CARE
Disoriented X4. VSS. Sitter at bedside. One person assist per adls and transfers, pericare q2 or as needed. Tolerating diabetic 2000 calorie diet, medications, and IVF well. HOB 20-30 degrees with call bell and bedside table within reach, bed in lowest position with hourly purposeful rounding. Alarms on and audible, will continue to monitor.   Problem: Adult Inpatient Plan of Care  Goal: Plan of Care Review  Outcome: Ongoing, Progressing  Goal: Patient-Specific Goal (Individualized)  Outcome: Ongoing, Progressing  Goal: Absence of Hospital-Acquired Illness or Injury  Outcome: Ongoing, Progressing  Goal: Optimal Comfort and Wellbeing  Outcome: Ongoing, Progressing  Goal: Readiness for Transition of Care  Outcome: Ongoing, Progressing     Problem: Diabetes Comorbidity  Goal: Blood Glucose Level Within Targeted Range  Outcome: Ongoing, Progressing     Problem: Fluid and Electrolyte Imbalance (Acute Kidney Injury/Impairment)  Goal: Fluid and Electrolyte Balance  Outcome: Ongoing, Progressing     Problem: Oral Intake Inadequate (Acute Kidney Injury/Impairment)  Goal: Optimal Nutrition Intake  Outcome: Ongoing, Progressing     Problem: Renal Function Impairment (Acute Kidney Injury/Impairment)  Goal: Effective Renal Function  Outcome: Ongoing, Progressing     Problem: Skin Injury Risk Increased  Goal: Skin Health and Integrity  Outcome: Ongoing, Progressing
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
## 2022-03-07 NOTE — PLAN OF CARE
03/07/22 1326   Post-Acute Status   Post-Acute Authorization Placement   Post-Acute Placement Status Patient List Provided   Coverage retirement: [facility]/long term JEREL; SNF: [facility] MCR   Patient choice form signed by patient/caregiver List from CMS Compare;List with quality metrics by geographic area provided   Discharge Delays (!) Post-Acute Set-up  (pending acceptance; sent out in careport)   Discharge Plan   Discharge Plan A New Nursing Home placement - retirement care facility     Patient's daughter, Nicolle Gallardo submitted the following choices:  1- [facility]  2-[facility]  3- [facility] (sent right fax)    LOCET/PASRR/142 pending.   Pending therapy final recs before completing LOCET.  CXR: 3/2/2022

## 2022-03-07 NOTE — PT/OT/SLP PROGRESS
Physical Therapy      Patient Name:  Jessie Gallardo   MRN:  [id number]    Patient not seen today secondary to Patient fatigue; pt in deep sleep with gladys at bedside. Gladys noted she needs max cues and assistance to turn for hygiene; gladys also noted she is unsure she will be able to follow directions for evaluation at this time. Will follow-up as pt is available / appropriate.

## 2022-03-07 NOTE — PT/OT/SLP PROGRESS
Occupational Therapy      Patient Name:  Jessie Gallardo   MRN:  [id number]    Patient not seen today secondary to fatigue, unarousable and unable to follow commands. Sitter reports patient required dependent assist for feeding and was unable to maintain arousal during feeding. Will follow-up tomorrow for OT willis.    3/7/2022

## 2022-03-07 NOTE — ASSESSMENT & PLAN NOTE
A1c pending  Low dose SSI  BG AC/HS    Recent Labs   Lab 03/05/22 2010 03/06/22  0738 03/06/22  1211 03/06/22  1712 03/06/22  2115 03/07/22  0816   POCTGLUCOSE 139* 82 102 99 117* 106

## 2022-03-08 LAB
ALBUMIN SERPL BCP-MCNC: 2.9 G/DL (ref 3.5–5.2)
ANION GAP SERPL CALC-SCNC: 12 MMOL/L (ref 8–16)
ANION GAP SERPL CALC-SCNC: 7 MMOL/L (ref 8–16)
BACTERIA #/AREA URNS HPF: ABNORMAL /HPF
BASOPHILS # BLD AUTO: 0.01 K/UL (ref 0–0.2)
BASOPHILS NFR BLD: 0.2 % (ref 0–1.9)
BILIRUB UR QL STRIP: NEGATIVE
BUN SERPL-MCNC: 54 MG/DL (ref 8–23)
BUN SERPL-MCNC: 54 MG/DL (ref 8–23)
CALCIUM SERPL-MCNC: 10.8 MG/DL (ref 8.7–10.5)
CALCIUM SERPL-MCNC: 11.1 MG/DL (ref 8.7–10.5)
CHLORIDE SERPL-SCNC: 111 MMOL/L (ref 95–110)
CHLORIDE SERPL-SCNC: 112 MMOL/L (ref 95–110)
CHLORIDE UR-SCNC: 43 MMOL/L (ref 25–200)
CLARITY UR: ABNORMAL
CO2 SERPL-SCNC: 17 MMOL/L (ref 23–29)
CO2 SERPL-SCNC: 24 MMOL/L (ref 23–29)
COLOR UR: YELLOW
CREAT SERPL-MCNC: 2.4 MG/DL (ref 0.5–1.4)
CREAT SERPL-MCNC: 2.4 MG/DL (ref 0.5–1.4)
CREAT UR-MCNC: 120.2 MG/DL (ref 15–325)
DIFFERENTIAL METHOD: ABNORMAL
EOSINOPHIL # BLD AUTO: 0.1 K/UL (ref 0–0.5)
EOSINOPHIL NFR BLD: 1.6 % (ref 0–8)
EOSINOPHIL URNS QL WRIGHT STN: ABNORMAL
ERYTHROCYTE [DISTWIDTH] IN BLOOD BY AUTOMATED COUNT: 17.4 % (ref 11.5–14.5)
EST. GFR  (AFRICAN AMERICAN): 23 ML/MIN/1.73 M^2
EST. GFR  (AFRICAN AMERICAN): 23 ML/MIN/1.73 M^2
EST. GFR  (NON AFRICAN AMERICAN): 20 ML/MIN/1.73 M^2
EST. GFR  (NON AFRICAN AMERICAN): 20 ML/MIN/1.73 M^2
GLUCOSE SERPL-MCNC: 108 MG/DL (ref 70–110)
GLUCOSE SERPL-MCNC: 118 MG/DL (ref 70–110)
GLUCOSE UR QL STRIP: NEGATIVE
HCT VFR BLD AUTO: 30.4 % (ref 37–48.5)
HGB BLD-MCNC: 9.1 G/DL (ref 12–16)
HGB UR QL STRIP: ABNORMAL
HYALINE CASTS #/AREA URNS LPF: 0 /LPF
IMM GRANULOCYTES # BLD AUTO: 0.01 K/UL (ref 0–0.04)
IMM GRANULOCYTES NFR BLD AUTO: 0.2 % (ref 0–0.5)
KETONES UR QL STRIP: NEGATIVE
LEUKOCYTE ESTERASE UR QL STRIP: ABNORMAL
LYMPHOCYTES # BLD AUTO: 1.4 K/UL (ref 1–4.8)
LYMPHOCYTES NFR BLD: 27.9 % (ref 18–48)
MCH RBC QN AUTO: 25.7 PG (ref 27–31)
MCHC RBC AUTO-ENTMCNC: 29.9 G/DL (ref 32–36)
MCV RBC AUTO: 86 FL (ref 82–98)
MICROSCOPIC COMMENT: ABNORMAL
MONOCYTES # BLD AUTO: 0.5 K/UL (ref 0.3–1)
MONOCYTES NFR BLD: 9.4 % (ref 4–15)
NEUTROPHILS # BLD AUTO: 3 K/UL (ref 1.8–7.7)
NEUTROPHILS NFR BLD: 60.7 % (ref 38–73)
NITRITE UR QL STRIP: POSITIVE
NON-SQ EPI CELLS #/AREA URNS HPF: 0 /HPF
NRBC BLD-RTO: 0 /100 WBC
PH UR STRIP: 6 [PH] (ref 5–8)
PHOSPHATE SERPL-MCNC: 3.6 MG/DL (ref 2.7–4.5)
PLATELET # BLD AUTO: 95 K/UL (ref 150–450)
PMV BLD AUTO: 11.4 FL (ref 9.2–12.9)
POCT GLUCOSE: 108 MG/DL (ref 70–110)
POCT GLUCOSE: 127 MG/DL (ref 70–110)
POCT GLUCOSE: 86 MG/DL (ref 70–110)
POTASSIUM SERPL-SCNC: 4.2 MMOL/L (ref 3.5–5.1)
POTASSIUM SERPL-SCNC: 4.3 MMOL/L (ref 3.5–5.1)
PROT UR QL STRIP: ABNORMAL
PTH-INTACT SERPL-MCNC: 246.2 PG/ML (ref 9–77)
RBC # BLD AUTO: 3.54 M/UL (ref 4–5.4)
RBC #/AREA URNS HPF: 10 /HPF (ref 0–4)
SODIUM SERPL-SCNC: 141 MMOL/L (ref 136–145)
SODIUM SERPL-SCNC: 142 MMOL/L (ref 136–145)
SODIUM UR-SCNC: 40 MMOL/L (ref 20–250)
SP GR UR STRIP: 1.01 (ref 1–1.03)
SQUAMOUS #/AREA URNS HPF: 8 /HPF
URATE SERPL-MCNC: 9.1 MG/DL (ref 2.4–5.7)
URN SPEC COLLECT METH UR: ABNORMAL
UROBILINOGEN UR STRIP-ACNC: NEGATIVE EU/DL
WBC # BLD AUTO: 4.91 K/UL (ref 3.9–12.7)
WBC #/AREA URNS HPF: >100 /HPF (ref 0–5)
WBC CLUMPS URNS QL MICRO: ABNORMAL
YEAST URNS QL MICRO: ABNORMAL

## 2022-03-08 PROCEDURE — 84550 ASSAY OF BLOOD/URIC ACID: CPT | Performed by: PHYSICIAN ASSISTANT

## 2022-03-08 PROCEDURE — 81000 URINALYSIS NONAUTO W/SCOPE: CPT | Performed by: INTERNAL MEDICINE

## 2022-03-08 PROCEDURE — 99233 SBSQ HOSP IP/OBS HIGH 50: CPT | Mod: ,,, | Performed by: PHYSICIAN ASSISTANT

## 2022-03-08 PROCEDURE — 80048 BASIC METABOLIC PNL TOTAL CA: CPT | Performed by: PHYSICIAN ASSISTANT

## 2022-03-08 PROCEDURE — 97161 PT EVAL LOW COMPLEX 20 MIN: CPT

## 2022-03-08 PROCEDURE — 87186 SC STD MICRODIL/AGAR DIL: CPT | Performed by: INTERNAL MEDICINE

## 2022-03-08 PROCEDURE — 99222 PR INITIAL HOSPITAL CARE,LEVL II: ICD-10-PCS | Mod: ,,, | Performed by: STUDENT IN AN ORGANIZED HEALTH CARE EDUCATION/TRAINING PROGRAM

## 2022-03-08 PROCEDURE — 87077 CULTURE AEROBIC IDENTIFY: CPT | Performed by: INTERNAL MEDICINE

## 2022-03-08 PROCEDURE — 83970 ASSAY OF PARATHORMONE: CPT | Performed by: INTERNAL MEDICINE

## 2022-03-08 PROCEDURE — 80069 RENAL FUNCTION PANEL: CPT | Performed by: NURSE PRACTITIONER

## 2022-03-08 PROCEDURE — G0407 PR TELHEATH INPT CONSULT 25MIN: ICD-10-PCS | Mod: 95,,, | Performed by: PSYCHIATRY & NEUROLOGY

## 2022-03-08 PROCEDURE — 94761 N-INVAS EAR/PLS OXIMETRY MLT: CPT

## 2022-03-08 PROCEDURE — 82436 ASSAY OF URINE CHLORIDE: CPT | Performed by: NURSE PRACTITIONER

## 2022-03-08 PROCEDURE — 84300 ASSAY OF URINE SODIUM: CPT | Performed by: NURSE PRACTITIONER

## 2022-03-08 PROCEDURE — 87088 URINE BACTERIA CULTURE: CPT | Performed by: INTERNAL MEDICINE

## 2022-03-08 PROCEDURE — 85025 COMPLETE CBC W/AUTO DIFF WBC: CPT | Performed by: PHYSICIAN ASSISTANT

## 2022-03-08 PROCEDURE — 36415 COLL VENOUS BLD VENIPUNCTURE: CPT | Performed by: PHYSICIAN ASSISTANT

## 2022-03-08 PROCEDURE — 99222 1ST HOSP IP/OBS MODERATE 55: CPT | Mod: ,,, | Performed by: STUDENT IN AN ORGANIZED HEALTH CARE EDUCATION/TRAINING PROGRAM

## 2022-03-08 PROCEDURE — 87205 SMEAR GRAM STAIN: CPT | Performed by: NURSE PRACTITIONER

## 2022-03-08 PROCEDURE — 25000003 PHARM REV CODE 250: Performed by: NURSE PRACTITIONER

## 2022-03-08 PROCEDURE — 99233 PR SUBSEQUENT HOSPITAL CARE,LEVL III: ICD-10-PCS | Mod: ,,, | Performed by: PHYSICIAN ASSISTANT

## 2022-03-08 PROCEDURE — 25000003 PHARM REV CODE 250: Performed by: INTERNAL MEDICINE

## 2022-03-08 PROCEDURE — 63600175 PHARM REV CODE 636 W HCPCS: Performed by: PHYSICIAN ASSISTANT

## 2022-03-08 PROCEDURE — 63600175 PHARM REV CODE 636 W HCPCS: Performed by: NURSE PRACTITIONER

## 2022-03-08 PROCEDURE — 87086 URINE CULTURE/COLONY COUNT: CPT | Performed by: INTERNAL MEDICINE

## 2022-03-08 PROCEDURE — 25000003 PHARM REV CODE 250: Performed by: PHYSICIAN ASSISTANT

## 2022-03-08 PROCEDURE — G0407 INPT/TELE FOLLOW UP 25: HCPCS | Mod: 95,,, | Performed by: PSYCHIATRY & NEUROLOGY

## 2022-03-08 PROCEDURE — 97165 OT EVAL LOW COMPLEX 30 MIN: CPT

## 2022-03-08 PROCEDURE — 82570 ASSAY OF URINE CREATININE: CPT | Performed by: NURSE PRACTITIONER

## 2022-03-08 PROCEDURE — 11000001 HC ACUTE MED/SURG PRIVATE ROOM

## 2022-03-08 PROCEDURE — 36415 COLL VENOUS BLD VENIPUNCTURE: CPT | Performed by: NURSE PRACTITIONER

## 2022-03-08 RX ORDER — MIRTAZAPINE 7.5 MG/1
7.5 TABLET, FILM COATED ORAL NIGHTLY
Status: ON HOLD | COMMUNITY
Start: 2022-02-28 | End: 2022-03-23 | Stop reason: HOSPADM

## 2022-03-08 RX ORDER — SODIUM CHLORIDE, SODIUM LACTATE, POTASSIUM CHLORIDE, CALCIUM CHLORIDE 600; 310; 30; 20 MG/100ML; MG/100ML; MG/100ML; MG/100ML
INJECTION, SOLUTION INTRAVENOUS CONTINUOUS
Status: DISCONTINUED | OUTPATIENT
Start: 2022-03-08 | End: 2022-03-10

## 2022-03-08 RX ORDER — HYDROCHLOROTHIAZIDE 25 MG/1
25 TABLET ORAL DAILY
Status: ON HOLD | COMMUNITY
Start: 2022-02-28 | End: 2022-03-23 | Stop reason: HOSPADM

## 2022-03-08 RX ORDER — AMLODIPINE BESYLATE 10 MG/1
10 TABLET ORAL DAILY
COMMUNITY
Start: 2022-02-28

## 2022-03-08 RX ORDER — LOSARTAN POTASSIUM 100 MG/1
100 TABLET ORAL DAILY
Status: ON HOLD | COMMUNITY
Start: 2022-02-28 | End: 2022-03-23 | Stop reason: HOSPADM

## 2022-03-08 RX ORDER — HALOPERIDOL 0.5 MG/1
0.5 TABLET ORAL NIGHTLY
Status: ON HOLD | COMMUNITY
Start: 2022-03-01 | End: 2022-03-23 | Stop reason: HOSPADM

## 2022-03-08 RX ORDER — QUETIAPINE FUMARATE 25 MG/1
12.5 TABLET, FILM COATED ORAL NIGHTLY
Status: ON HOLD | COMMUNITY
End: 2022-03-23 | Stop reason: HOSPADM

## 2022-03-08 RX ORDER — VALPROIC ACID 250 MG/5ML
125 SOLUTION ORAL DAILY
Status: ON HOLD | COMMUNITY
Start: 2022-02-28 | End: 2022-03-23 | Stop reason: HOSPADM

## 2022-03-08 RX ORDER — VALPROIC ACID 250 MG/5ML
250 SOLUTION ORAL NIGHTLY
Status: DISCONTINUED | OUTPATIENT
Start: 2022-03-09 | End: 2022-03-10

## 2022-03-08 RX ORDER — UBIDECARENONE 75 MG
1000 CAPSULE ORAL DAILY
COMMUNITY
Start: 2022-02-02 | End: 2022-05-03

## 2022-03-08 RX ORDER — CINACALCET 30 MG/1
30 TABLET, FILM COATED ORAL
Status: DISCONTINUED | OUTPATIENT
Start: 2022-03-09 | End: 2022-03-09

## 2022-03-08 RX ADMIN — QUETIAPINE FUMARATE 12.5 MG: 25 TABLET, FILM COATED ORAL at 11:03

## 2022-03-08 RX ADMIN — CEFTRIAXONE 1 G: 1 INJECTION, SOLUTION INTRAVENOUS at 05:03

## 2022-03-08 RX ADMIN — HYDRALAZINE HYDROCHLORIDE 50 MG: 25 TABLET, FILM COATED ORAL at 09:03

## 2022-03-08 RX ADMIN — CYANOCOBALAMIN TAB 1000 MCG 1000 MCG: 1000 TAB at 09:03

## 2022-03-08 RX ADMIN — ATORVASTATIN CALCIUM 40 MG: 20 TABLET, FILM COATED ORAL at 09:03

## 2022-03-08 RX ADMIN — CLOPIDOGREL BISULFATE 75 MG: 75 TABLET, FILM COATED ORAL at 09:03

## 2022-03-08 RX ADMIN — AMLODIPINE BESYLATE 10 MG: 5 TABLET ORAL at 09:03

## 2022-03-08 RX ADMIN — SODIUM CHLORIDE, SODIUM LACTATE, POTASSIUM CHLORIDE, AND CALCIUM CHLORIDE: .6; .31; .03; .02 INJECTION, SOLUTION INTRAVENOUS at 11:03

## 2022-03-08 RX ADMIN — FERROUS SULFATE TAB 325 MG (65 MG ELEMENTAL FE) 1 EACH: 325 (65 FE) TAB at 09:03

## 2022-03-08 RX ADMIN — QUETIAPINE FUMARATE 12.5 MG: 25 TABLET, FILM COATED ORAL at 04:03

## 2022-03-08 RX ADMIN — LABETALOL HYDROCHLORIDE 300 MG: 100 TABLET, FILM COATED ORAL at 09:03

## 2022-03-08 RX ADMIN — LEVOTHYROXINE SODIUM 75 MCG: 75 TABLET ORAL at 05:03

## 2022-03-08 RX ADMIN — MIRTAZAPINE 7.5 MG: 7.5 TABLET ORAL at 09:03

## 2022-03-08 RX ADMIN — Medication 1 TABLET: at 09:03

## 2022-03-08 RX ADMIN — HYDRALAZINE HYDROCHLORIDE 50 MG: 25 TABLET, FILM COATED ORAL at 05:03

## 2022-03-08 RX ADMIN — ASPIRIN 81 MG: 81 TABLET, COATED ORAL at 09:03

## 2022-03-08 RX ADMIN — HYDRALAZINE HYDROCHLORIDE 50 MG: 25 TABLET, FILM COATED ORAL at 03:03

## 2022-03-08 RX ADMIN — VALPROIC ACID 250 MG: 250 SOLUTION ORAL at 09:03
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
## 2022-03-08 NOTE — PT/OT/SLP EVAL
Physical Therapy Evaluation and Discharge Note    Patient Name:  Jessie Gallardo   MRN:  [id number]    Recommendations:     Discharge Recommendations:  nursing facility, basic   Discharge Equipment Recommendations: none   Barriers to discharge: None    Assessment:     Jessie Gallardo is a 66 y.o. female admitted with a medical diagnosis of Acute kidney injury superimposed on CKD. She has a past medical history that includes but is not limited to HTN, HLD, CAD, SOB, ROMANA, dementia, T2DM, fibromyalgia, depression and anemia.  At this time, patient is functioning at their prior level of function and does not require further acute PT services.     PT orders received. Evaluation completed. Pt is independent for ambulation and transfers. Pt demonstrates normal gait pattern and normal balance. Pt is at her baseline functional level and is unable to follow commands required for productive therapy sessions. No acute PT needs identified at this time. Will d/c PT. Recommend discharge to nursing home.    Recent Surgery: * No surgery found *      Plan:     During this hospitalization, patient does not require further acute PT services.  Please re-consult if situation changes.      Subjective     Chief Complaint: none at this time  Patient/Family Comments/goals: Pt with tangential speech throughout session; pt continued to explain she needed to go check on the baby toward end of session. PT / OT attempted to redirect pt, however, pt had to be returned to bed for her own safety.   Pain/Comfort:  · Pain Rating 1: 0/10  · Pain Rating Post-Intervention 1: 0/10    Patients cultural, spiritual, Roman Catholic conflicts given the current situation: no    Living Environment:  Pt reports she lives with her mother in a double and that she is ambulatory, however, pt is an unreliable historian. Equipment used at home: none.  DME owned (not currently used): none.     Objective:     Communicated with GENE Wren prior to session.  Patient found HOB  elevated with peripheral IV, bed alarm upon PT entry to room.    General Precautions: Standard,  (24/7 supervision)   Orthopedic Precautions:N/A   Braces: N/A   Respiratory Status: Room air    Exams:  · Cognition:   · Patient is oriented to person and city  · Pt follows approximately 25% of simple commands.    · Mood: Pleasant and cooperative.  · Musculoskeletal:  · Posture:    · In sitting: WNL  · In standing: WNL  · LE ROM/Strength:   · R ROM: No deficits  · L ROM: No deficits  · R Strength:   · WFL   · L Strength:   · WFL  · Neuromuscular:  · Balance:   · Static sitting: SPV  · Dynamic sitting: SPV  · Static standing: SPV (for safety)  · Dynamic standing: SPV (for safety)  · Visual-vestibular: No impairments identified with functional mobility. No formal testing performed.  · Integument: Visible skin intact    Functional Mobility:  · Bed Mobility:     · Supine to Sit: supervision  · Sit to Supine: supervision with verbal cues and demonstration  · Transfers:     · Sit to Stand:  supervision with no AD  · Gait: x5 feet with supervision and no AD. Pt with speed, calixto, and bilateral step length within normal limits. Pt's functional mobility limited at this time secondary to decreased safety awareness.     AM-PAC 6 CLICK MOBILITY  Total Score:23     Role of PT in acute care setting explained to patient, however, no evidence of learning.     Patient left HOB elevated with all lines intact, call button in reach, bed alarm on and sitter present.    GOALS:   Multidisciplinary Problems     Physical Therapy Goals        Problem: Physical Therapy Goal    Goal Priority Disciplines Outcome Goal Variances Interventions   Physical Therapy Goal     PT, PT/OT Adequate for Care Transition                     History:     Past Medical History:   Diagnosis Date    Anxiety     Chest tightness     Chronic back pain     See neuro     Chronic low back pain     CKD (chronic kidney disease) stage 3, GFR 30-59 ml/min     Depression      Diabetes mellitus     type 2    Diabetes mellitus type II     Diabetic neuropathy     Diverticulosis     last colonoscopy was in 2000    Encounter for blood transfusion     Fibroids     Fibromyalgia     GERD (gastroesophageal reflux disease)     History of palpitations     Hoarseness of voice     Hyperlipidemia     Hypertension     Obesity     Obstructive sleep apnea     not using cpap now    Pancreatitis     Patient is Congregation     Post menopausal syndrome        Past Surgical History:   Procedure Laterality Date    ABDOMINAL SURGERY      CHOLECYSTECTOMY      MIKE      fibroid      OOPHORECTOMY      xlap, right ovary removed due to infection?    right ovary removal Right     UTERINE ARTERY EMBOLIZATION  3/14       Time Tracking:     PT Received On: 03/08/22  PT Start Time: 0930     PT Stop Time: 0947  PT Total Time (min): 17 min     Billable Minutes: Evaluation 17      03/08/2022

## 2022-03-08 NOTE — PLAN OF CARE
03/08/22 1631   Post-Acute Status   Post-Acute Authorization Placement   Post-Acute Placement Status Referrals Sent   Coverage [facility]   Patient choice form signed by patient/caregiver List from CMS Compare;List with quality metrics by geographic area provided   Discharge Delays (!) Post-Acute Set-up   Discharge Plan   Discharge Plan A Skilled Nursing Facility   Discharge Plan B Home with family;Home Health     Per provider, Salma MCINTYRE: the patient remains medically active at this time.    No acceptance at this time. Preferred choices:   [facility]- cannot accept; no bed available  [facility]- no response- will follow up on 3/9 if there is continued non response; right fax and careport referral submitted  [facility]- no response ( sent right fax and careport); email follow up to Joaquin in admissions    Other referrals sent out by other dc planner:  [facility]: interested; requests more info; updated therapy sent via careport; request for what other info needed.  Also sent updates to: [facility] ([facility] mgt group, which DENISE Sup spoke with daughter about sending to for alternative choices and she agreed); [facility], and [facility]    LOCET to be called in 3/9 morning with SW in training ([place] [place])
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
## 2022-03-08 NOTE — NURSING
called transfer center to notify of psych consult via telemedicine. charge nurse called nephro consult in as well.

## 2022-03-08 NOTE — PT/OT/SLP EVAL
Occupational Therapy   Evaluation & Discharge Summary    Name: Jessie Gallardo  MRN: [id number]  Admitting Diagnosis:  Acute kidney injury superimposed on CKD  Recent Surgery: * No surgery found *      Recommendations:     Discharge Recommendations:  NH- nursing home care   Discharge Equipment Recommendations:  none  Barriers to discharge:   Decreased caregiver support    Assessment:     Jessie Gallardo is a 66 y.o. female with a medical diagnosis of Acute kidney injury superimposed on CKD.  She presents with confusion. Performance deficits affecting function: decreased safety awareness, impaired cognition.  Pt peaceful and agreeable to participating in therapy upon arrival to room.  Pt oriented to name, date of birth, and city, but required orientation for location. Pt demonstrates strength and ROM in (B) UE needed for ADLs that is WNL, and is able to perform sit <> stand transfer and take steps with supervision.  Pt physically capable of performing all tasks; however, decreased task initiation and confusion noted when asked to don socks.  Later in session pt doffed socks independently while seated at EOB.  During evaluation pt confused and impulsive requiring encouragement and redirection.  Pt repeatedly stated she needed to check on the baby.      Pt's main limitation during session appears to be altered mental status.  She does not require OT services in the acute care setting at this time.  Pt to d/c from OT.  NH- nursing home care is recommended upon d/c from hospital.            Rehab Prognosis: Good; patient would benefit from acute skilled OT services to address these deficits and reach maximum level of function.       Plan:     · Patient to d/c from OT; NH- nursing home care is recommended upon d/c from hospital.    Subjective     Chief Complaint: None stated  Patient/Family Comments/goals: Return home    Occupational Profile:  Pt unreliable historian.  Unable to provide information.  Living Environment: Pt reports  she lives with her mother.        Pain/Comfort:  · Pain Rating 1: 0/10  · Pain Rating Post-Intervention 1: 0/10    Patients cultural, spiritual, Jehovah's witness conflicts given the current situation: no    Objective:     Communicated with: RN prior to session.  Patient found HOB elevated with peripheral IV, bed alarm (sitter) upon OT entry to room.  Sitter present during session.    General Precautions: Standard,  impaired cognition  Orthopedic Precautions:N/A   Braces: N/A  Respiratory Status: Room air    Occupational Performance:    Bed Mobility:    · Supine <> sit: Supervision  · Scooting: Supervision  · Sit <> Supine: Supervision    Functional Mobility/Transfers:  · Sit <> Stand: Supervision x 2 trials from EOB  · Functional Mobility: Pt took steps forward and back with supervision.  · Pt impulsive requiring redirection to return to bed.    Activities of Daily Living:  · Lower Body Dressing: Total A for donning socks while supine with HOB elevated 2* decreased task initiation; however, once seated at EOB pt able to doff socks independently.  · When OT asked pt to don socks pt turned sock inside out and looked at it intently, but no initiation of task observed.    Cognitive/Visual Perceptual:  Cognitive/Psychosocial Skills:    -       Oriented to: Person, date of birth, city.     -Pt unable to state current location of hospital.   -Pt repeatedly asked about checking on the baby and called out a family member's name  -       Follows Commands/attention: Follows ~25% of one step commands.  Impaired cognition and confusion noted.   -       Communication: clear/fluent  -       Memory: Impairments noted  -       Safety awareness/insight to disability: Impaired  -       Mood/Affect/Coping skills/emotional control: Cooperative at first, but impulsive and confused.  Redirection required.    Physical Exam:  Postural examination/scapula alignment: No postural abnormalities identified  Skin integrity: Visible skin intact  Edema:   None noted  Sensation: -       Intact  Motor Planning: -       WNL  Dominant hand: Unknown  Upper Extremity Range of Motion:    -       Right Upper Extremity: WNL  -       Left Upper Extremity: WNL  Upper Extremity Strength:   -       Right Upper Extremity: WNL  -       Left Upper Extremity: WNL   Strength: 5/5 both hands  Fine Motor Coordination: Intact  Gross motor coordination: WNL  Vision: Intact  Balance:  Sitting- Independent; Standing- Supervision 2* safety; impulsive    AMPAC 6 Click ADL:  AMPAC Total Score: 18    Treatment & Education:  *Pt educated on role of OT in acute care setting  Education:    Patient left HOB elevated with call button in reach, bed alarm on and sitter present    GOALS:   Multidisciplinary Problems     Occupational Therapy Goals     Not on file          Multidisciplinary Problems (Resolved)        Problem: Occupational Therapy Goal    Goal Priority Disciplines Outcome Interventions   Occupational Therapy Goal   (Resolved)     OT, PT/OT Met                    History:     Past Medical History:   Diagnosis Date    Anxiety     Chest tightness     Chronic back pain     See neuro     Chronic low back pain     CKD (chronic kidney disease) stage 3, GFR 30-59 ml/min     Depression     Diabetes mellitus     type 2    Diabetes mellitus type II     Diabetic neuropathy     Diverticulosis     last colonoscopy was in 2000    Encounter for blood transfusion     Fibroids     Fibromyalgia     GERD (gastroesophageal reflux disease)     History of palpitations     Hoarseness of voice     Hyperlipidemia     Hypertension     Obesity     Obstructive sleep apnea     not using cpap now    Pancreatitis     Patient is Tenriism     Post menopausal syndrome        Past Surgical History:   Procedure Laterality Date    ABDOMINAL SURGERY      CHOLECYSTECTOMY      MIKE      fibroid      OOPHORECTOMY      xlap, right ovary removed due to infection?    right ovary removal Right      UTERINE ARTERY EMBOLIZATION  3/14       Time Tracking:     OT Date of Treatment: 03/08/22  OT Start Time: 0931  OT Stop Time: 0947  OT Total Time (min): 16 min    Billable Minutes:Evaluation 16   *Completed with PT    3/8/2022

## 2022-03-08 NOTE — CONSULTS
Consult Note  Nephrology    Consult Requested By: Rochelle Kevin MD  Reason for Consult: JOSÉ MIGUEL    SUBJECTIVE:     History of Present Illness:  Patient is a 66 y.o. female presents with AMS, JOSÉ MIGUEL, behavioral disturbances.  Admitted and placed on IVF's with some improvement but fluids stopped and now creat rising again.  Consulted for evaluation.  Extensive hx as outlined below.  Epic reviewed with very significant findings at recent hospitalization at .  Case discussed with staff and Kelly MCINTYRE.  Pt seen and examined.  Mostly somnolent but answers very simple questions.  Sitter at bedside.      Labs significant for >Ca 11.1, Creat 2.4, Bicarb 17.   Pt recently seen at [facility] for HA and had mild JOSÉ MIGUEL with Creat 2.2    Baseline Creat 1.3, MGUS, Hypercalcemia, Paranoia, etc.      Unable to complete ROS with mental status but denies pain.       Past Medical History:   Diagnosis Date    Anxiety     Chest tightness     Chronic back pain     See neuro     Chronic low back pain     CKD (chronic kidney disease) stage 3, GFR 30-59 ml/min     Depression     Diabetes mellitus     type 2    Diabetes mellitus type II     Diabetic neuropathy     Diverticulosis     last colonoscopy was in 2000    Encounter for blood transfusion     Fibroids     Fibromyalgia     GERD (gastroesophageal reflux disease)     History of palpitations     Hoarseness of voice     Hyperlipidemia     Hypertension     Obesity     Obstructive sleep apnea     not using cpap now    Pancreatitis     Patient is Pentecostalism     Post menopausal syndrome      Past Surgical History:   Procedure Laterality Date    ABDOMINAL SURGERY      CHOLECYSTECTOMY      MIKE      fibroid      OOPHORECTOMY      xlap, right ovary removed due to infection?    right ovary removal Right     UTERINE ARTERY EMBOLIZATION  3/14     Family History   Problem Relation Age of Onset    Breast cancer Neg Hx     Ovarian cancer Neg Hx     Colon cancer Neg Hx      Alcohol abuse Father     Cancer Sister         throat ca    Prostate cancer Brother     Heart attack Paternal Uncle     Cirrhosis Sister     Stroke Mother     Diabetes Mother     Hypertension Mother     No Known Problems Daughter     No Known Problems Son     No Known Problems Maternal Grandmother     No Known Problems Daughter     No Known Problems Son     No Known Problems Sister     No Known Problems Sister     No Known Problems Sister     No Known Problems Brother     No Known Problems Brother     No Known Problems Brother     No Known Problems Brother     Cancer Paternal Aunt     Lupus Grandchild     No Known Problems Maternal Aunt     No Known Problems Maternal Uncle     No Known Problems Maternal Grandfather     No Known Problems Paternal Grandfather     No Known Problems Paternal Grandmother     No Known Problems Cousin     Heart disease Neg Hx     Heart failure Neg Hx      Social History     Tobacco Use    Smoking status: Never Smoker    Smokeless tobacco: Never Used   Substance Use Topics    Alcohol use: No     Comment: Rarely    Drug use: No       Review of patient's allergies indicates:  No Known Allergies     Review of Systems:    Unable.     OBJECTIVE:     Vital Signs (Most Recent)  Temp: 97.9 °F (36.6 °C) (03/08/22 0755)  Pulse: 72 (03/08/22 0937)  Resp: 16 (03/08/22 0755)  BP: (!) 153/66 (03/08/22 0755)  SpO2: 98 % (03/08/22 0755)    Vital Signs Range (Last 24H):  Temp:  [97 °F (36.1 °C)-97.9 °F (36.6 °C)]   Pulse:  [60-72]   Resp:  [16-20]   BP: (123-153)/(57-66)   SpO2:  [96 %-99 %]       Intake/Output Summary (Last 24 hours) at 3/8/2022 1108  Last data filed at 3/7/2022 1900  Gross per 24 hour   Intake 240 ml   Output --   Net 240 ml       Physical Exam:  General appearance: Well developed, well nourished, but somnolent.   Eyes:  Conjunctivae/corneas clear. PERRL.  Lungs: Normal respiratory effort,   clear to auscultation bilaterally   Heart: Regular rate and  rhythm, S1, S2 normal, no murmur, rub or padmini.  Abdomen: Soft, non-tender non-distended; bowel sounds normal; no masses,  no organomegaly  Extremities: No cyanosis or clubbing. No edema.    Skin: Skin color, texture, turgor normal. No rashes or lesions  Neurologic: Normal strength and tone. No focal numbness or weakness       Laboratory:  Recent Labs   Lab 03/08/22  0907   WBC 4.91   RBC 3.54*   HGB 9.1*   HCT 30.4*   PLT 95*   MCV 86   MCH 25.7*   MCHC 29.9*     BMP:   Recent Labs   Lab 03/01/22  1219 [birth year]/1953 03/08/22  0907   GLU 92   < > 118*      < > 141   K 4.9   < > 4.2      < > 112*   CO2 23   < > 17*   BUN 76*   < > 54*   CREATININE 2.2*   < > 2.4*   CALCIUM 10.9*   < > 11.1*   MG 2.0  --   --     < > = values in this interval not displayed.     Lab Results   Component Value Date    CALCIUM 11.1 (H) 03/08/2022    PHOS 3.5 10/15/2021     BNP  No results for input(s): BNP, BNPTRIAGEBLO in the last 168 hours.  Lab Results   Component Value Date    URICACID 5.2 09/23/2014     Lab Results   Component Value Date    IRON 45 10/15/2021    TIBC 324 10/15/2021    FERRITIN 90 10/15/2021     Lab Results   Component Value Date    CALCIUM 11.1 (H) 03/08/2022    PHOS 3.5 10/15/2021       Diagnostic Results:  CT Renal Stone Study Abd Pelvis WO   Final Result      No evidence for nephrolithiasis or hydronephrosis on today's CT exam.  Diffuse urinary bladder wall thickening which can be seen with cystitis or chronic outlet obstruction.         Electronically signed by: Catie Coreas   Date:    03/04/2022   Time:    13:39      X-Ray Hip 2 or 3 views Right (with Pelvis when performed)   Final Result      No acute findings.         Electronically signed by: Yaakov Garza MD   Date:    03/03/2022   Time:    08:04      US Retroperitoneal Complete   Final Result      Mild right hydronephrosis.  Correlation with renal stone CT may be considered in further evaluating.         Electronically signed by: Catie  Saritater   Date:    03/02/2022   Time:    09:11          ASSESSMENT/PLAN:     JOSÉ MIGUEL on mild CKD III (baseline 1.3) secondary to hypercalcemia, MGUS, poor oral intake, NAGMA, while on ARB/HCTZ:  -events/trends noted of past few months.  -pt has had steady increase in Creat since Jan of this year at [facility] where her Creat was 1.7 and changed from ACE to ARB/HCTZ.    -lost to follow up and has been in and out of hospitals with mental disorder.  -no record of NSAIDS from what I can tell.  -mild right hydro on US but negative CT.   -check urine lytes, uric Acid.  -see orders.  -follow trends.   -hold ACE/ARB/HCTZ for now.   -Renally dose meds, avoid nephrotoxins, and monitor I/O's closely.      Hypercalcemia secondary to MGUS/1HPTH:  -aggressive IVF's with LR  -start low dose sensipar.  -need f/up with hem for MGUS with high likelihood of MM  w/ lytic skull lesion and Calcium trends.  -maintain hydration.  -no more diuretics.  -r/up ionized roxana and PTH      Behavioral disturbance/paranoia:  -no record of lithium  -defer to psych/neuro  -recent LP at [facility] negative.       Thanks for consult  See above  Will follow along.

## 2022-03-08 NOTE — PLAN OF CARE
Problem: Physical Therapy Goal  Goal: Physical Therapy Goal  Outcome: Adequate for Care Transition     PT orders received. Evaluation completed. Pt is independent for ambulation and transfers. Pt demonstrates normal gait pattern and normal balance. Pt is at her baseline functional level and is unable to follow commands required for productive therapy sessions. No acute PT needs identified at this time. Will d/c PT. Recommend discharge to nursing home.

## 2022-03-08 NOTE — CONSULTS
Heme/Onc consult Note      History of Present Illness:  Patient is a 66 y.o. female with pmhx of CAD, schizophrenia, CKD who presented with behaviour issues.  Pt has hx of MGUS and saw Dr Griffith in March 2021. Hence hematology was consulted.        SUBJECTIVE:     Pt was seen and examined at bedside.   She is oriented to only her name. She was unable to have a conversation and actively hallucinating, pleasant and calm though.   She couldn't follow simple commands and had a sitter.     Nursing staff reported urinary retention  Past Medical History:   Diagnosis Date    Anxiety     Chest tightness     Chronic back pain     See neuro     Chronic low back pain     CKD (chronic kidney disease) stage 3, GFR 30-59 ml/min     Depression     Diabetes mellitus     type 2    Diabetes mellitus type II     Diabetic neuropathy     Diverticulosis     last colonoscopy was in 2000    Encounter for blood transfusion     Fibroids     Fibromyalgia     GERD (gastroesophageal reflux disease)     History of palpitations     Hoarseness of voice     Hyperlipidemia     Hypertension     Obesity     Obstructive sleep apnea     not using cpap now    Pancreatitis     Patient is Congregation     Post menopausal syndrome      Past Surgical History:   Procedure Laterality Date    ABDOMINAL SURGERY      CHOLECYSTECTOMY      MIKE      fibroid      OOPHORECTOMY      xlap, right ovary removed due to infection?    right ovary removal Right     UTERINE ARTERY EMBOLIZATION  3/14     Family History   Problem Relation Age of Onset    Breast cancer Neg Hx     Ovarian cancer Neg Hx     Colon cancer Neg Hx     Alcohol abuse Father     Cancer Sister         throat ca    Prostate cancer Brother     Heart attack Paternal Uncle     Cirrhosis Sister     Stroke Mother     Diabetes Mother     Hypertension Mother     No Known Problems Daughter     No Known Problems Son     No Known Problems Maternal Grandmother     No  Known Problems Daughter     No Known Problems Son     No Known Problems Sister     No Known Problems Sister     No Known Problems Sister     No Known Problems Brother     No Known Problems Brother     No Known Problems Brother     No Known Problems Brother     Cancer Paternal Aunt     Lupus Grandchild     No Known Problems Maternal Aunt     No Known Problems Maternal Uncle     No Known Problems Maternal Grandfather     No Known Problems Paternal Grandfather     No Known Problems Paternal Grandmother     No Known Problems Cousin     Heart disease Neg Hx     Heart failure Neg Hx      Social History     Tobacco Use    Smoking status: Never Smoker    Smokeless tobacco: Never Used   Substance Use Topics    Alcohol use: No     Comment: Rarely    Drug use: No       Review of patient's allergies indicates:  No Known Allergies     Review of Systems:  Review of Systems   Unable to perform ROS: Psychiatric disorder       OBJECTIVE:     Vital Signs (Most Recent)  Temp: 97.6 °F (36.4 °C) (03/08/22 1551)  Pulse: 66 (03/08/22 1551)  Resp: 18 (03/08/22 1551)  BP: (!) 121/59 (03/08/22 1551)  SpO2: 98 % (03/08/22 1551)    Vital Signs Range (Last 24H):  Temp:  [97.5 °F (36.4 °C)-97.9 °F (36.6 °C)]   Pulse:  [62-72]   Resp:  [16-20]   BP: (121-153)/(57-66)   SpO2:  [97 %-98 %]     Physical Exam:  Physical Exam  Constitutional:       Appearance: Normal appearance.   HENT:      Right Ear: External ear normal.      Left Ear: External ear normal.   Eyes:      General: No scleral icterus.  Cardiovascular:      Rate and Rhythm: Normal rate.   Pulmonary:      Effort: Pulmonary effort is normal. No respiratory distress.   Abdominal:      General: There is no distension.      Palpations: Abdomen is soft.      Tenderness: There is no abdominal tenderness.   Musculoskeletal:         General: No swelling or tenderness.      Right lower leg: No edema.      Left lower leg: No edema.   Neurological:      Mental Status: She is  alert.   Psychiatric:         Attention and Perception: She perceives auditory and visual hallucinations.      Comments: Talking incoherently         Scheduled Meds:   amLODIPine  10 mg Oral Daily    aspirin  81 mg Oral Daily    atorvastatin  40 mg Oral Daily    cefTRIAXone (ROCEPHIN) IVPB  1 g Intravenous Q24H    [START ON 3/9/2022] cinacalcet  30 mg Oral Daily with breakfast    clopidogreL  75 mg Oral Daily    cyanocobalamin  1,000 mcg Oral Daily    ferrous sulfate  1 tablet Oral Daily    hydrALAZINE  50 mg Oral Q8H    labetaloL  300 mg Oral Q12H    levothyroxine  75 mcg Oral Before breakfast    mirtazapine  7.5 mg Oral QHS    multivitamin  1 tablet Oral Daily    QUEtiapine  12.5 mg Oral Daily    [START ON 3/9/2022] valproic acid (as sodium salt)  250 mg Oral QHS     Continuous Infusions:   lactated ringers 125 mL/hr at 03/08/22 1118     PRN Meds:.QUEtiapine, sodium chloride 0.9%    Laboratory:  I have reviewed all pertinent lab results within the past 24 hours.  CBC:   Recent Labs   Lab 03/08/22  0907   WBC 4.91   RBC 3.54*   HGB 9.1*   HCT 30.4*   PLT 95*   MCV 86   MCH 25.7*   MCHC 29.9*     CMP:   Recent Labs   Lab 03/01/22  1953/22  0458 03/08/22  1642   GLU 93   < > 108   CALCIUM 11.1*   < > 10.8*   ALBUMIN 3.7  --  2.9*   PROT 7.8  --   --    *   < > 142   K 5.1   < > 4.3   CO2 22*   < > 24   *   < > 111*   BUN 76*   < > 54*   CREATININE 2.3*   < > 2.4*   ALKPHOS 100  --   --    ALT 48*  --   --    AST 57*  --   --    BILITOT 0.3  --   --     < > = values in this interval not displayed.         Diagnostic Results:  Labs: Reviewed  ECG: Reviewed  X-Ray: Reviewed  US: Reviewed  CT: Reviewed    ASSESSMENT/PLAN:     Active Hospital Problems    Diagnosis  POA    *Acute kidney injury superimposed on CKD [N17.9, N18.9]  Yes    Major neurocognitive disorder [F03.90]  Yes    Type 2 diabetes mellitus with neurologic complication [E11.49]  Yes    Hyperlipidemia [E78.5]  Yes      Chronic    Hypertension [I10]  Yes     Chronic      Resolved Hospital Problems   No resolved problems to display.       1. JOSÉ MIGUEL (acute kidney injury)    2. Altered mental status, unspecified altered mental status type    3. At risk for long QT syndrome      MGUS  -will check immunoglobulins and spep  -anemia and renal function relatively stable , with mild worsening could be due to ur retention  -reviewed imaging here and OSH- no imaging with lytic lesions found  -f/u with primary oncologist OP    Hypercalcemia  -mild. Give IVFs and monitor . Work ordered by nephro    I will follow up with the patient. Please call us should questions arise.     Electronically signed by Mary Heard    [facility]

## 2022-03-08 NOTE — PLAN OF CARE
Occupational Therapy     OT evaluation complete.  Pt demonstrates strength and ROM in (B) UE needed for ADLs that is WNL, and is able to perform sit <> stand transfer and take steps with supervision.  Pt confused and impulsive during session requiring redirection and reorientation.  Pt does not require OT services in the acute care setting at this time.  Pt to d/c from OT.  NH- intermediate placement recommended upon d/c from hospital.    MAGGY Hines  3/8/2022

## 2022-03-08 NOTE — NURSING
awake and alert, sitter at bedside, patient unable to answer questions appropriately, rambling speech with flight of ideas, follows commands well. mostly calm but will change mood easily, must be reassured often. denies pain, sob or any current needs.

## 2022-03-09 LAB
ALBUMIN SERPL BCP-MCNC: 2.7 G/DL (ref 3.5–5.2)
ALBUMIN SERPL BCP-MCNC: 2.7 G/DL (ref 3.5–5.2)
ANION GAP SERPL CALC-SCNC: 6 MMOL/L (ref 8–16)
ANION GAP SERPL CALC-SCNC: 7 MMOL/L (ref 8–16)
BUN SERPL-MCNC: 57 MG/DL (ref 8–23)
BUN SERPL-MCNC: 58 MG/DL (ref 8–23)
CA-I BLDV-SCNC: 1.38 MMOL/L (ref 1.06–1.42)
CALCIUM SERPL-MCNC: 10.4 MG/DL (ref 8.7–10.5)
CALCIUM SERPL-MCNC: 10.4 MG/DL (ref 8.7–10.5)
CHLORIDE SERPL-SCNC: 112 MMOL/L (ref 95–110)
CHLORIDE SERPL-SCNC: 112 MMOL/L (ref 95–110)
CO2 SERPL-SCNC: 22 MMOL/L (ref 23–29)
CO2 SERPL-SCNC: 24 MMOL/L (ref 23–29)
CREAT SERPL-MCNC: 2.4 MG/DL (ref 0.5–1.4)
CREAT SERPL-MCNC: 2.5 MG/DL (ref 0.5–1.4)
EST. GFR  (AFRICAN AMERICAN): 22 ML/MIN/1.73 M^2
EST. GFR  (AFRICAN AMERICAN): 23 ML/MIN/1.73 M^2
EST. GFR  (NON AFRICAN AMERICAN): 19 ML/MIN/1.73 M^2
EST. GFR  (NON AFRICAN AMERICAN): 20 ML/MIN/1.73 M^2
GLUCOSE SERPL-MCNC: 104 MG/DL (ref 70–110)
GLUCOSE SERPL-MCNC: 73 MG/DL (ref 70–110)
IGA SERPL-MCNC: 93 MG/DL (ref 40–350)
IGG SERPL-MCNC: 1476 MG/DL (ref 650–1600)
IGM SERPL-MCNC: 36 MG/DL (ref 50–300)
PHOSPHATE SERPL-MCNC: 3.5 MG/DL (ref 2.7–4.5)
PHOSPHATE SERPL-MCNC: 3.7 MG/DL (ref 2.7–4.5)
POCT GLUCOSE: 121 MG/DL (ref 70–110)
POCT GLUCOSE: 84 MG/DL (ref 70–110)
POCT GLUCOSE: 96 MG/DL (ref 70–110)
POTASSIUM SERPL-SCNC: 4.7 MMOL/L (ref 3.5–5.1)
POTASSIUM SERPL-SCNC: 5 MMOL/L (ref 3.5–5.1)
SODIUM SERPL-SCNC: 141 MMOL/L (ref 136–145)
SODIUM SERPL-SCNC: 142 MMOL/L (ref 136–145)

## 2022-03-09 PROCEDURE — 11000001 HC ACUTE MED/SURG PRIVATE ROOM

## 2022-03-09 PROCEDURE — 25000003 PHARM REV CODE 250: Performed by: NURSE PRACTITIONER

## 2022-03-09 PROCEDURE — 99233 SBSQ HOSP IP/OBS HIGH 50: CPT | Mod: ,,, | Performed by: PHYSICIAN ASSISTANT

## 2022-03-09 PROCEDURE — 99233 PR SUBSEQUENT HOSPITAL CARE,LEVL III: ICD-10-PCS | Mod: ,,, | Performed by: PHYSICIAN ASSISTANT

## 2022-03-09 PROCEDURE — 84165 PROTEIN E-PHORESIS SERUM: CPT | Mod: 26,,, | Performed by: PATHOLOGY

## 2022-03-09 PROCEDURE — 84165 PROTEIN E-PHORESIS SERUM: CPT | Performed by: STUDENT IN AN ORGANIZED HEALTH CARE EDUCATION/TRAINING PROGRAM

## 2022-03-09 PROCEDURE — 36415 COLL VENOUS BLD VENIPUNCTURE: CPT | Performed by: NURSE PRACTITIONER

## 2022-03-09 PROCEDURE — 86334 IMMUNOFIX E-PHORESIS SERUM: CPT | Performed by: NURSE PRACTITIONER

## 2022-03-09 PROCEDURE — 82330 ASSAY OF CALCIUM: CPT | Performed by: NURSE PRACTITIONER

## 2022-03-09 PROCEDURE — 84165 PATHOLOGIST INTERPRETATION SPE: ICD-10-PCS | Mod: 26,,, | Performed by: PATHOLOGY

## 2022-03-09 PROCEDURE — 94761 N-INVAS EAR/PLS OXIMETRY MLT: CPT

## 2022-03-09 PROCEDURE — 25000003 PHARM REV CODE 250: Performed by: PHYSICIAN ASSISTANT

## 2022-03-09 PROCEDURE — 25000003 PHARM REV CODE 250: Performed by: INTERNAL MEDICINE

## 2022-03-09 PROCEDURE — 82784 ASSAY IGA/IGD/IGG/IGM EACH: CPT | Performed by: STUDENT IN AN ORGANIZED HEALTH CARE EDUCATION/TRAINING PROGRAM

## 2022-03-09 PROCEDURE — 86334 IMMUNOFIX E-PHORESIS SERUM: CPT | Mod: 26,,, | Performed by: PATHOLOGY

## 2022-03-09 PROCEDURE — 80069 RENAL FUNCTION PANEL: CPT | Mod: 91 | Performed by: NURSE PRACTITIONER

## 2022-03-09 PROCEDURE — 86334 PATHOLOGIST INTERPRETATION IFE: ICD-10-PCS | Mod: 26,,, | Performed by: PATHOLOGY

## 2022-03-09 RX ORDER — AMOXICILLIN AND CLAVULANATE POTASSIUM 500; 125 MG/1; MG/1
1 TABLET, FILM COATED ORAL 2 TIMES DAILY
Status: DISCONTINUED | OUTPATIENT
Start: 2022-03-09 | End: 2022-03-11

## 2022-03-09 RX ORDER — CINACALCET 30 MG/1
30 TABLET, FILM COATED ORAL
Status: DISCONTINUED | OUTPATIENT
Start: 2022-03-11 | End: 2022-03-11

## 2022-03-09 RX ADMIN — LEVOTHYROXINE SODIUM 75 MCG: 75 TABLET ORAL at 06:03

## 2022-03-09 RX ADMIN — HYDRALAZINE HYDROCHLORIDE 50 MG: 25 TABLET, FILM COATED ORAL at 03:03

## 2022-03-09 RX ADMIN — MIRTAZAPINE 7.5 MG: 7.5 TABLET ORAL at 09:03

## 2022-03-09 RX ADMIN — HYDRALAZINE HYDROCHLORIDE 50 MG: 25 TABLET, FILM COATED ORAL at 09:03

## 2022-03-09 RX ADMIN — QUETIAPINE FUMARATE 12.5 MG: 25 TABLET, FILM COATED ORAL at 05:03

## 2022-03-09 RX ADMIN — LABETALOL HYDROCHLORIDE 300 MG: 100 TABLET, FILM COATED ORAL at 09:03

## 2022-03-09 RX ADMIN — VALPROIC ACID 250 MG: 250 SOLUTION ORAL at 09:03

## 2022-03-09 RX ADMIN — QUETIAPINE FUMARATE 12.5 MG: 25 TABLET, FILM COATED ORAL at 08:03

## 2022-03-09 RX ADMIN — HYDRALAZINE HYDROCHLORIDE 50 MG: 25 TABLET, FILM COATED ORAL at 06:03

## 2022-03-09 RX ADMIN — AMOXICILLIN AND CLAVULANATE POTASSIUM 500 MG: 500; 125 TABLET, FILM COATED ORAL at 09:03

## 2022-03-09 NOTE — PLAN OF CARE
"Patient remains medically active: JOSÉ MIGUEL on CKD, Hypercalcemia; aggressive IVF treatment  Dispo: NH placement: pending acceptance; updated clinicals submitted on 3/8/22.   - [facility]: denied- "not eligible". CM sent message requesting to review for assisted and give feedback  -[facility]- denied- no beds  -[facility]- no response  -[facility]: will place on wait list for beds; CM inquired if patient was clinically accepted via careport  * No other feedback from referral send out    PASRR/142 pending; will be completed by 3/10        "

## 2022-03-09 NOTE — PLAN OF CARE
[facility] replied in care-port  they are interested in the patient & requesting more information.    [facility] is the only facility accepting the patient.CM attempted to contact patient  daughter three times, CM was able to leave a VM.      Patient daughter called CM back, CM updated daughter of the denying facilities and the accepting facility. CM updated the CASSIA Monroe          03/09/22 1136   Post-Acute Status   Post-Acute Authorization Placement   Post-Acute Placement Status Referrals Sent   Hospital Resources/Appts/Education Provided Provided patient/caregiver with written discharge plan information;Appointments scheduled by Navigator/Coordinator   Discharge Delays (!) Post-Acute Set-up   Discharge Plan   Discharge Plan A New Nursing Home placement - long term care facility   Discharge Plan B  --
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
## 2022-03-09 NOTE — PROGRESS NOTES
Nephrology  Progress Note    Admit Date: 3/1/2022   LOS: 5 days     SUBJECTIVE:     Follow-up For:  Acute kidney injury superimposed on CKD    Interval History:   Remains altered and confused.  Oriented X 1.  Labs noted with improved Ca but still with JOSÉ MIGUEL.  Now with UTI.    I/O's not accurate.     Review of Systems:    Unable to fully access    OBJECTIVE:     Vital Signs Range (Last 24H):  /89 (BP Location: Left arm, Patient Position: Standing)   Pulse 82   Temp 98.4 °F (36.9 °C) (Oral)   Resp 18   Ht 5' (1.524 m)   Wt 89.4 kg (197 lb 1.5 oz)   LMP  (LMP Unknown)   SpO2 99%   BMI 38.49 kg/m²     Temp:  [97.6 °F (36.4 °C)-98.4 °F (36.9 °C)]   Pulse:  [66-82]   Resp:  [18-20]   BP: (121-134)/(57-89)   SpO2:  [97 %-99 %]     I & O (Last 24H):    Intake/Output Summary (Last 24 hours) at 3/9/2022 0832  Last data filed at 3/9/2022 0600  Gross per 24 hour   Intake 2936.53 ml   Output 300 ml   Net 2636.53 ml       Physical Exam:  General appearance: Well developed, well nourished but altered/confused  Eyes:  Conjunctivae/corneas clear. PERRL.  Lungs: Normal respiratory effort,   clear to auscultation bilaterally   Heart: Regular rate and rhythm, S1, S2 normal, no murmur, rub or padmini.  Abdomen: Soft, non-tender non-distended; bowel sounds normal; no masses,  no organomegaly  Extremities: No cyanosis or clubbing. No edema.    Skin: Skin color, texture, turgor normal. No rashes or lesions  Neurologic: Normal strength and tone. No focal numbness or weakness     Laboratory Data:  Recent Labs   Lab 03/08/22  0907   WBC 4.91   RBC 3.54*   HGB 9.1*   HCT 30.4*   PLT 95*   MCV 86   MCH 25.7*   MCHC 29.9*       BMP:   Recent Labs   Lab 03/09/22  0508   GLU 73      K 4.7   *   CO2 22*   BUN 58*   CREATININE 2.4*   CALCIUM 10.4     Lab Results   Component Value Date    CALCIUM 10.4 03/09/2022    PHOS 3.7 03/09/2022       Lab Results   Component Value Date    .2 (H) 03/08/2022    CALCIUM 10.4  03/09/2022    CAION 1.38 03/09/2022    PHOS 3.7 03/09/2022       Lab Results   Component Value Date    URICACID 9.1 (H) 03/08/2022       BNP  No results for input(s): BNP, BNPTRIAGEBLO in the last 168 hours.    Medications:  Medication list was reviewed and changes noted under Assessment/Plan.    Diagnostic Results:        ASSESSMENT/PLAN:     JOSÉ MIGUEL on mild CKD III (baseline 1.3) secondary to hypercalcemia, poor oral intake while on ARB/HCTZ and now with UTI:  -events/trends noted of past few months.  -pt has had steady increase in Creat since Jan of this year at [facility] where her Creat was 1.7 and changed from ACE to ARB/HCTZ.    -lost to follow up and has been in and out of hospitals with mental disorder.  -no record of NSAIDS from what I can tell.  -mild right hydro on US but negative CT.   -urine consistent with UTI and agree with rocephin.    -Creat stagnant and need better I/O documentation.  -see orders.  -follow trends.   -hold ACE/ARB/HCTZ for now.   -Renally dose meds, avoid nephrotoxins, and monitor I/O's closely.        Hypercalcemia secondary to MGUS/1HPTH?:  -aggressive IVF's with LR  -started low dose sensipar MWF  -need f/up with heme/onc for MGUS with high likelihood of MM  w/ remote hx of lytic skull lesion (Tumor Board at [facility]) and Calcium trends.  -maintain hydration.  -no more diuretics.  -f/up ionized roxana wnl.        Behavioral disturbance/paranoia:  -no record of lithium  -defer to psych/neuro  -recent LP at [facility] negative        See above

## 2022-03-09 NOTE — NURSING
Patient out of bed, unable to redirect, vdg in garbash can, assisted to bed, agitated. Prn med given

## 2022-03-09 NOTE — PROGRESS NOTES
IP Liaison - Initial Visit Note    Patient: Jessie Gallardo  MRN:  [id number]  Date of Service:  3/9/2022  Completed by:  NATE Cannon    Reason for Visit   Patient presents with    IP Liaison Initial Visit       RSW called daughter over the phone in order to complete SDOH questionnaire and liaison assessment.  Pt has identified no barriers to care.    The following were addressed during this visit:  - Review SDOH Questions   - Complete patient assessment   - Complete initial visit with patient        Patient Summary     IP Liaison Patient Assessment    General  Level of Caregiver support: Caregiver currently provides assistance  Have you had to make a decision between paying for any of the following in the last 2 months?: None  Transportation means: Family  Employment status: Disabled  Do you have any of the following?: Medical power of   Current symptoms: Sleep disturbances, Confusion, Memory problems, Delusions, Hallucinations, Difficulty processing information, Restlessness  Assessments  Was the PHQ Depression Screening completed this visit?: Yes  Was the LULÚ-7 Screening completed this visit?: No         NATE Cannon

## 2022-03-09 NOTE — PLAN OF CARE
"   03/09/22 1125   Post-Acute Status   Post-Acute Authorization Placement   Post-Acute Placement Status Referrals Sent   Discharge Delays (!) Post-Acute Set-up  (medically active;)   Discharge Plan   Discharge Plan A New Nursing Home placement - alf care facility   Discharge Plan B Home with family;Home Health     Patient remains medically active: JOSÉ MIGUEL on CKD, Hypercalcemia; aggressive IVF treatment  Dispo: NH placement: pending acceptance; updated clinicals submitted on 3/8/22.   - [facility]: denied- "not eligible" for SFN. CM sent message requesting to review for alf and give feedback; Lei Greco advised that the patient was denied for SNF based on insurance, but they would review for alf as this is the current dispo for patient.     -[facility]- denied- no beds  -[facility]- no response  -[facility]: will place on wait list for beds; CM inquired if patient was clinically accepted via careport  * No other feedback from referral send out    PASRR/142 pending; will be completed by 3/10        "

## 2022-03-09 NOTE — PLAN OF CARE
Pt lying in bed, sitter at the bedside, pt refused morning medications, no iv access md aware, safety precautions maintained, will continue to monitor.  Problem: Adult Inpatient Plan of Care  Goal: Plan of Care Review  Outcome: Ongoing, Progressing  Goal: Patient-Specific Goal (Individualized)  Outcome: Ongoing, Progressing  Goal: Absence of Hospital-Acquired Illness or Injury  Outcome: Ongoing, Progressing  Goal: Optimal Comfort and Wellbeing  Outcome: Ongoing, Progressing  Goal: Readiness for Transition of Care  Outcome: Ongoing, Progressing     Problem: Diabetes Comorbidity  Goal: Blood Glucose Level Within Targeted Range  Outcome: Ongoing, Progressing     Problem: Fluid and Electrolyte Imbalance (Acute Kidney Injury/Impairment)  Goal: Fluid and Electrolyte Balance  Outcome: Ongoing, Progressing     Problem: Oral Intake Inadequate (Acute Kidney Injury/Impairment)  Goal: Optimal Nutrition Intake  Outcome: Ongoing, Progressing     Problem: Renal Function Impairment (Acute Kidney Injury/Impairment)  Goal: Effective Renal Function  Outcome: Ongoing, Progressing     Problem: Skin Injury Risk Increased  Goal: Skin Health and Integrity  Outcome: Ongoing, Progressing

## 2022-03-10 ENCOUNTER — PATIENT OUTREACH (OUTPATIENT)
Dept: ADMINISTRATIVE | Facility: OTHER | Age: 67
End: 2022-03-10
Payer: MEDICARE

## 2022-03-10 LAB
ALBUMIN SERPL BCP-MCNC: 2.9 G/DL (ref 3.5–5.2)
ALBUMIN SERPL BCP-MCNC: 3.2 G/DL (ref 3.5–5.2)
ALBUMIN SERPL ELPH-MCNC: 2.82 G/DL (ref 3.35–5.55)
ALPHA1 GLOB SERPL ELPH-MCNC: 0.35 G/DL (ref 0.17–0.41)
ALPHA2 GLOB SERPL ELPH-MCNC: 0.84 G/DL (ref 0.43–0.99)
ANION GAP SERPL CALC-SCNC: 11 MMOL/L (ref 8–16)
ANION GAP SERPL CALC-SCNC: 6 MMOL/L (ref 8–16)
B-GLOBULIN SERPL ELPH-MCNC: 0.84 G/DL (ref 0.5–1.1)
BACTERIA UR CULT: ABNORMAL
BUN SERPL-MCNC: 57 MG/DL (ref 8–23)
BUN SERPL-MCNC: 59 MG/DL (ref 8–23)
CALCIUM SERPL-MCNC: 10.3 MG/DL (ref 8.7–10.5)
CALCIUM SERPL-MCNC: 11 MG/DL (ref 8.7–10.5)
CHLORIDE SERPL-SCNC: 109 MMOL/L (ref 95–110)
CHLORIDE SERPL-SCNC: 111 MMOL/L (ref 95–110)
CO2 SERPL-SCNC: 21 MMOL/L (ref 23–29)
CO2 SERPL-SCNC: 24 MMOL/L (ref 23–29)
CREAT SERPL-MCNC: 2.4 MG/DL (ref 0.5–1.4)
CREAT SERPL-MCNC: 2.5 MG/DL (ref 0.5–1.4)
EST. GFR  (AFRICAN AMERICAN): 22 ML/MIN/1.73 M^2
EST. GFR  (AFRICAN AMERICAN): 23 ML/MIN/1.73 M^2
EST. GFR  (NON AFRICAN AMERICAN): 19 ML/MIN/1.73 M^2
EST. GFR  (NON AFRICAN AMERICAN): 20 ML/MIN/1.73 M^2
GAMMA GLOB SERPL ELPH-MCNC: 1.05 G/DL (ref 0.67–1.58)
GLUCOSE SERPL-MCNC: 108 MG/DL (ref 70–110)
GLUCOSE SERPL-MCNC: 114 MG/DL (ref 70–110)
PHOSPHATE SERPL-MCNC: 3.4 MG/DL (ref 2.7–4.5)
PHOSPHATE SERPL-MCNC: 3.6 MG/DL (ref 2.7–4.5)
POTASSIUM SERPL-SCNC: 4.6 MMOL/L (ref 3.5–5.1)
POTASSIUM SERPL-SCNC: 4.9 MMOL/L (ref 3.5–5.1)
PROT SERPL-MCNC: 5.9 G/DL (ref 6–8.4)
SODIUM SERPL-SCNC: 141 MMOL/L (ref 136–145)
SODIUM SERPL-SCNC: 141 MMOL/L (ref 136–145)

## 2022-03-10 PROCEDURE — 25000003 PHARM REV CODE 250: Performed by: PHYSICIAN ASSISTANT

## 2022-03-10 PROCEDURE — 25000003 PHARM REV CODE 250: Performed by: NURSE PRACTITIONER

## 2022-03-10 PROCEDURE — 25000003 PHARM REV CODE 250: Performed by: INTERNAL MEDICINE

## 2022-03-10 PROCEDURE — 94761 N-INVAS EAR/PLS OXIMETRY MLT: CPT

## 2022-03-10 PROCEDURE — 80069 RENAL FUNCTION PANEL: CPT | Mod: 91 | Performed by: NURSE PRACTITIONER

## 2022-03-10 PROCEDURE — 99232 PR SUBSEQUENT HOSPITAL CARE,LEVL II: ICD-10-PCS | Mod: ,,, | Performed by: PHYSICIAN ASSISTANT

## 2022-03-10 PROCEDURE — 36415 COLL VENOUS BLD VENIPUNCTURE: CPT | Performed by: NURSE PRACTITIONER

## 2022-03-10 PROCEDURE — 11000001 HC ACUTE MED/SURG PRIVATE ROOM

## 2022-03-10 PROCEDURE — 99232 SBSQ HOSP IP/OBS MODERATE 35: CPT | Mod: ,,, | Performed by: PHYSICIAN ASSISTANT

## 2022-03-10 RX ORDER — VALPROIC ACID 250 MG/5ML
250 SOLUTION ORAL DAILY
Status: DISCONTINUED | OUTPATIENT
Start: 2022-03-10 | End: 2022-04-04 | Stop reason: HOSPADM

## 2022-03-10 RX ORDER — OLANZAPINE 10 MG/2ML
2.5 INJECTION, POWDER, FOR SOLUTION INTRAMUSCULAR 2 TIMES DAILY PRN
Status: DISCONTINUED | OUTPATIENT
Start: 2022-03-10 | End: 2022-04-04 | Stop reason: HOSPADM

## 2022-03-10 RX ADMIN — HYDRALAZINE HYDROCHLORIDE 50 MG: 25 TABLET, FILM COATED ORAL at 03:03

## 2022-03-10 RX ADMIN — LABETALOL HYDROCHLORIDE 300 MG: 100 TABLET, FILM COATED ORAL at 09:03

## 2022-03-10 RX ADMIN — QUETIAPINE FUMARATE 12.5 MG: 25 TABLET, FILM COATED ORAL at 09:03

## 2022-03-10 RX ADMIN — QUETIAPINE FUMARATE 12.5 MG: 25 TABLET, FILM COATED ORAL at 05:03

## 2022-03-10 RX ADMIN — AMLODIPINE BESYLATE 10 MG: 5 TABLET ORAL at 09:03

## 2022-03-10 RX ADMIN — QUETIAPINE FUMARATE 12.5 MG: 25 TABLET, FILM COATED ORAL at 02:03

## 2022-03-10 RX ADMIN — AMOXICILLIN AND CLAVULANATE POTASSIUM 500 MG: 500; 125 TABLET, FILM COATED ORAL at 09:03

## 2022-03-10 RX ADMIN — HYDRALAZINE HYDROCHLORIDE 50 MG: 25 TABLET, FILM COATED ORAL at 05:03

## 2022-03-10 RX ADMIN — Medication 1 TABLET: at 09:03

## 2022-03-10 RX ADMIN — ASPIRIN 81 MG: 81 TABLET, COATED ORAL at 09:03

## 2022-03-10 RX ADMIN — FERROUS SULFATE TAB 325 MG (65 MG ELEMENTAL FE) 1 EACH: 325 (65 FE) TAB at 09:03

## 2022-03-10 RX ADMIN — VALPROIC ACID 250 MG: 250 SOLUTION ORAL at 01:03

## 2022-03-10 RX ADMIN — CLOPIDOGREL BISULFATE 75 MG: 75 TABLET, FILM COATED ORAL at 09:03

## 2022-03-10 RX ADMIN — LEVOTHYROXINE SODIUM 75 MCG: 75 TABLET ORAL at 05:03

## 2022-03-10 RX ADMIN — CYANOCOBALAMIN TAB 1000 MCG 1000 MCG: 1000 TAB at 09:03

## 2022-03-10 RX ADMIN — ATORVASTATIN CALCIUM 40 MG: 20 TABLET, FILM COATED ORAL at 09:03

## 2022-03-10 NOTE — PLAN OF CARE
Problem: Adult Inpatient Plan of Care  Goal: Plan of Care Review  Outcome: Ongoing, Progressing  Goal: Patient-Specific Goal (Individualized)  Outcome: Ongoing, Progressing  Goal: Absence of Hospital-Acquired Illness or Injury  Outcome: Ongoing, Progressing  Goal: Optimal Comfort and Wellbeing  Outcome: Ongoing, Progressing  Goal: Readiness for Transition of Care  Outcome: Ongoing, Progressing     Problem: Diabetes Comorbidity  Goal: Blood Glucose Level Within Targeted Range  Outcome: Ongoing, Progressing     Problem: Fluid and Electrolyte Imbalance (Acute Kidney Injury/Impairment)  Goal: Fluid and Electrolyte Balance  Outcome: Ongoing, Progressing     Problem: Oral Intake Inadequate (Acute Kidney Injury/Impairment)  Goal: Optimal Nutrition Intake  Outcome: Ongoing, Progressing     Problem: Renal Function Impairment (Acute Kidney Injury/Impairment)  Goal: Effective Renal Function  Outcome: Ongoing, Progressing     Problem: Skin Injury Risk Increased  Goal: Skin Health and Integrity  Outcome: Ongoing, Progressing

## 2022-03-10 NOTE — PLAN OF CARE
NICOLE complete/ LOCET to be called in 3/11/22; Dr. Kevin signed off for level 2 review.     No acceptance for NH at this time; family to visit [facility] on 3/11/22

## 2022-03-10 NOTE — PLAN OF CARE
Patient A&Ox1. Patient with increased anxiety during the AM unable to redirect MD notified new orders given. Sitter at bedside. Purposeful rounding completed. Call light within reach. Side rails X2.       Problem: Adult Inpatient Plan of Care  Goal: Plan of Care Review  Outcome: Ongoing, Progressing  Goal: Patient-Specific Goal (Individualized)  Outcome: Ongoing, Progressing  Goal: Absence of Hospital-Acquired Illness or Injury  Outcome: Ongoing, Progressing     Problem: Skin Injury Risk Increased  Goal: Skin Health and Integrity  Outcome: Ongoing, Progressing
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
## 2022-03-10 NOTE — PROGRESS NOTES
Nephrology  Progress Note    Admit Date: 3/1/2022   LOS: 6 days     SUBJECTIVE:     Follow-up For:  Acute kidney injury superimposed on CKD    Interval History:   Remains altered and confused.  Oriented X 1.  Pt pulled out IV and no more access.  Encouraged to drink more fluids but unsure if she understands.  Discussed with team.     Review of Systems:    Unable to fully access    OBJECTIVE:     Vital Signs Range (Last 24H):  BP (!) 130/95 (BP Location: Left arm, Patient Position: Sitting)   Pulse 79   Temp 98.4 °F (36.9 °C) (Oral)   Resp 19   Ht 5' (1.524 m)   Wt 89.4 kg (197 lb 1.5 oz)   LMP  (LMP Unknown)   SpO2 99%   BMI 38.49 kg/m²     Temp:  [97.6 °F (36.4 °C)-98.6 °F (37 °C)]   Pulse:  []   Resp:  [16-19]   BP: (119-147)/(58-95)   SpO2:  [95 %-99 %]     I & O (Last 24H):    Intake/Output Summary (Last 24 hours) at 3/10/2022 0934  Last data filed at 3/10/2022 0600  Gross per 24 hour   Intake 1160 ml   Output --   Net 1160 ml       Physical Exam:  General appearance: Well developed, well nourished but altered/confused  Eyes:  Conjunctivae/corneas clear. PERRL.  Lungs: Normal respiratory effort,   clear to auscultation bilaterally   Heart: Regular rate and rhythm, S1, S2 normal, no murmur, rub or padmini.  Abdomen: Soft, non-tender non-distended; bowel sounds normal; no masses,  no organomegaly  Extremities: No cyanosis or clubbing. No edema.    Skin: Skin color, texture, turgor normal. No rashes or lesions  Neurologic: Normal strength and tone. No focal numbness or weakness     Laboratory Data:  No results for input(s): WBC, RBC, HGB, HCT, PLT, MCV, MCH, MCHC in the last 24 hours.    BMP:   Recent Labs   Lab 03/10/22  0515   *      K 4.6   *   CO2 24   BUN 59*   CREATININE 2.4*   CALCIUM 10.3     Lab Results   Component Value Date    CALCIUM 10.3 03/10/2022    PHOS 3.4 03/10/2022       Lab Results   Component Value Date    .2 (H) 03/08/2022    CALCIUM 10.3 03/10/2022     CAION 1.38 03/09/2022    PHOS 3.4 03/10/2022       Lab Results   Component Value Date    URICACID 9.1 (H) 03/08/2022       BNP  No results for input(s): BNP, BNPTRIAGEBLO in the last 168 hours.    Medications:  Medication list was reviewed and changes noted under Assessment/Plan.    Diagnostic Results:        ASSESSMENT/PLAN:     Very slowly improving JOSÉ MIGUEL on mild CKD III (baseline 1.3) secondary to hypercalcemia, poor oral intake while on ARB/HCTZ and now with UTI:  -events/trends noted of past few months.  -pt has had steady increase in Creat since Jan of this year at [facility] where her Creat was 1.7 and changed from ACE to ARB/HCTZ.    -lost to follow up and has been in and out of hospitals with mental disorder.  -no record of NSAIDS  -mild right hydro on US but negative CT.   -urine consistent with UTI and agree with antibx until sens avail.   -Creat slowly resolving and need better I/O documentation.  -see orders.  -follow trends.   -hold ACE/ARB/HCTZ for now.   -Renally dose meds, avoid nephrotoxins, and monitor I/O's closely.        Hypercalcemia secondary to MGUS/1HPTH:  -aggressive IVF's with LR but pt lost IV.  Encourage oral intake.   -started low dose sensipar MWF  -need f/up with heme/onc for MGUS with high likelihood of MM  w/ remote hx of lytic skull lesion (Tumor Board at ) and Calcium trends.  -maintain hydration.  -no more diuretics.  -f/up ionized roxana wnl.        Behavioral disturbance/paranoia:  -no record of lithium  -defer to psych/neuro  -recent LP at  negative        See above  Ok for NH-psych facility  F/u with [facility] or choice of PCP.   Will follow remotely.

## 2022-03-10 NOTE — PLAN OF CARE
CM received a call from patient daughter Nicolle concerning patient discharge placement. Nicolle reported she will visit [facility] today to confirm she want her mother to be placed there for [facility].        [phone]--Nicolle patient daughter

## 2022-03-11 LAB
ALBUMIN SERPL BCP-MCNC: 2.8 G/DL (ref 3.5–5.2)
ANION GAP SERPL CALC-SCNC: 8 MMOL/L (ref 8–16)
BUN SERPL-MCNC: 56 MG/DL (ref 8–23)
CALCIUM SERPL-MCNC: 10.4 MG/DL (ref 8.7–10.5)
CHLORIDE SERPL-SCNC: 112 MMOL/L (ref 95–110)
CO2 SERPL-SCNC: 21 MMOL/L (ref 23–29)
CREAT SERPL-MCNC: 2.3 MG/DL (ref 0.5–1.4)
EST. GFR  (AFRICAN AMERICAN): 24 ML/MIN/1.73 M^2
EST. GFR  (NON AFRICAN AMERICAN): 21 ML/MIN/1.73 M^2
GLUCOSE SERPL-MCNC: 75 MG/DL (ref 70–110)
INTERPRETATION SERPL IFE-IMP: NORMAL
PATHOLOGIST INTERPRETATION SPE: NORMAL
PHOSPHATE SERPL-MCNC: 3.4 MG/DL (ref 2.7–4.5)
POCT GLUCOSE: 97 MG/DL (ref 70–110)
POTASSIUM SERPL-SCNC: 4.5 MMOL/L (ref 3.5–5.1)
SODIUM SERPL-SCNC: 141 MMOL/L (ref 136–145)

## 2022-03-11 PROCEDURE — 99900035 HC TECH TIME PER 15 MIN (STAT)

## 2022-03-11 PROCEDURE — 80069 RENAL FUNCTION PANEL: CPT | Performed by: NURSE PRACTITIONER

## 2022-03-11 PROCEDURE — 99232 PR SUBSEQUENT HOSPITAL CARE,LEVL II: ICD-10-PCS | Mod: ,,, | Performed by: PHYSICIAN ASSISTANT

## 2022-03-11 PROCEDURE — 25000003 PHARM REV CODE 250: Performed by: NURSE PRACTITIONER

## 2022-03-11 PROCEDURE — 11000001 HC ACUTE MED/SURG PRIVATE ROOM

## 2022-03-11 PROCEDURE — 36415 COLL VENOUS BLD VENIPUNCTURE: CPT | Performed by: NURSE PRACTITIONER

## 2022-03-11 PROCEDURE — 25000003 PHARM REV CODE 250: Performed by: PHYSICIAN ASSISTANT

## 2022-03-11 PROCEDURE — 25000003 PHARM REV CODE 250: Performed by: INTERNAL MEDICINE

## 2022-03-11 PROCEDURE — 94761 N-INVAS EAR/PLS OXIMETRY MLT: CPT

## 2022-03-11 PROCEDURE — 99232 SBSQ HOSP IP/OBS MODERATE 35: CPT | Mod: ,,, | Performed by: PHYSICIAN ASSISTANT

## 2022-03-11 RX ORDER — CINACALCET 30 MG/1
30 TABLET, FILM COATED ORAL
Status: DISCONTINUED | OUTPATIENT
Start: 2022-03-11 | End: 2022-04-04 | Stop reason: HOSPADM

## 2022-03-11 RX ORDER — AMOXICILLIN AND CLAVULANATE POTASSIUM 500; 125 MG/1; MG/1
1 TABLET, FILM COATED ORAL 2 TIMES DAILY
Status: DISPENSED | OUTPATIENT
Start: 2022-03-11 | End: 2022-03-14

## 2022-03-11 RX ADMIN — AMOXICILLIN AND CLAVULANATE POTASSIUM 500 MG: 500; 125 TABLET, FILM COATED ORAL at 08:03

## 2022-03-11 RX ADMIN — HYDRALAZINE HYDROCHLORIDE 50 MG: 25 TABLET, FILM COATED ORAL at 06:03

## 2022-03-11 RX ADMIN — HYDRALAZINE HYDROCHLORIDE 50 MG: 25 TABLET, FILM COATED ORAL at 09:03

## 2022-03-11 RX ADMIN — MIRTAZAPINE 7.5 MG: 7.5 TABLET ORAL at 08:03

## 2022-03-11 RX ADMIN — QUETIAPINE FUMARATE 12.5 MG: 25 TABLET, FILM COATED ORAL at 01:03

## 2022-03-11 RX ADMIN — LEVOTHYROXINE SODIUM 75 MCG: 75 TABLET ORAL at 06:03

## 2022-03-11 RX ADMIN — LABETALOL HYDROCHLORIDE 300 MG: 100 TABLET, FILM COATED ORAL at 08:03

## 2022-03-11 RX ADMIN — QUETIAPINE FUMARATE 12.5 MG: 25 TABLET, FILM COATED ORAL at 11:03

## 2022-03-11 NOTE — PLAN OF CARE
"DENISE received an e-mail from Gail Santacruz with the [facility]. CM corrected error on the "Pasrr". CM faxed patient Pasrr back to [facility].       [phone]-- [facility] fax.     "

## 2022-03-11 NOTE — PLAN OF CARE
CM received an e-mail from [facility] concerning the patient discharge placement.  Letter of Consideration notice to Applicant was sent by Gail Santacruz.     [email or web address]
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
## 2022-03-11 NOTE — PROGRESS NOTES
Nephrology  Progress Note    Admit Date: 3/1/2022   LOS: 7 days     SUBJECTIVE:     Follow-up For:  Acute kidney injury superimposed on CKD    Interval History:   Remains altered and confused.  Discussed with team/sitter.  Refusing meds/treatments at times.  Awaiting placement.     Review of Systems:    Unable to fully access    OBJECTIVE:     Vital Signs Range (Last 24H):  BP (!) 149/64 (BP Location: Right arm, Patient Position: Lying)   Pulse 75   Temp 97.6 °F (36.4 °C) (Oral)   Resp 18   Ht 5' (1.524 m)   Wt 89.4 kg (197 lb 1.5 oz)   LMP  (LMP Unknown)   SpO2 97%   BMI 38.49 kg/m²     Temp:  [97.6 °F (36.4 °C)-98.2 °F (36.8 °C)]   Pulse:  [74-76]   Resp:  [18]   BP: (133-149)/(57-65)   SpO2:  [96 %-98 %]     I & O (Last 24H):    Intake/Output Summary (Last 24 hours) at 3/11/2022 0850  Last data filed at 3/11/2022 0600  Gross per 24 hour   Intake 840 ml   Output 400 ml   Net 440 ml       Physical Exam:  General appearance: Well developed, well nourished but altered/confused  Eyes:  Conjunctivae/corneas clear. PERRL.  Lungs: Normal respiratory effort,   clear to auscultation bilaterally   Heart: Regular rate and rhythm, S1, S2 normal, no murmur, rub or padmini.  Abdomen: Soft, non-tender non-distended; bowel sounds normal; no masses,  no organomegaly  Extremities: No cyanosis or clubbing. No edema.    Skin: Skin color, texture, turgor normal. No rashes or lesions  Neurologic: Normal strength and tone. No focal numbness or weakness     Laboratory Data:  No results for input(s): WBC, RBC, HGB, HCT, PLT, MCV, MCH, MCHC in the last 24 hours.    BMP:   Recent Labs   Lab 03/11/22  0532   GLU 75      K 4.5   *   CO2 21*   BUN 56*   CREATININE 2.3*   CALCIUM 10.4     Lab Results   Component Value Date    CALCIUM 10.4 03/11/2022    PHOS 3.4 03/11/2022       Lab Results   Component Value Date    .2 (H) 03/08/2022    CALCIUM 10.4 03/11/2022    CAION 1.38 03/09/2022    PHOS 3.4 03/11/2022       Lab  Results   Component Value Date    URICACID 9.1 (H) 03/08/2022       BNP  No results for input(s): BNP, BNPTRIAGEBLO in the last 168 hours.    Medications:  Medication list was reviewed and changes noted under Assessment/Plan.    Diagnostic Results:        ASSESSMENT/PLAN:     Very slowly improving JOSÉ MIGUEL on mild CKD III (baseline 1.3) secondary to hypercalcemia, poor oral intake while on ARB/HCTZ and now with UTI:  -events/trends noted of past few months.  -pt has had steady increase in Creat since Jan of this year at [facility] where her Creat was 1.7 and changed from ACE to ARB/HCTZ.    -lost to follow up and has been in and out of hospitals with mental disorder.  -no record of NSAIDS  -mild right hydro on US but negative CT.   -urine consistent with UTI and agree with antibx  -Creat slowly resolving and need better I/O documentation.  -hold ACE/ARB/HCTZ for now.   -Renally dose meds, avoid nephrotoxins, and monitor I/O's closely.  Check labs on Monday        Hypercalcemia secondary to MGUS/1HPTH:  -aggressive IVF's with LR but pt lost IV.  Encourage oral intake.   -started low dose sensipar but change to daily for now as unable to follow directions of increasing oral intake.  -need f/up with heme/onc for MGUS with high likelihood of MM  w/ remote hx of lytic skull lesion (Tumor Board at ) and Calcium trends.  -no more diuretics.  -f/up ionized roxana wnl.        Behavioral disturbance/paranoia:  -no record of lithium  -defer to psych/neuro  -recent LP at  negative        See above  Ok for NH-psych facility  F/u with [facility] or choice of PCP.   Will see on Monday

## 2022-03-12 PROCEDURE — 94761 N-INVAS EAR/PLS OXIMETRY MLT: CPT

## 2022-03-12 PROCEDURE — 25000003 PHARM REV CODE 250: Performed by: PHYSICIAN ASSISTANT

## 2022-03-12 PROCEDURE — 11000001 HC ACUTE MED/SURG PRIVATE ROOM

## 2022-03-12 PROCEDURE — 25000003 PHARM REV CODE 250: Performed by: NURSE PRACTITIONER

## 2022-03-12 PROCEDURE — 25000003 PHARM REV CODE 250: Performed by: INTERNAL MEDICINE

## 2022-03-12 PROCEDURE — 63600175 PHARM REV CODE 636 W HCPCS: Performed by: NURSE PRACTITIONER

## 2022-03-12 RX ADMIN — MIRTAZAPINE 7.5 MG: 7.5 TABLET ORAL at 08:03

## 2022-03-12 RX ADMIN — FERROUS SULFATE TAB 325 MG (65 MG ELEMENTAL FE) 1 EACH: 325 (65 FE) TAB at 09:03

## 2022-03-12 RX ADMIN — VALPROIC ACID 250 MG: 250 SOLUTION ORAL at 09:03

## 2022-03-12 RX ADMIN — CYANOCOBALAMIN TAB 1000 MCG 1000 MCG: 1000 TAB at 09:03

## 2022-03-12 RX ADMIN — LABETALOL HYDROCHLORIDE 300 MG: 100 TABLET, FILM COATED ORAL at 09:03

## 2022-03-12 RX ADMIN — QUETIAPINE FUMARATE 12.5 MG: 25 TABLET, FILM COATED ORAL at 05:03

## 2022-03-12 RX ADMIN — ATORVASTATIN CALCIUM 40 MG: 20 TABLET, FILM COATED ORAL at 09:03

## 2022-03-12 RX ADMIN — HYDRALAZINE HYDROCHLORIDE 50 MG: 25 TABLET, FILM COATED ORAL at 06:03

## 2022-03-12 RX ADMIN — AMOXICILLIN AND CLAVULANATE POTASSIUM 500 MG: 500; 125 TABLET, FILM COATED ORAL at 09:03

## 2022-03-12 RX ADMIN — ASPIRIN 81 MG: 81 TABLET, COATED ORAL at 09:03

## 2022-03-12 RX ADMIN — LEVOTHYROXINE SODIUM 75 MCG: 75 TABLET ORAL at 06:03

## 2022-03-12 RX ADMIN — CLOPIDOGREL BISULFATE 75 MG: 75 TABLET, FILM COATED ORAL at 09:03

## 2022-03-12 RX ADMIN — QUETIAPINE FUMARATE 12.5 MG: 25 TABLET, FILM COATED ORAL at 10:03

## 2022-03-12 RX ADMIN — LABETALOL HYDROCHLORIDE 300 MG: 100 TABLET, FILM COATED ORAL at 08:03

## 2022-03-12 RX ADMIN — CINACALCET HYDROCHLORIDE 30 MG: 30 TABLET, COATED ORAL at 07:03

## 2022-03-12 RX ADMIN — AMLODIPINE BESYLATE 10 MG: 5 TABLET ORAL at 09:03

## 2022-03-12 RX ADMIN — AMOXICILLIN AND CLAVULANATE POTASSIUM 500 MG: 500; 125 TABLET, FILM COATED ORAL at 08:03

## 2022-03-12 RX ADMIN — Medication 1 TABLET: at 09:03

## 2022-03-12 NOTE — PLAN OF CARE
Patient A&oX1. Patient refused all medication attempts X2. Patient sitter at bedside. Purposeful rounding completed. Call light within reach. Side railX2.      Problem: Adult Inpatient Plan of Care  Goal: Plan of Care Review  Outcome: Ongoing, Progressing  Goal: Patient-Specific Goal (Individualized)  Outcome: Ongoing, Progressing     Problem: Skin Injury Risk Increased  Goal: Skin Health and Integrity  Outcome: Ongoing, Progressing

## 2022-03-12 NOTE — PLAN OF CARE
Patient A&Ox1. Patient with increased confusion. Patient without complaints of discomfort. Purposeful rounding completed. Call light within reach. Side rails X2.       Problem: Adult Inpatient Plan of Care  Goal: Plan of Care Review  3/12/2022 1752 by Katlyn Burgos RN  Outcome: Ongoing, Progressing  3/12/2022 1704 by Katlyn Burgos RN  Outcome: Ongoing, Progressing  Goal: Patient-Specific Goal (Individualized)  3/12/2022 1752 by Katlyn Burgos RN  Outcome: Ongoing, Progressing  3/12/2022 1704 by Katlyn Burgos RN  Outcome: Ongoing, Progressing     Problem: Skin Injury Risk Increased  Goal: Skin Health and Integrity  Outcome: Ongoing, Progressing

## 2022-03-12 NOTE — PLAN OF CARE
Disoriented X4. VSS. Sitter at bedside. Pericare q2 or as needed. Tolerating medications and diet well. HOB 30 degrees with call bell and bedside table within reach, bed in lowest position with hourly purposeful rounding. No distress noted, will continue to monitor.   Problem: Adult Inpatient Plan of Care  Goal: Plan of Care Review  Outcome: Ongoing, Progressing  Goal: Patient-Specific Goal (Individualized)  Outcome: Ongoing, Progressing  Goal: Absence of Hospital-Acquired Illness or Injury  Outcome: Ongoing, Progressing  Goal: Optimal Comfort and Wellbeing  Outcome: Ongoing, Progressing  Goal: Readiness for Transition of Care  Outcome: Ongoing, Progressing     Problem: Diabetes Comorbidity  Goal: Blood Glucose Level Within Targeted Range  Outcome: Ongoing, Progressing     Problem: Fluid and Electrolyte Imbalance (Acute Kidney Injury/Impairment)  Goal: Fluid and Electrolyte Balance  Outcome: Ongoing, Progressing     Problem: Oral Intake Inadequate (Acute Kidney Injury/Impairment)  Goal: Optimal Nutrition Intake  Outcome: Ongoing, Progressing     Problem: Renal Function Impairment (Acute Kidney Injury/Impairment)  Goal: Effective Renal Function  Outcome: Ongoing, Progressing     Problem: Skin Injury Risk Increased  Goal: Skin Health and Integrity  Outcome: Ongoing, Progressing     Problem: Fall Injury Risk  Goal: Absence of Fall and Fall-Related Injury  Outcome: Ongoing, Progressing
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
## 2022-03-13 PROCEDURE — 99232 SBSQ HOSP IP/OBS MODERATE 35: CPT | Mod: ,,, | Performed by: PHYSICIAN ASSISTANT

## 2022-03-13 PROCEDURE — 99232 PR SUBSEQUENT HOSPITAL CARE,LEVL II: ICD-10-PCS | Mod: ,,, | Performed by: PHYSICIAN ASSISTANT

## 2022-03-13 PROCEDURE — 25000003 PHARM REV CODE 250: Performed by: PHYSICIAN ASSISTANT

## 2022-03-13 PROCEDURE — 25000003 PHARM REV CODE 250: Performed by: NURSE PRACTITIONER

## 2022-03-13 PROCEDURE — S0166 INJ OLANZAPINE 2.5MG: HCPCS | Performed by: PHYSICIAN ASSISTANT

## 2022-03-13 PROCEDURE — 94761 N-INVAS EAR/PLS OXIMETRY MLT: CPT

## 2022-03-13 PROCEDURE — 11000001 HC ACUTE MED/SURG PRIVATE ROOM

## 2022-03-13 RX ORDER — ALPRAZOLAM 0.25 MG/1
0.25 TABLET ORAL ONCE
Status: COMPLETED | OUTPATIENT
Start: 2022-03-13 | End: 2022-03-13

## 2022-03-13 RX ADMIN — OLANZAPINE 2.5 MG: 10 INJECTION, POWDER, FOR SOLUTION INTRAMUSCULAR at 09:03

## 2022-03-13 RX ADMIN — AMOXICILLIN AND CLAVULANATE POTASSIUM 500 MG: 500; 125 TABLET, FILM COATED ORAL at 11:03

## 2022-03-13 RX ADMIN — FERROUS SULFATE TAB 325 MG (65 MG ELEMENTAL FE) 1 EACH: 325 (65 FE) TAB at 11:03

## 2022-03-13 RX ADMIN — ASPIRIN 81 MG: 81 TABLET, COATED ORAL at 11:03

## 2022-03-13 RX ADMIN — Medication 1 TABLET: at 11:03

## 2022-03-13 RX ADMIN — CYANOCOBALAMIN TAB 1000 MCG 1000 MCG: 1000 TAB at 11:03

## 2022-03-13 RX ADMIN — QUETIAPINE FUMARATE 12.5 MG: 25 TABLET, FILM COATED ORAL at 05:03

## 2022-03-13 RX ADMIN — ALPRAZOLAM 0.25 MG: 0.25 TABLET ORAL at 12:03

## 2022-03-13 RX ADMIN — VALPROIC ACID 250 MG: 250 SOLUTION ORAL at 09:03

## 2022-03-13 RX ADMIN — LABETALOL HYDROCHLORIDE 300 MG: 100 TABLET, FILM COATED ORAL at 11:03

## 2022-03-13 RX ADMIN — ATORVASTATIN CALCIUM 40 MG: 20 TABLET, FILM COATED ORAL at 11:03

## 2022-03-13 RX ADMIN — AMLODIPINE BESYLATE 10 MG: 5 TABLET ORAL at 11:03

## 2022-03-13 RX ADMIN — CLOPIDOGREL BISULFATE 75 MG: 75 TABLET, FILM COATED ORAL at 11:03

## 2022-03-13 NOTE — PLAN OF CARE
Patient A&Ox1. Sitter and family at bedside. Patient refused some meds see MAR. Pt assisted up to chair tolerated well. CHG bath completed and sheets changed. Purposeful rounding completed. Call light within reach. Side rails X2      Problem: Adult Inpatient Plan of Care  Goal: Plan of Care Review  Outcome: Ongoing, Progressing  Goal: Patient-Specific Goal (Individualized)  Outcome: Ongoing, Progressing     Problem: Diabetes Comorbidity  Goal: Blood Glucose Level Within Targeted Range  Outcome: Ongoing, Progressing     Problem: Fall Injury Risk  Goal: Absence of Fall and Fall-Related Injury  Outcome: Ongoing, Progressing
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
## 2022-03-13 NOTE — PLAN OF CARE
Patient is oriented to self. Sitter at bedside. Without sitter for roughly 2 hours in which patient became increasingly restless and agitated. Provider notified and medications administered. Sitter back at bedside. Patient denied pain. No falls or injuries. Bed locked in lowest position with side rails up x 3. Call light within reach of patient. Will continue purposeful rounding.

## 2022-03-14 PROBLEM — E83.52 HYPERCALCEMIA: Status: ACTIVE | Noted: 2022-03-14

## 2022-03-14 PROBLEM — E21.3 HYPERPARATHYROIDISM: Status: ACTIVE | Noted: 2022-03-14

## 2022-03-14 PROBLEM — G93.41 ENCEPHALOPATHY, METABOLIC: Status: ACTIVE | Noted: 2022-03-14

## 2022-03-14 PROBLEM — N30.00 ACUTE CYSTITIS: Status: ACTIVE | Noted: 2022-03-14

## 2022-03-14 PROBLEM — R63.8 INADEQUATE ORAL INTAKE: Status: ACTIVE | Noted: 2022-03-14

## 2022-03-14 LAB
ALBUMIN SERPL BCP-MCNC: 2.8 G/DL (ref 3.5–5.2)
ANION GAP SERPL CALC-SCNC: 8 MMOL/L (ref 8–16)
BUN SERPL-MCNC: 50 MG/DL (ref 8–23)
CALCIUM SERPL-MCNC: 9.5 MG/DL (ref 8.7–10.5)
CHLORIDE SERPL-SCNC: 113 MMOL/L (ref 95–110)
CO2 SERPL-SCNC: 23 MMOL/L (ref 23–29)
CREAT SERPL-MCNC: 2.1 MG/DL (ref 0.5–1.4)
EST. GFR  (AFRICAN AMERICAN): 27 ML/MIN/1.73 M^2
EST. GFR  (NON AFRICAN AMERICAN): 24 ML/MIN/1.73 M^2
GLUCOSE SERPL-MCNC: 97 MG/DL (ref 70–110)
PHOSPHATE SERPL-MCNC: 3 MG/DL (ref 2.7–4.5)
POTASSIUM SERPL-SCNC: 4.4 MMOL/L (ref 3.5–5.1)
SODIUM SERPL-SCNC: 144 MMOL/L (ref 136–145)

## 2022-03-14 PROCEDURE — 99900035 HC TECH TIME PER 15 MIN (STAT)

## 2022-03-14 PROCEDURE — 25000003 PHARM REV CODE 250: Performed by: PHYSICIAN ASSISTANT

## 2022-03-14 PROCEDURE — 36415 COLL VENOUS BLD VENIPUNCTURE: CPT | Performed by: INTERNAL MEDICINE

## 2022-03-14 PROCEDURE — 94761 N-INVAS EAR/PLS OXIMETRY MLT: CPT

## 2022-03-14 PROCEDURE — 80069 RENAL FUNCTION PANEL: CPT | Performed by: INTERNAL MEDICINE

## 2022-03-14 PROCEDURE — 99232 PR SUBSEQUENT HOSPITAL CARE,LEVL II: ICD-10-PCS | Mod: ,,, | Performed by: PHYSICIAN ASSISTANT

## 2022-03-14 PROCEDURE — 99232 SBSQ HOSP IP/OBS MODERATE 35: CPT | Mod: ,,, | Performed by: PHYSICIAN ASSISTANT

## 2022-03-14 PROCEDURE — 25000003 PHARM REV CODE 250: Performed by: INTERNAL MEDICINE

## 2022-03-14 PROCEDURE — 11000001 HC ACUTE MED/SURG PRIVATE ROOM

## 2022-03-14 PROCEDURE — 25000003 PHARM REV CODE 250: Performed by: NURSE PRACTITIONER

## 2022-03-14 RX ORDER — QUETIAPINE FUMARATE 25 MG/1
25 TABLET, FILM COATED ORAL DAILY
Status: DISCONTINUED | OUTPATIENT
Start: 2022-03-14 | End: 2022-03-16

## 2022-03-14 RX ORDER — MIRTAZAPINE 15 MG/1
15 TABLET, FILM COATED ORAL NIGHTLY
Status: DISCONTINUED | OUTPATIENT
Start: 2022-03-14 | End: 2022-04-04 | Stop reason: HOSPADM

## 2022-03-14 RX ORDER — AMOXICILLIN AND CLAVULANATE POTASSIUM 500; 125 MG/1; MG/1
1 TABLET, FILM COATED ORAL 2 TIMES DAILY
Status: COMPLETED | OUTPATIENT
Start: 2022-03-14 | End: 2022-03-14

## 2022-03-14 RX ADMIN — AMOXICILLIN AND CLAVULANATE POTASSIUM 500 MG: 500; 125 TABLET, FILM COATED ORAL at 08:03

## 2022-03-14 RX ADMIN — QUETIAPINE FUMARATE 12.5 MG: 25 TABLET, FILM COATED ORAL at 09:03

## 2022-03-14 RX ADMIN — HYDRALAZINE HYDROCHLORIDE 50 MG: 25 TABLET, FILM COATED ORAL at 12:03

## 2022-03-14 RX ADMIN — CLOPIDOGREL BISULFATE 75 MG: 75 TABLET, FILM COATED ORAL at 09:03

## 2022-03-14 RX ADMIN — MIRTAZAPINE 15 MG: 15 TABLET, FILM COATED ORAL at 08:03

## 2022-03-14 RX ADMIN — VALPROIC ACID 250 MG: 250 SOLUTION ORAL at 09:03

## 2022-03-14 RX ADMIN — AMOXICILLIN AND CLAVULANATE POTASSIUM 500 MG: 500; 125 TABLET, FILM COATED ORAL at 12:03

## 2022-03-14 RX ADMIN — QUETIAPINE FUMARATE 12.5 MG: 25 TABLET, FILM COATED ORAL at 08:03

## 2022-03-14 RX ADMIN — CYANOCOBALAMIN TAB 1000 MCG 1000 MCG: 1000 TAB at 09:03

## 2022-03-14 RX ADMIN — QUETIAPINE FUMARATE 25 MG: 25 TABLET ORAL at 05:03

## 2022-03-14 RX ADMIN — AMLODIPINE BESYLATE 10 MG: 5 TABLET ORAL at 09:03

## 2022-03-14 RX ADMIN — LABETALOL HYDROCHLORIDE 300 MG: 100 TABLET, FILM COATED ORAL at 08:03

## 2022-03-14 RX ADMIN — ASPIRIN 81 MG: 81 TABLET, COATED ORAL at 09:03

## 2022-03-14 RX ADMIN — ATORVASTATIN CALCIUM 40 MG: 20 TABLET, FILM COATED ORAL at 09:03

## 2022-03-14 RX ADMIN — FERROUS SULFATE TAB 325 MG (65 MG ELEMENTAL FE) 1 EACH: 325 (65 FE) TAB at 09:03

## 2022-03-14 RX ADMIN — HYDRALAZINE HYDROCHLORIDE 50 MG: 25 TABLET, FILM COATED ORAL at 08:03

## 2022-03-14 RX ADMIN — LABETALOL HYDROCHLORIDE 300 MG: 100 TABLET, FILM COATED ORAL at 09:03

## 2022-03-14 NOTE — PLAN OF CARE
Per FRANCISCO JAVIER Mendoza, the patient is medically cleared for discharge.  The patient still requires a sitter.  Sitter will have to be discontinued before discharge. Contact for [facility] is Michelle [phone]. FRANCISCO JAVIER Mendoza updated. Referral update:    1. [facility]-under review   2.  [facility]-under review  3. [facility]-declined  4. [facility]: declined  5. [facility]: no bed available  6. [facility]: not accepting new residents  7. [facility]: no bed available  8. [facility]: out of network with insurance  9. [facility]: no long term medicaid beds  10. [facility]: care exceeds capacity  11. [facility]: under review     03/14/22 0929   Discharge Reassessment   Assessment Type Discharge Planning Reassessment   Discharge Plan A Skilled Nursing Facility   Discharge Plan B New Nursing Home placement - assisted care facility   Discharge Barriers Identified None   Why the patient remains in the hospital Placement issues   Post-Acute Status   Post-Acute Authorization Placement   Post-Acute Placement Status Pending post-acute provider review/more information requested   Coverage [facility] Dual   Discharge Delays   (level 2 passr)
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
## 2022-03-14 NOTE — PLAN OF CARE
This CM Director spoke with [employer] [phone]. Per Gail, the level 2 passr is still under review.  Will continue to follow.

## 2022-03-14 NOTE — PLAN OF CARE
Patient oriented to self. Aggressive and combative at beginning of shift. Spoke with provider, administered Zyprexa. Moderate relief obtained. Restraints not needed. Patient denied all care during the night. Sitter at bedside. Patient resting in bed at present. Bed locked in lowest position with side rails up x 2. Call light within reach of patient. Will continue purposeful rounding.

## 2022-03-14 NOTE — PROGRESS NOTES
[facility] ([facility])  Adult Nutrition  Progress Note    SUMMARY       Recommendations     1.Recommend Boost Glucose BID as tolerated.   2. Continue 2000kcal DM diet as tolerated.   3. Encourage good PO intake as needed.    Goals: Pt to meet % EEN/EPN via PO intake + ONS By RD f/u.  Nutrition Goal Status: new  Communication of [place] Recs:  (POC)    Assessment and Plan    Inadequate oral intake  Contributing Nutrition Diagnosis  Inadequate oral intake    Related to (etiology):   AMS     Signs and Symptoms (as evidenced by):   PO intake 50% ; no ONS ordered     Interventions/Recommendations (treatment strategy):  Collaboration with other healthcare providers  2000kcal DM Diet  ONS    Nutrition Diagnosis Status:   New     Reason for Assessment    Reason For Assessment: length of stay  Diagnosis:  (JOSÉ MIGUEL superiposed on CKD)  Relevant Medical History: Dementia, T2DM, CKD3, CAD s/p PCI, hypothyroidism, HTN, and schizophrenia  Interdisciplinary Rounds: did not attend  General Information Comments: 3/14- Pt seen 2/2 LOS. Pt on 2000kcal DM diet with no ONS. Presented after becoming aggressive- recent stay at [facility] for behavioral disturbances. Awaiting NH placement. PO intake 50%. AMS noted. Will continue to monitor.    Nutrition Discharge Planning: Pt to follow a cardiac/ DM diet as tolerated.    Nutrition Risk Screen    Nutrition Risk Screen: no indicators present    Nutrition/Diet History    Spiritual, Cultural Beliefs, Denominational Practices, Values that Affect Care: no  Factors Affecting Nutritional Intake: impaired cognitive status/motor control    Anthropometrics    Temp: 98.4 °F (36.9 °C)  Height: 5' (152.4 cm)  Height (inches): 60 in  Weight Method: Bed Scale  Weight: 89.4 kg (197 lb 1.5 oz)  Weight (lb): 197.09 lb  Ideal Body Weight (IBW), Female: 100 lb  % Ideal Body Weight, Female (lb): 197.09 %  BMI (Calculated): 38.5  BMI Grade: 35 - 39.9 - obesity - grade II     Lab/Procedures/Meds    Pertinent Labs  Reviewed: reviewed  Pertinent Labs Comments: BUN 50, Cr 2.1  Pertinent Medications Reviewed: reviewed  Pertinent Medications Comments: Amlodipine, atorvastatin, cinacalcet, clopidogrel, Vit B12, ferrous sulfate, hydralazine, labetalol, levothyroxine, mirtazapine, MV, quetiapine, valproic acid    Estimated/Assessed Needs    Weight Used For Calorie Calculations: 89.4 kg (197 lb 1.5 oz)  Energy Calorie Requirements (kcal): 0542-1862 kcal day (20-30 kcal/kg JOSÉ MIGUEL)  Energy Need Method: Kcal/kg  Protein Requirements: 72- 90 g day (0.8-1.0 g/kg JOSÉ MIGUEL)  Weight Used For Protein Calculations: 89.4 kg (197 lb 1.5 oz)  Fluid Requirements (mL): 500 mL + total output  Estimated Fluid Requirement Method: other (see comments) (JOSÉ MIGUEL)  RDA Method (mL): 1700  CHO Requirement: 213 g day    Nutrition Prescription Ordered    Current Diet Order: 2000 kcal DM diet    Evaluation of Received Nutrient/Fluid Intake    Energy Calories Required: not meeting needs  Protein Required: not meeting needs  Fluid Required: not meeting needs  % Intake of Estimated Energy Needs: 50 - 75 %  % Meal Intake: 50 - 75 %    Nutrition Risk    Level of Risk/Frequency of Follow-up:  (1-2 x weekly)     Monitor and Evaluation    Food and Nutrient Intake: food and beverage intake, energy intake  Food and Nutrient Adminstration: diet order  Knowledge/Beliefs/Attitudes: food and nutrition knowledge/skill  Physical Activity and Function: nutrition-related ADLs and IADLs  Anthropometric Measurements: weight, weight change, body mass index  Biochemical Data, Medical Tests and Procedures: glucose/endocrine profile, gastrointestinal profile, electrolyte and renal panel, lipid profile, inflammatory profile  Nutrition-Focused Physical Findings: overall appearance     Nutrition Follow-Up    RD Follow-up?: Yes

## 2022-03-14 NOTE — ASSESSMENT & PLAN NOTE
Acute metabolic encephalopathy (resolved)  - Patient recently released from inpatient stay at [facility]; became acutely combative with family after discharge.  - metabolic encephalopathy due to UTI, uremia and hypercalcemia, all treated  - continue Depakene 250mg daily, given continued use of PRNs for agitation, increase Remeron 15 mg q.h.s., Seroquel 25 mg q.h.s.  - discontinued Klonopin as it can worsen encephalopathy, use Seroquel 12.5 mg p.o. or Zyprexa 2.5 mg IM p.r.n. for non-redirectable agitation only; may need to reconsider klonopin now that acute encephalopathy has resolved  - questionable grave disability, now that acute process resolved will re-consult psych  - has Neurology appointment scheduled next month

## 2022-03-14 NOTE — PLAN OF CARE
Recommendations     1.Recommend Boost Glucose BID as tolerated.   2. Continue 2000kcal DM diet as tolerated.   3. Encourage good PO intake as needed.    Goals: Pt to meet % EEN/EPN via PO intake + ONS By RD f/u.  Nutrition Goal Status: new  Communication of RD Recs:  (POC)

## 2022-03-14 NOTE — ASSESSMENT & PLAN NOTE
- Continue amlodipine, labetalol  -  d/c losartan and hctz with JOSÉ MIGUEL/hypercalcemia.  -  Hydralazine added for better for control  - now monitor

## 2022-03-14 NOTE — PROGRESS NOTES
Nephrology  Progress Note    Admit Date: 3/1/2022   LOS: 10 days     SUBJECTIVE:     Follow-up For:  Acute kidney injury superimposed on CKD    Interval History:   Still remains altered and confused.  Discussed with team/sitter.  Refusing meds/treatments at times.  Awaiting placement.     Review of Systems:    Unable to fully access    OBJECTIVE:     Vital Signs Range (Last 24H):  BP (!) 192/73   Pulse 72   Temp 98.4 °F (36.9 °C)   Resp 16   Ht 5' (1.524 m)   Wt 89.4 kg (197 lb 1.5 oz)   LMP  (LMP Unknown)   SpO2 96%   BMI 38.49 kg/m²     Temp:  [98.2 °F (36.8 °C)-98.5 °F (36.9 °C)]   Pulse:  [60-75]   Resp:  [16-18]   BP: (116-192)/(73-88)   SpO2:  [96 %-99 %]     I & O (Last 24H):    Intake/Output Summary (Last 24 hours) at 3/14/2022 0834  Last data filed at 3/14/2022 0400  Gross per 24 hour   Intake 240 ml   Output --   Net 240 ml       Physical Exam:  General appearance: Well developed, well nourished but altered/confused  Eyes:  Conjunctivae/corneas clear. PERRL.  Lungs: Normal respiratory effort,   clear to auscultation bilaterally   Heart: Regular rate and rhythm, S1, S2 normal, no murmur, rub or padmini.  Abdomen: Soft, non-tender non-distended; bowel sounds normal; no masses,  no organomegaly  Extremities: No cyanosis or clubbing. No edema.    Skin: Skin color, texture, turgor normal. No rashes or lesions  Neurologic: Normal strength and tone. No focal numbness or weakness     Laboratory Data:  No results for input(s): WBC, RBC, HGB, HCT, PLT, MCV, MCH, MCHC in the last 24 hours.    BMP:   Recent Labs   Lab 03/11/22  0532   GLU 75      K 4.5   *   CO2 21*   BUN 56*   CREATININE 2.3*   CALCIUM 10.4     Lab Results   Component Value Date    CALCIUM 10.4 03/11/2022    PHOS 3.4 03/11/2022       Lab Results   Component Value Date    .2 (H) 03/08/2022    CALCIUM 10.4 03/11/2022    CAION 1.38 03/09/2022    PHOS 3.4 03/11/2022       Lab Results   Component Value Date    URICACID 9.1 (H)  03/08/2022       BNP  No results for input(s): BNP, BNPTRIAGEBLO in the last 168 hours.    Medications:  Medication list was reviewed and changes noted under Assessment/Plan.    Diagnostic Results:        ASSESSMENT/PLAN:     Very slowly improving JOSÉ MIGUEL on mild CKD III (baseline 1.3) secondary to hypercalcemia, poor oral intake while on ARB/HCTZ and now with UTI:  -events/trends noted of past few months.  -pt has had steady increase in Creat since Jan of this year at [facility] where her Creat was 1.7 and changed from ACE to ARB/HCTZ.    -lost to follow up and has been in and out of hospitals with mental disorder.  -no record of NSAIDS  -mild right hydro on US but negative CT.   -urine consistent with UTI and agree with antibx  -Creat slowly resolving and need better I/O documentation.  -hold ACE/ARB/HCTZ for now.   -not a dialysis candidate with mental disorder as she would be a danger to herself with likelihood of pulling out lines.   -Renally dose meds, avoid nephrotoxins, and monitor I/O's closely.          Hypercalcemia secondary to MGUS/1HPTH:  -aggressive IVF's with LR but pt lost IV.  Encourage oral intake.   -started low dose sensipar but change to daily for now as unable to follow directions of increasing oral intake.  -need f/up with heme/onc for MGUS with high likelihood of MM  w/ remote hx of lytic skull lesion (Tumor Board at [facility]) and Calcium trends.  -no more diuretics.  -f/up ionized roxana wnl.        Behavioral disturbance/paranoia:  -no record of lithium  -defer to psych/neuro  -recent LP at  negative        See above  Ok for NH-psych facility  F/u with [facility] or choice of PCP.   Nothing else to offer.  Thanks
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
## 2022-03-14 NOTE — ASSESSMENT & PLAN NOTE
Contributing Nutrition Diagnosis  Inadequate oral intake    Related to (etiology):   AMS     Signs and Symptoms (as evidenced by):   PO intake 50% ; no ONS ordered     Interventions/Recommendations (treatment strategy):  Collaboration with other healthcare providers  [address] [place] [address] DM Diet  ONS    Nutrition Diagnosis Status:   New

## 2022-03-14 NOTE — ASSESSMENT & PLAN NOTE
- Heme-Onc consulted given MGUS with calcium trends, no acute intervention necessary  - patient has appointment scheduled with her oncologist in April

## 2022-03-14 NOTE — ASSESSMENT & PLAN NOTE
Non-anion gap metabolic acidosis  - appreciate nephrology consultation  - Creat slowly resolving   - creatinine has been trending up since January of this year  - mild right hydronephrosis on US, no evidence of hydronephrosis on follow-up CT  - s/p IVFs and creatinine very slowly improving  - d/c thiazide/ARB, avoid nephrotoxins  - will need [facility] nephrology referral on discharge for follow up
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
## 2022-03-15 LAB — PATHOLOGIST INTERPRETATION IFE: NORMAL

## 2022-03-15 PROCEDURE — 11000001 HC ACUTE MED/SURG PRIVATE ROOM

## 2022-03-15 PROCEDURE — 99233 SBSQ HOSP IP/OBS HIGH 50: CPT | Mod: ,,, | Performed by: PHYSICIAN ASSISTANT

## 2022-03-15 PROCEDURE — G0425 PR INPT TELEHEALTH CONSULT 30M: ICD-10-PCS | Mod: GT,,, | Performed by: PSYCHIATRY & NEUROLOGY

## 2022-03-15 PROCEDURE — 25000003 PHARM REV CODE 250: Performed by: NURSE PRACTITIONER

## 2022-03-15 PROCEDURE — G0425 INPT/ED TELECONSULT30: HCPCS | Mod: GT,,, | Performed by: PSYCHIATRY & NEUROLOGY

## 2022-03-15 PROCEDURE — 63600175 PHARM REV CODE 636 W HCPCS: Performed by: NURSE PRACTITIONER

## 2022-03-15 PROCEDURE — 99233 PR SUBSEQUENT HOSPITAL CARE,LEVL III: ICD-10-PCS | Mod: ,,, | Performed by: PHYSICIAN ASSISTANT

## 2022-03-15 PROCEDURE — 25000003 PHARM REV CODE 250: Performed by: INTERNAL MEDICINE

## 2022-03-15 PROCEDURE — 25000003 PHARM REV CODE 250: Performed by: PHYSICIAN ASSISTANT

## 2022-03-15 PROCEDURE — 94761 N-INVAS EAR/PLS OXIMETRY MLT: CPT

## 2022-03-15 RX ADMIN — LEVOTHYROXINE SODIUM 75 MCG: 75 TABLET ORAL at 05:03

## 2022-03-15 RX ADMIN — QUETIAPINE FUMARATE 25 MG: 25 TABLET ORAL at 04:03

## 2022-03-15 RX ADMIN — CLOPIDOGREL BISULFATE 75 MG: 75 TABLET, FILM COATED ORAL at 10:03

## 2022-03-15 RX ADMIN — Medication 1 TABLET: at 10:03

## 2022-03-15 RX ADMIN — MIRTAZAPINE 15 MG: 15 TABLET, FILM COATED ORAL at 08:03

## 2022-03-15 RX ADMIN — HYDRALAZINE HYDROCHLORIDE 50 MG: 25 TABLET, FILM COATED ORAL at 10:03

## 2022-03-15 RX ADMIN — HYDRALAZINE HYDROCHLORIDE 50 MG: 25 TABLET, FILM COATED ORAL at 05:03

## 2022-03-15 RX ADMIN — LABETALOL HYDROCHLORIDE 300 MG: 100 TABLET, FILM COATED ORAL at 08:03

## 2022-03-15 RX ADMIN — AMLODIPINE BESYLATE 10 MG: 5 TABLET ORAL at 10:03

## 2022-03-15 RX ADMIN — QUETIAPINE FUMARATE 12.5 MG: 25 TABLET, FILM COATED ORAL at 10:03

## 2022-03-15 RX ADMIN — HYDRALAZINE HYDROCHLORIDE 50 MG: 25 TABLET, FILM COATED ORAL at 04:03

## 2022-03-15 RX ADMIN — VALPROIC ACID 250 MG: 250 SOLUTION ORAL at 10:03

## 2022-03-15 RX ADMIN — FERROUS SULFATE TAB 325 MG (65 MG ELEMENTAL FE) 1 EACH: 325 (65 FE) TAB at 10:03

## 2022-03-15 RX ADMIN — CINACALCET HYDROCHLORIDE 30 MG: 30 TABLET, COATED ORAL at 10:03

## 2022-03-15 RX ADMIN — ASPIRIN 81 MG: 81 TABLET, COATED ORAL at 10:03

## 2022-03-15 RX ADMIN — ATORVASTATIN CALCIUM 40 MG: 20 TABLET, FILM COATED ORAL at 10:03

## 2022-03-15 RX ADMIN — CYANOCOBALAMIN TAB 1000 MCG 1000 MCG: 1000 TAB at 10:03

## 2022-03-15 RX ADMIN — LABETALOL HYDROCHLORIDE 300 MG: 100 TABLET, FILM COATED ORAL at 10:03
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
## 2022-03-15 NOTE — PLAN OF CARE
03/15/22 0802   Post-Acute Status   Post-Acute Authorization Placement   Post-Acute Placement Status Referrals Sent   Coverage [facility]   Discharge Delays (!) Post-Acute Set-up  (NH acceptance)   Discharge Plan   Discharge Plan A New Nursing Home placement - retirement care facility   Discharge Plan B Home with family     Patient has not been accepted by a NH; [facility] but inquired if the patient remained with a sitter, which the patient does remain with a sitter. There are no other acceptances from numerous referral sent out in [facility].    [facility] is reviewing whether patient meets standards for NH placement due to psychiatric hx, thereby a level 2 review is ongoing. More clinicals sent to [facility] at this time to [phone].

## 2022-03-15 NOTE — PLAN OF CARE
Problem: Adult Inpatient Plan of Care  Goal: Plan of Care Review  Outcome: Ongoing, Progressing  Goal: Patient-Specific Goal (Individualized)  Outcome: Ongoing, Progressing  Goal: Absence of Hospital-Acquired Illness or Injury  Outcome: Ongoing, Progressing  Goal: Optimal Comfort and Wellbeing  Outcome: Ongoing, Progressing  Goal: Readiness for Transition of Care  Outcome: Ongoing, Progressing     Problem: Diabetes Comorbidity  Goal: Blood Glucose Level Within Targeted Range  Outcome: Ongoing, Progressing     Problem: Fluid and Electrolyte Imbalance (Acute Kidney Injury/Impairment)  Goal: Fluid and Electrolyte Balance  Outcome: Ongoing, Progressing     Problem: Oral Intake Inadequate (Acute Kidney Injury/Impairment)  Goal: Optimal Nutrition Intake  Outcome: Ongoing, Progressing     Problem: Renal Function Impairment (Acute Kidney Injury/Impairment)  Goal: Effective Renal Function  Outcome: Ongoing, Progressing     Problem: Skin Injury Risk Increased  Goal: Skin Health and Integrity  Outcome: Ongoing, Progressing     Problem: Fall Injury Risk  Goal: Absence of Fall and Fall-Related Injury  Outcome: Ongoing, Progressing     Problem: Restraint, Nonbehavioral (Nonviolent)  Goal: Absence of Harm or Injury  Outcome: Ongoing, Progressing

## 2022-03-15 NOTE — SUBJECTIVE & OBJECTIVE
Interval History:  Sitter at bedside; patient in chair ate most of her breakfast in no distress; alert to self answers questions inappropriately    Review of Systems   Unable to perform ROS: Mental status change   Objective:     Vital Signs (Most Recent):  Temp: 98.7 °F (37.1 °C) (03/15/22 1629)  Pulse: 64 (03/15/22 1629)  Resp: 12 (03/15/22 1629)  BP: (!) 152/70 (03/15/22 1629)  SpO2: 100 % (03/15/22 1629)   Vital Signs (24h Range):  Temp:  [97 °F (36.1 °C)-98.7 °F (37.1 °C)] 98.7 °F (37.1 °C)  Pulse:  [63-70] 64  Resp:  [12-18] 12  SpO2:  [97 %-100 %] 100 %  BP: (133-168)/(58-71) 152/70     Weight: 89.4 kg (197 lb 1.5 oz)  Body mass index is 38.49 kg/m².    Intake/Output Summary (Last 24 hours) at 3/15/2022 1650  Last data filed at 3/15/2022 0400  Gross per 24 hour   Intake 960 ml   Output --   Net 960 ml      Physical Exam  Vitals and nursing note reviewed.   Constitutional:       General: She is not in acute distress.     Appearance: She is well-developed. She is not diaphoretic.   HENT:      Head: Normocephalic and atraumatic.   Eyes:      General: No scleral icterus.     Conjunctiva/sclera: Conjunctivae normal.   Cardiovascular:      Rate and Rhythm: Normal rate and regular rhythm.      Heart sounds: Normal heart sounds.   Pulmonary:      Effort: Pulmonary effort is normal. No respiratory distress.      Breath sounds: Normal breath sounds. No wheezing.   Abdominal:      General: Bowel sounds are normal. There is no distension.      Palpations: Abdomen is soft.      Tenderness: There is no abdominal tenderness.   Musculoskeletal:         General: Normal range of motion.      Cervical back: Normal range of motion and neck supple.   Skin:     General: Skin is warm and dry.      Capillary Refill: Capillary refill takes less than 2 seconds.   Neurological:      Mental Status: She is alert.      Comments: Grossly nonfocal       Significant Labs: All pertinent labs within the past 24 hours have been reviewed.  CBC:  No results for input(s): WBC, HGB, HCT, PLT in the last 48 hours.  CMP:   Recent Labs   Lab 03/14/22  1112      K 4.4   *   CO2 23   GLU 97   BUN 50*   CREATININE 2.1*   CALCIUM 9.5   ALBUMIN 2.8*   ANIONGAP 8   EGFRNONAA 24*       Significant Imaging:   Imaging Results              US Retroperitoneal Complete (Final result)  Result time 03/02/22 09:11:23      Final result by Catie Coreas MD (03/02/22 09:11:23)                   Impression:      Mild right hydronephrosis.  Correlation with renal stone CT may be considered in further evaluating.      Electronically signed by: Catie Coreas  Date:    03/02/2022  Time:    09:11               Narrative:    EXAMINATION:  US RETROPERITONEAL COMPLETE    CLINICAL HISTORY:  JOSÉ MIGUEL;    TECHNIQUE:  Ultrasound of the kidneys and urinary bladder was performed including color flow and Doppler evaluation of the kidneys.    COMPARISON:  None.    FINDINGS:  Right kidney: The right kidney measures 9.7 cm. No cortical thinning. No loss of corticomedullary distinction. Resistive index measures 0.75.  No mass. No renal stone. Mild hydronephrosis.    Left kidney: The left kidney measures 9.5 cm. No cortical thinning. No loss of corticomedullary distinction. Resistive index measures 0.73.  No mass. No renal stone. No hydronephrosis.    The bladder is partially distended at the time of scanning and has an unremarkable appearance.

## 2022-03-16 PROCEDURE — 25000003 PHARM REV CODE 250: Performed by: NURSE PRACTITIONER

## 2022-03-16 PROCEDURE — 99233 SBSQ HOSP IP/OBS HIGH 50: CPT | Mod: ,,, | Performed by: PHYSICIAN ASSISTANT

## 2022-03-16 PROCEDURE — 25000003 PHARM REV CODE 250: Performed by: PHYSICIAN ASSISTANT

## 2022-03-16 PROCEDURE — 94761 N-INVAS EAR/PLS OXIMETRY MLT: CPT

## 2022-03-16 PROCEDURE — 11000001 HC ACUTE MED/SURG PRIVATE ROOM

## 2022-03-16 PROCEDURE — 25000003 PHARM REV CODE 250: Performed by: INTERNAL MEDICINE

## 2022-03-16 PROCEDURE — 99233 PR SUBSEQUENT HOSPITAL CARE,LEVL III: ICD-10-PCS | Mod: ,,, | Performed by: PHYSICIAN ASSISTANT

## 2022-03-16 RX ORDER — QUETIAPINE FUMARATE 25 MG/1
25 TABLET, FILM COATED ORAL 2 TIMES DAILY
Status: DISCONTINUED | OUTPATIENT
Start: 2022-03-16 | End: 2022-04-04 | Stop reason: HOSPADM

## 2022-03-16 RX ADMIN — ASPIRIN 81 MG: 81 TABLET, COATED ORAL at 10:03

## 2022-03-16 RX ADMIN — MIRTAZAPINE 15 MG: 15 TABLET, FILM COATED ORAL at 11:03

## 2022-03-16 RX ADMIN — ATORVASTATIN CALCIUM 40 MG: 20 TABLET, FILM COATED ORAL at 10:03

## 2022-03-16 RX ADMIN — LEVOTHYROXINE SODIUM 75 MCG: 75 TABLET ORAL at 06:03

## 2022-03-16 RX ADMIN — CLOPIDOGREL BISULFATE 75 MG: 75 TABLET, FILM COATED ORAL at 10:03

## 2022-03-16 RX ADMIN — HYDRALAZINE HYDROCHLORIDE 50 MG: 25 TABLET, FILM COATED ORAL at 06:03

## 2022-03-16 RX ADMIN — FERROUS SULFATE TAB 325 MG (65 MG ELEMENTAL FE) 1 EACH: 325 (65 FE) TAB at 10:03

## 2022-03-16 RX ADMIN — LABETALOL HYDROCHLORIDE 300 MG: 100 TABLET, FILM COATED ORAL at 11:03

## 2022-03-16 RX ADMIN — CYANOCOBALAMIN TAB 1000 MCG 1000 MCG: 1000 TAB at 10:03

## 2022-03-16 RX ADMIN — LABETALOL HYDROCHLORIDE 300 MG: 100 TABLET, FILM COATED ORAL at 10:03

## 2022-03-16 RX ADMIN — QUETIAPINE FUMARATE 25 MG: 25 TABLET ORAL at 10:03

## 2022-03-16 RX ADMIN — VALPROIC ACID 250 MG: 250 SOLUTION ORAL at 11:03

## 2022-03-16 RX ADMIN — QUETIAPINE FUMARATE 25 MG: 25 TABLET ORAL at 11:03

## 2022-03-16 RX ADMIN — AMLODIPINE BESYLATE 10 MG: 5 TABLET ORAL at 10:03

## 2022-03-16 RX ADMIN — Medication 1 TABLET: at 10:03

## 2022-03-16 NOTE — ASSESSMENT & PLAN NOTE
Acute metabolic encephalopathy (resolved)  - Patient recently released from inpatient stay at [facility]; became acutely combative with family after discharge.  - metabolic encephalopathy due to UTI, uremia and hypercalcemia, all treated  - continue Depakene 250mg daily, given continued use of PRNs for agitation, increase Remeron 15 mg q.h.s., Seroquel 25 mg q.h.s.  - discontinued Klonopin as it can worsen encephalopathy, use Seroquel 12.5 mg p.o. or Zyprexa 2.5 mg IM p.r.n. for non-redirectable agitation only; may need to reconsider klonopin now that acute encephalopathy has resolved  - questionable grave disability, now that acute process resolved  Psych re-consulted 3/15 recs noted, medications adjusted   - dc planning  - has Neurology appointment scheduled next month

## 2022-03-16 NOTE — SUBJECTIVE & OBJECTIVE
Interval History: per RN Does not eat tray unless set up and coaxed by sitter. Pt has sitter in room due to impulsiveness confusion  And safety concerns    ROS: unable to obtain secondary to cognitive impairment  Objective:     Vital Signs (Most Recent):  Temp: 98.7 °F (37.1 °C) (03/16/22 0754)  Pulse: 100 (03/16/22 0754)  Resp: 16 (03/16/22 0754)  BP: (!) 128/91 (03/16/22 0754)  SpO2: 97 % (03/16/22 0754)   Vital Signs (24h Range):  Temp:  [97.5 °F (36.4 °C)-98.7 °F (37.1 °C)] 98.7 °F (37.1 °C)  Pulse:  [] 100  Resp:  [12-20] 16  SpO2:  [92 %-100 %] 97 %  BP: (128-156)/(60-91) 128/91     Weight: 89.4 kg (197 lb 1.5 oz)  Body mass index is 38.49 kg/m².    Intake/Output Summary (Last 24 hours) at 3/16/2022 1533  Last data filed at 3/16/2022 0400  Gross per 24 hour   Intake 720 ml   Output --   Net 720 ml      Physical Exam  Vitals and nursing note reviewed.   Constitutional:       General: She is not in acute distress.     Appearance: She is well-developed. She is not diaphoretic.   HENT:      Head: Normocephalic and atraumatic.   Eyes:      General: No scleral icterus.     Conjunctiva/sclera: Conjunctivae normal.   Cardiovascular:      Rate and Rhythm: Normal rate and regular rhythm.      Heart sounds: Normal heart sounds.   Pulmonary:      Effort: Pulmonary effort is normal. No respiratory distress.      Breath sounds: Normal breath sounds. No wheezing.   Abdominal:      General: Bowel sounds are normal. There is no distension.      Palpations: Abdomen is soft.      Tenderness: There is no abdominal tenderness.   Musculoskeletal:         General: Normal range of motion.      Cervical back: Normal range of motion and neck supple.   Skin:     General: Skin is warm and dry.      Capillary Refill: Capillary refill takes less than 2 seconds.   Neurological:      Mental Status: She is alert.      Comments: Grossly nonfocal       Significant Labs: All pertinent labs within the past 24 hours have been  reviewed.    Significant Imaging:   Imaging Results              US Retroperitoneal Complete (Final result)  Result time 03/02/22 09:11:23      Final result by Catie Coreas MD (03/02/22 09:11:23)                   Impression:      Mild right hydronephrosis.  Correlation with renal stone CT may be considered in further evaluating.      Electronically signed by: Catie Coreas  Date:    03/02/2022  Time:    09:11               Narrative:    EXAMINATION:  US RETROPERITONEAL COMPLETE    CLINICAL HISTORY:  JOSÉ MIGUEL;    TECHNIQUE:  Ultrasound of the kidneys and urinary bladder was performed including color flow and Doppler evaluation of the kidneys.    COMPARISON:  None.    FINDINGS:  Right kidney: The right kidney measures 9.7 cm. No cortical thinning. No loss of corticomedullary distinction. Resistive index measures 0.75.  No mass. No renal stone. Mild hydronephrosis.    Left kidney: The left kidney measures 9.5 cm. No cortical thinning. No loss of corticomedullary distinction. Resistive index measures 0.73.  No mass. No renal stone. No hydronephrosis.    The bladder is partially distended at the time of scanning and has an unremarkable appearance.

## 2022-03-16 NOTE — NURSING
Py has had siter dc 'd since 12 noon when pt fell asleep in bed. Has nacho sleep[ing quietly in bed this afternoon. Still has not taken any meds today.

## 2022-03-16 NOTE — PLAN OF CARE
Pt has been resting this afternoon.at this time. Refuses all meds. Has been non-agitated  and no confrontations due to resting well. Assisted with meals. No sitters at bedside this afternoon. Noncompliant with medical treatments and appears to be disoriented most of time. Vs stable. No c/o of pain.

## 2022-03-16 NOTE — PLAN OF CARE
Disoriented X4. VSS. Ambulates well with no assistance needed. Sitter at bedside. Walks around in room and easily redirected at times. Patient becomes agitated and anxious at times. Tolerating diabetic 2000 calorie diet and medications well. HOB low fowlers position with call bell and bedside table within reach, bed in lowest position with hourly purposeful rounding. Alarms on and audible, will continue to monitor.   Problem: Adult Inpatient Plan of Care  Goal: Plan of Care Review  Outcome: Ongoing, Progressing  Goal: Patient-Specific Goal (Individualized)  Outcome: Ongoing, Progressing  Goal: Absence of Hospital-Acquired Illness or Injury  Outcome: Ongoing, Progressing  Goal: Optimal Comfort and Wellbeing  Outcome: Ongoing, Progressing  Goal: Readiness for Transition of Care  Outcome: Ongoing, Progressing     Problem: Diabetes Comorbidity  Goal: Blood Glucose Level Within Targeted Range  Outcome: Ongoing, Progressing     Problem: Fluid and Electrolyte Imbalance (Acute Kidney Injury/Impairment)  Goal: Fluid and Electrolyte Balance  Outcome: Ongoing, Progressing     Problem: Oral Intake Inadequate (Acute Kidney Injury/Impairment)  Goal: Optimal Nutrition Intake  Outcome: Ongoing, Progressing     Problem: Renal Function Impairment (Acute Kidney Injury/Impairment)  Goal: Effective Renal Function  Outcome: Ongoing, Progressing     Problem: Skin Injury Risk Increased  Goal: Skin Health and Integrity  Outcome: Ongoing, Progressing     Problem: Fall Injury Risk  Goal: Absence of Fall and Fall-Related Injury  Outcome: Ongoing, Progressing     Problem: Restraint, Nonbehavioral (Nonviolent)  Goal: Absence of Harm or Injury  Outcome: Ongoing, Progressing
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
## 2022-03-17 LAB
POCT GLUCOSE: 170 MG/DL (ref 70–110)
POCT GLUCOSE: 96 MG/DL (ref 70–110)

## 2022-03-17 PROCEDURE — 11000001 HC ACUTE MED/SURG PRIVATE ROOM

## 2022-03-17 PROCEDURE — 25000003 PHARM REV CODE 250: Performed by: INTERNAL MEDICINE

## 2022-03-17 PROCEDURE — 99233 PR SUBSEQUENT HOSPITAL CARE,LEVL III: ICD-10-PCS | Mod: ,,, | Performed by: PHYSICIAN ASSISTANT

## 2022-03-17 PROCEDURE — 25000003 PHARM REV CODE 250: Performed by: PHYSICIAN ASSISTANT

## 2022-03-17 PROCEDURE — 99233 SBSQ HOSP IP/OBS HIGH 50: CPT | Mod: ,,, | Performed by: PHYSICIAN ASSISTANT

## 2022-03-17 PROCEDURE — 25000003 PHARM REV CODE 250: Performed by: NURSE PRACTITIONER

## 2022-03-17 PROCEDURE — 94761 N-INVAS EAR/PLS OXIMETRY MLT: CPT

## 2022-03-17 RX ORDER — HYDRALAZINE HYDROCHLORIDE 20 MG/ML
10 INJECTION INTRAMUSCULAR; INTRAVENOUS EVERY 8 HOURS PRN
Status: DISCONTINUED | OUTPATIENT
Start: 2022-03-17 | End: 2022-04-04 | Stop reason: HOSPADM

## 2022-03-17 RX ADMIN — CYANOCOBALAMIN TAB 1000 MCG 1000 MCG: 1000 TAB at 08:03

## 2022-03-17 RX ADMIN — HYDRALAZINE HYDROCHLORIDE 50 MG: 25 TABLET, FILM COATED ORAL at 06:03

## 2022-03-17 RX ADMIN — QUETIAPINE FUMARATE 12.5 MG: 25 TABLET, FILM COATED ORAL at 10:03

## 2022-03-17 RX ADMIN — VALPROIC ACID 250 MG: 250 SOLUTION ORAL at 08:03

## 2022-03-17 RX ADMIN — LABETALOL HYDROCHLORIDE 300 MG: 100 TABLET, FILM COATED ORAL at 08:03

## 2022-03-17 RX ADMIN — QUETIAPINE FUMARATE 25 MG: 25 TABLET ORAL at 08:03

## 2022-03-17 RX ADMIN — AMLODIPINE BESYLATE 10 MG: 5 TABLET ORAL at 08:03

## 2022-03-17 RX ADMIN — ASPIRIN 81 MG: 81 TABLET, COATED ORAL at 08:03

## 2022-03-17 RX ADMIN — MIRTAZAPINE 15 MG: 15 TABLET, FILM COATED ORAL at 08:03

## 2022-03-17 RX ADMIN — LEVOTHYROXINE SODIUM 75 MCG: 75 TABLET ORAL at 06:03

## 2022-03-17 RX ADMIN — HYDRALAZINE HYDROCHLORIDE 50 MG: 25 TABLET, FILM COATED ORAL at 10:03

## 2022-03-17 RX ADMIN — ATORVASTATIN CALCIUM 40 MG: 20 TABLET, FILM COATED ORAL at 08:03

## 2022-03-17 RX ADMIN — Medication 1 TABLET: at 08:03

## 2022-03-17 RX ADMIN — CLOPIDOGREL BISULFATE 75 MG: 75 TABLET, FILM COATED ORAL at 08:03

## 2022-03-17 RX ADMIN — FERROUS SULFATE TAB 325 MG (65 MG ELEMENTAL FE) 1 EACH: 325 (65 FE) TAB at 08:03

## 2022-03-17 NOTE — ASSESSMENT & PLAN NOTE
Non-anion gap metabolic acidosis  - appreciate nephrology consultation  - creatinine has been trending up since January of this year, may be a new baseline  - mild right hydronephrosis on US, no evidence of hydronephrosis on follow-up CT  - s/p IVFs and creatinine very slowly improving  - d/c thiazide/ARB, avoid nephrotoxins  - will need [facility] nephrology referral on discharge for follow up
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
## 2022-03-17 NOTE — PLAN OF CARE
Patient is oriented to self. Denies pain overnight. [employer] at bedside. Patient limited with following commands. Incontinent. No falls or injuries. Patient resting comfortably at present. Bed locked in lowest position with side rails up x 3. Call light within reach of patient. Will continue purposeful rounding.

## 2022-03-17 NOTE — PLAN OF CARE
This CM Director called [facility] ([phone]). Per Isaura, a decision is still pending from the office of behavioral health. Telesitter still at bedside and will need to be discontinued before discharging to a nursing home.

## 2022-03-17 NOTE — SUBJECTIVE & OBJECTIVE
Interval History:  Per sitter at bedside patient took medications today ate breakfast    Review of Systems  Unable to obtain secondary to cognitive impairment  Objective:     Vital Signs (Most Recent):  Temp: 98.7 °F (37.1 °C) (03/17/22 0715)  Pulse: 61 (03/17/22 0715)  Resp: 17 (03/17/22 0715)  BP: (!) 186/76 (03/17/22 0818)  SpO2: 95 % (03/17/22 0715)   Vital Signs (24h Range):  Temp:  [97.9 °F (36.6 °C)-99 °F (37.2 °C)] 98.7 °F (37.1 °C)  Pulse:  [61-72] 61  Resp:  [17-18] 17  SpO2:  [94 %-97 %] 95 %  BP: (145-186)/(76-90) 186/76     Weight: 89.4 kg (197 lb 1.5 oz)  Body mass index is 38.49 kg/m².    Intake/Output Summary (Last 24 hours) at 3/17/2022 1508  Last data filed at 3/17/2022 1000  Gross per 24 hour   Intake 360 ml   Output --   Net 360 ml      Physical Exam  Vitals and nursing note reviewed.   Constitutional:       General: She is not in acute distress.     Appearance: She is well-developed. She is not diaphoretic.   HENT:      Head: Normocephalic and atraumatic.   Eyes:      General: No scleral icterus.     Conjunctiva/sclera: Conjunctivae normal.   Cardiovascular:      Rate and Rhythm: Normal rate and regular rhythm.      Heart sounds: Normal heart sounds.   Pulmonary:      Effort: Pulmonary effort is normal. No respiratory distress.      Breath sounds: Normal breath sounds. No wheezing.   Abdominal:      General: Bowel sounds are normal. There is no distension.      Palpations: Abdomen is soft.      Tenderness: There is no abdominal tenderness.   Musculoskeletal:         General: Normal range of motion.      Cervical back: Normal range of motion and neck supple.   Skin:     General: Skin is warm and dry.      Capillary Refill: Capillary refill takes less than 2 seconds.   Neurological:      Mental Status: She is alert.      Comments: Grossly nonfocal       Significant Labs: All pertinent labs within the past 24 hours have been reviewed.      Significant Imaging:   Imaging Results              US  Retroperitoneal Complete (Final result)  Result time 03/02/22 09:11:23      Final result by Catie Coreas MD (03/02/22 09:11:23)                   Impression:      Mild right hydronephrosis.  Correlation with renal stone CT may be considered in further evaluating.      Electronically signed by: Catie Coreas  Date:    03/02/2022  Time:    09:11               Narrative:    EXAMINATION:  US RETROPERITONEAL COMPLETE    CLINICAL HISTORY:  JOSÉ MIGUEL;    TECHNIQUE:  Ultrasound of the kidneys and urinary bladder was performed including color flow and Doppler evaluation of the kidneys.    COMPARISON:  None.    FINDINGS:  Right kidney: The right kidney measures 9.7 cm. No cortical thinning. No loss of corticomedullary distinction. Resistive index measures 0.75.  No mass. No renal stone. Mild hydronephrosis.    Left kidney: The left kidney measures 9.5 cm. No cortical thinning. No loss of corticomedullary distinction. Resistive index measures 0.73.  No mass. No renal stone. No hydronephrosis.    The bladder is partially distended at the time of scanning and has an unremarkable appearance.

## 2022-03-17 NOTE — ASSESSMENT & PLAN NOTE
- elevated today, Continue amlodipine, labetalol  -  d/c losartan and hctz with JOSÉ MIGUEL/hypercalcemia.  -  Hydralazine added for better for control, p.r.n. hydralazine added as patient is inconsistent with taking her medications  - continue to monitor

## 2022-03-17 NOTE — PLAN OF CARE
POC discussed with patient. Verbalizes understanding. Awake, alert, and oriented x self. Pleasantly confused this shift. RA. Sitter at bedside. Avasys monitor discontinued. Patient took all AM medications without issue. No c/o pain noted this shift. Up to chair. No falls, traumas, or accidents at this time. Bed in lowest position, SR up x 2 for patient safety/mobility. Call light in reach. Will continue to monitor until report is given to oncoming nurse.

## 2022-03-18 ENCOUNTER — PATIENT OUTREACH (OUTPATIENT)
Dept: ADMINISTRATIVE | Facility: OTHER | Age: 67
End: 2022-03-18
Payer: MEDICARE

## 2022-03-18 PROCEDURE — 99233 PR SUBSEQUENT HOSPITAL CARE,LEVL III: ICD-10-PCS | Mod: ,,, | Performed by: PHYSICIAN ASSISTANT

## 2022-03-18 PROCEDURE — 94761 N-INVAS EAR/PLS OXIMETRY MLT: CPT

## 2022-03-18 PROCEDURE — 25000003 PHARM REV CODE 250: Performed by: PHYSICIAN ASSISTANT

## 2022-03-18 PROCEDURE — 63600175 PHARM REV CODE 636 W HCPCS: Performed by: NURSE PRACTITIONER

## 2022-03-18 PROCEDURE — 11000001 HC ACUTE MED/SURG PRIVATE ROOM

## 2022-03-18 PROCEDURE — 25000003 PHARM REV CODE 250: Performed by: NURSE PRACTITIONER

## 2022-03-18 PROCEDURE — 25000003 PHARM REV CODE 250: Performed by: INTERNAL MEDICINE

## 2022-03-18 PROCEDURE — 99233 SBSQ HOSP IP/OBS HIGH 50: CPT | Mod: ,,, | Performed by: PHYSICIAN ASSISTANT

## 2022-03-18 RX ADMIN — HYDRALAZINE HYDROCHLORIDE 50 MG: 25 TABLET, FILM COATED ORAL at 01:03

## 2022-03-18 RX ADMIN — FERROUS SULFATE TAB 325 MG (65 MG ELEMENTAL FE) 1 EACH: 325 (65 FE) TAB at 09:03

## 2022-03-18 RX ADMIN — QUETIAPINE FUMARATE 25 MG: 25 TABLET ORAL at 09:03

## 2022-03-18 RX ADMIN — CYANOCOBALAMIN TAB 1000 MCG 1000 MCG: 1000 TAB at 09:03

## 2022-03-18 RX ADMIN — Medication 1 TABLET: at 09:03

## 2022-03-18 RX ADMIN — LABETALOL HYDROCHLORIDE 300 MG: 100 TABLET, FILM COATED ORAL at 09:03

## 2022-03-18 RX ADMIN — MIRTAZAPINE 15 MG: 15 TABLET, FILM COATED ORAL at 09:03

## 2022-03-18 RX ADMIN — ATORVASTATIN CALCIUM 40 MG: 20 TABLET, FILM COATED ORAL at 09:03

## 2022-03-18 RX ADMIN — CLOPIDOGREL BISULFATE 75 MG: 75 TABLET, FILM COATED ORAL at 09:03

## 2022-03-18 RX ADMIN — HYDRALAZINE HYDROCHLORIDE 50 MG: 25 TABLET, FILM COATED ORAL at 09:03

## 2022-03-18 RX ADMIN — AMLODIPINE BESYLATE 10 MG: 5 TABLET ORAL at 09:03

## 2022-03-18 RX ADMIN — ASPIRIN 81 MG: 81 TABLET, COATED ORAL at 09:03

## 2022-03-18 RX ADMIN — CINACALCET HYDROCHLORIDE 30 MG: 30 TABLET, COATED ORAL at 08:03

## 2022-03-18 RX ADMIN — VALPROIC ACID 250 MG: 250 SOLUTION ORAL at 09:03

## 2022-03-18 NOTE — PROGRESS NOTES
[facility] - Med Surg ([facility])  Adult Nutrition  Progress Note    SUMMARY     Recommendations  1) Add Boost Glucose Control TID   2) Weekly wts   3) Continue Diabetic diet    Goals: Pt to meet % EEN/EPN via PO intake + ONS By RD f/u.  Nutrition Goal Status: progressing towards goal  Communication of RD Recs: reviewed with physician    Assessment and Plan  Inadequate oral intake  Contributing Nutrition Diagnosis  Inadequate oral intake     Related to (etiology):   AMS      Signs and Symptoms (as evidenced by):   PO intake 50% ; no ONS ordered      Interventions/Recommendations (treatment strategy):  Collaboration with other healthcare providers  Carbohydrate modified diet (Diabetic)     Nutrition Diagnosis Status:   Continues    Malnutrition Assessment    JEREMY NFPE due to remote assessment    Reason for Assessment  Reason For Assessment: RD follow-up  Diagnosis:  (JOSÉ MIGUEL superiposed on CKD)  Relevant Medical History: Dementia, T2DM, CKD3, CAD s/p PCI, hypothyroidism, HTN, and schizophrenia  Interdisciplinary Rounds: did not attend  General Information Comments: Remote assessment for coverage, per chart pt eating 50-75% meals, LBM 3/15, pt with psych hx - does not cooperate at times.  lbs x 1 year. No new wt in chart. RD to monitor and follow up.  Nutrition Discharge Planning: Pt to follow a cardiac/ DM diet as tolerated.    Nutrition Risk Screen  Nutrition Risk Screen: no indicators present    Nutrition/Diet History  Spiritual, Cultural Beliefs, Confucianism Practices, Values that Affect Care: no  Factors Affecting Nutritional Intake: impaired cognitive status/motor control    Anthropometrics  Temp: 98.3 °F (36.8 °C)  Height: 5' (152.4 cm)  Height (inches): 60 in  Weight Method: Bed Scale  Weight: 89.4 kg (197 lb 1.5 oz)  Weight (lb): 197.09 lb  Ideal Body Weight (IBW), Female: 100 lb  % Ideal Body Weight, Female (lb): 197.09 %  BMI (Calculated): 38.5  BMI Grade: 35 - 39.9 - obesity - grade II      Lab/Procedures/Meds  Pertinent Labs Reviewed: reviewed  Pertinent Labs Comments: Cl 113, Cr 2.1, GFR 27, Alb 2.8, POC BG , Hgb A1c 5.8 on 3/2/22  Pertinent Medications Reviewed: reviewed  Pertinent Medications Comments: atorvastatin, cyanocobalamin, ferrous sulfate, levothyroxine, mirtazapine, MVI    Estimated/Assessed Needs  Weight Used For Calorie Calculations: 89.4 kg (197 lb 1.5 oz)  Energy Calorie Requirements (kcal): 8981-8720 kcal day (20-30 kcal/kg JOSÉ MIGUEL)  Energy Need Method: Kcal/kg  Protein Requirements: 72- 90 g day (0.8-1.0 g/kg JOSÉ MIGUEL)  Weight Used For Protein Calculations: 89.4 kg (197 lb 1.5 oz)  Fluid Requirements (mL): 500 mL + total output  Estimated Fluid Requirement Method: other (see comments) (JOSÉ MIGUEL)  RDA Method (mL): 1700  CHO Requirement: 213 g day    Nutrition Prescription Ordered  Current Diet Order: Diabetic    Evaluation of Received Nutrient/Fluid Intake  Energy Calories Required: not meeting needs  Protein Required: meeting needs  Fluid Required: meeting needs  Comments: LBM 3/15  Tolerance: tolerating  % Intake of Estimated Energy Needs: 50 - 75 %  % Meal Intake: 50 - 75 %    Nutrition Risk  Level of Risk/Frequency of Follow-up:  (1-2x weekly)     Monitor and Evaluation  Food and Nutrient Intake: food and beverage intake, energy intake  Food and Nutrient Adminstration: diet order  Knowledge/Beliefs/Attitudes: food and nutrition knowledge/skill  Physical Activity and Function: nutrition-related ADLs and IADLs  Anthropometric Measurements: weight, weight change, body mass index  Biochemical Data, Medical Tests and Procedures: glucose/endocrine profile, gastrointestinal profile, electrolyte and renal panel, lipid profile, inflammatory profile  Nutrition-Focused Physical Findings: overall appearance     Nutrition Follow-Up  RD Follow-up?: Yes

## 2022-03-18 NOTE — ASSESSMENT & PLAN NOTE
-  Continue amlodipine, labetalol  -  d/c losartan and hctz with JOSÉ MIGUEL/hypercalcemia.  -  Hydralazine added for better for control, p.r.n. hydralazine added as patient is inconsistent with taking her medications  - continue to monitor
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
## 2022-03-18 NOTE — PLAN OF CARE
Recommendations  1) Add Boost Glucose Control TID   2) Weekly wts   3) Continue Diabetic diet    Goals: Pt to meet % EEN/EPN via PO intake + ONS By RD f/u.  Nutrition Goal Status: progressing towards goal  Communication of RD Recs: reviewed with physician    Assessment and Plan  Inadequate oral intake  Contributing Nutrition Diagnosis  Inadequate oral intake     Related to (etiology):   AMS      Signs and Symptoms (as evidenced by):   PO intake 50% ; no ONS ordered      Interventions/Recommendations (treatment strategy):  Collaboration with other healthcare providers  Carbohydrate modified diet (Diabetic)     Nutrition Diagnosis Status:   Continues

## 2022-03-18 NOTE — PLAN OF CARE
Disoriented X4. VSS. Patient is constantly redirected, agitated at times. Ambulates in room with no assistance needed. Sitter at bedside. Tolerating diet and meds well. HOB low fowlers position with hourly purposeful rounding, bedside table within reach. No distress noted, will continue to monitor.   Problem: Adult Inpatient Plan of Care  Goal: Plan of Care Review  Outcome: Ongoing, Progressing  Goal: Patient-Specific Goal (Individualized)  Outcome: Ongoing, Progressing  Goal: Absence of Hospital-Acquired Illness or Injury  Outcome: Ongoing, Progressing  Goal: Optimal Comfort and Wellbeing  Outcome: Ongoing, Progressing  Goal: Readiness for Transition of Care  Outcome: Ongoing, Progressing     Problem: Diabetes Comorbidity  Goal: Blood Glucose Level Within Targeted Range  Outcome: Ongoing, Progressing     Problem: Fluid and Electrolyte Imbalance (Acute Kidney Injury/Impairment)  Goal: Fluid and Electrolyte Balance  Outcome: Ongoing, Progressing     Problem: Oral Intake Inadequate (Acute Kidney Injury/Impairment)  Goal: Optimal Nutrition Intake  Outcome: Ongoing, Progressing     Problem: Renal Function Impairment (Acute Kidney Injury/Impairment)  Goal: Effective Renal Function  Outcome: Ongoing, Progressing     Problem: Skin Injury Risk Increased  Goal: Skin Health and Integrity  Outcome: Ongoing, Progressing     Problem: Fall Injury Risk  Goal: Absence of Fall and Fall-Related Injury  Outcome: Ongoing, Progressing     Problem: Restraint, Nonbehavioral (Nonviolent)  Goal: Absence of Harm or Injury  Outcome: Ongoing, Progressing
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
## 2022-03-19 LAB
ANION GAP SERPL CALC-SCNC: 12 MMOL/L (ref 8–16)
BUN SERPL-MCNC: 47 MG/DL (ref 8–23)
CALCIUM SERPL-MCNC: 9.1 MG/DL (ref 8.7–10.5)
CHLORIDE SERPL-SCNC: 113 MMOL/L (ref 95–110)
CO2 SERPL-SCNC: 18 MMOL/L (ref 23–29)
CREAT SERPL-MCNC: 2.1 MG/DL (ref 0.5–1.4)
EST. GFR  (AFRICAN AMERICAN): 27 ML/MIN/1.73 M^2
EST. GFR  (NON AFRICAN AMERICAN): 24 ML/MIN/1.73 M^2
GLUCOSE SERPL-MCNC: 116 MG/DL (ref 70–110)
POTASSIUM SERPL-SCNC: 4.6 MMOL/L (ref 3.5–5.1)
SODIUM SERPL-SCNC: 143 MMOL/L (ref 136–145)

## 2022-03-19 PROCEDURE — 99233 SBSQ HOSP IP/OBS HIGH 50: CPT | Mod: ,,, | Performed by: PHYSICIAN ASSISTANT

## 2022-03-19 PROCEDURE — 11000001 HC ACUTE MED/SURG PRIVATE ROOM

## 2022-03-19 PROCEDURE — 36415 COLL VENOUS BLD VENIPUNCTURE: CPT | Performed by: PHYSICIAN ASSISTANT

## 2022-03-19 PROCEDURE — 99233 PR SUBSEQUENT HOSPITAL CARE,LEVL III: ICD-10-PCS | Mod: ,,, | Performed by: PHYSICIAN ASSISTANT

## 2022-03-19 PROCEDURE — 63600175 PHARM REV CODE 636 W HCPCS: Performed by: NURSE PRACTITIONER

## 2022-03-19 PROCEDURE — 80048 BASIC METABOLIC PNL TOTAL CA: CPT | Performed by: PHYSICIAN ASSISTANT

## 2022-03-19 PROCEDURE — 25000003 PHARM REV CODE 250: Performed by: INTERNAL MEDICINE

## 2022-03-19 PROCEDURE — 25000003 PHARM REV CODE 250: Performed by: NURSE PRACTITIONER

## 2022-03-19 PROCEDURE — 25000003 PHARM REV CODE 250: Performed by: PHYSICIAN ASSISTANT

## 2022-03-19 RX ADMIN — LABETALOL HYDROCHLORIDE 300 MG: 100 TABLET, FILM COATED ORAL at 09:03

## 2022-03-19 RX ADMIN — QUETIAPINE FUMARATE 25 MG: 25 TABLET ORAL at 08:03

## 2022-03-19 RX ADMIN — HYDRALAZINE HYDROCHLORIDE 50 MG: 25 TABLET, FILM COATED ORAL at 08:03

## 2022-03-19 RX ADMIN — CINACALCET HYDROCHLORIDE 30 MG: 30 TABLET, COATED ORAL at 08:03

## 2022-03-19 RX ADMIN — CYANOCOBALAMIN TAB 1000 MCG 1000 MCG: 1000 TAB at 09:03

## 2022-03-19 RX ADMIN — ATORVASTATIN CALCIUM 40 MG: 20 TABLET, FILM COATED ORAL at 09:03

## 2022-03-19 RX ADMIN — AMLODIPINE BESYLATE 10 MG: 5 TABLET ORAL at 09:03

## 2022-03-19 RX ADMIN — QUETIAPINE FUMARATE 25 MG: 25 TABLET ORAL at 09:03

## 2022-03-19 RX ADMIN — ASPIRIN 81 MG: 81 TABLET, COATED ORAL at 09:03

## 2022-03-19 RX ADMIN — LEVOTHYROXINE SODIUM 75 MCG: 75 TABLET ORAL at 05:03

## 2022-03-19 RX ADMIN — MIRTAZAPINE 15 MG: 15 TABLET, FILM COATED ORAL at 08:03

## 2022-03-19 RX ADMIN — Medication 1 TABLET: at 09:03

## 2022-03-19 RX ADMIN — HYDRALAZINE HYDROCHLORIDE 50 MG: 25 TABLET, FILM COATED ORAL at 05:03

## 2022-03-19 RX ADMIN — VALPROIC ACID 250 MG: 250 SOLUTION ORAL at 09:03

## 2022-03-19 RX ADMIN — HYDRALAZINE HYDROCHLORIDE 50 MG: 25 TABLET, FILM COATED ORAL at 01:03

## 2022-03-19 RX ADMIN — CLOPIDOGREL BISULFATE 75 MG: 75 TABLET, FILM COATED ORAL at 09:03

## 2022-03-19 RX ADMIN — FERROUS SULFATE TAB 325 MG (65 MG ELEMENTAL FE) 1 EACH: 325 (65 FE) TAB at 09:03

## 2022-03-19 RX ADMIN — LABETALOL HYDROCHLORIDE 300 MG: 100 TABLET, FILM COATED ORAL at 08:03

## 2022-03-19 NOTE — SUBJECTIVE & OBJECTIVE
Interval History:  Refused labs this morning and vitals last night; did take morning meds    Review of Systems  Unable to obtain secondary to cognitive impairment  Objective:     Vital Signs (Most Recent):  Temp: 97 °F (36.1 °C) (03/19/22 1203)  Pulse: 62 (03/19/22 1203)  Resp: 17 (03/19/22 1203)  BP: 124/78 (03/19/22 1203)  SpO2: 97 % (03/19/22 1203) Vital Signs (24h Range):  Temp:  [97 °F (36.1 °C)-99 °F (37.2 °C)] 97 °F (36.1 °C)  Pulse:  [62-72] 62  Resp:  [16-18] 17  SpO2:  [96 %-99 %] 97 %  BP: (124-159)/(64-78) 124/78     Weight: 89.4 kg (197 lb 1.5 oz)  Body mass index is 38.49 kg/m².    Intake/Output Summary (Last 24 hours) at 3/19/2022 1520  Last data filed at 3/19/2022 0600  Gross per 24 hour   Intake 840 ml   Output --   Net 840 ml      Physical Exam  Vitals and nursing note reviewed.   Constitutional:       General: She is not in acute distress.     Appearance: She is well-developed. She is not diaphoretic.   HENT:      Head: Normocephalic and atraumatic.   Eyes:      General: No scleral icterus.     Conjunctiva/sclera: Conjunctivae normal.   Cardiovascular:      Rate and Rhythm: Normal rate and regular rhythm.      Heart sounds: Normal heart sounds.   Pulmonary:      Effort: Pulmonary effort is normal. No respiratory distress.      Breath sounds: Normal breath sounds. No wheezing.   Abdominal:      General: Bowel sounds are normal. There is no distension.      Palpations: Abdomen is soft.      Tenderness: There is no abdominal tenderness.   Musculoskeletal:         General: Normal range of motion.      Cervical back: Normal range of motion and neck supple.   Skin:     General: Skin is warm and dry.   Neurological:      Mental Status: She is alert. She is disoriented.      Comments: Grossly nonfocal       Significant Labs: All pertinent labs within the past 24 hours have been reviewed.  None    Refused labs this a.m.

## 2022-03-19 NOTE — PLAN OF CARE
Pt resting quietly. Has been cooperative with obtaining vital signs and with medications. Refused labs today. Pt up with assist to bathroom. Voiding yellow urine. Tolerating diet well. Safety maintained. Pt free from falls or injury.   Problem: Adult Inpatient Plan of Care  Goal: Plan of Care Review  Outcome: Ongoing, Progressing  Goal: Patient-Specific Goal (Individualized)  Outcome: Ongoing, Progressing  Goal: Absence of Hospital-Acquired Illness or Injury  Outcome: Ongoing, Progressing  Goal: Optimal Comfort and Wellbeing  Outcome: Ongoing, Progressing  Goal: Readiness for Transition of Care  Outcome: Ongoing, Progressing     Problem: Diabetes Comorbidity  Goal: Blood Glucose Level Within Targeted Range  Outcome: Ongoing, Progressing     Problem: Fluid and Electrolyte Imbalance (Acute Kidney Injury/Impairment)  Goal: Fluid and Electrolyte Balance  Outcome: Ongoing, Progressing     Problem: Oral Intake Inadequate (Acute Kidney Injury/Impairment)  Goal: Optimal Nutrition Intake  Outcome: Ongoing, Progressing     Problem: Renal Function Impairment (Acute Kidney Injury/Impairment)  Goal: Effective Renal Function  Outcome: Ongoing, Progressing     Problem: Skin Injury Risk Increased  Goal: Skin Health and Integrity  Outcome: Ongoing, Progressing     Problem: Fall Injury Risk  Goal: Absence of Fall and Fall-Related Injury  Outcome: Ongoing, Progressing     Problem: Restraint, Nonbehavioral (Nonviolent)  Goal: Absence of Harm or Injury  Outcome: Ongoing, Progressing   Sitter @ bedside. Call light in reach. VSS.

## 2022-03-19 NOTE — PLAN OF CARE
Problem: Adult Inpatient Plan of Care  Goal: Plan of Care Review  Outcome: Ongoing, Progressing  Goal: Optimal Comfort and Wellbeing  Outcome: Ongoing, Progressing     Problem: Fall Injury Risk  Goal: Absence of Fall and Fall-Related Injury  Outcome: Ongoing, Progressing     Problem: Restraint, Nonbehavioral (Nonviolent)  Goal: Absence of Harm or Injury  Outcome: Ongoing, Progressing    Pt refused 12 am vitals and am labs. Dr. Smith notified.
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
## 2022-03-20 PROCEDURE — 25000003 PHARM REV CODE 250: Performed by: INTERNAL MEDICINE

## 2022-03-20 PROCEDURE — 99233 SBSQ HOSP IP/OBS HIGH 50: CPT | Mod: ,,, | Performed by: PHYSICIAN ASSISTANT

## 2022-03-20 PROCEDURE — 25000003 PHARM REV CODE 250: Performed by: PHYSICIAN ASSISTANT

## 2022-03-20 PROCEDURE — 94761 N-INVAS EAR/PLS OXIMETRY MLT: CPT

## 2022-03-20 PROCEDURE — 99233 PR SUBSEQUENT HOSPITAL CARE,LEVL III: ICD-10-PCS | Mod: ,,, | Performed by: PHYSICIAN ASSISTANT

## 2022-03-20 PROCEDURE — 63600175 PHARM REV CODE 636 W HCPCS: Performed by: NURSE PRACTITIONER

## 2022-03-20 PROCEDURE — 11000001 HC ACUTE MED/SURG PRIVATE ROOM

## 2022-03-20 PROCEDURE — 25000003 PHARM REV CODE 250: Performed by: NURSE PRACTITIONER

## 2022-03-20 RX ADMIN — HYDRALAZINE HYDROCHLORIDE 50 MG: 25 TABLET, FILM COATED ORAL at 05:03

## 2022-03-20 RX ADMIN — CLOPIDOGREL BISULFATE 75 MG: 75 TABLET, FILM COATED ORAL at 10:03

## 2022-03-20 RX ADMIN — ATORVASTATIN CALCIUM 40 MG: 20 TABLET, FILM COATED ORAL at 10:03

## 2022-03-20 RX ADMIN — HYDRALAZINE HYDROCHLORIDE 50 MG: 25 TABLET, FILM COATED ORAL at 08:03

## 2022-03-20 RX ADMIN — CYANOCOBALAMIN TAB 1000 MCG 1000 MCG: 1000 TAB at 10:03

## 2022-03-20 RX ADMIN — LABETALOL HYDROCHLORIDE 300 MG: 100 TABLET, FILM COATED ORAL at 10:03

## 2022-03-20 RX ADMIN — QUETIAPINE FUMARATE 12.5 MG: 25 TABLET, FILM COATED ORAL at 01:03

## 2022-03-20 RX ADMIN — Medication 1 TABLET: at 10:03

## 2022-03-20 RX ADMIN — HYDRALAZINE HYDROCHLORIDE 50 MG: 25 TABLET, FILM COATED ORAL at 02:03

## 2022-03-20 RX ADMIN — ASPIRIN 81 MG: 81 TABLET, COATED ORAL at 10:03

## 2022-03-20 RX ADMIN — QUETIAPINE FUMARATE 25 MG: 25 TABLET ORAL at 08:03

## 2022-03-20 RX ADMIN — VALPROIC ACID 250 MG: 250 SOLUTION ORAL at 10:03

## 2022-03-20 RX ADMIN — CINACALCET HYDROCHLORIDE 30 MG: 30 TABLET, COATED ORAL at 08:03

## 2022-03-20 RX ADMIN — LABETALOL HYDROCHLORIDE 300 MG: 100 TABLET, FILM COATED ORAL at 08:03

## 2022-03-20 RX ADMIN — FERROUS SULFATE TAB 325 MG (65 MG ELEMENTAL FE) 1 EACH: 325 (65 FE) TAB at 10:03

## 2022-03-20 RX ADMIN — QUETIAPINE FUMARATE 25 MG: 25 TABLET ORAL at 10:03

## 2022-03-20 RX ADMIN — LEVOTHYROXINE SODIUM 75 MCG: 75 TABLET ORAL at 05:03

## 2022-03-20 RX ADMIN — AMLODIPINE BESYLATE 10 MG: 5 TABLET ORAL at 10:03

## 2022-03-20 RX ADMIN — MIRTAZAPINE 15 MG: 15 TABLET, FILM COATED ORAL at 08:03
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
## 2022-03-20 NOTE — SUBJECTIVE & OBJECTIVE
Interval History:  Patient is sitting at edge of bed eating breakfast, in no distress has not had a sitter at bedside since yesterday    Review of Systems  Unable to obtain secondary to cognitive impairment  Objective:     Vital Signs (Most Recent):  Temp: 97.9 °F (36.6 °C) (03/20/22 0533)  Pulse: 70 (03/20/22 0533)  Resp: 20 (03/20/22 0533)  BP: (!) 158/70 (03/20/22 0533)  SpO2: 99 % (03/20/22 0700)   Vital Signs (24h Range):  Temp:  [97 °F (36.1 °C)-98.1 °F (36.7 °C)] 97.9 °F (36.6 °C)  Pulse:  [62-73] 70  Resp:  [17-20] 20  SpO2:  [97 %-99 %] 99 %  BP: (123-158)/(54-78) 158/70     Weight: 89.4 kg (197 lb 1.5 oz)  Body mass index is 38.49 kg/m².    Intake/Output Summary (Last 24 hours) at 3/20/2022 1157  Last data filed at 3/20/2022 0400  Gross per 24 hour   Intake 1200 ml   Output --   Net 1200 ml      Physical Exam  Vitals and nursing note reviewed.   Constitutional:       General: She is not in acute distress.     Appearance: She is well-developed. She is not diaphoretic.   HENT:      Head: Normocephalic and atraumatic.   Eyes:      General: No scleral icterus.     Conjunctiva/sclera: Conjunctivae normal.   Cardiovascular:      Rate and Rhythm: Normal rate and regular rhythm.      Heart sounds: Normal heart sounds.   Pulmonary:      Effort: Pulmonary effort is normal. No respiratory distress.      Breath sounds: Normal breath sounds. No wheezing.   Abdominal:      General: Bowel sounds are normal. There is no distension.      Palpations: Abdomen is soft.      Tenderness: There is no abdominal tenderness.   Musculoskeletal:         General: Normal range of motion.      Cervical back: Normal range of motion and neck supple.   Skin:     General: Skin is warm and dry.   Neurological:      Mental Status: She is alert. She is disoriented.      Comments: Grossly nonfocal       Significant Labs: All pertinent labs within the past 24 hours have been reviewed.  CMP:   Recent Labs   Lab 03/19/22 2059      K 4.6    *   CO2 18*   *   BUN 47*   CREATININE 2.1*   CALCIUM 9.1   ANIONGAP 12   EGFRNONAA 24*       Significant Imaging:   Imaging Results              US Retroperitoneal Complete (Final result)  Result time 03/02/22 09:11:23      Final result by Catie Coreas MD (03/02/22 09:11:23)                   Impression:      Mild right hydronephrosis.  Correlation with renal stone CT may be considered in further evaluating.      Electronically signed by: Catie Coreas  Date:    03/02/2022  Time:    09:11               Narrative:    EXAMINATION:  US RETROPERITONEAL COMPLETE    CLINICAL HISTORY:  JOSÉ MIGUEL;    TECHNIQUE:  Ultrasound of the kidneys and urinary bladder was performed including color flow and Doppler evaluation of the kidneys.    COMPARISON:  None.    FINDINGS:  Right kidney: The right kidney measures 9.7 cm. No cortical thinning. No loss of corticomedullary distinction. Resistive index measures 0.75.  No mass. No renal stone. Mild hydronephrosis.    Left kidney: The left kidney measures 9.5 cm. No cortical thinning. No loss of corticomedullary distinction. Resistive index measures 0.73.  No mass. No renal stone. No hydronephrosis.    The bladder is partially distended at the time of scanning and has an unremarkable appearance.

## 2022-03-20 NOTE — NURSING
"Pt up without calling for assist. Patient states "I thought you got rid of all the bats." When asked where the bats where, pt pointed to corner of ceiling and said " they are right there. I think they are here for the flies".  When asked patient other questions, did not get a response. Pt easily redirected into bed. Disoriented.  "

## 2022-03-20 NOTE — ASSESSMENT & PLAN NOTE
-  Continue amlodipine, labetalol  -  d/c losartan and hctz with JOSÉ MIGUEL/hypercalcemia.  -  Hydralazine added for better for control  - continue to monitor

## 2022-03-20 NOTE — ASSESSMENT & PLAN NOTE
Non-anion gap metabolic acidosis  - appreciate nephrology consultation  - creatinine has been trending up since January of this year, may be a new baseline  - mild right hydronephrosis on US, no evidence of hydronephrosis on follow-up CT  - s/p IVFs, appears near baseline  - d/c thiazide/ARB, avoid nephrotoxins  - will need [facility] nephrology referral on discharge for follow up

## 2022-03-20 NOTE — PLAN OF CARE
Disoriented X4. VSS. Sitter at bedside. Tolerating diet and medications well. Ambulates in room well with no assistance needed. Easily redirected. HOB low fowlers position with call bell and bedside table within reach, bed in lowest position with hourly purposeful rounding. Alarms on and audible with door opened, will continue to monitor.   Problem: Adult Inpatient Plan of Care  Goal: Plan of Care Review  Outcome: Ongoing, Progressing  Goal: Patient-Specific Goal (Individualized)  Outcome: Ongoing, Progressing  Goal: Absence of Hospital-Acquired Illness or Injury  Outcome: Ongoing, Progressing  Goal: Optimal Comfort and Wellbeing  Outcome: Ongoing, Progressing  Goal: Readiness for Transition of Care  Outcome: Ongoing, Progressing     Problem: Diabetes Comorbidity  Goal: Blood Glucose Level Within Targeted Range  Outcome: Ongoing, Progressing     Problem: Fluid and Electrolyte Imbalance (Acute Kidney Injury/Impairment)  Goal: Fluid and Electrolyte Balance  Outcome: Ongoing, Progressing     Problem: Oral Intake Inadequate (Acute Kidney Injury/Impairment)  Goal: Optimal Nutrition Intake  Outcome: Ongoing, Progressing     Problem: Renal Function Impairment (Acute Kidney Injury/Impairment)  Goal: Effective Renal Function  Outcome: Ongoing, Progressing     Problem: Skin Injury Risk Increased  Goal: Skin Health and Integrity  Outcome: Ongoing, Progressing     Problem: Fall Injury Risk  Goal: Absence of Fall and Fall-Related Injury  Outcome: Ongoing, Progressing     Problem: Restraint, Nonbehavioral (Nonviolent)  Goal: Absence of Harm or Injury  Outcome: Ongoing, Progressing

## 2022-03-21 LAB
POCT GLUCOSE: 113 MG/DL (ref 70–110)
POCT GLUCOSE: 217 MG/DL (ref 70–110)
POCT GLUCOSE: 98 MG/DL (ref 70–110)

## 2022-03-21 PROCEDURE — 63600175 PHARM REV CODE 636 W HCPCS: Performed by: NURSE PRACTITIONER

## 2022-03-21 PROCEDURE — 11000001 HC ACUTE MED/SURG PRIVATE ROOM

## 2022-03-21 PROCEDURE — 99233 SBSQ HOSP IP/OBS HIGH 50: CPT | Mod: ,,, | Performed by: PHYSICIAN ASSISTANT

## 2022-03-21 PROCEDURE — 99233 PR SUBSEQUENT HOSPITAL CARE,LEVL III: ICD-10-PCS | Mod: ,,, | Performed by: PHYSICIAN ASSISTANT

## 2022-03-21 PROCEDURE — 25000003 PHARM REV CODE 250: Performed by: INTERNAL MEDICINE

## 2022-03-21 PROCEDURE — 25000003 PHARM REV CODE 250: Performed by: PHYSICIAN ASSISTANT

## 2022-03-21 PROCEDURE — 25000003 PHARM REV CODE 250: Performed by: NURSE PRACTITIONER

## 2022-03-21 PROCEDURE — 94761 N-INVAS EAR/PLS OXIMETRY MLT: CPT

## 2022-03-21 RX ADMIN — VALPROIC ACID 250 MG: 250 SOLUTION ORAL at 09:03

## 2022-03-21 RX ADMIN — HYDRALAZINE HYDROCHLORIDE 50 MG: 25 TABLET, FILM COATED ORAL at 04:03

## 2022-03-21 RX ADMIN — HYDRALAZINE HYDROCHLORIDE 50 MG: 25 TABLET, FILM COATED ORAL at 11:03

## 2022-03-21 RX ADMIN — Medication 1 TABLET: at 09:03

## 2022-03-21 RX ADMIN — ATORVASTATIN CALCIUM 40 MG: 20 TABLET, FILM COATED ORAL at 09:03

## 2022-03-21 RX ADMIN — LABETALOL HYDROCHLORIDE 300 MG: 100 TABLET, FILM COATED ORAL at 08:03

## 2022-03-21 RX ADMIN — CYANOCOBALAMIN TAB 1000 MCG 1000 MCG: 1000 TAB at 09:03

## 2022-03-21 RX ADMIN — MIRTAZAPINE 15 MG: 15 TABLET, FILM COATED ORAL at 08:03

## 2022-03-21 RX ADMIN — QUETIAPINE FUMARATE 25 MG: 25 TABLET ORAL at 08:03

## 2022-03-21 RX ADMIN — QUETIAPINE FUMARATE 25 MG: 25 TABLET ORAL at 09:03

## 2022-03-21 RX ADMIN — FERROUS SULFATE TAB 325 MG (65 MG ELEMENTAL FE) 1 EACH: 325 (65 FE) TAB at 09:03

## 2022-03-21 RX ADMIN — CINACALCET HYDROCHLORIDE 30 MG: 30 TABLET, COATED ORAL at 08:03

## 2022-03-21 RX ADMIN — LABETALOL HYDROCHLORIDE 300 MG: 100 TABLET, FILM COATED ORAL at 09:03

## 2022-03-21 RX ADMIN — CLOPIDOGREL BISULFATE 75 MG: 75 TABLET, FILM COATED ORAL at 09:03

## 2022-03-21 RX ADMIN — LEVOTHYROXINE SODIUM 75 MCG: 75 TABLET ORAL at 04:03

## 2022-03-21 RX ADMIN — ASPIRIN 81 MG: 81 TABLET, COATED ORAL at 09:03

## 2022-03-21 RX ADMIN — AMLODIPINE BESYLATE 10 MG: 5 TABLET ORAL at 09:03

## 2022-03-21 NOTE — ASSESSMENT & PLAN NOTE
Acute metabolic encephalopathy (resolved)  - Patient recently released from inpatient stay at [facility]; became acutely combative with family after discharge.  - metabolic encephalopathy due to UTI, uremia and hypercalcemia, all treated  - continue Depakene 250mg daily, given continued use of PRNs for agitation, increase Remeron 15 mg q.h.s., Seroquel 25 mg q.h.s.  - discontinued Klonopin as it can worsen encephalopathy, use Seroquel 12.5 mg p.o. or Zyprexa 2.5 mg IM p.r.n. for non-redirectable agitation only; may need to reconsider klonopin now that acute encephalopathy has resolved  - questionable grave disability, now that acute process resolved  Psych re-consulted 3/15 recs noted, medications adjusted   - patient common cooperative no sitter at bedside for since 03/19, awaiting acceptance [facility]  - PR planning  - has Neurology appointment scheduled next month

## 2022-03-21 NOTE — PLAN OF CARE
AAOX1. VSS. Easily redirected, ambulates well with no assistance needed. Tolerating diet and meds well. HOB 20-30 degrees with call bell and bedside table within reach, bed in lowest position with hourly purposeful rounding. Alarms on and audible with door opened. Calm and quiet majority of shift, will continue to monitor.   Problem: Adult Inpatient Plan of Care  Goal: Plan of Care Review  Outcome: Ongoing, Progressing  Goal: Patient-Specific Goal (Individualized)  Outcome: Ongoing, Progressing  Goal: Absence of Hospital-Acquired Illness or Injury  Outcome: Ongoing, Progressing  Goal: Optimal Comfort and Wellbeing  Outcome: Ongoing, Progressing  Goal: Readiness for Transition of Care  Outcome: Ongoing, Progressing     Problem: Diabetes Comorbidity  Goal: Blood Glucose Level Within Targeted Range  Outcome: Ongoing, Progressing     Problem: Fluid and Electrolyte Imbalance (Acute Kidney Injury/Impairment)  Goal: Fluid and Electrolyte Balance  Outcome: Ongoing, Progressing     Problem: Oral Intake Inadequate (Acute Kidney Injury/Impairment)  Goal: Optimal Nutrition Intake  Outcome: Ongoing, Progressing     Problem: Renal Function Impairment (Acute Kidney Injury/Impairment)  Goal: Effective Renal Function  Outcome: Ongoing, Progressing     Problem: Skin Injury Risk Increased  Goal: Skin Health and Integrity  Outcome: Ongoing, Progressing     Problem: Fall Injury Risk  Goal: Absence of Fall and Fall-Related Injury  Outcome: Ongoing, Progressing     Problem: Restraint, Nonbehavioral (Nonviolent)  Goal: Absence of Harm or Injury  Outcome: Ongoing, Progressing
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
## 2022-03-21 NOTE — PLAN OF CARE
[facility] ([facility])  Discharge Reassessment     Primary Care Provider: Adryan Osman MD    Admission Diagnosis: JOSÉ MIGUEL (acute kidney injury) [N17.9]  Altered mental status, unspecified altered mental status type [R41.82]    Admission Date: 3/1/2022  Expected Discharge Date: TBD    Awaiting [facility] certification  Awaiting NH acceptance; updated clinical documentation sent to Michelle Leyva of [facility] for review: patient has not required a sitter since 3/19/22  Sent requested supporting clinical documentation to OB and emailed sent to Mgr requesting review to be expedited; awaiting feed back  Received call back from  Azul @ [facility] , last status update, still awaiting required documentation to process.        Payor: [facility] MCARE / Plan: [facility] DUAL COMPLETE HMO SNP / Product Type: Medicare Advantage /     Extended Emergency Contact Information  Primary Emergency Contact: Nicolle Gallardo  Mobile Phone: [phone]  Relation: Daughter  Preferred language: English   needed? No    Discharge Plan A: New Nursing Home placement - alf care facility  Discharge Plan B: Home with family      [id number] - [place], [place] - [address] AT [facility] [address]  [address]  [place] [address]  Phone: [phone] Fax: [phone]    [id number] - [place], [place] - [address]  [address]  [place] [address]  Phone: [phone] Fax: [phone]    [id number] - [facility] [place] - [address] AT [facility] [address]  [address]  [place] [address]  Phone: [phone] Fax: [phone]    [id number] - [facility] [place] - [address] AT [facility] OF [address]  [address]  [place] [address]  Phone: [phone] Fax: [phone]      Initial Assessment (most recent)      Adult Discharge Assessment - 03/02/22 1438        Discharge Assessment    Assessment Type Discharge Planning Assessment     Source of Information health record     Communicated JAGDEEP with patient/caregiver Date not available/Unable to determine     Reason For Admission JOSÉ MIGUEL     Do you have prescription coverage? Yes     Coverage [facility]     Discharge Plan A Home     SDOH --   recent dc from [facility]; c/s to psych planned or psych recs planned per hospitalist provider

## 2022-03-21 NOTE — SUBJECTIVE & OBJECTIVE
Interval History:  In bed in no distress eating breakfast, good appetite;, cooperative has not had a bedside sitter in 2 days    Review of Systems  Unable to obtain secondary to cognitive impairment  Objective:     Vital Signs (Most Recent):  Temp: 98.1 °F (36.7 °C) (03/21/22 0754)  Pulse: 69 (03/21/22 0754)  Resp: 18 (03/21/22 0754)  BP: 138/62 (03/21/22 0754)  SpO2: 95 % (03/21/22 0754) Vital Signs (24h Range):  Temp:  [97.6 °F (36.4 °C)-99.3 °F (37.4 °C)] 98.1 °F (36.7 °C)  Pulse:  [69-74] 69  Resp:  [18-20] 18  SpO2:  [95 %-99 %] 95 %  BP: (129-174)/(60-74) 138/62     Weight: 89.4 kg (197 lb 1.5 oz)  Body mass index is 38.49 kg/m².    Intake/Output Summary (Last 24 hours) at 3/21/2022 1052  Last data filed at 3/21/2022 0500  Gross per 24 hour   Intake 480 ml   Output --   Net 480 ml      Physical Exam  Vitals and nursing note reviewed.   Constitutional:       General: She is not in acute distress.     Appearance: She is well-developed. She is not diaphoretic.   HENT:      Head: Normocephalic and atraumatic.   Eyes:      General: No scleral icterus.     Conjunctiva/sclera: Conjunctivae normal.   Cardiovascular:      Rate and Rhythm: Normal rate and regular rhythm.      Heart sounds: Normal heart sounds.   Pulmonary:      Effort: Pulmonary effort is normal. No respiratory distress.      Breath sounds: Normal breath sounds. No wheezing.   Abdominal:      General: Bowel sounds are normal. There is no distension.      Palpations: Abdomen is soft.      Tenderness: There is no abdominal tenderness.   Musculoskeletal:         General: Normal range of motion.      Cervical back: Normal range of motion and neck supple.   Skin:     General: Skin is warm and dry.   Neurological:      Mental Status: She is alert. She is disoriented.      Comments: Grossly nonfocal       Significant Labs: All pertinent labs within the past 24 hours have been reviewed.  CMP:   Recent Labs   Lab 03/19/22 2059      K 4.6   *   CO2  18*   *   BUN 47*   CREATININE 2.1*   CALCIUM 9.1   ANIONGAP 12   EGFRNONAA 24*       Significant Imaging:   Imaging Results              US Retroperitoneal Complete (Final result)  Result time 03/02/22 09:11:23      Final result by Catie Coreas MD (03/02/22 09:11:23)                   Impression:      Mild right hydronephrosis.  Correlation with renal stone CT may be considered in further evaluating.      Electronically signed by: Catie Coreas  Date:    03/02/2022  Time:    09:11               Narrative:    EXAMINATION:  US RETROPERITONEAL COMPLETE    CLINICAL HISTORY:  JOSÉ MIGUEL;    TECHNIQUE:  Ultrasound of the kidneys and urinary bladder was performed including color flow and Doppler evaluation of the kidneys.    COMPARISON:  None.    FINDINGS:  Right kidney: The right kidney measures 9.7 cm. No cortical thinning. No loss of corticomedullary distinction. Resistive index measures 0.75.  No mass. No renal stone. Mild hydronephrosis.    Left kidney: The left kidney measures 9.5 cm. No cortical thinning. No loss of corticomedullary distinction. Resistive index measures 0.73.  No mass. No renal stone. No hydronephrosis.    The bladder is partially distended at the time of scanning and has an unremarkable appearance.

## 2022-03-22 PROCEDURE — 11000001 HC ACUTE MED/SURG PRIVATE ROOM

## 2022-03-22 PROCEDURE — 25000003 PHARM REV CODE 250: Performed by: NURSE PRACTITIONER

## 2022-03-22 PROCEDURE — 25000003 PHARM REV CODE 250: Performed by: INTERNAL MEDICINE

## 2022-03-22 PROCEDURE — 25000003 PHARM REV CODE 250: Performed by: PHYSICIAN ASSISTANT

## 2022-03-22 PROCEDURE — 94761 N-INVAS EAR/PLS OXIMETRY MLT: CPT

## 2022-03-22 PROCEDURE — 99231 SBSQ HOSP IP/OBS SF/LOW 25: CPT | Mod: ,,, | Performed by: PHYSICIAN ASSISTANT

## 2022-03-22 PROCEDURE — 99231 PR SUBSEQUENT HOSPITAL CARE,LEVL I: ICD-10-PCS | Mod: ,,, | Performed by: PHYSICIAN ASSISTANT

## 2022-03-22 PROCEDURE — 63600175 PHARM REV CODE 636 W HCPCS: Performed by: NURSE PRACTITIONER

## 2022-03-22 RX ADMIN — LABETALOL HYDROCHLORIDE 300 MG: 100 TABLET, FILM COATED ORAL at 09:03

## 2022-03-22 RX ADMIN — FERROUS SULFATE TAB 325 MG (65 MG ELEMENTAL FE) 1 EACH: 325 (65 FE) TAB at 08:03

## 2022-03-22 RX ADMIN — ASPIRIN 81 MG: 81 TABLET, COATED ORAL at 08:03

## 2022-03-22 RX ADMIN — HYDRALAZINE HYDROCHLORIDE 50 MG: 25 TABLET, FILM COATED ORAL at 03:03

## 2022-03-22 RX ADMIN — CYANOCOBALAMIN TAB 1000 MCG 1000 MCG: 1000 TAB at 08:03

## 2022-03-22 RX ADMIN — QUETIAPINE FUMARATE 25 MG: 25 TABLET ORAL at 08:03

## 2022-03-22 RX ADMIN — LABETALOL HYDROCHLORIDE 300 MG: 100 TABLET, FILM COATED ORAL at 08:03

## 2022-03-22 RX ADMIN — HYDRALAZINE HYDROCHLORIDE 50 MG: 25 TABLET, FILM COATED ORAL at 05:03

## 2022-03-22 RX ADMIN — CLOPIDOGREL BISULFATE 75 MG: 75 TABLET, FILM COATED ORAL at 08:03

## 2022-03-22 RX ADMIN — QUETIAPINE FUMARATE 25 MG: 25 TABLET ORAL at 09:03

## 2022-03-22 RX ADMIN — QUETIAPINE FUMARATE 12.5 MG: 25 TABLET, FILM COATED ORAL at 04:03

## 2022-03-22 RX ADMIN — Medication 1 TABLET: at 08:03

## 2022-03-22 RX ADMIN — ATORVASTATIN CALCIUM 40 MG: 20 TABLET, FILM COATED ORAL at 08:03

## 2022-03-22 RX ADMIN — AMLODIPINE BESYLATE 10 MG: 5 TABLET ORAL at 08:03

## 2022-03-22 RX ADMIN — MIRTAZAPINE 15 MG: 15 TABLET, FILM COATED ORAL at 09:03

## 2022-03-22 RX ADMIN — CINACALCET HYDROCHLORIDE 30 MG: 30 TABLET, COATED ORAL at 08:03

## 2022-03-22 RX ADMIN — LEVOTHYROXINE SODIUM 75 MCG: 75 TABLET ORAL at 05:03

## 2022-03-22 RX ADMIN — VALPROIC ACID 250 MG: 250 SOLUTION ORAL at 08:03

## 2022-03-22 NOTE — PLAN OF CARE
Called Azul DELGADILLO at [facility]; left message to return call; awaiting status of Level II PASRR; e-mail sent to [facility] ; awaiting status update  Additional referrals sent to:  [facility] [phone]  Accept mild covid case patients 10 days out and severe cases 20 days out   * COVID-19: NOT able to accept COVID-19 positive patients,* High-Risk Isolation Patients: Willing/Equipped to accept High-Risk Isolation Patients    [facility] [phone]  1 negative COVID within 72 hours of admit   * COVID-19: Willing/Equipped to accept COVID-19 positive patients    [facility] [phone]    * Closed for New Admissions,* COVID-19: NOT able to accept COVID-19 positive patients    [facility] [phone]    * COVID-19: Positive patients under care,* COVID-19: Willing/Equipped to accept COVID-19 positive patients,* High-Risk Isolation Patients: Willing/Equipped to accept High-Risk Isolation Patients    [facility] [phone]  negative covid swab within 72 hours before admit   * COVID-19: NOT able to accept COVID-19 positive patients,* High-Risk Isolation Patients: Willing/Equipped to accept High-Risk Isolation Patients    [facility] [phone]    * COVID-19: Positive patients under care,* COVID-19: Services not specified,* High-Risk Isolation Patients: Willing/Equipped to accept High-Risk Isolation Patients    [facility] [phone]  24 hours fever free   * COVID-19: Willing/Equipped to accept COVID-19 positive patients,* High-Risk Isolation Patients: Willing/Equipped to accept High-Risk Isolation Patients    [facility] [phone]    * COVID-19: NOT able to accept COVID-19 positive patients    [facility] [phone]  24 hours fever free   * COVID-19: Positive patients  under care,* COVID-19: Willing/Equipped to accept COVID-19 positive patients    [facility] [phone]  Negative COVID test prior to admission   * COVID-19: NOT able to accept COVID-19 positive patients,* COVID-19: Positive patients under care,* COVID-19: Willing/Equipped to accept COVID-19 positive patients    [facility] [phone]    * COVID-19: NOT able to accept COVID-19 positive patients    [facility] [phone]    * COVID-19: NOT able to accept COVID-19 positive patients    [facility] [phone]  2 covid neg results, 1 72 hrs prior to actual dc   * COVID-19: Services not specified,* High-Risk Isolation Patients: Willing/Equipped to accept High-Risk Isolation Patients    [facility] [phone]  negative covid test   * COVID-19: Willing/Equipped to accept COVID-19 positive patients,* High-Risk Isolation Patients: Willing/Equipped to accept High-Risk Isolation Patients    [facility] [phone]  no testing required on day of d/c unless not vaccinated.   * COVID-19: NOT able to accept COVID-19 positive patients    [facility] [phone]    * COVID-19: Positive patients under care,* COVID-19: Willing/Equipped to accept COVID-19 positive patients    [facility] [phone]  COVID + must meet recovered status, negative test is not required   * COVID-19: NOT able to accept COVID-19 positive patients,* High-Risk Isolation Patients: Willing/Equipped to accept High-Risk Isolation Patients    [facility] [phone]  Can only take vaccinated at this time   * COVID-19: NOT able to accept COVID-19 positive patients    [facility] [phone]  1 negative test in last 72 hours   * COVID-19: Services not specified,* High-Risk Isolation Patients: Willing/Equipped to accept High-Risk Isolation Patients     [facility] [phone]  covid test within 72 hours   * COVID-19: NOT able to accept COVID-19 positive patients,* High-Risk Isolation Patients: Willing/Equipped to accept High-Risk Isolation Patients

## 2022-03-22 NOTE — SUBJECTIVE & OBJECTIVE
Interval History:  No events overnight.  Remains calm and cooperative.    Review of Systems  Unable to obtain secondary to cognitive impairment    Objective:     Vital Signs (Most Recent):  Temp: 97.8 °F (36.6 °C) (03/23/22 1211)  Pulse: 69 (03/23/22 1211)  Resp: 17 (03/23/22 1211)  BP: 137/62 (03/23/22 1211)  SpO2: (!) 94 % (03/23/22 1211) Vital Signs (24h Range):  Temp:  [97.8 °F (36.6 °C)-98.4 °F (36.9 °C)] 97.8 °F (36.6 °C)  Pulse:  [69-71] 69  Resp:  [17-19] 17  SpO2:  [93 %-97 %] 94 %  BP: (126-159)/(58-68) 137/62     Weight: 89.4 kg (197 lb 1.5 oz)  Body mass index is 38.49 kg/m².    Intake/Output Summary (Last 24 hours) at 3/23/2022 1452  Last data filed at 3/23/2022 1200  Gross per 24 hour   Intake 600 ml   Output --   Net 600 ml      Physical Exam  Vitals and nursing note reviewed.   Constitutional:       General: She is not in acute distress.     Appearance: She is well-developed. She is not diaphoretic.   HENT:      Head: Normocephalic and atraumatic.   Eyes:      General: No scleral icterus.     Conjunctiva/sclera: Conjunctivae normal.   Cardiovascular:      Rate and Rhythm: Normal rate and regular rhythm.      Heart sounds: Normal heart sounds.   Pulmonary:      Effort: Pulmonary effort is normal. No respiratory distress.      Breath sounds: Normal breath sounds. No wheezing.   Abdominal:      General: Bowel sounds are normal. There is no distension.      Palpations: Abdomen is soft.      Tenderness: There is no abdominal tenderness.   Musculoskeletal:         General: Normal range of motion.      Cervical back: Normal range of motion and neck supple.   Skin:     General: Skin is warm and dry.   Neurological:      Mental Status: She is alert. She is disoriented.      Comments: Grossly nonfocal       Significant Labs: All pertinent labs within the past 24 hours have been reviewed      Significant Imaging: All pertinent imaging within the past 24 hours has been reviewed
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
## 2022-03-23 ENCOUNTER — PATIENT OUTREACH (OUTPATIENT)
Dept: ADMINISTRATIVE | Facility: OTHER | Age: 67
End: 2022-03-23
Payer: MEDICARE

## 2022-03-23 PROBLEM — G93.41 ENCEPHALOPATHY, METABOLIC: Status: RESOLVED | Noted: 2022-03-14 | Resolved: 2022-03-23

## 2022-03-23 PROBLEM — N18.9 ACUTE KIDNEY INJURY SUPERIMPOSED ON CKD: Status: RESOLVED | Noted: 2022-03-01 | Resolved: 2022-03-23

## 2022-03-23 PROBLEM — N17.9 ACUTE KIDNEY INJURY SUPERIMPOSED ON CKD: Status: RESOLVED | Noted: 2022-03-01 | Resolved: 2022-03-23

## 2022-03-23 PROBLEM — E83.52 HYPERCALCEMIA: Status: RESOLVED | Noted: 2022-03-14 | Resolved: 2022-03-23

## 2022-03-23 PROBLEM — R63.8 INADEQUATE ORAL INTAKE: Status: RESOLVED | Noted: 2022-03-14 | Resolved: 2022-03-23

## 2022-03-23 LAB — SARS-COV-2 RDRP RESP QL NAA+PROBE: NEGATIVE

## 2022-03-23 PROCEDURE — 11000001 HC ACUTE MED/SURG PRIVATE ROOM

## 2022-03-23 PROCEDURE — 25000003 PHARM REV CODE 250: Performed by: INTERNAL MEDICINE

## 2022-03-23 PROCEDURE — 63600175 PHARM REV CODE 636 W HCPCS: Performed by: NURSE PRACTITIONER

## 2022-03-23 PROCEDURE — 30200315 PPD INTRADERMAL TEST REV CODE 302: Performed by: PHYSICIAN ASSISTANT

## 2022-03-23 PROCEDURE — 25000003 PHARM REV CODE 250: Performed by: PHYSICIAN ASSISTANT

## 2022-03-23 PROCEDURE — 99231 SBSQ HOSP IP/OBS SF/LOW 25: CPT | Mod: ,,, | Performed by: PHYSICIAN ASSISTANT

## 2022-03-23 PROCEDURE — 99231 PR SUBSEQUENT HOSPITAL CARE,LEVL I: ICD-10-PCS | Mod: ,,, | Performed by: PHYSICIAN ASSISTANT

## 2022-03-23 PROCEDURE — U0002 COVID-19 LAB TEST NON-CDC: HCPCS | Performed by: PHYSICIAN ASSISTANT

## 2022-03-23 PROCEDURE — 25000003 PHARM REV CODE 250: Performed by: NURSE PRACTITIONER

## 2022-03-23 PROCEDURE — 94761 N-INVAS EAR/PLS OXIMETRY MLT: CPT

## 2022-03-23 PROCEDURE — 86580 TB INTRADERMAL TEST: CPT | Performed by: PHYSICIAN ASSISTANT

## 2022-03-23 RX ORDER — CINACALCET 30 MG/1
30 TABLET, FILM COATED ORAL
Start: 2022-03-24 | End: 2023-03-24

## 2022-03-23 RX ORDER — MIRTAZAPINE 15 MG/1
15 TABLET, FILM COATED ORAL NIGHTLY
Start: 2022-03-23 | End: 2023-03-23

## 2022-03-23 RX ORDER — VALPROIC ACID 250 MG/5ML
250 SOLUTION ORAL DAILY
Start: 2022-03-24 | End: 2023-03-24

## 2022-03-23 RX ORDER — QUETIAPINE FUMARATE 25 MG/1
25 TABLET, FILM COATED ORAL 2 TIMES DAILY
Start: 2022-03-23 | End: 2023-03-23

## 2022-03-23 RX ORDER — HYDRALAZINE HYDROCHLORIDE 50 MG/1
50 TABLET, FILM COATED ORAL EVERY 8 HOURS
Start: 2022-03-23 | End: 2023-03-23

## 2022-03-23 RX ADMIN — HYDRALAZINE HYDROCHLORIDE 50 MG: 25 TABLET, FILM COATED ORAL at 01:03

## 2022-03-23 RX ADMIN — CINACALCET HYDROCHLORIDE 30 MG: 30 TABLET, COATED ORAL at 08:03

## 2022-03-23 RX ADMIN — QUETIAPINE FUMARATE 25 MG: 25 TABLET ORAL at 09:03

## 2022-03-23 RX ADMIN — AMLODIPINE BESYLATE 10 MG: 5 TABLET ORAL at 09:03

## 2022-03-23 RX ADMIN — LABETALOL HYDROCHLORIDE 300 MG: 100 TABLET, FILM COATED ORAL at 09:03

## 2022-03-23 RX ADMIN — ASPIRIN 81 MG: 81 TABLET, COATED ORAL at 09:03

## 2022-03-23 RX ADMIN — HYDRALAZINE HYDROCHLORIDE 50 MG: 25 TABLET, FILM COATED ORAL at 05:03

## 2022-03-23 RX ADMIN — CLOPIDOGREL BISULFATE 75 MG: 75 TABLET, FILM COATED ORAL at 09:03

## 2022-03-23 RX ADMIN — ATORVASTATIN CALCIUM 40 MG: 20 TABLET, FILM COATED ORAL at 09:03

## 2022-03-23 RX ADMIN — CYANOCOBALAMIN TAB 1000 MCG 1000 MCG: 1000 TAB at 09:03

## 2022-03-23 RX ADMIN — TUBERCULIN PURIFIED PROTEIN DERIVATIVE 5 UNITS: 5 INJECTION, SOLUTION INTRADERMAL at 12:03

## 2022-03-23 RX ADMIN — FERROUS SULFATE TAB 325 MG (65 MG ELEMENTAL FE) 1 EACH: 325 (65 FE) TAB at 09:03

## 2022-03-23 RX ADMIN — VALPROIC ACID 250 MG: 250 SOLUTION ORAL at 09:03

## 2022-03-23 RX ADMIN — Medication 1 TABLET: at 09:03

## 2022-03-23 RX ADMIN — HYDRALAZINE HYDROCHLORIDE 50 MG: 25 TABLET, FILM COATED ORAL at 03:03

## 2022-03-23 RX ADMIN — LEVOTHYROXINE SODIUM 75 MCG: 75 TABLET ORAL at 05:03
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
## 2022-03-23 NOTE — PLAN OF CARE
Called Michelle with [facility] [employer] to discuss why patient payor source is relevant to request for alf Care placement . Michelle response was that [facility] Managed Medicare is out of network for their facilities thus limiting patient to receiving any future  skilled nursing care when needed.  However, if patient/family would consider changing medical plan could possibly accept for alf Placement. Michelle agreed to re-review referral for possible acceptance;  Updated Kelly; plan to place transfer orders, covid 19  Test, and PPD.

## 2022-03-23 NOTE — PROGRESS NOTES
[facility] - Med Surg ([facility])  Adult Nutrition  Progress Note    SUMMARY     Recommendations  1) Please obtain new wt   2) Continue Diabetic diet with Boost Glucose Control TID     Goals: Pt to meet % EEN/EPN via PO intake + ONS By RD f/u.  Nutrition Goal Status: progressing towards goal  Communication of RD Recs: reviewed with physician     Assessment and Plan  Inadequate oral intake  Contributing Nutrition Diagnosis  Inadequate oral intake     Related to (etiology):   AMS      Signs and Symptoms (as evidenced by):   PO intake 50% ; no ONS ordered      Interventions/Recommendations (treatment strategy):  Collaboration with other healthcare providers  Carbohydrate modified diet (Diabetic)  Commercial Beverage    Nutrition Diagnosis Status:   Continues     Malnutrition Assessment    JEREMY NFPE due to remote assessment     Reason for Assessment  Reason For Assessment: RD follow-up  Diagnosis:  (JOSÉ MIGUEL superiposed on CKD)  Relevant Medical History: Dementia, T2DM, CKD3, CAD s/p PCI, hypothyroidism, HTN, and schizophrenia  Interdisciplinary Rounds: did not attend  General Information Comments:   3/23 Remote assessment for coverage, per chart with improved intake, % meals over the last 6 days. LBM 3/22. Pending discharge to NH. Unable to reach RN via secure chat. No new wt on file.      3/18 Remote assessment for coverage, per chart pt eating 50-75% meals, LBM 3/15, pt with psych hx - does not cooperate at times.  lbs x 1 year. No new wt in chart. RD to monitor and follow up.  Nutrition Discharge Planning: Pt to follow a cardiac/ DM diet as tolerated.     Nutrition Risk Screen  Nutrition Risk Screen: no indicators present     Nutrition/Diet History  Spiritual, Cultural Beliefs, Oriental orthodox Practices, Values that Affect Care: no  Factors Affecting Nutritional Intake: impaired cognitive status/motor control     Anthropometrics  Temp: 98.3 °F (36.8 °C)  Height: 5' (152.4 cm)  Height (inches): 60 in  Weight  Method: Bed Scale  Weight: 89.4 kg (197 lb 1.5 oz)  Weight (lb): 197.09 lb  Ideal Body Weight (IBW), Female: 100 lb  % Ideal Body Weight, Female (lb): 197.09 %  BMI (Calculated): 38.5  BMI Grade: 35 - 39.9 - obesity - grade II    Lab/Procedures/Meds  Pertinent Labs Reviewed: reviewed  Pertinent Labs Comments: Cl 113, BUN 47, Cr 2.1, GFR 27, Glu 116, POC BG   Pertinent Medications Reviewed: reviewed  Pertinent Medications Comments: atorvastatin, cyanocobalamin, ferrous sulfate, levothyroxine, mirtazapine, MVI     Estimated/Assessed Needs  Weight Used For Calorie Calculations: 89.4 kg (197 lb 1.5 oz)  Energy Calorie Requirements (kcal): 7557-5629 kcal day (20-30 kcal/kg JOSÉ MIGUEL)  Energy Need Method: Kcal/kg  Protein Requirements: 72- 90 g day (0.8-1.0 g/kg JOSÉ MIGUEL)  Weight Used For Protein Calculations: 89.4 kg (197 lb 1.5 oz)  Fluid Requirements (mL): 500 mL + total output  Estimated Fluid Requirement Method: other (see comments) (JOSÉ MIGUEL)  RDA Method (mL): 1700  CHO Requirement: 213 g day     Nutrition Prescription Ordered  Current Diet Order: Diabetic     Evaluation of Received Nutrient/Fluid Intake  Energy Calories Required: not meeting needs  Protein Required: meeting needs  Fluid Required: meeting needs  Comments: LBM 3/15  Tolerance: tolerating  % Intake of Estimated Energy Needs: 50 - 75 %  % Meal Intake: 50 - 75 %     Nutrition Risk  Level of Risk/Frequency of Follow-up:  (1-2x weekly)      Monitor and Evaluation  Food and Nutrient Intake: food and beverage intake, energy intake  Food and Nutrient Adminstration: diet order  Knowledge/Beliefs/Attitudes: food and nutrition knowledge/skill  Physical Activity and Function: nutrition-related ADLs and IADLs  Anthropometric Measurements: weight, weight change, body mass index  Biochemical Data, Medical Tests and Procedures: glucose/endocrine profile, gastrointestinal profile, electrolyte and renal panel, lipid profile, inflammatory profile  Nutrition-Focused Physical  Findings: overall appearance      Nutrition Follow-Up  RD Follow-up?: Yes

## 2022-03-23 NOTE — PLAN OF CARE
Recommendations  1) Please obtain new wt   2) Continue Diabetic diet with Boost Glucose Control TID     Goals: Pt to meet % EEN/EPN via PO intake + ONS By RD f/u.  Nutrition Goal Status: progressing towards goal  Communication of RD Recs: reviewed with physician     Assessment and Plan  Inadequate oral intake  Contributing Nutrition Diagnosis  Inadequate oral intake     Related to (etiology):   AMS      Signs and Symptoms (as evidenced by):   PO intake 50% ; no ONS ordered      Interventions/Recommendations (treatment strategy):  Collaboration with other healthcare providers  Carbohydrate modified diet (Diabetic)  Commercial Beverage    Nutrition Diagnosis Status:   Continues

## 2022-03-23 NOTE — PLAN OF CARE
NURSING HOME ORDERS    04/04/2022  [facility] LOCATION ([facility])  [facility] - MED SURG ([facility])  [address]  [address]RD [address]LO[address]  [facility] [address]  Dept: [phone]  Loc: [phone]     Admit to Nursing Home:      Diagnoses:  Active Hospital Problems    Diagnosis  POA    Hyperparathyroidism [E21.3]  Yes    Acute cystitis [N30.00]  No    Major neurocognitive disorder [F03.90]  Yes    MGUS (monoclonal gammopathy of unknown significance) [D47.2]  Yes    Type 2 diabetes mellitus with neurologic complication [E11.49]  Yes    Hypertension [I10]  Yes     Chronic      Resolved Hospital Problems    Diagnosis Date Resolved POA    *Acute kidney injury superimposed on CKD [N17.9, N18.9] 03/23/2022 Yes     Priority: 1 - High    Hypercalcemia [E83.52] 03/23/2022 Yes     Priority: 2     Encephalopathy, metabolic [G93.41] 03/23/2022 Yes    Inadequate oral intake [R63.8] 03/23/2022 Yes       Code Status: full    Patient is homebound due to:  Major neuro cognitive disorder/dementia    Allergies:Review of patient's allergies indicates:  No Known Allergies    Vitals:  Routine    Diet: diabetic diet: 2000 calorie    Activities:   Activity as tolerated    Nursing Precautions:  Fall    Consults:   Nutrition to evaluate and recommend diet     Medications: Discontinue all previous medication orders, if any. See new list below.     Medication List      START taking these medications    cinacalcet 30 MG Tab  Commonly known as: SENSIPAR  Take 1 tablet (30 mg total) by mouth daily with breakfast.  Start taking on: March 24, 2022     hydrALAZINE 50 MG tablet  Commonly known as: APRESOLINE  Take 1 tablet (50 mg total) by mouth every 8 (eight) hours.        CHANGE how you take these medications    mirtazapine 15 MG tablet  Commonly known as: REMERON  Take 1 tablet (15 mg total) by mouth every evening.  What changed:   · medication strength  · how much to take     QUEtiapine 25 MG Tab  Commonly known as: SEROQUEL  Take 1 tablet  (25 mg total) by mouth 2 (two) times daily.  What changed:   · when to take this  · Another medication with the same name was removed. Continue taking this medication, and follow the directions you see here.     valproic acid (as sodium salt) 250 mg/5 mL (5 mL) Soln  Commonly known as: DEPAKENE  Take 5 mLs (250 mg total) by mouth once daily.  Start taking on: March 24, 2022  What changed:   · medication strength  · how much to take        CONTINUE taking these medications    amLODIPine 10 MG tablet  Commonly known as: NORVASC  Take 10 mg by mouth once daily.     aspirin 81 MG EC tablet  Commonly known as: ECOTRIN  Take 1 tablet (81 mg total) by mouth once daily.     atorvastatin 40 MG tablet  Commonly known as: LIPITOR  Take 40 mg by mouth once daily.     clopidogreL 75 mg tablet  Commonly known as: PLAVIX  Take 75 mg by mouth once daily.     cyanocobalamin 500 MCG tablet  Take 1,000 mcg by mouth once daily.     ferrous sulfate 325 mg (65 mg iron) Tab tablet  Commonly known as: FEOSOL  Take 325 mg by mouth once daily.     labetaloL 300 MG tablet  Commonly known as: NORMODYNE  Take 300 mg by mouth every 12 (twelve) hours.     levothyroxine 75 MCG tablet  Commonly known as: SYNTHROID  Take 75 mcg by mouth every morning before breakfast.     multivitamin with folic acid 400 mcg Tab  Take 1 tablet by mouth once daily.            Immunizations Administered as of 3/23/2022     Name Date Dose VIS Date Route Exp Date    COVID-19, vector-nr, [id number] (J&J) 7/30/2021 0.5 mL -- -- --    Lot: [id number]     External: Auto Reconciled From Outside Source             _________________________________  Lazaro Vick MD  04/04/2022

## 2022-03-23 NOTE — PLAN OF CARE
[facility] Surg ([facility])  Discharge Reassessment       Primary Care Provider: Adryan Osman MD    Admission Diagnosis: JOSÉ MIGUEL (acute kidney injury) [N17.9]  Altered mental status, unspecified altered mental status type [R41.82]    Admission Date: 3/1/2022  Expected Discharge Date: TBD  Medically cleared for NH discharge disposition  Received [facility] Medicaid Notice of Medical certification    Pending accepting Skilled nursing facility for skilled nursing placement:  [facility] re: Referral [id number] for patient in [place] [id number] A: No, unable to accept patient unable to meet needs at this time  [facility] re: Referral [id number] [place] [id number] A: No, unable to accept patient   [facility] re: Referral [id number] for patient [place] [id number] A: No, unable to accept patient   [facility] re: [address] [id number] [address]: Referral Received ; pending review  [facility] re: Referral [id number] for patient in [place] [id number] A: Referral Received ; pending review  [facility] re: Referral [id number] for patient [place] [id number] A: No, unable to accept patient     Attempted to reach daughterNicolle at [phone] to discuss discharge plan B; LVM to return call    Discharge Barriers Identified: Nursing home rejection  Payor: [facility] MCARE / Plan: [facility] DUAL COMPLETE HMO SNP / Product Type: Medicare Advantage /     Discharge Plan A: New Nursing Home placement - skilled nursing care facility  Discharge Plan B: Home with family        Initial Assessment (most recent)     Adult Discharge Assessment - 03/02/22 1438        Discharge Assessment    Assessment Type Discharge Planning Assessment     Source of Information health record     Communicated JAGDEEP with patient/caregiver Date not available/Unable to  determine     Reason For Admission JOSÉ MIGUEL     Do you have prescription coverage? Yes     Coverage [facility] MCR     Discharge Plan A Home     SDOH --   recent dc from [facility]; c/s to psych planned or psych recs planned per hospitalist provider

## 2022-03-24 LAB
ALBUMIN SERPL BCP-MCNC: 2.8 G/DL (ref 3.5–5.2)
ANION GAP SERPL CALC-SCNC: 10 MMOL/L (ref 8–16)
BUN SERPL-MCNC: 44 MG/DL (ref 8–23)
CALCIUM SERPL-MCNC: 8.7 MG/DL (ref 8.7–10.5)
CHLORIDE SERPL-SCNC: 113 MMOL/L (ref 95–110)
CO2 SERPL-SCNC: 20 MMOL/L (ref 23–29)
CREAT SERPL-MCNC: 2.1 MG/DL (ref 0.5–1.4)
EST. GFR  (AFRICAN AMERICAN): 27 ML/MIN/1.73 M^2
EST. GFR  (NON AFRICAN AMERICAN): 24 ML/MIN/1.73 M^2
GLUCOSE SERPL-MCNC: 123 MG/DL (ref 70–110)
PHOSPHATE SERPL-MCNC: 3.3 MG/DL (ref 2.7–4.5)
POTASSIUM SERPL-SCNC: 3.9 MMOL/L (ref 3.5–5.1)
SODIUM SERPL-SCNC: 143 MMOL/L (ref 136–145)

## 2022-03-24 PROCEDURE — 99231 SBSQ HOSP IP/OBS SF/LOW 25: CPT | Mod: GT,,, | Performed by: STUDENT IN AN ORGANIZED HEALTH CARE EDUCATION/TRAINING PROGRAM

## 2022-03-24 PROCEDURE — 11000001 HC ACUTE MED/SURG PRIVATE ROOM

## 2022-03-24 PROCEDURE — 25000003 PHARM REV CODE 250: Performed by: INTERNAL MEDICINE

## 2022-03-24 PROCEDURE — 25000003 PHARM REV CODE 250: Performed by: PHYSICIAN ASSISTANT

## 2022-03-24 PROCEDURE — 25000003 PHARM REV CODE 250: Performed by: STUDENT IN AN ORGANIZED HEALTH CARE EDUCATION/TRAINING PROGRAM

## 2022-03-24 PROCEDURE — 99231 PR SUBSEQUENT HOSPITAL CARE,LEVL I: ICD-10-PCS | Mod: GT,,, | Performed by: STUDENT IN AN ORGANIZED HEALTH CARE EDUCATION/TRAINING PROGRAM

## 2022-03-24 PROCEDURE — 80069 RENAL FUNCTION PANEL: CPT | Performed by: STUDENT IN AN ORGANIZED HEALTH CARE EDUCATION/TRAINING PROGRAM

## 2022-03-24 PROCEDURE — 63600175 PHARM REV CODE 636 W HCPCS: Performed by: NURSE PRACTITIONER

## 2022-03-24 PROCEDURE — 36415 COLL VENOUS BLD VENIPUNCTURE: CPT | Performed by: STUDENT IN AN ORGANIZED HEALTH CARE EDUCATION/TRAINING PROGRAM

## 2022-03-24 PROCEDURE — 25000003 PHARM REV CODE 250: Performed by: NURSE PRACTITIONER

## 2022-03-24 RX ORDER — ONDANSETRON 4 MG/1
4 TABLET, ORALLY DISINTEGRATING ORAL EVERY 6 HOURS PRN
Status: DISCONTINUED | OUTPATIENT
Start: 2022-03-24 | End: 2022-04-04 | Stop reason: HOSPADM

## 2022-03-24 RX ORDER — POLYETHYLENE GLYCOL 3350 17 G/17G
17 POWDER, FOR SOLUTION ORAL DAILY
Status: DISCONTINUED | OUTPATIENT
Start: 2022-03-24 | End: 2022-04-04 | Stop reason: HOSPADM

## 2022-03-24 RX ADMIN — LABETALOL HYDROCHLORIDE 300 MG: 100 TABLET, FILM COATED ORAL at 08:03

## 2022-03-24 RX ADMIN — CLOPIDOGREL BISULFATE 75 MG: 75 TABLET, FILM COATED ORAL at 08:03

## 2022-03-24 RX ADMIN — VALPROIC ACID 250 MG: 250 SOLUTION ORAL at 08:03

## 2022-03-24 RX ADMIN — HYDRALAZINE HYDROCHLORIDE 50 MG: 25 TABLET, FILM COATED ORAL at 06:03

## 2022-03-24 RX ADMIN — FERROUS SULFATE TAB 325 MG (65 MG ELEMENTAL FE) 1 EACH: 325 (65 FE) TAB at 08:03

## 2022-03-24 RX ADMIN — ASPIRIN 81 MG: 81 TABLET, COATED ORAL at 08:03

## 2022-03-24 RX ADMIN — CINACALCET HYDROCHLORIDE 30 MG: 30 TABLET, COATED ORAL at 08:03

## 2022-03-24 RX ADMIN — ATORVASTATIN CALCIUM 40 MG: 20 TABLET, FILM COATED ORAL at 08:03

## 2022-03-24 RX ADMIN — AMLODIPINE BESYLATE 10 MG: 5 TABLET ORAL at 08:03

## 2022-03-24 RX ADMIN — LEVOTHYROXINE SODIUM 75 MCG: 75 TABLET ORAL at 06:03

## 2022-03-24 RX ADMIN — QUETIAPINE FUMARATE 25 MG: 25 TABLET ORAL at 08:03

## 2022-03-24 RX ADMIN — HYDRALAZINE HYDROCHLORIDE 50 MG: 25 TABLET, FILM COATED ORAL at 02:03

## 2022-03-24 RX ADMIN — QUETIAPINE FUMARATE 25 MG: 25 TABLET ORAL at 06:03

## 2022-03-24 RX ADMIN — POLYETHYLENE GLYCOL 3350 17 G: 17 POWDER, FOR SOLUTION ORAL at 11:03

## 2022-03-24 RX ADMIN — Medication 1 TABLET: at 08:03

## 2022-03-24 RX ADMIN — CYANOCOBALAMIN TAB 1000 MCG 1000 MCG: 1000 TAB at 08:03

## 2022-03-24 NOTE — PLAN OF CARE
Patient A&Ox4 sitting in bed, denied pain or any discomfort at this time. No changes noted from the previous shift. Refused taking her medication, provider notified. Safety precautions maintained. Purposeful rounding completed.  Problem: Adult Inpatient Plan of Care  Goal: Plan of Care Review  Outcome: Ongoing, Progressing  Goal: Patient-Specific Goal (Individualized)  Outcome: Ongoing, Progressing  Goal: Absence of Hospital-Acquired Illness or Injury  Outcome: Ongoing, Progressing  Goal: Optimal Comfort and Wellbeing  Outcome: Ongoing, Progressing  Goal: Readiness for Transition of Care  Outcome: Ongoing, Progressing     Problem: Diabetes Comorbidity  Goal: Blood Glucose Level Within Targeted Range  Outcome: Ongoing, Progressing     Problem: Fall Injury Risk  Goal: Absence of Fall and Fall-Related Injury  Outcome: Ongoing, Progressing

## 2022-03-24 NOTE — PLAN OF CARE
Discussed Plan of care with patient. Verbalizes understanding. Patient is awake and alert. No falls, accidents, or traumas at this time. Ambulatory ad santosh. VS monitored. PO medications administered per MD orders. Bed is in lowest position, SR up x2, Call light within reach. Will continue to monitor.

## 2022-03-24 NOTE — PLAN OF CARE
[facility] - [facility] Surg ([facility])  Discharge Reassessment    [facility] - [facility]urg ([facility])  Initial Discharge Assessment       Primary Care Provider: Adryan Osman MD    Admission Diagnosis: JOSÉ MIGUEL (acute kidney injury) [N17.9]  Altered mental status, unspecified altered mental status type [R41.82]    Admission Date: 3/1/2022  Expected Discharge Date: TBD  Awaiting correction Care placement  Clinical documentation sent to additional skilled nursing home exceeding 50 mile radius from patient's primary residence;  Per daughter Nicolle; unable to provide adequate care for her mother at this time as she requires 24/7 assistance due to mental health hx     Discharge Barriers Identified: None    Payor: [facility] MCARE / Plan: [facility] DUAL COMPLETE HMO SNP / Product Type: Medicare Advantage /     Extended Emergency Contact Information  Primary Emergency Contact: Nicolle Gallardo  Mobile Phone: [phone]  Relation: Daughter  Preferred language: English   needed? No    Discharge Plan A: New Nursing Home placement - care home care facility  Discharge Plan B: Home with family      [id number] - [place], [place] - [address] AT [facility] [address]  [address]  [place] [address]  Phone: [phone] Fax: [phone]    [id number] - [place], [place] - [address]  [address]  [place] [address]  Phone: [phone] Fax: [phone]    [id number] - [place], [place] - [address] AT [facility] [address]  [address]  [place] [address]  Phone: [phone] Fax: [phone]    [id number] - [place], [place] - [address] AT [facility] [address]  [address]  [place] [address]  Phone: [phone] Fax: [phone]      Initial Assessment (most recent)     Adult Discharge Assessment - 03/02/22 1438        Discharge  Assessment    Assessment Type Discharge Planning Assessment     Source of Information health record     Communicated JAGDEEP with patient/caregiver Date not available/Unable to determine     Reason For Admission JOSÉ MIGUEL     Do you have prescription coverage? Yes     Coverage [facility]     Discharge Plan A Home     SDOH --   recent dc from [facility]; c/s to psych planned or psych recs planned per hospitalist provider

## 2022-03-24 NOTE — PLAN OF CARE
Pending referrals sent to NH for senior living placement:    [facility]  [phone]; Pending review  [facility]  [phone]-Spoke with Roseann; requesting to send referral via fax; does not have access to [facility]  [facility]  [phone]; LM to return call    [facility]  [phone] Bianca in admission office to follow up on referral review; left message to return   [facility]  [phone]-Bypdmp Admission office to follow up; Office does not have [facility]port Access; requesting manual fax to [phone]    [facility] [phone]    Response: No, unable to accept patient   Reason: Care Needs Exceed Current Capacity       [facility] [phone]   Response: No, unable to accept patient   Reason: Inappropriate Level of Care       [facility] [phone]  Response: No, unable to accept patient  [facility] [phone]  Response: No, unable to accept patient   Reason: Care Needs Exceed Current Capacity       [facility] [phone]   Response: No, unable to accept patient   Reason: Out of Network       [facility] [phone]  Response: No, unable to accept patient   Reason: Care Needs Exceed Current Capacity       [facility] [phone]  Response: No, unable to accept patient   Reason: Out of Network       [facility] [phone]  Response: No, unable to accept patient   Reason: No Available Bed       [facility] [phone]  Response: No, unable to accept patient  Reason: Other (see comments)  Comments: unable to meet needs at this time

## 2022-03-24 NOTE — CONSULTS
Thank you for your consult to [facility]. We have reviewed the patient chart. This patient does meet criteria for [facility] service at this time. Will assume care on 03/24/22 at 6AM     Spoke with primary team. While patient has dementia she has interacted with previous remote services (telepsych) well and is able to answer basic questions about her condition. She is stable for  as she awaits [facility] nursing placement    Reginald Coley M.D.  Department of Hospital Medicine
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
## 2022-03-25 PROCEDURE — 25000003 PHARM REV CODE 250: Performed by: STUDENT IN AN ORGANIZED HEALTH CARE EDUCATION/TRAINING PROGRAM

## 2022-03-25 PROCEDURE — 90471 IMMUNIZATION ADMIN: CPT | Performed by: STUDENT IN AN ORGANIZED HEALTH CARE EDUCATION/TRAINING PROGRAM

## 2022-03-25 PROCEDURE — 99231 SBSQ HOSP IP/OBS SF/LOW 25: CPT | Mod: GT,,, | Performed by: STUDENT IN AN ORGANIZED HEALTH CARE EDUCATION/TRAINING PROGRAM

## 2022-03-25 PROCEDURE — 63600175 PHARM REV CODE 636 W HCPCS: Performed by: NURSE PRACTITIONER

## 2022-03-25 PROCEDURE — 25000003 PHARM REV CODE 250: Performed by: NURSE PRACTITIONER

## 2022-03-25 PROCEDURE — 25000003 PHARM REV CODE 250: Performed by: PHYSICIAN ASSISTANT

## 2022-03-25 PROCEDURE — 90694 VACC AIIV4 NO PRSRV 0.5ML IM: CPT | Performed by: STUDENT IN AN ORGANIZED HEALTH CARE EDUCATION/TRAINING PROGRAM

## 2022-03-25 PROCEDURE — 63600175 PHARM REV CODE 636 W HCPCS: Performed by: STUDENT IN AN ORGANIZED HEALTH CARE EDUCATION/TRAINING PROGRAM

## 2022-03-25 PROCEDURE — 11000001 HC ACUTE MED/SURG PRIVATE ROOM

## 2022-03-25 PROCEDURE — 25000003 PHARM REV CODE 250: Performed by: INTERNAL MEDICINE

## 2022-03-25 PROCEDURE — 94761 N-INVAS EAR/PLS OXIMETRY MLT: CPT

## 2022-03-25 PROCEDURE — 99231 PR SUBSEQUENT HOSPITAL CARE,LEVL I: ICD-10-PCS | Mod: GT,,, | Performed by: STUDENT IN AN ORGANIZED HEALTH CARE EDUCATION/TRAINING PROGRAM

## 2022-03-25 PROCEDURE — G0008 ADMIN INFLUENZA VIRUS VAC: HCPCS | Performed by: STUDENT IN AN ORGANIZED HEALTH CARE EDUCATION/TRAINING PROGRAM

## 2022-03-25 RX ADMIN — ASPIRIN 81 MG: 81 TABLET, COATED ORAL at 08:03

## 2022-03-25 RX ADMIN — LABETALOL HYDROCHLORIDE 300 MG: 100 TABLET, FILM COATED ORAL at 08:03

## 2022-03-25 RX ADMIN — LABETALOL HYDROCHLORIDE 300 MG: 100 TABLET, FILM COATED ORAL at 09:03

## 2022-03-25 RX ADMIN — LABETALOL HYDROCHLORIDE 300 MG: 100 TABLET, FILM COATED ORAL at 06:03

## 2022-03-25 RX ADMIN — ATORVASTATIN CALCIUM 40 MG: 20 TABLET, FILM COATED ORAL at 08:03

## 2022-03-25 RX ADMIN — MIRTAZAPINE 15 MG: 15 TABLET, FILM COATED ORAL at 06:03

## 2022-03-25 RX ADMIN — CINACALCET HYDROCHLORIDE 30 MG: 30 TABLET, COATED ORAL at 08:03

## 2022-03-25 RX ADMIN — QUETIAPINE FUMARATE 25 MG: 25 TABLET ORAL at 09:03

## 2022-03-25 RX ADMIN — FERROUS SULFATE TAB 325 MG (65 MG ELEMENTAL FE) 1 EACH: 325 (65 FE) TAB at 08:03

## 2022-03-25 RX ADMIN — MIRTAZAPINE 15 MG: 15 TABLET, FILM COATED ORAL at 09:03

## 2022-03-25 RX ADMIN — VALPROIC ACID 250 MG: 250 SOLUTION ORAL at 08:03

## 2022-03-25 RX ADMIN — HYDRALAZINE HYDROCHLORIDE 50 MG: 25 TABLET, FILM COATED ORAL at 02:03

## 2022-03-25 RX ADMIN — CYANOCOBALAMIN TAB 1000 MCG 1000 MCG: 1000 TAB at 08:03

## 2022-03-25 RX ADMIN — POLYETHYLENE GLYCOL 3350 17 G: 17 POWDER, FOR SOLUTION ORAL at 08:03

## 2022-03-25 RX ADMIN — AMLODIPINE BESYLATE 10 MG: 5 TABLET ORAL at 08:03

## 2022-03-25 RX ADMIN — LEVOTHYROXINE SODIUM 75 MCG: 75 TABLET ORAL at 06:03

## 2022-03-25 RX ADMIN — HYDRALAZINE HYDROCHLORIDE 50 MG: 25 TABLET, FILM COATED ORAL at 09:03

## 2022-03-25 RX ADMIN — CLOPIDOGREL BISULFATE 75 MG: 75 TABLET, FILM COATED ORAL at 08:03

## 2022-03-25 RX ADMIN — QUETIAPINE FUMARATE 25 MG: 25 TABLET ORAL at 06:03

## 2022-03-25 RX ADMIN — INFLUENZA VACCINE, ADJUVANTED 0.5 ML: 15; 15; 15; 15 INJECTION, SUSPENSION INTRAMUSCULAR at 11:03

## 2022-03-25 RX ADMIN — QUETIAPINE FUMARATE 25 MG: 25 TABLET ORAL at 08:03

## 2022-03-25 RX ADMIN — Medication 1 TABLET: at 08:03

## 2022-03-25 NOTE — SUBJECTIVE & OBJECTIVE
Interval History:  No events overnight.  Remains calm and cooperative. Awaiting placement.    Review of Systems  Unable to obtain secondary to cognitive impairment    Objective:     Vital Signs (Most Recent):  Temp: 98.4 °F (36.9 °C) (03/25/22 1133)  Pulse: 70 (03/25/22 1133)  Resp: 17 (03/25/22 1133)  BP: (!) 148/67 (03/25/22 1133)  SpO2: 95 % (03/25/22 1133) Vital Signs (24h Range):  Temp:  [97.6 °F (36.4 °C)-98.4 °F (36.9 °C)] 98.4 °F (36.9 °C)  Pulse:  [63-72] 70  Resp:  [12-20] 17  SpO2:  [95 %-98 %] 95 %  BP: (125-166)/(58-76) 148/67     Weight: 89.4 kg (197 lb 1.5 oz)  Body mass index is 38.49 kg/m².    Intake/Output Summary (Last 24 hours) at 3/25/2022 1308  Last data filed at 3/25/2022 1101  Gross per 24 hour   Intake 720 ml   Output --   Net 720 ml        Physical Exam  Vitals and nursing note reviewed.   Constitutional:       General: She is not in acute distress.     Appearance: She is well-developed. She is not diaphoretic.   HENT:      Head: Normocephalic and atraumatic.   Eyes:      General: No scleral icterus.     Conjunctiva/sclera: Conjunctivae normal.   Cardiovascular:      Rate and Rhythm: Normal rate and regular rhythm.      Heart sounds: Normal heart sounds.   Pulmonary:      Effort: Pulmonary effort is normal. No respiratory distress.      Breath sounds: Normal breath sounds. No wheezing.   Abdominal:      General: Bowel sounds are normal. There is no distension.      Palpations: Abdomen is soft.      Tenderness: There is no abdominal tenderness.   Musculoskeletal:         General: Normal range of motion.      Cervical back: Normal range of motion and neck supple.   Skin:     General: Skin is warm and dry.   Neurological:      Mental Status: She is alert. She is disoriented.      Comments: Grossly nonfocal       Significant Labs: All pertinent labs within the past 24 hours have been reviewed      Significant Imaging: All pertinent imaging within the past 24 hours has been reviewed

## 2022-03-25 NOTE — PLAN OF CARE
03/25/22 0840   Post-Acute Status   Post-Acute Authorization Placement   Post-Acute Placement Status Referrals Sent   Patient choice form signed by patient/caregiver   (no preference given by daughter/family)   Discharge Delays (!) Post-Acute Set-up   Discharge Plan   Discharge Plan A New Nursing Home placement - [facility] care facility     Follow up calls made this morning:    Pending referrals sent to NH for [facility] placement:     [facility]  [phone]; Pending review    **CM called spoke to Anna in admission/waiting for a year and cannot accept.   Did not receive referral from [facility] but not accepting applicants for placement at this time.      [facility]  [phone]-Spoke with Roseann; requesting to send referral via fax; does not have access to [facility]    **CM called and left message for update Roseann returned call and inquired of dc date, which she was advised that the patient is cleared to dc whenever the patient was accepted. Referral remains under review at this time.    [facility]  [phone];     **DENISE called and Idalia spoken to: no bed available/denied.                [facility]  [phone] Bianca in admission office to follow up on referral review; left message to return     **DENISE called and spoke to Mr. Capps (Ms. Foss is out for a week). Mr. Capps stated he would review.    [facility]  [phone]-Kvkllr Admission office to follow up; Office does not have [facility] Access; requesting manual fax to [phone]    **DENISE called and spoke to Jean. Call back will be made after the facility's morning meeting.      Family called (Ms. Valverde)/[phone] and given an update. Ms. Valverde will be at hospital later today to visit mother, and expressed understanding and acceptance to dc plan.

## 2022-03-25 NOTE — PLAN OF CARE
[facility] - Med Surg ([facility])  Discharge Reassessment     Primary Care Provider: Adryan Osman MD    Admission Diagnosis: JOSÉ MIGUEL (acute kidney injury) [N17.9]  Altered mental status, unspecified altered mental status type [R41.82]    Admission Date: 3/1/2022  Expected Discharge Date: 3/28/2022    [facility]  [phone]     3/25/2022 15:54 (CT)  Called admission; spoke with Rosanna;   requesting updated clinical documentation to be sent via her e-mail- [email or web address];forwarded re alexis [facility] [place] [phone]     3/8/2022 15:53 (CT)  Called admissions; LVM for Joaquin Healy RN to return call to [facility] [phone]     3/8/2022 15:53 (CT)  Called to speak with Monik in admission dept; out of the office at the time of call; LM to return call to [facility]
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
## 2022-03-25 NOTE — PLAN OF CARE
Discussed Plan of care with patient. Verbalizes understanding. Patient is awake and alert. Calm and cooperative with care. Patient is on room air. No falls, accidents, or traumas at this time. Ambulatory ad santosh. VS monitored. PO medications administered per MD orders. Bed is in lowest position, SR up x2, Call light within reach. Will continue to monitor

## 2022-03-26 LAB — TB INDURATION 48 - 72 HR READ: 0 MM

## 2022-03-26 PROCEDURE — 99231 SBSQ HOSP IP/OBS SF/LOW 25: CPT | Mod: GT,,, | Performed by: STUDENT IN AN ORGANIZED HEALTH CARE EDUCATION/TRAINING PROGRAM

## 2022-03-26 PROCEDURE — 25000003 PHARM REV CODE 250: Performed by: INTERNAL MEDICINE

## 2022-03-26 PROCEDURE — 25000003 PHARM REV CODE 250: Performed by: PHYSICIAN ASSISTANT

## 2022-03-26 PROCEDURE — 25000003 PHARM REV CODE 250: Performed by: STUDENT IN AN ORGANIZED HEALTH CARE EDUCATION/TRAINING PROGRAM

## 2022-03-26 PROCEDURE — 11000001 HC ACUTE MED/SURG PRIVATE ROOM

## 2022-03-26 PROCEDURE — 63600175 PHARM REV CODE 636 W HCPCS: Performed by: NURSE PRACTITIONER

## 2022-03-26 PROCEDURE — 25000003 PHARM REV CODE 250: Performed by: NURSE PRACTITIONER

## 2022-03-26 PROCEDURE — 99231 PR SUBSEQUENT HOSPITAL CARE,LEVL I: ICD-10-PCS | Mod: GT,,, | Performed by: STUDENT IN AN ORGANIZED HEALTH CARE EDUCATION/TRAINING PROGRAM

## 2022-03-26 RX ORDER — ACETAMINOPHEN 325 MG/1
650 TABLET ORAL EVERY 6 HOURS PRN
Status: DISCONTINUED | OUTPATIENT
Start: 2022-03-26 | End: 2022-04-04 | Stop reason: HOSPADM

## 2022-03-26 RX ADMIN — HYDRALAZINE HYDROCHLORIDE 50 MG: 25 TABLET, FILM COATED ORAL at 06:03

## 2022-03-26 RX ADMIN — FERROUS SULFATE TAB 325 MG (65 MG ELEMENTAL FE) 1 EACH: 325 (65 FE) TAB at 09:03

## 2022-03-26 RX ADMIN — ASPIRIN 81 MG: 81 TABLET, COATED ORAL at 09:03

## 2022-03-26 RX ADMIN — QUETIAPINE FUMARATE 25 MG: 25 TABLET ORAL at 09:03

## 2022-03-26 RX ADMIN — LEVOTHYROXINE SODIUM 75 MCG: 75 TABLET ORAL at 06:03

## 2022-03-26 RX ADMIN — VALPROIC ACID 250 MG: 250 SOLUTION ORAL at 09:03

## 2022-03-26 RX ADMIN — AMLODIPINE BESYLATE 10 MG: 5 TABLET ORAL at 09:03

## 2022-03-26 RX ADMIN — CINACALCET HYDROCHLORIDE 30 MG: 30 TABLET, COATED ORAL at 09:03

## 2022-03-26 RX ADMIN — LABETALOL HYDROCHLORIDE 300 MG: 100 TABLET, FILM COATED ORAL at 11:03

## 2022-03-26 RX ADMIN — ATORVASTATIN CALCIUM 40 MG: 20 TABLET, FILM COATED ORAL at 09:03

## 2022-03-26 RX ADMIN — HYDRALAZINE HYDROCHLORIDE 50 MG: 25 TABLET, FILM COATED ORAL at 03:03

## 2022-03-26 RX ADMIN — CLOPIDOGREL BISULFATE 75 MG: 75 TABLET, FILM COATED ORAL at 09:03

## 2022-03-26 RX ADMIN — CYANOCOBALAMIN TAB 1000 MCG 1000 MCG: 1000 TAB at 09:03

## 2022-03-26 RX ADMIN — QUETIAPINE FUMARATE 25 MG: 25 TABLET ORAL at 11:03

## 2022-03-26 RX ADMIN — Medication 1 TABLET: at 09:03

## 2022-03-26 RX ADMIN — ACETAMINOPHEN 650 MG: 325 TABLET, FILM COATED ORAL at 12:03

## 2022-03-26 RX ADMIN — HYDRALAZINE HYDROCHLORIDE 50 MG: 25 TABLET, FILM COATED ORAL at 11:03

## 2022-03-26 RX ADMIN — LABETALOL HYDROCHLORIDE 300 MG: 100 TABLET, FILM COATED ORAL at 09:03

## 2022-03-26 RX ADMIN — MIRTAZAPINE 15 MG: 15 TABLET, FILM COATED ORAL at 11:03

## 2022-03-26 RX ADMIN — POLYETHYLENE GLYCOL 3350 17 G: 17 POWDER, FOR SOLUTION ORAL at 09:03
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
## 2022-03-26 NOTE — SUBJECTIVE & OBJECTIVE
Interval History:  No events overnight.  Remains calm and cooperative. Awaiting placement. Complains of some chest tightness that appears to be chronic.    Review of Systems  Unable to obtain secondary to cognitive impairment    Objective:     Vital Signs (Most Recent):  Temp: 98 °F (36.7 °C) (03/26/22 1631)  Pulse: 66 (03/26/22 1631)  Resp: 19 (03/26/22 1631)  BP: 138/65 (03/26/22 1631)  SpO2: 95 % (03/26/22 1631) Vital Signs (24h Range):  Temp:  [98 °F (36.7 °C)-98.6 °F (37 °C)] 98 °F (36.7 °C)  Pulse:  [65-75] 66  Resp:  [16-19] 19  SpO2:  [93 %-99 %] 95 %  BP: (136-175)/(65-75) 138/65     Weight: 89.4 kg (197 lb 1.5 oz)  Body mass index is 38.49 kg/m².    Intake/Output Summary (Last 24 hours) at 3/26/2022 1800  Last data filed at 3/26/2022 1600  Gross per 24 hour   Intake 500 ml   Output 400 ml   Net 100 ml        Physical Exam  Vitals and nursing note reviewed.   Constitutional:       General: She is not in acute distress.     Appearance: She is well-developed. She is not diaphoretic.   HENT:      Head: Normocephalic and atraumatic.   Eyes:      General: No scleral icterus.     Conjunctiva/sclera: Conjunctivae normal.   Cardiovascular:      Rate and Rhythm: Normal rate and regular rhythm.      Heart sounds: Normal heart sounds.   Pulmonary:      Effort: Pulmonary effort is normal. No respiratory distress.      Breath sounds: Normal breath sounds. No wheezing.   Abdominal:      General: Bowel sounds are normal. There is no distension.      Palpations: Abdomen is soft.      Tenderness: There is no abdominal tenderness.   Musculoskeletal:         General: Normal range of motion.      Cervical back: Normal range of motion and neck supple.   Skin:     General: Skin is warm and dry.   Neurological:      Mental Status: She is alert. She is disoriented.      Comments: Grossly nonfocal       Significant Labs: All pertinent labs within the past 24 hours have been reviewed      Significant Imaging: All pertinent imaging  within the past 24 hours has been reviewed

## 2022-03-27 PROCEDURE — 99231 PR SUBSEQUENT HOSPITAL CARE,LEVL I: ICD-10-PCS | Mod: GT,,, | Performed by: STUDENT IN AN ORGANIZED HEALTH CARE EDUCATION/TRAINING PROGRAM

## 2022-03-27 PROCEDURE — 63600175 PHARM REV CODE 636 W HCPCS: Performed by: NURSE PRACTITIONER

## 2022-03-27 PROCEDURE — 25000003 PHARM REV CODE 250: Performed by: INTERNAL MEDICINE

## 2022-03-27 PROCEDURE — 25000003 PHARM REV CODE 250: Performed by: PHYSICIAN ASSISTANT

## 2022-03-27 PROCEDURE — 99231 SBSQ HOSP IP/OBS SF/LOW 25: CPT | Mod: GT,,, | Performed by: STUDENT IN AN ORGANIZED HEALTH CARE EDUCATION/TRAINING PROGRAM

## 2022-03-27 PROCEDURE — 25000003 PHARM REV CODE 250: Performed by: STUDENT IN AN ORGANIZED HEALTH CARE EDUCATION/TRAINING PROGRAM

## 2022-03-27 PROCEDURE — 11000001 HC ACUTE MED/SURG PRIVATE ROOM

## 2022-03-27 PROCEDURE — 25000003 PHARM REV CODE 250: Performed by: NURSE PRACTITIONER

## 2022-03-27 RX ORDER — HYDRALAZINE HYDROCHLORIDE 25 MG/1
75 TABLET, FILM COATED ORAL EVERY 8 HOURS
Status: DISCONTINUED | OUTPATIENT
Start: 2022-03-27 | End: 2022-04-04 | Stop reason: HOSPADM

## 2022-03-27 RX ADMIN — FERROUS SULFATE TAB 325 MG (65 MG ELEMENTAL FE) 1 EACH: 325 (65 FE) TAB at 08:03

## 2022-03-27 RX ADMIN — CLOPIDOGREL BISULFATE 75 MG: 75 TABLET, FILM COATED ORAL at 08:03

## 2022-03-27 RX ADMIN — CINACALCET HYDROCHLORIDE 30 MG: 30 TABLET, COATED ORAL at 08:03

## 2022-03-27 RX ADMIN — AMLODIPINE BESYLATE 10 MG: 5 TABLET ORAL at 08:03

## 2022-03-27 RX ADMIN — ATORVASTATIN CALCIUM 40 MG: 20 TABLET, FILM COATED ORAL at 08:03

## 2022-03-27 RX ADMIN — HYDRALAZINE HYDROCHLORIDE 75 MG: 25 TABLET, FILM COATED ORAL at 02:03

## 2022-03-27 RX ADMIN — HYDRALAZINE HYDROCHLORIDE 75 MG: 25 TABLET, FILM COATED ORAL at 10:03

## 2022-03-27 RX ADMIN — POLYETHYLENE GLYCOL 3350 17 G: 17 POWDER, FOR SOLUTION ORAL at 08:03

## 2022-03-27 RX ADMIN — Medication 1 TABLET: at 08:03

## 2022-03-27 RX ADMIN — CYANOCOBALAMIN TAB 1000 MCG 1000 MCG: 1000 TAB at 08:03

## 2022-03-27 RX ADMIN — QUETIAPINE FUMARATE 25 MG: 25 TABLET ORAL at 08:03

## 2022-03-27 RX ADMIN — MIRTAZAPINE 15 MG: 15 TABLET, FILM COATED ORAL at 10:03

## 2022-03-27 RX ADMIN — HYDRALAZINE HYDROCHLORIDE 50 MG: 25 TABLET, FILM COATED ORAL at 06:03

## 2022-03-27 RX ADMIN — QUETIAPINE FUMARATE 25 MG: 25 TABLET ORAL at 10:03

## 2022-03-27 RX ADMIN — LABETALOL HYDROCHLORIDE 300 MG: 100 TABLET, FILM COATED ORAL at 08:03

## 2022-03-27 RX ADMIN — LABETALOL HYDROCHLORIDE 300 MG: 100 TABLET, FILM COATED ORAL at 10:03

## 2022-03-27 RX ADMIN — ASPIRIN 81 MG: 81 TABLET, COATED ORAL at 08:03

## 2022-03-27 RX ADMIN — VALPROIC ACID 250 MG: 250 SOLUTION ORAL at 08:03

## 2022-03-27 RX ADMIN — LEVOTHYROXINE SODIUM 75 MCG: 75 TABLET ORAL at 06:03

## 2022-03-28 PROCEDURE — 25000003 PHARM REV CODE 250: Performed by: PHYSICIAN ASSISTANT

## 2022-03-28 PROCEDURE — 11000001 HC ACUTE MED/SURG PRIVATE ROOM

## 2022-03-28 PROCEDURE — 63600175 PHARM REV CODE 636 W HCPCS: Performed by: NURSE PRACTITIONER

## 2022-03-28 PROCEDURE — 25000003 PHARM REV CODE 250: Performed by: NURSE PRACTITIONER

## 2022-03-28 PROCEDURE — 99231 SBSQ HOSP IP/OBS SF/LOW 25: CPT | Mod: GT,,, | Performed by: STUDENT IN AN ORGANIZED HEALTH CARE EDUCATION/TRAINING PROGRAM

## 2022-03-28 PROCEDURE — 99231 PR SUBSEQUENT HOSPITAL CARE,LEVL I: ICD-10-PCS | Mod: GT,,, | Performed by: STUDENT IN AN ORGANIZED HEALTH CARE EDUCATION/TRAINING PROGRAM

## 2022-03-28 PROCEDURE — 94761 N-INVAS EAR/PLS OXIMETRY MLT: CPT

## 2022-03-28 PROCEDURE — 25000003 PHARM REV CODE 250: Performed by: STUDENT IN AN ORGANIZED HEALTH CARE EDUCATION/TRAINING PROGRAM

## 2022-03-28 RX ADMIN — QUETIAPINE FUMARATE 25 MG: 25 TABLET ORAL at 10:03

## 2022-03-28 RX ADMIN — AMLODIPINE BESYLATE 10 MG: 5 TABLET ORAL at 10:03

## 2022-03-28 RX ADMIN — ATORVASTATIN CALCIUM 40 MG: 20 TABLET, FILM COATED ORAL at 10:03

## 2022-03-28 RX ADMIN — LEVOTHYROXINE SODIUM 75 MCG: 75 TABLET ORAL at 06:03

## 2022-03-28 RX ADMIN — Medication 1 TABLET: at 10:03

## 2022-03-28 RX ADMIN — ASPIRIN 81 MG: 81 TABLET, COATED ORAL at 10:03

## 2022-03-28 RX ADMIN — LABETALOL HYDROCHLORIDE 300 MG: 100 TABLET, FILM COATED ORAL at 09:03

## 2022-03-28 RX ADMIN — HYDRALAZINE HYDROCHLORIDE 75 MG: 25 TABLET, FILM COATED ORAL at 06:03

## 2022-03-28 RX ADMIN — CYANOCOBALAMIN TAB 1000 MCG 1000 MCG: 1000 TAB at 10:03

## 2022-03-28 RX ADMIN — FERROUS SULFATE TAB 325 MG (65 MG ELEMENTAL FE) 1 EACH: 325 (65 FE) TAB at 10:03

## 2022-03-28 RX ADMIN — VALPROIC ACID 250 MG: 250 SOLUTION ORAL at 10:03

## 2022-03-28 RX ADMIN — CINACALCET HYDROCHLORIDE 30 MG: 30 TABLET, COATED ORAL at 10:03

## 2022-03-28 RX ADMIN — QUETIAPINE FUMARATE 25 MG: 25 TABLET ORAL at 09:03

## 2022-03-28 RX ADMIN — HYDRALAZINE HYDROCHLORIDE 75 MG: 25 TABLET, FILM COATED ORAL at 09:03

## 2022-03-28 RX ADMIN — LABETALOL HYDROCHLORIDE 300 MG: 100 TABLET, FILM COATED ORAL at 10:03

## 2022-03-28 RX ADMIN — POLYETHYLENE GLYCOL 3350 17 G: 17 POWDER, FOR SOLUTION ORAL at 10:03

## 2022-03-28 RX ADMIN — CLOPIDOGREL BISULFATE 75 MG: 75 TABLET, FILM COATED ORAL at 10:03

## 2022-03-28 RX ADMIN — MIRTAZAPINE 15 MG: 15 TABLET, FILM COATED ORAL at 09:03

## 2022-03-28 RX ADMIN — HYDRALAZINE HYDROCHLORIDE 75 MG: 25 TABLET, FILM COATED ORAL at 03:03

## 2022-03-28 NOTE — PLAN OF CARE
03/28/22 0906   Discharge Reassessment   Assessment Type Discharge Planning Reassessment   Did the patient's condition or plan change since previous assessment? No   Discharge Plan discussed with:   (provider (3/28); family/dtr on 3/25- pending NH acceptance)   Communicated JAGDEEP with patient/caregiver Date not available/Unable to determine   Discharge Plan A New Nursing Home placement [facility]   DME Needed Upon Discharge  none   Discharge Barriers Identified Nursing Home rejection;Mental illness;Social;No family/friends to help  (pending acceptance)   Why the patient remains in the hospital Placement issues;Social issues   Post-Acute Status   Post-Acute Authorization Placement   Post-Acute Placement Status Referrals Sent   Coverage [facility] (N/A for NH placement)   Patient choice form signed by patient/caregiver   (declined by family (open to any available; prefers closer to [place] if possible))   Discharge Delays (!) Post-Acute Set-up       Plan for follow up with [facility] today; other referrals sent out by PARDEEP Marina:  [facility], [facility] and [facility] (sent 3/25)- no response or acceptance    CM SUP will continue to follow    1534 update:    [facility]- denied based on behavior history  CDN: no response: A VM left  Others in careport: no acceptance for several reasons: care beyond what can be met, no beds, and no responses    Will continue to review referrals and follow up with facilities.

## 2022-03-28 NOTE — SUBJECTIVE & OBJECTIVE
Interval History:  No events overnight.  Remains calm and cooperative. Awaiting placement.       Review of Systems  Unable to obtain secondary to cognitive impairment    Objective:     Vital Signs (Most Recent):  Temp: 98.1 °F (36.7 °C) (03/27/22 1603)  Pulse: 67 (03/27/22 1603)  Resp: 18 (03/27/22 1603)  BP: 139/64 (03/27/22 1603)  SpO2: (!) 94 % (03/27/22 1603) Vital Signs (24h Range):  Temp:  [98 °F (36.7 °C)-98.7 °F (37.1 °C)] 98.1 °F (36.7 °C)  Pulse:  [65-70] 67  Resp:  [16-18] 18  SpO2:  [94 %-96 %] 94 %  BP: (139-183)/(64-81) 139/64     Weight: 89.4 kg (197 lb 1.5 oz)  Body mass index is 38.49 kg/m².    Intake/Output Summary (Last 24 hours) at 3/27/2022 2029  Last data filed at 3/27/2022 1600  Gross per 24 hour   Intake 960 ml   Output 1450 ml   Net -490 ml        Physical Exam  Vitals and nursing note reviewed.   Constitutional:       General: She is not in acute distress.     Appearance: She is well-developed. She is not diaphoretic.   HENT:      Head: Normocephalic and atraumatic.   Eyes:      General: No scleral icterus.     Conjunctiva/sclera: Conjunctivae normal.   Cardiovascular:      Rate and Rhythm: Normal rate and regular rhythm.      Heart sounds: Normal heart sounds.   Pulmonary:      Effort: Pulmonary effort is normal. No respiratory distress.      Breath sounds: Normal breath sounds. No wheezing.   Abdominal:      General: Bowel sounds are normal. There is no distension.      Palpations: Abdomen is soft.      Tenderness: There is no abdominal tenderness.   Musculoskeletal:         General: Normal range of motion.      Cervical back: Normal range of motion and neck supple.   Skin:     General: Skin is warm and dry.   Neurological:      Mental Status: She is alert. She is disoriented.      Comments: Grossly nonfocal       Significant Labs: All pertinent labs within the past 24 hours have been reviewed      Significant Imaging: All pertinent imaging within the past 24 hours has been reviewed
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
## 2022-03-29 ENCOUNTER — PATIENT OUTREACH (OUTPATIENT)
Dept: ADMINISTRATIVE | Facility: OTHER | Age: 67
End: 2022-03-29
Payer: MEDICARE

## 2022-03-29 PROCEDURE — 25000003 PHARM REV CODE 250: Performed by: PHYSICIAN ASSISTANT

## 2022-03-29 PROCEDURE — 25000003 PHARM REV CODE 250: Performed by: NURSE PRACTITIONER

## 2022-03-29 PROCEDURE — 63600175 PHARM REV CODE 636 W HCPCS: Performed by: NURSE PRACTITIONER

## 2022-03-29 PROCEDURE — 99231 SBSQ HOSP IP/OBS SF/LOW 25: CPT | Mod: GT,,, | Performed by: STUDENT IN AN ORGANIZED HEALTH CARE EDUCATION/TRAINING PROGRAM

## 2022-03-29 PROCEDURE — 11000001 HC ACUTE MED/SURG PRIVATE ROOM

## 2022-03-29 PROCEDURE — 25000003 PHARM REV CODE 250: Performed by: STUDENT IN AN ORGANIZED HEALTH CARE EDUCATION/TRAINING PROGRAM

## 2022-03-29 PROCEDURE — 99231 PR SUBSEQUENT HOSPITAL CARE,LEVL I: ICD-10-PCS | Mod: GT,,, | Performed by: STUDENT IN AN ORGANIZED HEALTH CARE EDUCATION/TRAINING PROGRAM

## 2022-03-29 PROCEDURE — 94761 N-INVAS EAR/PLS OXIMETRY MLT: CPT

## 2022-03-29 RX ORDER — BISACODYL 10 MG
10 SUPPOSITORY, RECTAL RECTAL ONCE
Status: DISCONTINUED | OUTPATIENT
Start: 2022-03-29 | End: 2022-03-29

## 2022-03-29 RX ORDER — SYRING-NEEDL,DISP,INSUL,0.3 ML 29 G X1/2"
296 SYRINGE, EMPTY DISPOSABLE MISCELLANEOUS ONCE
Status: COMPLETED | OUTPATIENT
Start: 2022-03-29 | End: 2022-03-29

## 2022-03-29 RX ADMIN — LABETALOL HYDROCHLORIDE 300 MG: 100 TABLET, FILM COATED ORAL at 09:03

## 2022-03-29 RX ADMIN — ATORVASTATIN CALCIUM 40 MG: 20 TABLET, FILM COATED ORAL at 09:03

## 2022-03-29 RX ADMIN — HYDRALAZINE HYDROCHLORIDE 75 MG: 25 TABLET, FILM COATED ORAL at 06:03

## 2022-03-29 RX ADMIN — QUETIAPINE FUMARATE 25 MG: 25 TABLET ORAL at 09:03

## 2022-03-29 RX ADMIN — Medication 1 TABLET: at 09:03

## 2022-03-29 RX ADMIN — CLOPIDOGREL BISULFATE 75 MG: 75 TABLET, FILM COATED ORAL at 09:03

## 2022-03-29 RX ADMIN — Medication 296 ML: at 11:03

## 2022-03-29 RX ADMIN — VALPROIC ACID 250 MG: 250 SOLUTION ORAL at 09:03

## 2022-03-29 RX ADMIN — CYANOCOBALAMIN TAB 1000 MCG 1000 MCG: 1000 TAB at 09:03

## 2022-03-29 RX ADMIN — ASPIRIN 81 MG: 81 TABLET, COATED ORAL at 09:03

## 2022-03-29 RX ADMIN — HYDRALAZINE HYDROCHLORIDE 75 MG: 25 TABLET, FILM COATED ORAL at 01:03

## 2022-03-29 RX ADMIN — AMLODIPINE BESYLATE 10 MG: 5 TABLET ORAL at 09:03

## 2022-03-29 RX ADMIN — HYDRALAZINE HYDROCHLORIDE 75 MG: 25 TABLET, FILM COATED ORAL at 09:03

## 2022-03-29 RX ADMIN — CINACALCET HYDROCHLORIDE 30 MG: 30 TABLET, COATED ORAL at 09:03

## 2022-03-29 RX ADMIN — FERROUS SULFATE TAB 325 MG (65 MG ELEMENTAL FE) 1 EACH: 325 (65 FE) TAB at 09:03

## 2022-03-29 RX ADMIN — POLYETHYLENE GLYCOL 3350 17 G: 17 POWDER, FOR SOLUTION ORAL at 09:03

## 2022-03-29 RX ADMIN — MIRTAZAPINE 15 MG: 15 TABLET, FILM COATED ORAL at 09:03

## 2022-03-29 RX ADMIN — LEVOTHYROXINE SODIUM 75 MCG: 75 TABLET ORAL at 06:03
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
## 2022-03-29 NOTE — PLAN OF CARE
03/29/22 1553   Post-Acute Status   Post-Acute Authorization Placement   Post-Acute Placement Status Referrals Sent   Discharge Plan   Discharge Plan A New Nursing Home placement - senior care care facility   Discharge Plan B Home with family;Home Health     CM reviewed all referrals in [facility] and there have been no acceptance. [facility] not able to update referrals send outs, so right fax used to hard fax referral to [facility] as suggested by virtual team.    Family is in discussion for a secondary plan for dc should the patient continue to have non accepting NHs

## 2022-03-29 NOTE — PLAN OF CARE
VSS on RA. Patient disoriented to time, place, situation. BP monitored closely. Frequent weight shift encouraged. Hourly rounding performed, fall risk precautions maintained. Ambulating with standby assistance. No significant events, will continue to monitor.    Problem: Adult Inpatient Plan of Care  Goal: Optimal Comfort and Wellbeing  Outcome: Ongoing, Progressing     Problem: Diabetes Comorbidity  Goal: Blood Glucose Level Within Targeted Range  Outcome: Ongoing, Progressing     Problem: Skin Injury Risk Increased  Goal: Skin Health and Integrity  Outcome: Ongoing, Progressing     Problem: Fall Injury Risk  Goal: Absence of Fall and Fall-Related Injury  Outcome: Ongoing, Progressing

## 2022-03-29 NOTE — SUBJECTIVE & OBJECTIVE
Interval History:  No events overnight.  Remains calm and cooperative. Awaiting placement. Daughter at bedside today, patient doing much better.      Review of Systems  Unable to obtain secondary to cognitive impairment    Objective:     Vital Signs (Most Recent):  Temp: 98.8 °F (37.1 °C) (03/28/22 1922)  Pulse: 69 (03/28/22 1922)  Resp: 14 (03/28/22 1922)  BP: (!) 141/63 (03/28/22 1922)  SpO2: 96 % (03/28/22 1922) Vital Signs (24h Range):  Temp:  [97.2 °F (36.2 °C)-98.8 °F (37.1 °C)] 98.8 °F (37.1 °C)  Pulse:  [62-70] 69  Resp:  [14-18] 14  SpO2:  [94 %-97 %] 96 %  BP: (141-183)/(63-82) 141/63     Weight: 89.4 kg (197 lb 1.5 oz)  Body mass index is 38.49 kg/m².    Intake/Output Summary (Last 24 hours) at 3/28/2022 2206  Last data filed at 3/28/2022 1600  Gross per 24 hour   Intake 960 ml   Output 300 ml   Net 660 ml        Physical Exam  Vitals and nursing note reviewed.   Constitutional:       General: She is not in acute distress.     Appearance: She is well-developed. She is not diaphoretic.   HENT:      Head: Normocephalic and atraumatic.   Eyes:      General: No scleral icterus.     Conjunctiva/sclera: Conjunctivae normal.   Cardiovascular:      Rate and Rhythm: Normal rate and regular rhythm.      Heart sounds: Normal heart sounds.   Pulmonary:      Effort: Pulmonary effort is normal. No respiratory distress.      Breath sounds: Normal breath sounds. No wheezing.   Abdominal:      General: Bowel sounds are normal. There is no distension.      Palpations: Abdomen is soft.      Tenderness: There is no abdominal tenderness.   Musculoskeletal:         General: Normal range of motion.      Cervical back: Normal range of motion and neck supple.   Skin:     General: Skin is warm and dry.   Neurological:      Mental Status: She is alert. She is disoriented.      Comments: Grossly nonfocal       Significant Labs: All pertinent labs within the past 24 hours have been reviewed      Significant Imaging: All pertinent  imaging within the past 24 hours has been reviewed

## 2022-03-30 LAB — POCT GLUCOSE: 95 MG/DL (ref 70–110)

## 2022-03-30 PROCEDURE — 25000003 PHARM REV CODE 250: Performed by: NURSE PRACTITIONER

## 2022-03-30 PROCEDURE — 25000003 PHARM REV CODE 250: Performed by: PHYSICIAN ASSISTANT

## 2022-03-30 PROCEDURE — 99231 PR SUBSEQUENT HOSPITAL CARE,LEVL I: ICD-10-PCS | Mod: GT,,, | Performed by: STUDENT IN AN ORGANIZED HEALTH CARE EDUCATION/TRAINING PROGRAM

## 2022-03-30 PROCEDURE — 25000003 PHARM REV CODE 250: Performed by: STUDENT IN AN ORGANIZED HEALTH CARE EDUCATION/TRAINING PROGRAM

## 2022-03-30 PROCEDURE — 99231 SBSQ HOSP IP/OBS SF/LOW 25: CPT | Mod: GT,,, | Performed by: STUDENT IN AN ORGANIZED HEALTH CARE EDUCATION/TRAINING PROGRAM

## 2022-03-30 PROCEDURE — 63600175 PHARM REV CODE 636 W HCPCS: Performed by: NURSE PRACTITIONER

## 2022-03-30 PROCEDURE — 11000001 HC ACUTE MED/SURG PRIVATE ROOM

## 2022-03-30 RX ADMIN — LABETALOL HYDROCHLORIDE 300 MG: 100 TABLET, FILM COATED ORAL at 09:03

## 2022-03-30 RX ADMIN — ATORVASTATIN CALCIUM 40 MG: 20 TABLET, FILM COATED ORAL at 08:03

## 2022-03-30 RX ADMIN — VALPROIC ACID 250 MG: 250 SOLUTION ORAL at 08:03

## 2022-03-30 RX ADMIN — CINACALCET HYDROCHLORIDE 30 MG: 30 TABLET, COATED ORAL at 08:03

## 2022-03-30 RX ADMIN — HYDRALAZINE HYDROCHLORIDE 75 MG: 25 TABLET, FILM COATED ORAL at 09:03

## 2022-03-30 RX ADMIN — QUETIAPINE FUMARATE 25 MG: 25 TABLET ORAL at 09:03

## 2022-03-30 RX ADMIN — ASPIRIN 81 MG: 81 TABLET, COATED ORAL at 08:03

## 2022-03-30 RX ADMIN — MIRTAZAPINE 15 MG: 15 TABLET, FILM COATED ORAL at 09:03

## 2022-03-30 RX ADMIN — CLOPIDOGREL BISULFATE 75 MG: 75 TABLET, FILM COATED ORAL at 08:03

## 2022-03-30 RX ADMIN — HYDRALAZINE HYDROCHLORIDE 75 MG: 25 TABLET, FILM COATED ORAL at 06:03

## 2022-03-30 RX ADMIN — FERROUS SULFATE TAB 325 MG (65 MG ELEMENTAL FE) 1 EACH: 325 (65 FE) TAB at 08:03

## 2022-03-30 RX ADMIN — LEVOTHYROXINE SODIUM 75 MCG: 75 TABLET ORAL at 06:03

## 2022-03-30 RX ADMIN — Medication 1 TABLET: at 08:03

## 2022-03-30 RX ADMIN — CYANOCOBALAMIN TAB 1000 MCG 1000 MCG: 1000 TAB at 08:03

## 2022-03-30 RX ADMIN — QUETIAPINE FUMARATE 25 MG: 25 TABLET ORAL at 08:03

## 2022-03-30 RX ADMIN — POLYETHYLENE GLYCOL 3350 17 G: 17 POWDER, FOR SOLUTION ORAL at 08:03

## 2022-03-30 RX ADMIN — LABETALOL HYDROCHLORIDE 300 MG: 100 TABLET, FILM COATED ORAL at 08:03

## 2022-03-30 RX ADMIN — HYDRALAZINE HYDROCHLORIDE 75 MG: 25 TABLET, FILM COATED ORAL at 02:03

## 2022-03-30 RX ADMIN — AMLODIPINE BESYLATE 10 MG: 5 TABLET ORAL at 08:03

## 2022-03-30 NOTE — PROGRESS NOTES
[facility] - Med Surg ([facility])  Adult Nutrition  Progress Note    SUMMARY       Recommendations  1. Recommend stool softner- LBM 3/26.   2. Continue DM diet with Boost Glucose Control TID.   3. Encourage good PO intake.    Goals: Pt to meet % EEN/EPN via PO intake + ONS By RD f/u.  Nutrition Goal Status: goal met  Communication of RD Recs:  (POC)    Assessment and Plan   Inadequate oral intake  Contributing Nutrition Diagnosis  Inadequate oral intake     Related to (etiology):   AMS      Signs and Symptoms (as evidenced by):   PO intake 75% ; ONS ordered      Interventions/Recommendations (treatment strategy):  Collaboration with other healthcare providers  Carbohydrate modified diet (Diabetic)  Commercial Beverage     Nutrition Diagnosis Status:   Improving     Reason for Assessment    Reason For Assessment: RD follow-up  Diagnosis:  (JOSÉ MIGUEL superiposed on CKD)  Relevant Medical History: Dementia, T2DM, CKD3, CAD s/p PCI, hypothyroidism, HTN, and schizophrenia  Interdisciplinary Rounds: did not attend  General Information Comments: 3/30- RD f/u. Disorented noted. Pt on 2000kcal DM diet. Boost Glucose TID. PO intake 75%. LBM 3/26- consipation? Prabhakar Score 18. Will continue to monitor.    Nutrition Discharge Planning: Pt to follow a cardiac/ DM diet as tolerated.    Nutrition Risk Screen    Nutrition Risk Screen: no indicators present    Nutrition/Diet History    Spiritual, Cultural Beliefs, Mormon Practices, Values that Affect Care: no  Factors Affecting Nutritional Intake: impaired cognitive status/motor control    Anthropometrics    Temp: 98.2 °F (36.8 °C)  Height: 5' (152.4 cm)  Height (inches): 60 in  Weight Method: Bed Scale  Weight: 89.4 kg (197 lb 1.5 oz)  Weight (lb): 197.09 lb  Ideal Body Weight (IBW), Female: 100 lb  % Ideal Body Weight, Female (lb): 197.09 %  BMI (Calculated): 38.5  BMI Grade: 35 - 39.9 - obesity - grade II     Lab/Procedures/Meds    Pertinent Labs Reviewed: reviewed  Pertinent  Labs Comments: no labs  Pertinent Medications Reviewed: reviewed  Pertinent Medications Comments: Amlodipine, atorvastatin, cinacalcet, clopidogrel, vit B12, ferrous sulfate, hydralazine, levothyroxine, mirtazapine, MV, polyethylene glycol, quetiapine, valproic acid    Estimated/Assessed Needs    Weight Used For Calorie Calculations: 89.4 kg (197 lb 1.5 oz)  Energy Calorie Requirements (kcal): 1890 kcal day (MSJ X AF 1.4)  Energy Need Method: [place]-St Jeor  Protein Requirements:  g day (1.0-1.2 g/kg)  Weight Used For Protein Calculations: 89.4 kg (197 lb 1.5 oz)  Fluid Requirements (mL): or per MD orders.  Estimated Fluid Requirement Method: RDA Method  RDA Method (mL): 1890  CHO Requirement: 237 g day    Nutrition Prescription Ordered    Current Diet Order: Diabetic  Oral Nutrition Supplement: Boost Glucose TID    Evaluation of Received Nutrient/Fluid Intake    Energy Calories Required: meeting needs  Protein Required: meeting needs  Fluid Required: meeting needs  Comments: LBM 3/26  Tolerance: tolerating  % Intake of Estimated Energy Needs: 75 - 100 %  % Meal Intake: 75 - 100 %    Nutrition Risk    Level of Risk/Frequency of Follow-up:  (1-2x weekly)     Monitor and Evaluation    Food and Nutrient Intake: food and beverage intake, energy intake  Food and Nutrient Adminstration: diet order  Knowledge/Beliefs/Attitudes: food and nutrition knowledge/skill  Physical Activity and Function: nutrition-related ADLs and IADLs  Anthropometric Measurements: weight, weight change, body mass index  Biochemical Data, Medical Tests and Procedures: electrolyte and renal panel, lipid profile, gastrointestinal profile, glucose/endocrine profile, inflammatory profile  Nutrition-Focused Physical Findings: overall appearance     Nutrition Follow-Up    RD Follow-up?: Yes

## 2022-03-30 NOTE — SUBJECTIVE & OBJECTIVE
Interval History:  No events overnight.  Remains calm and cooperative. Awaiting placement. Daughter states will talk to siblings about plan B as patient still has no accepting facility.       Review of Systems  Unable to obtain secondary to cognitive impairment    Objective:     Vital Signs (Most Recent):  Temp: 98.7 °F (37.1 °C) (03/29/22 1910)  Pulse: 66 (03/29/22 1910)  Resp: 16 (03/29/22 1910)  BP: (!) 155/70 (03/29/22 1910)  SpO2: 95 % (03/29/22 1910) Vital Signs (24h Range):  Temp:  [97.4 °F (36.3 °C)-99.4 °F (37.4 °C)] 98.7 °F (37.1 °C)  Pulse:  [66-69] 66  Resp:  [16-20] 16  SpO2:  [95 %-97 %] 95 %  BP: (143-170)/(63-77) 155/70     Weight: 89.4 kg (197 lb 1.5 oz)  Body mass index is 38.49 kg/m².    Intake/Output Summary (Last 24 hours) at 3/29/2022 2110  Last data filed at 3/29/2022 1800  Gross per 24 hour   Intake 1200 ml   Output 180 ml   Net 1020 ml        Physical Exam  Vitals and nursing note reviewed.   Constitutional:       General: She is not in acute distress.     Appearance: She is well-developed. She is not diaphoretic.   HENT:      Head: Normocephalic and atraumatic.   Eyes:      General: No scleral icterus.     Conjunctiva/sclera: Conjunctivae normal.   Cardiovascular:      Rate and Rhythm: Normal rate and regular rhythm.      Heart sounds: Normal heart sounds.   Pulmonary:      Effort: Pulmonary effort is normal. No respiratory distress.      Breath sounds: Normal breath sounds. No wheezing.   Abdominal:      General: Bowel sounds are normal. There is no distension.      Palpations: Abdomen is soft.      Tenderness: There is no abdominal tenderness.   Musculoskeletal:         General: Normal range of motion.      Cervical back: Normal range of motion and neck supple.   Skin:     General: Skin is warm and dry.   Neurological:      Mental Status: She is alert. She is disoriented.      Comments: Grossly nonfocal       Significant Labs: All pertinent labs within the past 24 hours have been  reviewed      Significant Imaging: All pertinent imaging within the past 24 hours has been reviewed
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
## 2022-03-30 NOTE — PLAN OF CARE
03/30/22 1033   Post-Acute Status   Post-Acute Authorization Placement   Post-Acute Placement Status Referrals Sent   Patient choice form signed by patient/caregiver   (n/a; family open to all possible acceptances)   Discharge Delays (!) Post-Acute Set-up  (placement issues)   Discharge Plan   Discharge Plan A New Nursing Home placement - intermediate care facility   Discharge Plan B Home with family;Home Health     CM SUP reached out to [facility] in [place], [place] and left a VM with admissions/[phone]  CM SUP attempted to reach patient's daughter, Gabby to discuss secondary plans for dc to home, as CM SUP was advised by the virtual team that the patient's family was discussing options to return home should there continue to be no NH acceptance. No VM was able to be left on Gabby's phone.    CM SUP will continue to follow for dc planning needs.

## 2022-03-30 NOTE — PLAN OF CARE
AAOX1. VSS.Pt free of trauma, falls, injury and skin breakdown. Pt denies pain at this time.Pt has been eating and voiding adequately throughout shift. Pt ambulates and repositions self independently. Purposeful hourly rounding. Pt has call light in reach, side rails up X2, bed in low position and nonskid socks on. Pt lying in bed in no distress. Will continue to monitor.

## 2022-03-30 NOTE — PLAN OF CARE
Recommendations  1. Recommend stool softner- LBM 3/26.   2. Continue DM diet with Boost Glucose Control TID.   3. Encourage good PO intake.    Goals: Pt to meet % EEN/EPN via PO intake + ONS By RD f/u.  Nutrition Goal Status: goal met  Communication of RD Recs:  (POC)

## 2022-03-31 ENCOUNTER — PATIENT OUTREACH (OUTPATIENT)
Dept: ADMINISTRATIVE | Facility: OTHER | Age: 67
End: 2022-03-31
Payer: MEDICARE

## 2022-03-31 PROCEDURE — 25000003 PHARM REV CODE 250: Performed by: NURSE PRACTITIONER

## 2022-03-31 PROCEDURE — 25000003 PHARM REV CODE 250: Performed by: PHYSICIAN ASSISTANT

## 2022-03-31 PROCEDURE — 63600175 PHARM REV CODE 636 W HCPCS: Performed by: NURSE PRACTITIONER

## 2022-03-31 PROCEDURE — 99231 PR SUBSEQUENT HOSPITAL CARE,LEVL I: ICD-10-PCS | Mod: GT,,, | Performed by: STUDENT IN AN ORGANIZED HEALTH CARE EDUCATION/TRAINING PROGRAM

## 2022-03-31 PROCEDURE — 25000003 PHARM REV CODE 250: Performed by: STUDENT IN AN ORGANIZED HEALTH CARE EDUCATION/TRAINING PROGRAM

## 2022-03-31 PROCEDURE — 99231 SBSQ HOSP IP/OBS SF/LOW 25: CPT | Mod: GT,,, | Performed by: STUDENT IN AN ORGANIZED HEALTH CARE EDUCATION/TRAINING PROGRAM

## 2022-03-31 PROCEDURE — 11000001 HC ACUTE MED/SURG PRIVATE ROOM

## 2022-03-31 RX ADMIN — ASPIRIN 81 MG: 81 TABLET, COATED ORAL at 08:03

## 2022-03-31 RX ADMIN — FERROUS SULFATE TAB 325 MG (65 MG ELEMENTAL FE) 1 EACH: 325 (65 FE) TAB at 08:03

## 2022-03-31 RX ADMIN — VALPROIC ACID 250 MG: 250 SOLUTION ORAL at 08:03

## 2022-03-31 RX ADMIN — QUETIAPINE FUMARATE 25 MG: 25 TABLET ORAL at 09:03

## 2022-03-31 RX ADMIN — LABETALOL HYDROCHLORIDE 300 MG: 100 TABLET, FILM COATED ORAL at 09:03

## 2022-03-31 RX ADMIN — HYDRALAZINE HYDROCHLORIDE 75 MG: 25 TABLET, FILM COATED ORAL at 02:03

## 2022-03-31 RX ADMIN — CINACALCET HYDROCHLORIDE 30 MG: 30 TABLET, COATED ORAL at 08:03

## 2022-03-31 RX ADMIN — Medication 1 TABLET: at 08:03

## 2022-03-31 RX ADMIN — POLYETHYLENE GLYCOL 3350 17 G: 17 POWDER, FOR SOLUTION ORAL at 08:03

## 2022-03-31 RX ADMIN — ATORVASTATIN CALCIUM 40 MG: 20 TABLET, FILM COATED ORAL at 08:03

## 2022-03-31 RX ADMIN — HYDRALAZINE HYDROCHLORIDE 75 MG: 25 TABLET, FILM COATED ORAL at 09:03

## 2022-03-31 RX ADMIN — CLOPIDOGREL BISULFATE 75 MG: 75 TABLET, FILM COATED ORAL at 08:03

## 2022-03-31 RX ADMIN — HYDRALAZINE HYDROCHLORIDE 75 MG: 25 TABLET, FILM COATED ORAL at 05:03

## 2022-03-31 RX ADMIN — CYANOCOBALAMIN TAB 1000 MCG 1000 MCG: 1000 TAB at 08:03

## 2022-03-31 RX ADMIN — LABETALOL HYDROCHLORIDE 300 MG: 100 TABLET, FILM COATED ORAL at 08:03

## 2022-03-31 RX ADMIN — LEVOTHYROXINE SODIUM 75 MCG: 75 TABLET ORAL at 05:03

## 2022-03-31 RX ADMIN — MIRTAZAPINE 15 MG: 15 TABLET, FILM COATED ORAL at 09:03

## 2022-03-31 RX ADMIN — QUETIAPINE FUMARATE 25 MG: 25 TABLET ORAL at 08:03

## 2022-03-31 RX ADMIN — AMLODIPINE BESYLATE 10 MG: 5 TABLET ORAL at 08:03

## 2022-03-31 NOTE — PLAN OF CARE
VSS on RA. Patient disoriented to time, place, situation. Frequent weight shift encouraged. Hourly rounding performed, fall risk precautions maintained. Ambulating with standby assistance. No significant events, will continue to monitor.    Problem: Adult Inpatient Plan of Care  Goal: Optimal Comfort and Wellbeing  Outcome: Ongoing, Progressing     Problem: Renal Function Impairment (Acute Kidney Injury/Impairment)  Goal: Effective Renal Function  Outcome: Ongoing, Progressing     Problem: Skin Injury Risk Increased  Goal: Skin Health and Integrity  Outcome: Ongoing, Progressing     Problem: Fall Injury Risk  Goal: Absence of Fall and Fall-Related Injury  Outcome: Ongoing, Progressing

## 2022-03-31 NOTE — SUBJECTIVE & OBJECTIVE
Interval History:  No events overnight.  Remains calm and cooperative. Awaiting placement. Daughter states will talk to siblings about plan B as patient still has no accepting facility.       Review of Systems  Unable to obtain secondary to cognitive impairment    Objective:     Vital Signs (Most Recent):  Temp: 96.4 °F (35.8 °C) (03/31/22 1526)  Pulse: 71 (03/31/22 1526)  Resp: 20 (03/31/22 1526)  BP: (!) 140/66 (03/31/22 1526)  SpO2: 96 % (03/31/22 1526) Vital Signs (24h Range):  Temp:  [96.4 °F (35.8 °C)-98.5 °F (36.9 °C)] 96.4 °F (35.8 °C)  Pulse:  [63-71] 71  Resp:  [16-20] 20  SpO2:  [95 %-98 %] 96 %  BP: (128-172)/(59-74) 140/66     Weight: 89.4 kg (197 lb 1.5 oz)  Body mass index is 38.49 kg/m².    Intake/Output Summary (Last 24 hours) at 3/31/2022 1606  Last data filed at 3/31/2022 1542  Gross per 24 hour   Intake 480 ml   Output --   Net 480 ml        Physical Exam  Vitals and nursing note reviewed.   Constitutional:       General: She is not in acute distress.     Appearance: She is well-developed. She is not diaphoretic.   HENT:      Head: Normocephalic and atraumatic.   Eyes:      General: No scleral icterus.     Conjunctiva/sclera: Conjunctivae normal.   Cardiovascular:      Rate and Rhythm: Normal rate and regular rhythm.      Heart sounds: Normal heart sounds.   Pulmonary:      Effort: Pulmonary effort is normal. No respiratory distress.      Breath sounds: Normal breath sounds. No wheezing.   Abdominal:      General: Bowel sounds are normal. There is no distension.      Palpations: Abdomen is soft.      Tenderness: There is no abdominal tenderness.   Musculoskeletal:         General: Normal range of motion.      Cervical back: Normal range of motion and neck supple.   Skin:     General: Skin is warm and dry.   Neurological:      Mental Status: She is alert. She is disoriented.      Comments: Grossly nonfocal       Significant Labs: All pertinent labs within the past 24 hours have been  reviewed      Significant Imaging: All pertinent imaging within the past 24 hours has been reviewed

## 2022-03-31 NOTE — PLAN OF CARE
[facility] - Med Surg ([facility])  Discharge Reassessment         Primary Care Provider: Adryan Osman MD    Admission Diagnosis: JOSÉ MIGUEL (acute kidney injury) [N17.9]  Altered mental status, unspecified altered mental status type [R41.82]    Admission Date: 3/1/2022  Expected Discharge Date: TBD  Length of Stay: 27 days   Medically Cleared for discharge; pending accepting placement for long term care  Additional referrals sent to :    [facility]  [phone]  Must have COVID test (PCR) 24 hours before admission. Must have at least one COVID vaccine or be willing to take the vaccine upon admission.   * COVID-19: NOT able to accept COVID-19 positive patients  3/31/2022 11:07 (CT)  3/31/2022 13:05 (CT)  3/31/2022 14:25 (CT)  3/31/2022 14:25 (CT)  Response: No, unable to accept patient  Reason: Other (see comments)  Comments: Patient does not have traditional Medicare as primary insurance for therapy services.    [facility]  [phone]    * COVID-19: Services not specified  3/31/2022 11:08 (CT)  3/31/2022 13:05 (CT)  3/31/2022 12:18 (CT)  3/31/2022 12:18 (CT)  Response: No, unable to accept patient  Reason: Other (see comments)  Comments: unable to met needs    [facility]  [phone]    * COVID-19: Positive patients under care,* COVID-19: Willing/Equipped to accept COVID-19 positive patients  3/31/2022 11:08 (CT)  3/31/2022 13:05 (CT)  3/31/2022 13:53 (CT)  3/31/2022 13:53 (CT)  Response: Referral Received  Comments: Will forward this referral to our sister facility [facility]    [facility] : [address], [place] [address]  Phone: [phone] 3/31/2022 11:08    [facility]ed [facility] at [phone]; spoke with  Margaret; Pending review; Spoke with daughter ; pending a call back to obtain financial info        Discharge Barriers Identified: Nursing Home rejection, Mental illness, Social, No family/friends to  help (pending acceptance)    Payor: [facility] MCARE / Plan: [facility] DUAL COMPLETE HMO SNP / Product Type: Medicare Advantage /     Extended Emergency Contact Information  Primary Emergency Contact: Nicolle Gallardo  Mobile Phone: [phone]  Relation: Daughter  Preferred language: English   needed? No    Discharge Plan A: New Nursing Home placement - FDC care facility  Discharge Plan B: Home with family, Home Health      Initial Assessment (most recent)     Adult Discharge Assessment - 03/02/22 1438        Discharge Assessment    Assessment Type Discharge Planning Assessment     Source of Information health record     Communicated JAGDEEP with patient/caregiver Date not available/Unable to determine     Reason For Admission JOSÉ MIGUEL     Do you have prescription coverage? Yes     Coverage [facility]     Discharge Plan A Home     SDOH --   recent dc from [facility]; c/s to psych planned or psych recs planned per hospitalist provider
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
## 2022-03-31 NOTE — SUBJECTIVE & OBJECTIVE
Interval History:  No events overnight.  Remains calm and cooperative. Awaiting placement. Daughter states will talk to siblings about plan B as patient still has no accepting facility.       Review of Systems  Unable to obtain secondary to cognitive impairment    Objective:     Vital Signs (Most Recent):  Temp: 97.8 °F (36.6 °C) (03/30/22 1538)  Pulse: 66 (03/30/22 1538)  Resp: 18 (03/30/22 1538)  BP: (!) 142/65 (03/30/22 1538)  SpO2: 96 % (03/30/22 1538) Vital Signs (24h Range):  Temp:  [97.8 °F (36.6 °C)-98.7 °F (37.1 °C)] 97.8 °F (36.6 °C)  Pulse:  [66-69] 66  Resp:  [16-18] 18  SpO2:  [95 %-97 %] 96 %  BP: (142-177)/(65-73) 142/65     Weight: 89.4 kg (197 lb 1.5 oz)  Body mass index is 38.49 kg/m².    Intake/Output Summary (Last 24 hours) at 3/30/2022 1901  Last data filed at 3/30/2022 1318  Gross per 24 hour   Intake 600 ml   Output --   Net 600 ml        Physical Exam  Vitals and nursing note reviewed.   Constitutional:       General: She is not in acute distress.     Appearance: She is well-developed. She is not diaphoretic.   HENT:      Head: Normocephalic and atraumatic.   Eyes:      General: No scleral icterus.     Conjunctiva/sclera: Conjunctivae normal.   Cardiovascular:      Rate and Rhythm: Normal rate and regular rhythm.      Heart sounds: Normal heart sounds.   Pulmonary:      Effort: Pulmonary effort is normal. No respiratory distress.      Breath sounds: Normal breath sounds. No wheezing.   Abdominal:      General: Bowel sounds are normal. There is no distension.      Palpations: Abdomen is soft.      Tenderness: There is no abdominal tenderness.   Musculoskeletal:         General: Normal range of motion.      Cervical back: Normal range of motion and neck supple.   Skin:     General: Skin is warm and dry.   Neurological:      Mental Status: She is alert. She is disoriented.      Comments: Grossly nonfocal       Significant Labs: All pertinent labs within the past 24 hours have been  reviewed      Significant Imaging: All pertinent imaging within the past 24 hours has been reviewed

## 2022-03-31 NOTE — PLAN OF CARE
Discussed Plan of care with patient. Verbalizes understanding. Patient is awake and alert. Ambulatory ad santosh. PO meds administered as ordered. No falls, accidents, or traumas at this time. Bed is in lowest position, SR up x2, Call light within reach. Will continue to monitor.   Problem: Adult Inpatient Plan of Care  Goal: Plan of Care Review  Outcome: Ongoing, Progressing  Goal: Patient-Specific Goal (Individualized)  Outcome: Ongoing, Progressing  Goal: Absence of Hospital-Acquired Illness or Injury  Outcome: Ongoing, Progressing  Goal: Optimal Comfort and Wellbeing  Outcome: Ongoing, Progressing  Goal: Readiness for Transition of Care  Outcome: Ongoing, Progressing     Problem: Diabetes Comorbidity  Goal: Blood Glucose Level Within Targeted Range  Outcome: Ongoing, Progressing     Problem: Fluid and Electrolyte Imbalance (Acute Kidney Injury/Impairment)  Goal: Fluid and Electrolyte Balance  Outcome: Ongoing, Progressing     Problem: Oral Intake Inadequate (Acute Kidney Injury/Impairment)  Goal: Optimal Nutrition Intake  Outcome: Ongoing, Progressing     Problem: Renal Function Impairment (Acute Kidney Injury/Impairment)  Goal: Effective Renal Function  Outcome: Ongoing, Progressing     Problem: Skin Injury Risk Increased  Goal: Skin Health and Integrity  Outcome: Ongoing, Progressing     Problem: Fall Injury Risk  Goal: Absence of Fall and Fall-Related Injury  Outcome: Ongoing, Progressing     Problem: Restraint, Nonbehavioral (Nonviolent)  Goal: Absence of Harm or Injury  Outcome: Ongoing, Progressing

## 2022-04-01 LAB — POCT GLUCOSE: 103 MG/DL (ref 70–110)

## 2022-04-01 PROCEDURE — 99231 PR SUBSEQUENT HOSPITAL CARE,LEVL I: ICD-10-PCS | Mod: GT,,, | Performed by: STUDENT IN AN ORGANIZED HEALTH CARE EDUCATION/TRAINING PROGRAM

## 2022-04-01 PROCEDURE — 25000003 PHARM REV CODE 250: Performed by: PHYSICIAN ASSISTANT

## 2022-04-01 PROCEDURE — 25000003 PHARM REV CODE 250: Performed by: STUDENT IN AN ORGANIZED HEALTH CARE EDUCATION/TRAINING PROGRAM

## 2022-04-01 PROCEDURE — 99231 SBSQ HOSP IP/OBS SF/LOW 25: CPT | Mod: GT,,, | Performed by: STUDENT IN AN ORGANIZED HEALTH CARE EDUCATION/TRAINING PROGRAM

## 2022-04-01 PROCEDURE — 11000001 HC ACUTE MED/SURG PRIVATE ROOM

## 2022-04-01 PROCEDURE — 25000003 PHARM REV CODE 250: Performed by: NURSE PRACTITIONER

## 2022-04-01 PROCEDURE — 63600175 PHARM REV CODE 636 W HCPCS: Performed by: NURSE PRACTITIONER

## 2022-04-01 RX ADMIN — POLYETHYLENE GLYCOL 3350 17 G: 17 POWDER, FOR SOLUTION ORAL at 08:04

## 2022-04-01 RX ADMIN — Medication 1 TABLET: at 08:04

## 2022-04-01 RX ADMIN — HYDRALAZINE HYDROCHLORIDE 75 MG: 25 TABLET, FILM COATED ORAL at 02:04

## 2022-04-01 RX ADMIN — FERROUS SULFATE TAB 325 MG (65 MG ELEMENTAL FE) 1 EACH: 325 (65 FE) TAB at 08:04

## 2022-04-01 RX ADMIN — CINACALCET HYDROCHLORIDE 30 MG: 30 TABLET, COATED ORAL at 08:04

## 2022-04-01 RX ADMIN — ATORVASTATIN CALCIUM 40 MG: 20 TABLET, FILM COATED ORAL at 08:04

## 2022-04-01 RX ADMIN — LABETALOL HYDROCHLORIDE 300 MG: 100 TABLET, FILM COATED ORAL at 09:04

## 2022-04-01 RX ADMIN — AMLODIPINE BESYLATE 10 MG: 5 TABLET ORAL at 08:04

## 2022-04-01 RX ADMIN — QUETIAPINE FUMARATE 25 MG: 25 TABLET ORAL at 08:04

## 2022-04-01 RX ADMIN — LABETALOL HYDROCHLORIDE 300 MG: 100 TABLET, FILM COATED ORAL at 08:04

## 2022-04-01 RX ADMIN — CLOPIDOGREL BISULFATE 75 MG: 75 TABLET, FILM COATED ORAL at 08:04

## 2022-04-01 RX ADMIN — HYDRALAZINE HYDROCHLORIDE 75 MG: 25 TABLET, FILM COATED ORAL at 05:04

## 2022-04-01 RX ADMIN — QUETIAPINE FUMARATE 25 MG: 25 TABLET ORAL at 09:04

## 2022-04-01 RX ADMIN — CYANOCOBALAMIN TAB 1000 MCG 1000 MCG: 1000 TAB at 08:04

## 2022-04-01 RX ADMIN — VALPROIC ACID 250 MG: 250 SOLUTION ORAL at 08:04

## 2022-04-01 RX ADMIN — LEVOTHYROXINE SODIUM 75 MCG: 75 TABLET ORAL at 05:04

## 2022-04-01 RX ADMIN — ASPIRIN 81 MG: 81 TABLET, COATED ORAL at 08:04

## 2022-04-01 RX ADMIN — HYDRALAZINE HYDROCHLORIDE 75 MG: 25 TABLET, FILM COATED ORAL at 09:04

## 2022-04-01 RX ADMIN — MIRTAZAPINE 15 MG: 15 TABLET, FILM COATED ORAL at 09:04

## 2022-04-01 NOTE — PLAN OF CARE
Called Margaret at [facility] to follow up on progress obtaining financial documentation from patient's daughter, Nicolle.  As per Margaret she has been in contact with daughter through out the day having discussions about obtaining the documents sooner than later to complete admission process;  Nicolle to send bank statements and POA via e-mail, and pending car title paper work at later time.  Facility is requesting proof of vaccination, and patient can admit on Monday.  Updated MD Vick

## 2022-04-01 NOTE — PLAN OF CARE
04/01/22 0928   Post-Acute Status   Post-Acute Authorization Placement   Discharge Delays (!) Post-Acute Set-up   Discharge Plan   Discharge Plan A New Nursing Home placement - intermediate care facility   Discharge Plan B Home with family     [facility] called this AM and advised that the referral was under review with the NH's review team today. There are no other NH acceptances at this time.     Update forwarded to virtual medicine team

## 2022-04-01 NOTE — PLAN OF CARE
Discussed Plan of care with patient. Verbalizes understanding. Patient is awake and alert. Ambulatory ad santosh. PO meds administered as ordered. No falls, accidents, or traumas at this time. Bed is in lowest position, SR up x2, Call light within reach. Will continue to monitor.     Problem: Adult Inpatient Plan of Care  Goal: Plan of Care Review  Outcome: Ongoing, Progressing  Goal: Patient-Specific Goal (Individualized)  Outcome: Ongoing, Progressing  Goal: Absence of Hospital-Acquired Illness or Injury  Outcome: Ongoing, Progressing  Goal: Optimal Comfort and Wellbeing  Outcome: Ongoing, Progressing  Goal: Readiness for Transition of Care  Outcome: Ongoing, Progressing     Problem: Diabetes Comorbidity  Goal: Blood Glucose Level Within Targeted Range  Outcome: Ongoing, Progressing     Problem: Fluid and Electrolyte Imbalance (Acute Kidney Injury/Impairment)  Goal: Fluid and Electrolyte Balance  Outcome: Ongoing, Progressing     Problem: Oral Intake Inadequate (Acute Kidney Injury/Impairment)  Goal: Optimal Nutrition Intake  Outcome: Ongoing, Progressing     Problem: Renal Function Impairment (Acute Kidney Injury/Impairment)  Goal: Effective Renal Function  Outcome: Ongoing, Progressing     Problem: Skin Injury Risk Increased  Goal: Skin Health and Integrity  Outcome: Ongoing, Progressing     Problem: Fall Injury Risk  Goal: Absence of Fall and Fall-Related Injury  Outcome: Ongoing, Progressing     Problem: Restraint, Nonbehavioral (Nonviolent)  Goal: Absence of Harm or Injury  Outcome: Ongoing, Progressing     Problem: Behavior Regulation Impairment (Dementia Signs/Symptoms)  Goal: Improved Behavioral Control (Dementia Signs/Symptoms)  Outcome: Ongoing, Progressing     Problem: Cognitive Impairment (Dementia Signs/Symptoms)  Goal: Optimized Cognitive Function (Dementia Signs/Symptoms)  Outcome: Ongoing, Progressing

## 2022-04-01 NOTE — PLAN OF CARE
Spoke with daughter Nicolle; discussed [facility]  [phone]) potential acceptance as [facility] care placement and Plan B : home with family.  As per daughter, currently working with Margaret at [facility] on providing necessary financial documents needed to process admission's protocol for [facility] care placement as well as working on securing housing for plan B  JOAQUINA Arntet to reach out to daughter to assist with housing resources .  Daughter is agreeable to taking mom home on Sunday evening should NH not fully accept her as placement.  MD Vick updated on latest discharge plan for patient.

## 2022-04-01 NOTE — PLAN OF CARE
Plan of care reviewed with patient. Pt AAOX1, disoriented to time, place and situation. Pt remains free from fall, injury, and skin breakdown. Positions self independently. No significant events overnight. Purposeful hourly rounding done. Safety maintained.       Problem: Adult Inpatient Plan of Care  Goal: Plan of Care Review  Outcome: Ongoing, Progressing  Goal: Patient-Specific Goal (Individualized)  Outcome: Ongoing, Progressing  Goal: Absence of Hospital-Acquired Illness or Injury  Outcome: Ongoing, Progressing  Goal: Optimal Comfort and Wellbeing  Outcome: Ongoing, Progressing  Goal: Readiness for Transition of Care  Outcome: Ongoing, Progressing     Problem: Skin Injury Risk Increased  Goal: Skin Health and Integrity  Outcome: Ongoing, Progressing     Problem: Fall Injury Risk  Goal: Absence of Fall and Fall-Related Injury  Outcome: Ongoing, Progressing     Problem: Behavior Regulation Impairment (Dementia Signs/Symptoms)  Goal: Improved Behavioral Control (Dementia Signs/Symptoms)  Outcome: Ongoing, Progressing     Problem: Cognitive Impairment (Dementia Signs/Symptoms)  Goal: Optimized Cognitive Function (Dementia Signs/Symptoms)  Outcome: Ongoing, Progressing

## 2022-04-02 PROCEDURE — 99231 PR SUBSEQUENT HOSPITAL CARE,LEVL I: ICD-10-PCS | Mod: GT,,, | Performed by: STUDENT IN AN ORGANIZED HEALTH CARE EDUCATION/TRAINING PROGRAM

## 2022-04-02 PROCEDURE — 25000003 PHARM REV CODE 250: Performed by: PHYSICIAN ASSISTANT

## 2022-04-02 PROCEDURE — 25000003 PHARM REV CODE 250: Performed by: NURSE PRACTITIONER

## 2022-04-02 PROCEDURE — 99231 SBSQ HOSP IP/OBS SF/LOW 25: CPT | Mod: GT,,, | Performed by: STUDENT IN AN ORGANIZED HEALTH CARE EDUCATION/TRAINING PROGRAM

## 2022-04-02 PROCEDURE — 63600175 PHARM REV CODE 636 W HCPCS: Performed by: NURSE PRACTITIONER

## 2022-04-02 PROCEDURE — 11000001 HC ACUTE MED/SURG PRIVATE ROOM

## 2022-04-02 PROCEDURE — 25000003 PHARM REV CODE 250: Performed by: STUDENT IN AN ORGANIZED HEALTH CARE EDUCATION/TRAINING PROGRAM

## 2022-04-02 PROCEDURE — 94761 N-INVAS EAR/PLS OXIMETRY MLT: CPT

## 2022-04-02 RX ORDER — SYRING-NEEDL,DISP,INSUL,0.3 ML 29 G X1/2"
296 SYRINGE, EMPTY DISPOSABLE MISCELLANEOUS ONCE
Status: COMPLETED | OUTPATIENT
Start: 2022-04-02 | End: 2022-04-02

## 2022-04-02 RX ADMIN — LABETALOL HYDROCHLORIDE 300 MG: 100 TABLET, FILM COATED ORAL at 10:04

## 2022-04-02 RX ADMIN — ATORVASTATIN CALCIUM 40 MG: 20 TABLET, FILM COATED ORAL at 10:04

## 2022-04-02 RX ADMIN — QUETIAPINE FUMARATE 25 MG: 25 TABLET ORAL at 10:04

## 2022-04-02 RX ADMIN — CYANOCOBALAMIN TAB 1000 MCG 1000 MCG: 1000 TAB at 10:04

## 2022-04-02 RX ADMIN — POLYETHYLENE GLYCOL 3350 17 G: 17 POWDER, FOR SOLUTION ORAL at 10:04

## 2022-04-02 RX ADMIN — VALPROIC ACID 250 MG: 250 SOLUTION ORAL at 11:04

## 2022-04-02 RX ADMIN — HYDRALAZINE HYDROCHLORIDE 75 MG: 25 TABLET, FILM COATED ORAL at 10:04

## 2022-04-02 RX ADMIN — MIRTAZAPINE 15 MG: 15 TABLET, FILM COATED ORAL at 10:04

## 2022-04-02 RX ADMIN — ASPIRIN 81 MG: 81 TABLET, COATED ORAL at 10:04

## 2022-04-02 RX ADMIN — LEVOTHYROXINE SODIUM 75 MCG: 75 TABLET ORAL at 05:04

## 2022-04-02 RX ADMIN — CINACALCET HYDROCHLORIDE 30 MG: 30 TABLET, COATED ORAL at 11:04

## 2022-04-02 RX ADMIN — AMLODIPINE BESYLATE 10 MG: 5 TABLET ORAL at 10:04

## 2022-04-02 RX ADMIN — FERROUS SULFATE TAB 325 MG (65 MG ELEMENTAL FE) 1 EACH: 325 (65 FE) TAB at 10:04

## 2022-04-02 RX ADMIN — HYDRALAZINE HYDROCHLORIDE 75 MG: 25 TABLET, FILM COATED ORAL at 05:04

## 2022-04-02 RX ADMIN — Medication 296 ML: at 10:04

## 2022-04-02 RX ADMIN — Medication 1 TABLET: at 10:04

## 2022-04-02 RX ADMIN — HYDRALAZINE HYDROCHLORIDE 75 MG: 25 TABLET, FILM COATED ORAL at 03:04

## 2022-04-02 RX ADMIN — CLOPIDOGREL BISULFATE 75 MG: 75 TABLET, FILM COATED ORAL at 10:04
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
## 2022-04-02 NOTE — ASSESSMENT & PLAN NOTE
Acute metabolic encephalopathy (resolved)  - Patient recently released from inpatient stay at [facility]; became acutely combative with family after discharge.  - metabolic encephalopathy due to UTI, uremia and hypercalcemia, all treated  - continue Depakene 250mg daily, given continued use of PRNs for agitation, increase Remeron 15 mg q.h.s., Seroquel 25 mg q.h.s.  - discontinued Klonopin as it can worsen encephalopathy, use Seroquel 12.5 mg p.o. or Zyprexa 2.5 mg IM p.r.n. for non-redirectable agitation only; may need to reconsider klonopin now that acute encephalopathy has resolved  - questionable grave disability, now that acute process resolved  Psych re-consulted 3/15 recs noted, medications adjusted   - patient common cooperative no sitter at bedside for since 03/19, awaiting acceptance NH  - [place] planning  - has Neurology appointment scheduled next month

## 2022-04-02 NOTE — PLAN OF CARE
Problem: Adult Inpatient Plan of Care  Goal: Plan of Care Review  Outcome: Ongoing, Progressing  Goal: Patient-Specific Goal (Individualized)  Outcome: Ongoing, Progressing  Goal: Absence of Hospital-Acquired Illness or Injury  Outcome: Ongoing, Progressing  Goal: Optimal Comfort and Wellbeing  Outcome: Ongoing, Progressing  Goal: Readiness for Transition of Care  Outcome: Ongoing, Progressing     Problem: Diabetes Comorbidity  Goal: Blood Glucose Level Within Targeted Range  Outcome: Ongoing, Progressing     Problem: Fluid and Electrolyte Imbalance (Acute Kidney Injury/Impairment)  Goal: Fluid and Electrolyte Balance  Outcome: Ongoing, Progressing     Problem: Oral Intake Inadequate (Acute Kidney Injury/Impairment)  Goal: Optimal Nutrition Intake  Outcome: Ongoing, Progressing     Problem: Renal Function Impairment (Acute Kidney Injury/Impairment)  Goal: Effective Renal Function  Outcome: Ongoing, Progressing     Problem: Skin Injury Risk Increased  Goal: Skin Health and Integrity  Outcome: Ongoing, Progressing     Problem: Fall Injury Risk  Goal: Absence of Fall and Fall-Related Injury  Outcome: Ongoing, Progressing     Problem: Restraint, Nonbehavioral (Nonviolent)  Goal: Absence of Harm or Injury  Outcome: Ongoing, Progressing     Problem: Behavior Regulation Impairment (Dementia Signs/Symptoms)  Goal: Improved Behavioral Control (Dementia Signs/Symptoms)  Outcome: Ongoing, Progressing     Problem: Cognitive Impairment (Dementia Signs/Symptoms)  Goal: Optimized Cognitive Function (Dementia Signs/Symptoms)  Outcome: Ongoing, Progressing
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
## 2022-04-02 NOTE — SUBJECTIVE & OBJECTIVE
Interval History:  No events overnight.  Remains calm and cooperative. Awaiting placement, accepting facility finally found and patient able to transfer on Monday.       Review of Systems  Unable to obtain secondary to cognitive impairment    Objective:     Vital Signs (Most Recent):  Temp: 98.8 °F (37.1 °C) (04/01/22 1930)  Pulse: 74 (04/01/22 1930)  Resp: 16 (04/01/22 1930)  BP: (!) 158/70 (04/01/22 1930)  SpO2: (!) 94 % (04/01/22 1930) Vital Signs (24h Range):  Temp:  [98.5 °F (36.9 °C)-98.8 °F (37.1 °C)] 98.8 °F (37.1 °C)  Pulse:  [64-74] 74  Resp:  [16-20] 16  SpO2:  [94 %-97 %] 94 %  BP: (149-188)/(69-79) 158/70     Weight: 89.4 kg (197 lb 1.5 oz)  Body mass index is 38.49 kg/m².    Intake/Output Summary (Last 24 hours) at 4/1/2022 2152  Last data filed at 4/1/2022 1600  Gross per 24 hour   Intake 1800 ml   Output --   Net 1800 ml        Physical Exam  Vitals and nursing note reviewed.   Constitutional:       General: She is not in acute distress.     Appearance: She is well-developed. She is not diaphoretic.   HENT:      Head: Normocephalic and atraumatic.   Eyes:      General: No scleral icterus.     Conjunctiva/sclera: Conjunctivae normal.   Cardiovascular:      Rate and Rhythm: Normal rate and regular rhythm.      Heart sounds: Normal heart sounds.   Pulmonary:      Effort: Pulmonary effort is normal. No respiratory distress.      Breath sounds: Normal breath sounds. No wheezing.   Abdominal:      General: Bowel sounds are normal. There is no distension.      Palpations: Abdomen is soft.      Tenderness: There is no abdominal tenderness.   Musculoskeletal:         General: Normal range of motion.      Cervical back: Normal range of motion and neck supple.   Skin:     General: Skin is warm and dry.   Neurological:      Mental Status: She is alert. She is disoriented.      Comments: Grossly nonfocal       Significant Labs: All pertinent labs within the past 24 hours have been reviewed      Significant  Imaging: All pertinent imaging within the past 24 hours has been reviewed

## 2022-04-02 NOTE — SUBJECTIVE & OBJECTIVE
Interval History:  No events overnight.  Remains calm and cooperative. Awaiting placement, accepting facility finally found and patient able to transfer on Monday.       Review of Systems  Unable to obtain secondary to cognitive impairment    Objective:     Vital Signs (Most Recent):  Temp: 97.8 °F (36.6 °C) (04/02/22 1127)  Pulse: 71 (04/02/22 1127)  Resp: 14 (04/02/22 1127)  BP: (!) 159/70 (04/02/22 1127)  SpO2: 97 % (04/02/22 1127) Vital Signs (24h Range):  Temp:  [97.8 °F (36.6 °C)-98.8 °F (37.1 °C)] 97.8 °F (36.6 °C)  Pulse:  [68-74] 71  Resp:  [14-18] 14  SpO2:  [94 %-97 %] 97 %  BP: (158-175)/(70-79) 159/70     Weight: 89.4 kg (197 lb 1.5 oz)  Body mass index is 38.49 kg/m².    Intake/Output Summary (Last 24 hours) at 4/2/2022 1328  Last data filed at 4/1/2022 1600  Gross per 24 hour   Intake 600 ml   Output --   Net 600 ml        Physical Exam  Vitals and nursing note reviewed.   Constitutional:       General: She is not in acute distress.     Appearance: She is well-developed. She is not diaphoretic.   HENT:      Head: Normocephalic and atraumatic.   Eyes:      General: No scleral icterus.     Conjunctiva/sclera: Conjunctivae normal.   Cardiovascular:      Rate and Rhythm: Normal rate and regular rhythm.      Heart sounds: Normal heart sounds.   Pulmonary:      Effort: Pulmonary effort is normal. No respiratory distress.      Breath sounds: Normal breath sounds. No wheezing.   Abdominal:      General: Bowel sounds are normal. There is no distension.      Palpations: Abdomen is soft.      Tenderness: There is no abdominal tenderness.   Musculoskeletal:         General: Normal range of motion.      Cervical back: Normal range of motion and neck supple.   Skin:     General: Skin is warm and dry.   Neurological:      Mental Status: She is alert. She is disoriented.      Comments: Grossly nonfocal       Significant Labs: All pertinent labs within the past 24 hours have been reviewed      Significant Imaging: All  pertinent imaging within the past 24 hours has been reviewed

## 2022-04-03 PROCEDURE — 25000003 PHARM REV CODE 250: Performed by: STUDENT IN AN ORGANIZED HEALTH CARE EDUCATION/TRAINING PROGRAM

## 2022-04-03 PROCEDURE — 25000003 PHARM REV CODE 250: Performed by: PHYSICIAN ASSISTANT

## 2022-04-03 PROCEDURE — 94761 N-INVAS EAR/PLS OXIMETRY MLT: CPT

## 2022-04-03 PROCEDURE — 11000001 HC ACUTE MED/SURG PRIVATE ROOM

## 2022-04-03 PROCEDURE — 99231 SBSQ HOSP IP/OBS SF/LOW 25: CPT | Mod: GT,,, | Performed by: STUDENT IN AN ORGANIZED HEALTH CARE EDUCATION/TRAINING PROGRAM

## 2022-04-03 PROCEDURE — 99231 PR SUBSEQUENT HOSPITAL CARE,LEVL I: ICD-10-PCS | Mod: GT,,, | Performed by: STUDENT IN AN ORGANIZED HEALTH CARE EDUCATION/TRAINING PROGRAM

## 2022-04-03 PROCEDURE — 63600175 PHARM REV CODE 636 W HCPCS: Performed by: NURSE PRACTITIONER

## 2022-04-03 PROCEDURE — 25000003 PHARM REV CODE 250: Performed by: NURSE PRACTITIONER

## 2022-04-03 RX ADMIN — QUETIAPINE FUMARATE 25 MG: 25 TABLET ORAL at 09:04

## 2022-04-03 RX ADMIN — HYDRALAZINE HYDROCHLORIDE 75 MG: 25 TABLET, FILM COATED ORAL at 03:04

## 2022-04-03 RX ADMIN — VALPROIC ACID 250 MG: 250 SOLUTION ORAL at 10:04

## 2022-04-03 RX ADMIN — HYDRALAZINE HYDROCHLORIDE 75 MG: 25 TABLET, FILM COATED ORAL at 09:04

## 2022-04-03 RX ADMIN — ASPIRIN 81 MG: 81 TABLET, COATED ORAL at 10:04

## 2022-04-03 RX ADMIN — LABETALOL HYDROCHLORIDE 300 MG: 100 TABLET, FILM COATED ORAL at 10:04

## 2022-04-03 RX ADMIN — LABETALOL HYDROCHLORIDE 300 MG: 100 TABLET, FILM COATED ORAL at 08:04

## 2022-04-03 RX ADMIN — MIRTAZAPINE 15 MG: 15 TABLET, FILM COATED ORAL at 09:04

## 2022-04-03 RX ADMIN — CYANOCOBALAMIN TAB 1000 MCG 1000 MCG: 1000 TAB at 10:04

## 2022-04-03 RX ADMIN — AMLODIPINE BESYLATE 10 MG: 5 TABLET ORAL at 10:04

## 2022-04-03 RX ADMIN — QUETIAPINE FUMARATE 25 MG: 25 TABLET ORAL at 10:04

## 2022-04-03 RX ADMIN — HYDRALAZINE HYDROCHLORIDE 75 MG: 25 TABLET, FILM COATED ORAL at 06:04

## 2022-04-03 RX ADMIN — FERROUS SULFATE TAB 325 MG (65 MG ELEMENTAL FE) 1 EACH: 325 (65 FE) TAB at 10:04

## 2022-04-03 RX ADMIN — Medication 1 TABLET: at 10:04

## 2022-04-03 RX ADMIN — ACETAMINOPHEN 650 MG: 325 TABLET, FILM COATED ORAL at 11:04

## 2022-04-03 RX ADMIN — LEVOTHYROXINE SODIUM 75 MCG: 75 TABLET ORAL at 06:04

## 2022-04-03 RX ADMIN — CINACALCET HYDROCHLORIDE 30 MG: 30 TABLET, COATED ORAL at 08:04

## 2022-04-03 RX ADMIN — CLOPIDOGREL BISULFATE 75 MG: 75 TABLET, FILM COATED ORAL at 10:04

## 2022-04-03 RX ADMIN — ATORVASTATIN CALCIUM 40 MG: 20 TABLET, FILM COATED ORAL at 10:04

## 2022-04-04 ENCOUNTER — PATIENT OUTREACH (OUTPATIENT)
Dept: ADMINISTRATIVE | Facility: OTHER | Age: 67
End: 2022-04-04
Payer: MEDICARE

## 2022-04-04 VITALS
OXYGEN SATURATION: 96 % | BODY MASS INDEX: 38.69 KG/M2 | HEART RATE: 71 BPM | WEIGHT: 197.06 LBS | DIASTOLIC BLOOD PRESSURE: 65 MMHG | HEIGHT: 60 IN | SYSTOLIC BLOOD PRESSURE: 133 MMHG | RESPIRATION RATE: 18 BRPM | TEMPERATURE: 99 F

## 2022-04-04 LAB — POCT GLUCOSE: 105 MG/DL (ref 70–110)

## 2022-04-04 PROCEDURE — 99239 PR HOSPITAL DISCHARGE DAY,>30 MIN: ICD-10-PCS | Mod: 95,,, | Performed by: STUDENT IN AN ORGANIZED HEALTH CARE EDUCATION/TRAINING PROGRAM

## 2022-04-04 PROCEDURE — 94761 N-INVAS EAR/PLS OXIMETRY MLT: CPT

## 2022-04-04 PROCEDURE — 25000003 PHARM REV CODE 250: Performed by: PHYSICIAN ASSISTANT

## 2022-04-04 PROCEDURE — 99239 HOSP IP/OBS DSCHRG MGMT >30: CPT | Mod: 95,,, | Performed by: STUDENT IN AN ORGANIZED HEALTH CARE EDUCATION/TRAINING PROGRAM

## 2022-04-04 PROCEDURE — 25000003 PHARM REV CODE 250: Performed by: STUDENT IN AN ORGANIZED HEALTH CARE EDUCATION/TRAINING PROGRAM

## 2022-04-04 PROCEDURE — 25000003 PHARM REV CODE 250: Performed by: NURSE PRACTITIONER

## 2022-04-04 PROCEDURE — 63600175 PHARM REV CODE 636 W HCPCS: Performed by: NURSE PRACTITIONER

## 2022-04-04 RX ORDER — POLYETHYLENE GLYCOL 3350 17 G/17G
17 POWDER, FOR SOLUTION ORAL DAILY
Refills: 0
Start: 2022-04-04

## 2022-04-04 RX ADMIN — Medication 1 TABLET: at 09:04

## 2022-04-04 RX ADMIN — AMLODIPINE BESYLATE 10 MG: 5 TABLET ORAL at 09:04

## 2022-04-04 RX ADMIN — CYANOCOBALAMIN TAB 1000 MCG 1000 MCG: 1000 TAB at 09:04

## 2022-04-04 RX ADMIN — CLOPIDOGREL BISULFATE 75 MG: 75 TABLET, FILM COATED ORAL at 09:04

## 2022-04-04 RX ADMIN — FERROUS SULFATE TAB 325 MG (65 MG ELEMENTAL FE) 1 EACH: 325 (65 FE) TAB at 09:04

## 2022-04-04 RX ADMIN — LEVOTHYROXINE SODIUM 75 MCG: 75 TABLET ORAL at 05:04

## 2022-04-04 RX ADMIN — ATORVASTATIN CALCIUM 40 MG: 20 TABLET, FILM COATED ORAL at 09:04

## 2022-04-04 RX ADMIN — QUETIAPINE FUMARATE 25 MG: 25 TABLET ORAL at 09:04

## 2022-04-04 RX ADMIN — ASPIRIN 81 MG: 81 TABLET, COATED ORAL at 09:04

## 2022-04-04 RX ADMIN — LABETALOL HYDROCHLORIDE 300 MG: 100 TABLET, FILM COATED ORAL at 09:04

## 2022-04-04 RX ADMIN — CINACALCET HYDROCHLORIDE 30 MG: 30 TABLET, COATED ORAL at 08:04

## 2022-04-04 RX ADMIN — VALPROIC ACID 250 MG: 250 SOLUTION ORAL at 09:04

## 2022-04-04 RX ADMIN — HYDRALAZINE HYDROCHLORIDE 75 MG: 25 TABLET, FILM COATED ORAL at 05:04
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
## 2022-04-04 NOTE — PLAN OF CARE
04/04/22 0914   Discharge Reassessment   Assessment Type Discharge Planning Reassessment   Did the patient's condition or plan change since previous assessment? No   Discharge Plan discussed with:   (Virtual team providers)   Communicated JAGDEEP with patient/caregiver Yes  (??4/4/22 pending NH acceptance)   Discharge Plan A New Nursing Home placement - skilled nursing care facility   DME Needed Upon Discharge  none   Discharge Barriers Identified Social   Why the patient remains in the hospital Placement issues;Social issues   Post-Acute Status   Post-Acute Placement Status   (pending final acceptance from [facility])   Coverage n/a   Discharge Delays (!) Post-Acute Set-up     Sign off from PARDEEP Marina:  Patient to dc today with [facility] after NH rep to see patient on today. Family in process of completing admission paperwork. This CM Sup has contacted [facility] at [phone] to inquire of acceptance and needs for dc. CM Sup advised that the admin coordinator would return call later today.    Addendum: 1011:   PASRR sent to [facility]; 142 upload pending, as well as updated orders.    Addendum 1024: PASRR, 142, and orders faxed in [facility]port to [facility]; Jovan noble/ NH advised

## 2022-04-04 NOTE — PLAN OF CARE
04/04/22 1038   Final Note   Assessment Type Final Discharge Note   Anticipated Discharge Disposition detention Nu   Post-Acute Status   Post-Acute Authorization Placement   Discharge Delays (!) Ambulance Transport/Facility Transport     *Passr emailed to Jovan noble/ [facility]; 142 and NH orders sent via [employer].     Call report for discharge: [facility] in [place]: [phone] -ask for receiving nurse; Message to charge nurse Bethany and staff nurse Savannah ness SC: please call at 11 am; transportation has been ordered for  in a [employer] van.     Also, DENISE Molina has contacted patient's daughter, Nicolle ([phone]) who is accepting of dc plan and location, and she has completed admission forms for NH placement.    Addendum: 2664: DENISE Molina spoke with Bethany, Charge nurse regarding SC message; Bethany advised that she would add the primary nurse to the chat for the call report message.    Addendum: 1146: transportation en route/pending per [facility]; updated number given to staff nurse for call report; [phone], Nurse Leon at [facility]; staff nurse was able to give report.     DC pending transport at this time

## 2022-04-04 NOTE — PROGRESS NOTES
IP Liaison - Final Visit Note    Patient: Jessie Gallardo  MRN:  [id number]  Date of Service:  4/4/2022  Completed by:  NATE Cannon    Reason for Visit   Patient presents with    IP Liaison Follow-up Visit       Patient will discharge to [facility].     Patient Summary     Discharge Date: 4/4/2022  Discharge telephone number/address: [phone]/[address], [place] [address]  Follow up provider: n/a  Follow up appointments: n/a  Home Health agency & telephone number: n/a  DME ordered &  name: n/a  Assigned OPCM RN/SW: n/a  Report sent to follow up team (PCP/OPCM) via in basket message: n/a  Community Resources arranged including agency name & contact info: No support & services have been documented.    NATE Cannon

## 2022-04-04 NOTE — SUBJECTIVE & OBJECTIVE
Interval History:  No events overnight.  Patient had a bowel movement. Awaiting placement, accepting facility finally found and patient able to transfer on Monday.       Review of Systems  Unable to obtain secondary to cognitive impairment    Objective:     Vital Signs (Most Recent):  Temp: 98.8 °F (37.1 °C) (04/03/22 1915)  Pulse: 72 (04/03/22 1915)  Resp: 16 (04/03/22 1915)  BP: (!) 166/72 (04/03/22 1915)  SpO2: 95 % (04/03/22 1915) Vital Signs (24h Range):  Temp:  [97.1 °F (36.2 °C)-98.8 °F (37.1 °C)] 98.8 °F (37.1 °C)  Pulse:  [66-74] 72  Resp:  [16-20] 16  SpO2:  [92 %-95 %] 95 %  BP: (141-175)/(68-86) 166/72     Weight: 89.4 kg (197 lb 1.5 oz)  Body mass index is 38.49 kg/m².  No intake or output data in the 24 hours ending 04/03/22 2320     Physical Exam  Vitals and nursing note reviewed.   Constitutional:       General: She is not in acute distress.     Appearance: She is well-developed. She is not diaphoretic.   HENT:      Head: Normocephalic and atraumatic.   Eyes:      General: No scleral icterus.     Conjunctiva/sclera: Conjunctivae normal.   Cardiovascular:      Rate and Rhythm: Normal rate and regular rhythm.      Heart sounds: Normal heart sounds.   Pulmonary:      Effort: Pulmonary effort is normal. No respiratory distress.      Breath sounds: Normal breath sounds. No wheezing.   Abdominal:      General: Bowel sounds are normal. There is no distension.      Palpations: Abdomen is soft.      Tenderness: There is no abdominal tenderness.   Musculoskeletal:         General: Normal range of motion.      Cervical back: Normal range of motion and neck supple.   Skin:     General: Skin is warm and dry.   Neurological:      Mental Status: She is alert. She is disoriented.      Comments: Grossly nonfocal       Significant Labs: All pertinent labs within the past 24 hours have been reviewed      Significant Imaging: All pertinent imaging within the past 24 hours has been reviewed

## 2022-04-05 LAB — POCT GLUCOSE: 124 MG/DL (ref 70–110)

## 2022-04-14 NOTE — ASSESSMENT & PLAN NOTE
Acute metabolic encephalopathy (resolved)  - Patient recently released from inpatient stay at [facility]; became acutely combative with family after discharge.  - metabolic encephalopathy due to UTI, uremia and hypercalcemia, all treated  - continue Depakene 250mg daily, given continued use of PRNs for agitation, increase Remeron 15 mg q.h.s., Seroquel 25 mg q.h.s.  - discontinued Klonopin as it can worsen encephalopathy, use Seroquel 12.5 mg p.o. or Zyprexa 2.5 mg IM p.r.n. for non-redirectable agitation only; may need to reconsider klonopin now that acute encephalopathy has resolved  - questionable grave disability, now that acute process resolved  Psych re-consulted 3/15 recs noted, medications adjusted   - patient common cooperative no sitter at bedside for since 03/19, awaiting acceptance NH  - [place] planning  - has Neurology appointment scheduled next month
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
## 2022-05-23 ENCOUNTER — TELEPHONE (OUTPATIENT)
Dept: ENDOSCOPY | Facility: HOSPITAL | Age: 67
End: 2022-05-23
Payer: MEDICARE

## 2022-05-23 NOTE — TELEPHONE ENCOUNTER
Spoke with patient's daughter, Ms. Valverde, about scheduling for patient's colonoscopy.   Ms. Valverde stated that patient is currently at [facility] in [place], [place].  Ms. Valverde states that she will call [place] about scheduling for the screening colonoscopy when patient is back at home.

## 2022-06-20 DIAGNOSIS — D47.2 MGUS (MONOCLONAL GAMMOPATHY OF UNKNOWN SIGNIFICANCE): Primary | ICD-10-CM

## 2022-06-20 DIAGNOSIS — R64 CACHEXIA: ICD-10-CM

## 2022-06-20 DIAGNOSIS — E43 UNSPECIFIED SEVERE PROTEIN-CALORIE MALNUTRITION: ICD-10-CM

## 2022-08-01 DIAGNOSIS — E11.49 TYPE 2 DIABETES MELLITUS WITH OTHER NEUROLOGIC COMPLICATION, UNSPECIFIED WHETHER LONG TERM INSULIN USE: ICD-10-CM

## 2022-08-01 DIAGNOSIS — N18.30 STAGE 3 CHRONIC KIDNEY DISEASE, UNSPECIFIED WHETHER STAGE 3A OR 3B CKD: ICD-10-CM

## 2022-08-01 DIAGNOSIS — N17.9 AKI (ACUTE KIDNEY INJURY): Primary | ICD-10-CM

## 2024-07-25 NOTE — PLAN OF CARE
I am concerned you have a conjunctival infection in your eye from using your contacts too long.  Please do not place your contacts back in until completing the course of oral antibiotics and antibiotic eyedrops.  These prescriptions have been sent to your pharmacy.    Please work on cutting back your drinking.  This is causing damage to your liver and likely causing the tingling in your toes.  Please make an appoint with the primary care doctor soon as possible for a visit   VSS on RA. Patient disoriented to time, place, situation. Frequent weight shift encouraged. Hourly rounding performed, fall risk precautions maintained. Ambulating with standby assistance. No significant events, will continue to monitor.    Problem: Adult Inpatient Plan of Care  Goal: Optimal Comfort and Wellbeing  Outcome: Ongoing, Progressing     Problem: Skin Injury Risk Increased  Goal: Skin Health and Integrity  Outcome: Ongoing, Progressing     Problem: Fall Injury Risk  Goal: Absence of Fall and Fall-Related Injury  Outcome: Ongoing, Progressing

## 2024-09-17 NOTE — ASSESSMENT & PLAN NOTE
Hyperparathyroidism  - PTH elevated, secondary to MGUS/1HPTH  - appreciate nephrology consultation  - encourage fluid intake  - started low dose sensipar but change to daily for now as unable to follow directions of increasing oral intake  - no diuretics  - Ca++ now wnls     normal gait and station, no deformities

## 2025-02-27 NOTE — NURSING
Pt uncooperative , not able to comprehend plan of care , meds or treatments due to confusion and agitation. Walks around room and shouts at staff. When asked name and where she is is only able to state her first name and cannot state place date etc. Continually asks where she is and for her daughter.Medications crushed and put in pudding , pt took a spoonful and spitit out in her coffee and refuses to take any meds or pudding with meds in it. Does not eat tray unless set up and coaxed by sitter. Pt has sitter in room due to impulsiveness confusion  And safety concerns. Also has wobbley gait at times, holds onto counters etc. Reported condition and refusal of meds to suzy NICHOLS.   What Type Of Note Output Would You Prefer (Optional)?: Bullet Format Hpi Title: Evaluation of a Skin Lesion

## (undated) DEVICE — SOL 9P NACL IRR PIC IL

## (undated) DEVICE — SOL BETADINE 5%

## (undated) DEVICE — SET INFLOW TUBE HYSTER

## (undated) DEVICE — PAD PERI POST REPLACEMNT

## (undated) DEVICE — SEE MEDLINE ITEM 146313

## (undated) DEVICE — GLOVE BIOGEL SKINSENSE PI 6.0

## (undated) DEVICE — BANDAGE ADHESIVE

## (undated) DEVICE — SOL PVP-I SCRUB 7.5% 4OZ

## (undated) DEVICE — GLOVE BIOGEL SKINSENSE PI 6.5

## (undated) DEVICE — Device

## (undated) DEVICE — TRAY DRY SKIN SCRUB PREP

## (undated) DEVICE — SOL IRR NACL .9% 3000ML

## (undated) DEVICE — DRESSING LEUKOPLAST FLEX 1X3IN

## (undated) DEVICE — SET BASIN 48X48IN 6000ML RING

## (undated) DEVICE — BANDAID STRIP PLASTIC 3/4X3